# Patient Record
Sex: FEMALE | Race: WHITE | NOT HISPANIC OR LATINO | Employment: OTHER | URBAN - METROPOLITAN AREA
[De-identification: names, ages, dates, MRNs, and addresses within clinical notes are randomized per-mention and may not be internally consistent; named-entity substitution may affect disease eponyms.]

---

## 2017-01-03 ENCOUNTER — TELEPHONE (OUTPATIENT)
Dept: GASTROENTEROLOGY | Facility: CLINIC | Age: 72
End: 2017-01-03

## 2017-01-03 NOTE — TELEPHONE ENCOUNTER
Consider short term use of Pepcid Complete (famotidine/calcium carbonate). Please refer to the labeling instructions on the bottle.

## 2017-01-03 NOTE — TELEPHONE ENCOUNTER
Pt called, has been taking Protonix for over 1 year, dose was increased by Dr Moe at last visit to [facility].  She is having more heartburn/upper abd pain with some nausea.  She was on Nexium before it was OTC, and once it became OTC, insurance stopped covering.  Pt wants to know if she needs to change medicine or if there is anything else she should be doing?

## 2017-01-04 NOTE — TELEPHONE ENCOUNTER
Pt notified of Dr Moe's recommendation regarding Pepcid Complete.  She is to call back if this doesn't help

## 2017-01-10 ENCOUNTER — OFFICE VISIT (OUTPATIENT)
Dept: ORTHOPEDIC SURGERY | Facility: CLINIC | Age: 72
End: 2017-01-10

## 2017-01-10 VITALS — HEIGHT: 64 IN | BODY MASS INDEX: 34.83 KG/M2 | WEIGHT: 204 LBS | TEMPERATURE: 97.6 F

## 2017-01-10 DIAGNOSIS — M17.11 ARTHRITIS OF RIGHT KNEE: ICD-10-CM

## 2017-01-10 DIAGNOSIS — G89.29 CHRONIC PAIN OF RIGHT KNEE: Primary | ICD-10-CM

## 2017-01-10 DIAGNOSIS — M25.561 CHRONIC PAIN OF RIGHT KNEE: Primary | ICD-10-CM

## 2017-01-10 PROCEDURE — 99212 OFFICE O/P EST SF 10 MIN: CPT | Performed by: NURSE PRACTITIONER

## 2017-01-10 PROCEDURE — 73562 X-RAY EXAM OF KNEE 3: CPT | Performed by: NURSE PRACTITIONER

## 2017-01-10 RX ORDER — METHYLPREDNISOLONE 4 MG/1
TABLET ORAL
Qty: 21 TABLET | Refills: 0 | Status: SHIPPED | OUTPATIENT
Start: 2017-01-10 | End: 2017-05-15

## 2017-01-10 NOTE — MR AVS SNAPSHOT
Mahnaz Becerra   1/10/2017 3:45 PM   Office Visit    Dept Phone:  [phone]   Encounter #:  [id number]    Provider:  ROBERTA Howard   Department:  [facility]                Your Full Care Plan              Today's Medication Changes          These changes are accurate as of: 1/10/17  4:55 PM.  If you have any questions, ask your nurse or doctor.               New Medication(s)Ordered:     MethylPREDNISolone 4 MG tablet   Commonly known as:  MEDROL (MARGI)   Use as directed by package instructions   Started by:  ROBERTA Howard            Where to Get Your Medications      These medications were sent to [facility] [address] [place] [address] [address] AT [facility] - [phone] [facility] [phone]   [address] [facility] [address]     Phone:  [phone]     MethylPREDNISolone 4 MG tablet                  Your Updated Medication List          This list is accurate as of: 1/10/17  4:55 PM.  Always use your most recent med list.                albuterol 108 (90 BASE) MCG/ACT inhaler   Commonly known as:  VENTOLIN HFA   Inhale 2 puffs Every 6 (Six) Hours As Needed for wheezing.       CENTRUM SILVER ADULT 50+ PO       cyclobenzaprine 10 MG tablet   Commonly known as:  FLEXERIL   Take 1 tablet by mouth 2 (Two) Times a Day As Needed for muscle spasms.       dicyclomine 20 MG tablet   Commonly known as:  BENTYL   Take 1 tablet by mouth As Needed (abd cramps).       hydrochlorothiazide 25 MG tablet   Commonly known as:  HYDRODIURIL   TAKE 1 TABLET BY MOUTH DAILY       meloxicam 7.5 MG tablet   Commonly known as:  MOBIC       MethylPREDNISolone 4 MG tablet   Commonly known as:  MEDROL (MARGI)   Use as directed by package instructions       oxyCODONE-acetaminophen 7.5-325 MG per tablet   Commonly known as:  PERCOCET   1-2 Oral Q4H PRN severe pain       pantoprazole 40 MG EC tablet      Commonly known as:  PROTONIX   Take 1 tablet by mouth 2 (Two) Times a Day.       vitamin E 400 UNIT capsule               We Performed the Following     XR knee 1 or 2 vw bilateral     XR knee 1 or 2 vw right       You Were Diagnosed With        Codes Comments    Chronic pain of right knee    -  Primary ICD-10-CM: M25.561, G89.29  ICD-9-CM: 719.46, 338.29     Arthritis of right knee     ICD-10-CM: M19.90  ICD-9-CM: 716.96       Medications to be Given to You by a Medical Professional     Due       Frequency    2016 ipratropium-albuterol (DUO-NEB) nebulizer solution 3 mL  4 Times Daily - RT      Instructions     None    Patient Instructions History      Upcoming Appointments     Visit Type Date Time Department    FOLLOW UP 1/10/2017  3:45 PM [facility]    FOLLOW UP 2017 10:10 AM [facility]    FOLLOW UP 2017 10:30 AM [facility]      maniaTV Signup     [facility] [employer] allows you to send messages to your doctor, view your test results, renew your prescriptions, schedule appointments, and more. To sign up, go to [employer] and click on the Sign Up Now link in the New User? box. Enter your maniaTV Activation Code exactly as it appears below along with the last four digits of your Social Security Number and your Date of Birth () to complete the sign-up process. If you do not sign up before the expiration date, you must request a new code.    maniaTV Activation Code: [email or web address]  Expires: 2017  5:40 AM    If you have questions, you can email [email or web address] or call [phone] to talk to our maniaTV staff. Remember, maniaTV is NOT to be used for urgent needs. For medical emergencies, dial 911.               Other Info from Your Visit           Your Appointments     2017 10:10 AM EST   Follow Up with Artur Pat MD   [facility] [place] [facility] (--)    [address] [place] [address]  "[id number]   [phone]           Arrive 15 minutes prior to appointment.            May 04, 2017 10:30 AM EDT   Follow Up with Bernardo Botello DO   [facility] (--)    [address] [place] [id number] [phone]           Arrive 15 minutes prior to appointment.              Allergies     Lansoprazole  Itching    AND TURN RED    Levofloxacin  Swelling    RED RASH    Cefdinir Intolerance Other (See Comments)    Abdominal pain, caused upset stomach    Trazodone And Nefazodone Intolerance Other (See Comments)    Severe muscle cramps      Reason for Visit     Right Knee - Follow-up           Vital Signs     Temperature Height Weight Body Mass Index Smoking Status       97.6 °F (36.4 °C) (Temporal Artery ) 64\" (162.6 cm) 204 lb (92.5 kg) 35.02 kg/m2 Former Smoker       Problems and Diagnoses Noted     Arthritis of right knee    Knee pain, right      Results     XR knee 1 or 2 vw right           XR knee 1 or 2 vw bilateral               "

## 2017-02-02 ENCOUNTER — OFFICE VISIT (OUTPATIENT)
Dept: ORTHOPEDIC SURGERY | Facility: CLINIC | Age: 72
End: 2017-02-02

## 2017-02-02 VITALS — TEMPERATURE: 97.6 F | HEIGHT: 64 IN | BODY MASS INDEX: 34.66 KG/M2 | WEIGHT: 203 LBS

## 2017-02-02 DIAGNOSIS — M25.552 BILATERAL HIP PAIN: Primary | ICD-10-CM

## 2017-02-02 DIAGNOSIS — M25.551 BILATERAL HIP PAIN: Primary | ICD-10-CM

## 2017-02-02 DIAGNOSIS — M17.0 ARTHRITIS OF BOTH KNEES: ICD-10-CM

## 2017-02-02 PROCEDURE — 20610 DRAIN/INJ JOINT/BURSA W/O US: CPT | Performed by: NURSE PRACTITIONER

## 2017-02-02 RX ORDER — LIDOCAINE HYDROCHLORIDE 10 MG/ML
4 INJECTION, SOLUTION INFILTRATION; PERINEURAL
Status: COMPLETED | OUTPATIENT
Start: 2017-02-02 | End: 2017-02-02

## 2017-02-02 RX ORDER — LIDOCAINE HYDROCHLORIDE 10 MG/ML
2 INJECTION, SOLUTION INFILTRATION; PERINEURAL
Status: COMPLETED | OUTPATIENT
Start: 2017-02-02 | End: 2017-02-02

## 2017-02-02 RX ORDER — METHYLPREDNISOLONE ACETATE 80 MG/ML
80 INJECTION, SUSPENSION INTRA-ARTICULAR; INTRALESIONAL; INTRAMUSCULAR; SOFT TISSUE
Status: COMPLETED | OUTPATIENT
Start: 2017-02-02 | End: 2017-02-02

## 2017-02-02 RX ORDER — BUPIVACAINE HYDROCHLORIDE 5 MG/ML
2 INJECTION, SOLUTION PERINEURAL
Status: COMPLETED | OUTPATIENT
Start: 2017-02-02 | End: 2017-02-02

## 2017-02-02 RX ADMIN — LIDOCAINE HYDROCHLORIDE 2 ML: 10 INJECTION, SOLUTION INFILTRATION; PERINEURAL at 11:27

## 2017-02-02 RX ADMIN — BUPIVACAINE HYDROCHLORIDE 2 ML: 5 INJECTION, SOLUTION PERINEURAL at 11:27

## 2017-02-02 RX ADMIN — METHYLPREDNISOLONE ACETATE 80 MG: 80 INJECTION, SUSPENSION INTRA-ARTICULAR; INTRALESIONAL; INTRAMUSCULAR; SOFT TISSUE at 11:27

## 2017-02-02 RX ADMIN — LIDOCAINE HYDROCHLORIDE 4 ML: 10 INJECTION, SOLUTION INFILTRATION; PERINEURAL at 11:27

## 2017-02-02 NOTE — PROGRESS NOTES
"Knee Joint Injection      Patient: Mahnaz Becerra  YOB: 1945    Chief Complaints: Knee pain    Subjective:  This problem is not new to this examiner.     History of Present Illness: Pt gets intermittent  injections with good relief. Is here for repeat injection. Understands options. The pain is a generalized joint line tenderness.  It has been progressive in nature but remains intermittent.  Worsened by prolonged standing or walking and squatting activities. Has had improvement in the past with ice/heat, rest, and injections. TIMING:  The pain is described as ACUTE on CHRONIC.  LOCATION: medial joint line tenderness. AGGRAVATING FACTORS:  Is worsened by prolonged standing, sitting, walking and squatting activities.  ASSOCIATED SYMPTOMS:  swelling, tenderness, and aching. OTHER SYMPTOMS denies popping, locking or catching. RELIEVING FACTORS:  Previous treatment has included cortisone injections  viscosupplementation  OTC Tylenol  OTC meds/NSAIDS  prescription NSAIDS  controlled substances  activtiy modification  physical therapy  ice/heat/rest  pat rec'd L Knee Synvisc (doing well) & Rt knee Eddie inj 11/02/16. Pat has MS x17yrs, not on MS meds, does PT. Rt leg Sciatic nerve inflamed. R hip to ankle severely painful. Not sleeping, unable to WB very long. \"Absolutely miserable\" IS NOW IMPROVED MODERATELY after medrol dose pack , but is unable to flex right knee >100 with PT still. .      Allergies:   Allergies   Allergen Reactions   • Lansoprazole Itching     AND TURN RED   • Levofloxacin Swelling     RED RASH   • Cefdinir Other (See Comments)     Abdominal pain, caused upset stomach   • Trazodone And Nefazodone Other (See Comments)     Severe muscle cramps       Medications:   Home Medications:  Current Outpatient Prescriptions on File Prior to Visit   Medication Sig   • albuterol (VENTOLIN HFA) 108 (90 BASE) MCG/ACT inhaler Inhale 2 puffs Every 6 (Six) Hours As Needed for wheezing.   • cyclobenzaprine " (FLEXERIL) 10 MG tablet Take 1 tablet by mouth 2 (Two) Times a Day As Needed for muscle spasms.   • dicyclomine (BENTYL) 20 MG tablet Take 1 tablet by mouth As Needed (abd cramps).   • hydrochlorothiazide (HYDRODIURIL) 25 MG tablet TAKE 1 TABLET BY MOUTH DAILY   • meloxicam (MOBIC) 7.5 MG tablet Take 7.5 mg by mouth Daily.   • MethylPREDNISolone (MEDROL, MARGI,) 4 MG tablet Use as directed by package instructions   • Multiple Vitamins-Minerals (CENTRUM SILVER ADULT 50+ PO) Take  by mouth daily.   • oxyCODONE-acetaminophen (PERCOCET) 7.5-325 MG per tablet  1-2 Oral Q4H PRN severe pain   • pantoprazole (PROTONIX) 40 MG EC tablet Take 1 tablet by mouth 2 (Two) Times a Day.   • vitamin E 400 UNIT capsule Take 400 Units by mouth daily.     Current Facility-Administered Medications on File Prior to Visit   Medication   • ipratropium-albuterol (DUO-NEB) nebulizer solution 3 mL     Current Medications:  Scheduled Meds:  ipratropium-albuterol 3 mL Nebulization 4x Daily - RT     Continuous Infusions:   PRN Meds:.    I have reviewed the patient's medical history in detail and updated the computerized patient record.  Review and summarization of old records include:    Past Medical History   Diagnosis Date   • Acid reflux    • Asthma    • Bronchitis    • Edema    • IBS (irritable bowel syndrome)    • Knee pain, right    • Neuromuscular disorder      MS   • PONV (postoperative nausea and vomiting)      Patient states she had post-op nausea and vomiting after hysterectomy in .        Past Surgical History   Procedure Laterality Date   •  section     • Dilatation and curettage     • Breast surgery       REDUCTION   • Oophorectomy     • Knee surgery Left 2014   • Cholecystectomy     • Eye surgery Bilateral      BLEPHAROPLASTY   • Knee arthroscopy Right 2016     Procedure: KNEE ARTHROSCOPY, PARTIAL MEDIAL AND LATERAL MENISECTOMY, CHONDROPLASTY OF THE PATELLA, REMOVAL OF LOOSE BODIES;  Surgeon: Artur GOLD  MD Adilia;  Location:  [facility];  Service:    • Endoscopy  09/20/2012     Dr Moe   • Colonoscopy  09/20/2012     Dr Moe   • Breast lumpectomy Bilateral    • Hysterectomy  1987   • Tonsillectomy  1952   • Appendectomy  1997   • Endoscopy N/A 12/22/2016     Procedure: ESOPHAGOGASTRODUODENOSCOPY WITH BX;  Surgeon: Nasim Moe MD;  Location: [facility] ENDOSCOPY;  Service:         Social History     Occupational History   • Not on file.     Social History Main Topics   • Smoking status: Former Smoker     Packs/day: 0.25     Types: Cigarettes     Quit date: 1983   • Smokeless tobacco: Never Used      Comment: Patient states she was a social smoker.   • Alcohol use Yes      Comment: Occasional   • Drug use: No   • Sexual activity: Defer    Social History     Social History Narrative        Family History   Problem Relation Age of Onset   • Hypertension Mother    • Stroke Mother    • Alzheimer's disease Mother    • Heart disease Father    • Emphysema Father    • Stroke Maternal Grandmother    • Cancer Maternal Grandfather    • No Known Problems Paternal Grandmother    • Cerebral aneurysm Paternal Grandfather        ROS: 14 point review of systems was performed and was negative except for documented findings in HPI and today's encounter.     Allergies:   Allergies   Allergen Reactions   • Lansoprazole Itching     AND TURN RED   • Levofloxacin Swelling     RED RASH   • Cefdinir Other (See Comments)     Abdominal pain, caused upset stomach   • Trazodone And Nefazodone Other (See Comments)     Severe muscle cramps     Constitutional:  Denies fever, shaking or chills   Eyes:  Denies change in visual acuity   HENT:  Denies nasal congestion or sore throat   Respiratory:  Denies cough or shortness of breath   Cardiovascular:  Denies chest pain or severe LE edema   GI:  Denies abdominal pain, nausea, vomiting, bloody stools or diarrhea   Musculoskeletal:  Numbness, tingling, or loss of motor function only as noted above  in history of present illness.  : Denies painful urination or hematuria  Integument:  Denies rash, lesion or ulceration   Neurologic:  Denies headache or focal weakness  Endocrine:  Denies lymphadenopathy  Psych:  Denies confusion or change in mental status   Hem:  Denies active bleeding    Physical Exam:  Body mass index is 34.84 kg/(m^2).  Vitals:    02/02/17 1115   Temp: 97.6 °F (36.4 °C)     Vital Signs:  reviewed  Constitutional: Awake alert and oriented x3, well developed, no acute distress, non-toxic appearance.  EYES: symmetric, sclera clear  ENT:  Normocephalic, Atraumatic.   Respiratory:  No respiratory distress, No wheezing  CV: pulse regular, no palpitations or pallor.  GI:  Abdomen soft, non-tender.   Vascular:  Intact distal pulses, No cyanosis, no signs or symptoms of DVT.  Neurologic: Sensation grossly intact to the involved extremity, No focal deficits noted.   Neck: No tenderness, Supple.  Integument: warm, dry, no ulcerations.   Psychiatric:  Oriented, no pathological affect.  Musculoskeletal:    Affected knee(s):  Painful gait with a subtle limp, positive for synovitis, swelling, joint effusion with crepitation.  Lachman negative  Posterior drawer negative  Cherri's negative  Patellofemoral grind +  Sensation grossly intact to light touch throughout the lower extremity  Skin is intact  Distal pulses are palpable  No signs or symptoms of DVT        Diagnostic Data:     Imaging was done previously in the office and discussed at length with the patient:    Indication: pain related symptoms,  Views: 3V AP, LAT & 40 degree PA bilateral knee(s)   Findings: severe end-stage arthritis  Comparison views: viewed last xray done in the office.     Procedure:  Large Joint Arthrocentesis  Date/Time: 2/2/2017 11:27 AM  Consent given by: patient  Site marked: site marked  Timeout: Immediately prior to procedure a time out was called to verify the correct patient, procedure, equipment, support staff and  site/side marked as required   Supporting Documentation  Indications: pain and joint swelling   Procedure Details  Location: knee - R knee  Preparation: Patient was prepped and draped in the usual sterile fashion  Needle size: 22 G  Approach: anterolateral  Medications administered: 48 mg hylan 48 MG/6ML; 4 mL lidocaine 1 %  Patient tolerance: patient tolerated the procedure well with no immediate complications    Large Joint Arthrocentesis  Date/Time: 2/2/2017 11:27 AM  Consent given by: patient  Site marked: site marked  Timeout: Immediately prior to procedure a time out was called to verify the correct patient, procedure, equipment, support staff and site/side marked as required   Supporting Documentation  Indications: pain and joint swelling   Procedure Details  Location: knee - L knee  Preparation: Patient was prepped and draped in the usual sterile fashion  Needle size: 22 G  Approach: anterolateral  Medications administered: 2 mL bupivacaine; 2 mL lidocaine 1 %; 80 mg methylPREDNISolone acetate 80 MG/ML  Patient tolerance: patient tolerated the procedure well with no immediate complications          Assessment. Severe osteoarthritis bilateral knee(s).      Plan: Is to proceed with injection  Follow up as indicated.  Ice, elevate, and rest as needed.  Additional interventions include: Biomechanics of pertinent body area discussed.  Risks, benefits, alternatives, comparisons, and complications of accepted medicines, injections, recommendations, surgical procedures, and therapies explained and education provided in laymen's terms. The patient was given the opportunity to ask questions and they were answerved to their satisfaction.   Natural history and expected course discussed. Questions answered.  Advice on benefits of, and types of regular/moderate exercise.  Lifestyle measures for weight loss with biomechanical explanations for weight loss and how this affects orthopedic condition.  Cortisone Injection. See  procedure note.  Medications per orders; safety, precautions, and warnings given.  Cryotherapy/brachy therapy as indicated with instructions.   Viscosupplementation.  See procedure note.  Rest, ice, compression, and elevation (RICE) therapy.  3 mo f/u L knee synvisc if desires and right knee cortisone next visit.   2/2/2017  TRINH

## 2017-02-02 NOTE — PATIENT INSTRUCTIONS
Knee Injection, Care After  Refer to this sheet in the next few weeks. These instructions provide you with information about caring for yourself after your procedure. Your health care provider may also give you more specific instructions. Your treatment has been planned according to current medical practices, but problems sometimes occur. Call your health care provider if you have any problems or questions after your procedure.  WHAT TO EXPECT AFTER THE PROCEDURE  After your procedure, it is common to have:  · Soreness.  · Warmth.  · Swelling.  You may have more pain, swelling, and warmth than you did before the injection. This reaction may last for about one day.   HOME CARE INSTRUCTIONS  Bathing  · If you were given a bandage (dressing), keep it dry until your health care provider says it can be removed. Ask your health care provider when you can start showering or taking a bath.   Managing Pain, Stiffness, and Swelling  · If directed, apply ice to the injection area:    Put ice in a plastic bag.    Place a towel between your skin and the bag.    Leave the ice on for 20 minutes, 2-3 times per day.  · Do not apply heat to your knee.  · Raise the injection area above the level of your heart while you are sitting or lying down.  Activity  · Avoid strenuous activities for as long as directed by your health care provider. Ask your health care provider when you can return to your normal activities.   General Instructions  · Take medicines only as directed by your health care provider.  · Do not take aspirin or other over-the-counter medicines unless your health care provider says you can.  · Check your injection site every day for signs of infection. Watch for:    Redness, swelling, or pain.    Fluid, blood, or pus.  · Follow your health care provider's instructions about dressing changes and removal.  SEEK MEDICAL CARE IF:  · You have symptoms at your injection site that last longer than two days after your  procedure.  · You have redness, swelling, or pain in your injection area.  · You have fluid, blood, or pus coming from your injection site.  · You have warmth in your injection area.  · You have a fever.  · Your pain is not controlled with medicine.  SEEK IMMEDIATE MEDICAL CARE IF:  · Your knee turns very red.  · Your knee becomes very swollen.  · Your knee pain is severe.     This information is not intended to replace advice given to you by your health care provider. Make sure you discuss any questions you have with your health care provider.     Document Released: 01/08/2016 Document Reviewed: 01/08/2016  [employer] Patient Education ©2016 [employer]

## 2017-02-28 ENCOUNTER — RESULTS ENCOUNTER (OUTPATIENT)
Dept: FAMILY MEDICINE CLINIC | Facility: CLINIC | Age: 72
End: 2017-02-28

## 2017-02-28 DIAGNOSIS — E78.5 HYPERLIPIDEMIA, UNSPECIFIED HYPERLIPIDEMIA TYPE: ICD-10-CM

## 2017-03-17 ENCOUNTER — TELEPHONE (OUTPATIENT)
Dept: FAMILY MEDICINE CLINIC | Facility: CLINIC | Age: 72
End: 2017-03-17

## 2017-03-17 RX ORDER — AZITHROMYCIN 250 MG/1
TABLET, FILM COATED ORAL
Qty: 6 TABLET | Refills: 0 | Status: SHIPPED | OUTPATIENT
Start: 2017-03-17 | End: 2017-05-15

## 2017-03-22 ENCOUNTER — TELEPHONE (OUTPATIENT)
Dept: FAMILY MEDICINE CLINIC | Facility: CLINIC | Age: 72
End: 2017-03-22

## 2017-05-01 RX ORDER — HYDROCHLOROTHIAZIDE 25 MG/1
TABLET ORAL
Qty: 30 TABLET | Refills: 4 | Status: SHIPPED | OUTPATIENT
Start: 2017-05-01 | End: 2017-10-15 | Stop reason: SDUPTHER

## 2017-05-03 ENCOUNTER — CLINICAL SUPPORT (OUTPATIENT)
Dept: ORTHOPEDIC SURGERY | Facility: CLINIC | Age: 72
End: 2017-05-03

## 2017-05-03 VITALS — HEIGHT: 63 IN | BODY MASS INDEX: 34.91 KG/M2 | TEMPERATURE: 97.2 F | WEIGHT: 197 LBS

## 2017-05-03 DIAGNOSIS — M17.0 ARTHRITIS OF BOTH KNEES: Primary | ICD-10-CM

## 2017-05-03 PROCEDURE — 20610 DRAIN/INJ JOINT/BURSA W/O US: CPT | Performed by: NURSE PRACTITIONER

## 2017-05-03 RX ORDER — METHYLPREDNISOLONE ACETATE 80 MG/ML
80 INJECTION, SUSPENSION INTRA-ARTICULAR; INTRALESIONAL; INTRAMUSCULAR; SOFT TISSUE
Status: COMPLETED | OUTPATIENT
Start: 2017-05-03 | End: 2017-05-03

## 2017-05-03 RX ORDER — LIDOCAINE HYDROCHLORIDE 10 MG/ML
2 INJECTION, SOLUTION INFILTRATION; PERINEURAL
Status: COMPLETED | OUTPATIENT
Start: 2017-05-03 | End: 2017-05-03

## 2017-05-03 RX ORDER — BUPIVACAINE HYDROCHLORIDE 2.5 MG/ML
2 INJECTION, SOLUTION INFILTRATION; PERINEURAL
Status: COMPLETED | OUTPATIENT
Start: 2017-05-03 | End: 2017-05-03

## 2017-05-03 RX ORDER — LIDOCAINE HYDROCHLORIDE 10 MG/ML
4 INJECTION, SOLUTION INFILTRATION; PERINEURAL
Status: COMPLETED | OUTPATIENT
Start: 2017-05-03 | End: 2017-05-03

## 2017-05-03 RX ADMIN — LIDOCAINE HYDROCHLORIDE 2 ML: 10 INJECTION, SOLUTION INFILTRATION; PERINEURAL at 14:01

## 2017-05-03 RX ADMIN — BUPIVACAINE HYDROCHLORIDE 2 ML: 2.5 INJECTION, SOLUTION INFILTRATION; PERINEURAL at 14:01

## 2017-05-03 RX ADMIN — LIDOCAINE HYDROCHLORIDE 4 ML: 10 INJECTION, SOLUTION INFILTRATION; PERINEURAL at 14:01

## 2017-05-03 RX ADMIN — METHYLPREDNISOLONE ACETATE 80 MG: 80 INJECTION, SUSPENSION INTRA-ARTICULAR; INTRALESIONAL; INTRAMUSCULAR; SOFT TISSUE at 14:01

## 2017-05-04 ENCOUNTER — TELEPHONE (OUTPATIENT)
Dept: ORTHOPEDIC SURGERY | Facility: CLINIC | Age: 72
End: 2017-05-04

## 2017-05-15 ENCOUNTER — OFFICE VISIT (OUTPATIENT)
Dept: FAMILY MEDICINE CLINIC | Facility: CLINIC | Age: 72
End: 2017-05-15

## 2017-05-15 VITALS
WEIGHT: 198 LBS | OXYGEN SATURATION: 98 % | BODY MASS INDEX: 35.08 KG/M2 | HEART RATE: 58 BPM | SYSTOLIC BLOOD PRESSURE: 128 MMHG | TEMPERATURE: 97.7 F | HEIGHT: 63 IN | DIASTOLIC BLOOD PRESSURE: 84 MMHG

## 2017-05-15 DIAGNOSIS — D72.829 LEUKOCYTOSIS, UNSPECIFIED TYPE: ICD-10-CM

## 2017-05-15 DIAGNOSIS — I10 BENIGN ESSENTIAL HYPERTENSION: Primary | ICD-10-CM

## 2017-05-15 DIAGNOSIS — E78.5 DYSLIPIDEMIA: ICD-10-CM

## 2017-05-15 DIAGNOSIS — G47.33 OSA (OBSTRUCTIVE SLEEP APNEA): ICD-10-CM

## 2017-05-15 DIAGNOSIS — E03.8 OTHER SPECIFIED HYPOTHYROIDISM: ICD-10-CM

## 2017-05-15 PROCEDURE — 99214 OFFICE O/P EST MOD 30 MIN: CPT | Performed by: FAMILY MEDICINE

## 2017-05-25 DIAGNOSIS — R52 PAIN: ICD-10-CM

## 2017-05-25 RX ORDER — MELOXICAM 7.5 MG/1
TABLET ORAL
Qty: 90 TABLET | Refills: 3 | Status: SHIPPED | OUTPATIENT
Start: 2017-05-25 | End: 2017-08-08 | Stop reason: SDUPTHER

## 2017-06-14 ENCOUNTER — TRANSCRIBE ORDERS (OUTPATIENT)
Dept: ADMINISTRATIVE | Facility: HOSPITAL | Age: 72
End: 2017-06-14

## 2017-06-14 DIAGNOSIS — R42 VERTIGO: ICD-10-CM

## 2017-06-14 DIAGNOSIS — H53.2 DIPLOPIA: Primary | ICD-10-CM

## 2017-06-21 ENCOUNTER — APPOINTMENT (OUTPATIENT)
Dept: CARDIOLOGY | Facility: HOSPITAL | Age: 72
End: 2017-06-21

## 2017-06-21 ENCOUNTER — APPOINTMENT (OUTPATIENT)
Dept: MRI IMAGING | Facility: HOSPITAL | Age: 72
End: 2017-06-21

## 2017-06-29 ENCOUNTER — HOSPITAL ENCOUNTER (OUTPATIENT)
Dept: CARDIOLOGY | Facility: HOSPITAL | Age: 72
Discharge: HOME | End: 2017-06-29

## 2017-06-29 ENCOUNTER — HOSPITAL ENCOUNTER (OUTPATIENT)
Dept: MRI IMAGING | Facility: HOSPITAL | Age: 72
Discharge: HOME | End: 2017-06-29
Admitting: PSYCHIATRY & NEUROLOGY

## 2017-06-29 DIAGNOSIS — H53.2 DIPLOPIA: ICD-10-CM

## 2017-06-29 DIAGNOSIS — R42 VERTIGO: ICD-10-CM

## 2017-06-29 LAB — CREAT BLDA-MCNC: 0.7 MG/DL (ref 0.6–1.3)

## 2017-06-29 PROCEDURE — 82565 ASSAY OF CREATININE: CPT

## 2017-06-29 PROCEDURE — A9577 INJ MULTIHANCE: HCPCS | Performed by: PSYCHIATRY & NEUROLOGY

## 2017-06-29 PROCEDURE — 0 GADOBENATE DIMEGLUMINE 529 MG/ML SOLUTION: Performed by: PSYCHIATRY & NEUROLOGY

## 2017-06-29 PROCEDURE — 70553 MRI BRAIN STEM W/O & W/DYE: CPT

## 2017-06-29 PROCEDURE — 93880 EXTRACRANIAL BILAT STUDY: CPT

## 2017-06-29 RX ADMIN — GADOBENATE DIMEGLUMINE 18 ML: 529 INJECTION, SOLUTION INTRAVENOUS at 15:23

## 2017-06-30 LAB
BH CV XLRA MEAS LEFT CCA RATIO VEL: 68.6 CM/SEC
BH CV XLRA MEAS LEFT DIST CCA EDV: 20.5 CM/SEC
BH CV XLRA MEAS LEFT DIST CCA PSV: 68.6 CM/SEC
BH CV XLRA MEAS LEFT DIST ICA EDV: -33 CM/SEC
BH CV XLRA MEAS LEFT DIST ICA PSV: -69.1 CM/SEC
BH CV XLRA MEAS LEFT ICA RATIO VEL: 89.1 CM/SEC
BH CV XLRA MEAS LEFT ICA/CCA RATIO: 1.3
BH CV XLRA MEAS LEFT MID ICA EDV: 33.4 CM/SEC
BH CV XLRA MEAS LEFT MID ICA PSV: 89.1 CM/SEC
BH CV XLRA MEAS LEFT PROX CCA EDV: 31.9 CM/SEC
BH CV XLRA MEAS LEFT PROX CCA PSV: 140 CM/SEC
BH CV XLRA MEAS LEFT PROX ECA EDV: -10 CM/SEC
BH CV XLRA MEAS LEFT PROX ECA PSV: -87.9 CM/SEC
BH CV XLRA MEAS LEFT PROX ICA EDV: -28.1 CM/SEC
BH CV XLRA MEAS LEFT PROX ICA PSV: -75.6 CM/SEC
BH CV XLRA MEAS LEFT PROX SCLA EDV: 11 CM/SEC
BH CV XLRA MEAS LEFT PROX SCLA PSV: 134 CM/SEC
BH CV XLRA MEAS LEFT VERTEBRAL A EDV: 16.4 CM/SEC
BH CV XLRA MEAS LEFT VERTEBRAL A PSV: 66.8 CM/SEC
BH CV XLRA MEAS RIGHT CCA RATIO VEL: 78.6 CM/SEC
BH CV XLRA MEAS RIGHT DIST CCA EDV: 21.1 CM/SEC
BH CV XLRA MEAS RIGHT DIST CCA PSV: 78.6 CM/SEC
BH CV XLRA MEAS RIGHT DIST ICA EDV: -29.9 CM/SEC
BH CV XLRA MEAS RIGHT DIST ICA PSV: -72.3 CM/SEC
BH CV XLRA MEAS RIGHT ICA RATIO VEL: -90.3 CM/SEC
BH CV XLRA MEAS RIGHT ICA/CCA RATIO: -1.1
BH CV XLRA MEAS RIGHT MID ICA EDV: -36.4 CM/SEC
BH CV XLRA MEAS RIGHT MID ICA PSV: -90.3 CM/SEC
BH CV XLRA MEAS RIGHT PROX CCA EDV: 28.3 CM/SEC
BH CV XLRA MEAS RIGHT PROX CCA PSV: 136 CM/SEC
BH CV XLRA MEAS RIGHT PROX ECA EDV: -9.6 CM/SEC
BH CV XLRA MEAS RIGHT PROX ECA PSV: -92.5 CM/SEC
BH CV XLRA MEAS RIGHT PROX ICA EDV: -21.1 CM/SEC
BH CV XLRA MEAS RIGHT PROX ICA PSV: -85 CM/SEC
BH CV XLRA MEAS RIGHT PROX SCLA PSV: 135 CM/SEC
BH CV XLRA MEAS RIGHT VERTEBRAL A EDV: 9 CM/SEC
BH CV XLRA MEAS RIGHT VERTEBRAL A PSV: 49.1 CM/SEC
LEFT ARM BP: NORMAL MMHG
RIGHT ARM BP: NORMAL MMHG

## 2017-08-08 ENCOUNTER — CLINICAL SUPPORT (OUTPATIENT)
Dept: ORTHOPEDIC SURGERY | Facility: CLINIC | Age: 72
End: 2017-08-08

## 2017-08-08 VITALS — BODY MASS INDEX: 35.08 KG/M2 | TEMPERATURE: 97.7 F | HEIGHT: 63 IN | WEIGHT: 198 LBS

## 2017-08-08 DIAGNOSIS — M17.11 ARTHRITIS OF RIGHT KNEE: ICD-10-CM

## 2017-08-08 DIAGNOSIS — Z98.890 S/P RIGHT KNEE ARTHROSCOPY: Primary | ICD-10-CM

## 2017-08-08 DIAGNOSIS — M25.562 CHRONIC PAIN OF LEFT KNEE: ICD-10-CM

## 2017-08-08 DIAGNOSIS — G89.29 CHRONIC PAIN OF LEFT KNEE: ICD-10-CM

## 2017-08-08 DIAGNOSIS — M17.12 ARTHRITIS OF LEFT KNEE: ICD-10-CM

## 2017-08-08 PROCEDURE — 73562 X-RAY EXAM OF KNEE 3: CPT | Performed by: ORTHOPAEDIC SURGERY

## 2017-08-08 PROCEDURE — 20610 DRAIN/INJ JOINT/BURSA W/O US: CPT | Performed by: ORTHOPAEDIC SURGERY

## 2017-08-08 PROCEDURE — 99213 OFFICE O/P EST LOW 20 MIN: CPT | Performed by: ORTHOPAEDIC SURGERY

## 2017-08-08 RX ORDER — METHYLPREDNISOLONE ACETATE 80 MG/ML
80 INJECTION, SUSPENSION INTRA-ARTICULAR; INTRALESIONAL; INTRAMUSCULAR; SOFT TISSUE
Status: COMPLETED | OUTPATIENT
Start: 2017-08-08 | End: 2017-08-08

## 2017-08-08 RX ORDER — BUPIVACAINE HYDROCHLORIDE 5 MG/ML
2 INJECTION, SOLUTION PERINEURAL
Status: COMPLETED | OUTPATIENT
Start: 2017-08-08 | End: 2017-08-08

## 2017-08-08 RX ORDER — NABUMETONE 500 MG/1
500 TABLET, FILM COATED ORAL 2 TIMES DAILY PRN
Qty: 60 TABLET | Refills: 5 | Status: SHIPPED | OUTPATIENT
Start: 2017-08-08 | End: 2017-11-09

## 2017-08-08 RX ORDER — LIDOCAINE HYDROCHLORIDE 10 MG/ML
4 INJECTION, SOLUTION INFILTRATION; PERINEURAL
Status: COMPLETED | OUTPATIENT
Start: 2017-08-08 | End: 2017-08-08

## 2017-08-08 RX ORDER — LIDOCAINE HYDROCHLORIDE 10 MG/ML
2 INJECTION, SOLUTION INFILTRATION; PERINEURAL
Status: COMPLETED | OUTPATIENT
Start: 2017-08-08 | End: 2017-08-08

## 2017-08-08 RX ADMIN — BUPIVACAINE HYDROCHLORIDE 2 ML: 5 INJECTION, SOLUTION PERINEURAL at 10:53

## 2017-08-08 RX ADMIN — METHYLPREDNISOLONE ACETATE 80 MG: 80 INJECTION, SUSPENSION INTRA-ARTICULAR; INTRALESIONAL; INTRAMUSCULAR; SOFT TISSUE at 10:53

## 2017-08-08 RX ADMIN — LIDOCAINE HYDROCHLORIDE 4 ML: 10 INJECTION, SOLUTION INFILTRATION; PERINEURAL at 10:54

## 2017-08-08 RX ADMIN — LIDOCAINE HYDROCHLORIDE 2 ML: 10 INJECTION, SOLUTION INFILTRATION; PERINEURAL at 10:53

## 2017-08-08 NOTE — PROGRESS NOTES
Knee Follow Up Visit      Patient: Mahnaz Becerra  YOB: 1945    Chief Complaints:  bilateral knee pain    Subjective:    History of Present Illness: Here today for knee pain. The pain is a generalized joint tenderness.  It has been progressive in nature but remains intermittent.  Worsened by prolonged standing or walking and squatting activities. Has had improvement in the past with ice/heat, rest, and injections. Had synvisc on the left.  Helps  Achy.  Atraumatic.  contant with intermittent severe.  Has MS>  mobic not helping as much.  Went over precuations    This problem is not new to this examiner.     Allergies:   Allergies   Allergen Reactions   • Lansoprazole Itching     AND TURN RED   • Levofloxacin Swelling     RED RASH   • Cefdinir Other (See Comments)     Abdominal pain, caused upset stomach   • Trazodone And Nefazodone Other (See Comments)     Severe muscle cramps       Medications:   Home Medications:  Current Outpatient Prescriptions on File Prior to Visit   Medication Sig   • albuterol (VENTOLIN HFA) 108 (90 BASE) MCG/ACT inhaler Inhale 2 puffs Every 6 (Six) Hours As Needed for wheezing.   • cyclobenzaprine (FLEXERIL) 10 MG tablet Take 1 tablet by mouth 2 (Two) Times a Day As Needed for muscle spasms.   • dicyclomine (BENTYL) 20 MG tablet Take 1 tablet by mouth As Needed (abd cramps).   • hydrochlorothiazide (HYDRODIURIL) 25 MG tablet TAKE 1 TABLET BY MOUTH DAILY   • Multiple Vitamins-Minerals (CENTRUM SILVER ADULT 50+ PO) Take  by mouth daily.   • oxyCODONE-acetaminophen (PERCOCET) 7.5-325 MG per tablet  1-2 Oral Q4H PRN severe pain   • pantoprazole (PROTONIX) 40 MG EC tablet Take 1 tablet by mouth 2 (Two) Times a Day.   • vitamin E 400 UNIT capsule Take 400 Units by mouth daily.   • [DISCONTINUED] meloxicam (MOBIC) 7.5 MG tablet Take 7.5 mg by mouth Daily.   • [DISCONTINUED] meloxicam (MOBIC) 7.5 MG tablet TAKE 1 TABLET BY MOUTH EVERY DAY     Current Facility-Administered Medications  on File Prior to Visit   Medication   • ipratropium-albuterol (DUO-NEB) nebulizer solution 3 mL     Current Medications:  Scheduled Meds:    ipratropium-albuterol 3 mL Nebulization 4x Daily - RT     Continuous Infusions:   PRN Meds:.    I have reviewed the patient's medical history in detail and updated the computerized patient record.  Review and summarization of old records include:    Past Medical History:   Diagnosis Date   • Acid reflux    • Asthma    • Bronchitis    • Edema    • IBS (irritable bowel syndrome)    • Knee pain, right    • Neuromuscular disorder     MS   • PONV (postoperative nausea and vomiting)     Patient states she had post-op nausea and vomiting after hysterectomy in .        Past Surgical History:   Procedure Laterality Date   • APPENDECTOMY     • BREAST LUMPECTOMY Bilateral    • BREAST SURGERY      REDUCTION   •  SECTION     • CHOLECYSTECTOMY     • COLONOSCOPY  2012    Dr Moe   • DILATATION AND CURETTAGE     • ENDOSCOPY  2012    Dr Moe   • ENDOSCOPY N/A 2016    Procedure: ESOPHAGOGASTRODUODENOSCOPY WITH BX;  Surgeon: Nasim Moe MD;  Location: [facility] ENDOSCOPY;  Service:    • EYE SURGERY Bilateral     BLEPHAROPLASTY   • HYSTERECTOMY     • KNEE ARTHROSCOPY Right 2016    Procedure: KNEE ARTHROSCOPY, PARTIAL MEDIAL AND LATERAL MENISECTOMY, CHONDROPLASTY OF THE PATELLA, REMOVAL OF LOOSE BODIES;  Surgeon: Artur Pat MD;  Location: [facility] OR [facility] [place];  Service:    • KNEE SURGERY Left 2014   • OOPHORECTOMY     • TONSILLECTOMY          Social History     Occupational History   • Not on file.     Social History Main Topics   • Smoking status: Former Smoker     Packs/day: 0.25     Types: Cigarettes     Quit date:    • Smokeless tobacco: Never Used      Comment: Patient states she was a social smoker.   • Alcohol use Yes      Comment: Occasional   • Drug use: No   • Sexual activity: Defer      Social History     Social  History Narrative        Family History   Problem Relation Age of Onset   • Hypertension Mother    • Stroke Mother    • Alzheimer's disease Mother    • Heart disease Father    • Emphysema Father    • Stroke Maternal Grandmother    • Cancer Maternal Grandfather    • No Known Problems Paternal Grandmother    • Cerebral aneurysm Paternal Grandfather        ROS: 14 point review of systems was performed and was negative except for documented findings in HPI and today's encounter.     Allergies:   Allergies   Allergen Reactions   • Lansoprazole Itching     AND TURN RED   • Levofloxacin Swelling     RED RASH   • Cefdinir Other (See Comments)     Abdominal pain, caused upset stomach   • Trazodone And Nefazodone Other (See Comments)     Severe muscle cramps     Constitutional:  Denies fever, shaking or chills   Eyes:  Denies change in visual acuity   HENT:  Denies nasal congestion or sore throat   Respiratory:  Denies cough or shortness of breath   Cardiovascular:  Denies chest pain or severe LE edema   GI:  Denies abdominal pain, nausea, vomiting, bloody stools or diarrhea   Musculoskeletal:  Numbness, tingling, or loss of motor function only as noted above in history of present illness.  : Denies painful urination or hematuria  Integument:  Denies rash, lesion or ulceration   Neurologic:  Denies headache or focal weakness  Endocrine:  Denies lymphadenopathy  Psych:  Denies confusion or change in mental status   Hem:  Denies active bleeding    Physical Exam:  Body mass index is 35.07 kg/(m^2).  Vitals:    08/08/17 1052   Temp: 97.7 °F (36.5 °C)     Vital Signs:  reviewed  Constitutional: Awake alert and oriented x3, well developed, no acute distress, non-toxic appearance.  EYES: symmetric, sclera clear  ENT:  Normocephalic, Atraumatic.   Respiratory:  No respiratory distress, No wheezing  CV: pulse regular, no palpitations or pallor.  GI:  Abdomen soft, non-tender.   Vascular:  Intact distal pulses, No cyanosis, no signs  or symptoms of DVT.  Neurologic: Sensation grossly intact to the involved extremity, No focal deficits noted.   Neck: No tenderness, Supple.  Integument: warm, dry, no ulcerations.   Psychiatric:  Oriented, no pathological affect.  Musculoskeletal:    Affected knee(s):  Painful gait with a subtle limp, positive for synovitis, swelling, joint effusion with crepitation.  Lachman negative  Posterior drawer negative  Cherri's negative  Patellofemoral grind +  Sensation grossly intact to light touch throughout the lower extremity  Skin is intact  Distal pulses are palpable  No signs or symptoms of DVT        Diagnostic Data:     Imaging was done today and discussed at length with the patient:    Indication: pain related symptoms,  Views: 3V AP, LAT & 40 degree PA bilateral knee(s)   Findings: severe end-stage arthritis (bone on bone, subchondral sclerosis/cysts, osteophytes), advanced arthritis  Comparison views: viewed last xray done in the office.     Procedure:  Large Joint Arthrocentesis  Date/Time: 8/8/2017 10:53 AM  Consent given by: patient  Site marked: site marked  Timeout: Immediately prior to procedure a time out was called to verify the correct patient, procedure, equipment, support staff and site/side marked as required   Supporting Documentation  Indications: pain   Procedure Details  Location: knee - L knee  Needle size: 25 G  Approach: anteromedial  Medications administered: 80 mg methylPREDNISolone acetate 80 MG/ML; 2 mL lidocaine 1 %; 2 mL bupivacaine  Patient tolerance: patient tolerated the procedure well with no immediate complications    Large Joint Arthrocentesis  Date/Time: 8/8/2017 10:54 AM  Consent given by: patient  Site marked: site marked  Timeout: Immediately prior to procedure a time out was called to verify the correct patient, procedure, equipment, support staff and site/side marked as required   Supporting Documentation  Indications: pain   Procedure Details  Location: knee - R  knee  Needle size: 25 G  Approach: anteromedial  Medications administered: 4 mL lidocaine 1 %; 48 mg hylan 48 MG/6ML  Patient tolerance: patient tolerated the procedure well with no immediate complications          Assessment. Severe arthritis bilateral knee(s).      Plan: Is to proceed with injection  Follow up as indicated.  Ice, elevate, and rest as needed.  Additional interventions include: Biomechanics of pertinent body area discussed.  Risks, benefits, alternatives, comparisons, and complications of accepted medicines, injections, recommendations, surgical procedures, and therapies explained and education provided in laymen's terms. The patient was given the opportunity to ask questions and they were answerved to their satisfaction.   Natural history and expected course discussed. Questions answered.  Lifestyle measures for weight loss with biomechanical explanations for weight loss and how this affects orthopedic condition.  Cortisone Injection for DIAGNOSTIC and THERAPUTIC purposes.  Medications per orders; safety, precautions, and warnings given.  PT referral offered and declined, advised on stretching/strengthening exercises.  15 min spent face to face with patient >10 min spent counseling about natural history and expected course of assessed complaint. Questions answered.  Advised on knee strengthening exercises, stressed importance of these with progression of arthritis, demonstrated exercises to patient with repeat demonstration and verb of understanding.   Viscosupplementation.  See procedure note.  Compression/elastic brace/support sleeve.   Biomechanics and proper use of cane/ambulatory aids.    8/8/2017

## 2017-08-14 ENCOUNTER — TELEPHONE (OUTPATIENT)
Dept: ORTHOPEDIC SURGERY | Facility: CLINIC | Age: 72
End: 2017-08-14

## 2017-08-14 DIAGNOSIS — M17.11 ARTHRITIS OF RIGHT KNEE: Primary | ICD-10-CM

## 2017-08-14 RX ORDER — TRAMADOL HYDROCHLORIDE 50 MG/1
50 TABLET ORAL EVERY 4 HOURS PRN
Qty: 30 TABLET | Refills: 0 | Status: SHIPPED | OUTPATIENT
Start: 2017-08-14 | End: 2017-11-09 | Stop reason: SDUPTHER

## 2017-08-14 NOTE — TELEPHONE ENCOUNTER
Rx was called in to the [facility] pharmacy on file and the patient has been notified as well.

## 2017-08-14 NOTE — TELEPHONE ENCOUNTER
Discussed reaction and encouraged to ice, rest and elevate as needed until reaction goes down.  Cannot sleep and is not getting any relief. Will go ahead and give a limited dose with precautions.   Thanks TRINH

## 2017-09-22 ENCOUNTER — OFFICE VISIT (OUTPATIENT)
Dept: FAMILY MEDICINE CLINIC | Facility: CLINIC | Age: 72
End: 2017-09-22

## 2017-09-22 VITALS
HEIGHT: 63 IN | TEMPERATURE: 98.2 F | OXYGEN SATURATION: 97 % | BODY MASS INDEX: 35.79 KG/M2 | HEART RATE: 81 BPM | DIASTOLIC BLOOD PRESSURE: 72 MMHG | SYSTOLIC BLOOD PRESSURE: 136 MMHG | WEIGHT: 202 LBS
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
## 2017-09-22 PROCEDURE — 99213 OFFICE O/P EST LOW 20 MIN: CPT | Performed by: NURSE PRACTITIONER

## 2017-09-22 RX ORDER — DIAPER,BRIEF,INFANT-TODD,DISP
EACH MISCELLANEOUS 2 TIMES DAILY
Qty: 56 G | Refills: 0 | Status: SHIPPED | OUTPATIENT
Start: 2017-09-22 | End: 2018-01-25

## 2017-09-22 RX ORDER — CEPHALEXIN 500 MG/1
500 CAPSULE ORAL 2 TIMES DAILY
Qty: 20 CAPSULE | Refills: 0 | Status: SHIPPED | OUTPATIENT
Start: 2017-09-22 | End: 2017-11-09

## 2017-09-22 NOTE — PROGRESS NOTES
Lopez Becerra is a 72 y.o. female. She is here for a possible spider bite under her L armpit, on the R side of her chest and right side of her abdomen. She was trimming shrubs last night and thinks she was bit by a spider. She did notice there were several spider webs on the shrubs which she knocked to the side. She went to therapy this morning and while there noticed some stinging to the area on the right upper chest. When she was checking it out, she noticed there was also areas in the left axilla and on the left side of the abdomen. The last time this happened she developed cellulitis.     Insect Bite   This is a new problem. The current episode started yesterday. The problem occurs constantly. The problem has been gradually worsening. Pertinent negatives include no chills or fever. Associated symptoms comments: Stinging of the skin  . Nothing aggravates the symptoms. Treatments tried: Benadryl cream.        The following portions of the patient's history were reviewed and updated as appropriate: allergies, current medications, past medical history, past social history, past surgical history and problem list.    Review of Systems   Constitutional: Negative.  Negative for chills and fever.   Respiratory: Negative.    Cardiovascular: Negative.    Skin:        3 areas of redness.        Objective   Physical Exam   Constitutional: She appears well-developed and well-nourished.   Cardiovascular: Normal rate, regular rhythm and normal heart sounds.    Pulmonary/Chest: Effort normal and breath sounds normal.   Skin: Skin is warm and dry. There is erythema.        Area on the right upper chest with erythema, vesicles and warmth, area on the right upper abdomen with erythema, warmth, area under the left axilla with erythema, warmth, No drainage.    Nursing note and vitals reviewed.    Vitals:    09/22/17 1558   BP: 136/72   Pulse: 81   Temp: 98.2 °F (36.8 °C)   TempSrc: Oral   SpO2: 97%   Weight: 202 lb  "(91.6 kg)   Height: 63\" (160 cm)       Assessment/Plan   Diagnoses and all orders for this visit:    Insect bite, initial encounter  -     hydrocortisone 1 % ointment; Apply  topically 2 (Two) Times a Day.  -     cephalexin (KEFLEX) 500 MG capsule; Take 1 capsule by mouth 2 (Two) Times a Day.      States she has taken Keflex before and tolerated well. She does not have true allergy to cefdinir, just upsets her stomach. Keflex ok.  Keep clean and dry.  Monitor for worsening infection.  RTC prn         "

## 2017-10-16 ENCOUNTER — TELEPHONE (OUTPATIENT)
Dept: FAMILY MEDICINE CLINIC | Facility: CLINIC | Age: 72
End: 2017-10-16

## 2017-10-16 RX ORDER — HYDROCHLOROTHIAZIDE 25 MG/1
TABLET ORAL
Qty: 30 TABLET | Refills: 6 | Status: SHIPPED | OUTPATIENT
Start: 2017-10-16 | End: 2017-10-16 | Stop reason: SDUPTHER

## 2017-10-16 RX ORDER — HYDROCHLOROTHIAZIDE 25 MG/1
25 TABLET ORAL DAILY
Qty: 90 TABLET | Refills: 1 | Status: SHIPPED | OUTPATIENT
Start: 2017-10-16 | End: 2018-05-13 | Stop reason: SDUPTHER

## 2017-11-09 ENCOUNTER — CLINICAL SUPPORT (OUTPATIENT)
Dept: ORTHOPEDIC SURGERY | Facility: CLINIC | Age: 72
End: 2017-11-09

## 2017-11-09 VITALS — WEIGHT: 202 LBS | HEIGHT: 63 IN | TEMPERATURE: 98.2 F | BODY MASS INDEX: 35.79 KG/M2

## 2017-11-09 DIAGNOSIS — M17.12 ARTHRITIS OF LEFT KNEE: ICD-10-CM

## 2017-11-09 PROCEDURE — 20610 DRAIN/INJ JOINT/BURSA W/O US: CPT | Performed by: NURSE PRACTITIONER

## 2017-11-09 RX ORDER — CEFAZOLIN SODIUM 2 G/100ML
2 INJECTION, SOLUTION INTRAVENOUS ONCE
Status: CANCELLED | OUTPATIENT
Start: 2018-02-07 | End: 2018-02-07

## 2017-11-09 RX ORDER — TRAMADOL HYDROCHLORIDE 50 MG/1
50 TABLET ORAL EVERY 4 HOURS PRN
Qty: 45 TABLET | Refills: 0 | Status: SHIPPED | OUTPATIENT
Start: 2017-11-09 | End: 2018-02-09 | Stop reason: HOSPADM

## 2017-11-09 RX ORDER — BUPIVACAINE HYDROCHLORIDE 5 MG/ML
2 INJECTION, SOLUTION EPIDURAL; INTRACAUDAL
Status: COMPLETED | OUTPATIENT
Start: 2017-11-09 | End: 2017-11-09

## 2017-11-09 RX ORDER — METHYLPREDNISOLONE ACETATE 80 MG/ML
80 INJECTION, SUSPENSION INTRA-ARTICULAR; INTRALESIONAL; INTRAMUSCULAR; SOFT TISSUE
Status: COMPLETED | OUTPATIENT
Start: 2017-11-09 | End: 2017-11-09

## 2017-11-09 RX ORDER — LIDOCAINE HYDROCHLORIDE 10 MG/ML
2 INJECTION, SOLUTION EPIDURAL; INFILTRATION; INTRACAUDAL; PERINEURAL
Status: COMPLETED | OUTPATIENT
Start: 2017-11-09 | End: 2017-11-09

## 2017-11-09 RX ADMIN — METHYLPREDNISOLONE ACETATE 80 MG: 80 INJECTION, SUSPENSION INTRA-ARTICULAR; INTRALESIONAL; INTRAMUSCULAR; SOFT TISSUE at 11:08

## 2017-11-09 RX ADMIN — LIDOCAINE HYDROCHLORIDE 2 ML: 10 INJECTION, SOLUTION EPIDURAL; INFILTRATION; INTRACAUDAL; PERINEURAL at 11:08

## 2017-11-09 RX ADMIN — BUPIVACAINE HYDROCHLORIDE 2 ML: 5 INJECTION, SOLUTION EPIDURAL; INTRACAUDAL at 11:08

## 2018-01-24 ENCOUNTER — LAB (OUTPATIENT)
Dept: LAB | Facility: HOSPITAL | Age: 73
End: 2018-01-24

## 2018-01-24 ENCOUNTER — TRANSCRIBE ORDERS (OUTPATIENT)
Dept: ADMINISTRATIVE | Facility: HOSPITAL | Age: 73
End: 2018-01-24

## 2018-01-24 DIAGNOSIS — Z00.00 ROUTINE GENERAL MEDICAL EXAMINATION AT A HEALTH CARE FACILITY: ICD-10-CM

## 2018-01-24 DIAGNOSIS — Z00.00 ROUTINE GENERAL MEDICAL EXAMINATION AT A HEALTH CARE FACILITY: Primary | ICD-10-CM

## 2018-01-24 LAB
ALBUMIN SERPL-MCNC: 4.2 G/DL (ref 3.5–5.2)
ALBUMIN/GLOB SERPL: 1.6 G/DL
ALP SERPL-CCNC: 71 U/L (ref 39–117)
ALT SERPL W P-5'-P-CCNC: 14 U/L (ref 1–33)
ANION GAP SERPL CALCULATED.3IONS-SCNC: 11.1 MMOL/L
AST SERPL-CCNC: 18 U/L (ref 1–32)
BASOPHILS # BLD AUTO: 0.04 10*3/MM3 (ref 0–0.2)
BASOPHILS NFR BLD AUTO: 0.6 % (ref 0–1.5)
BILIRUB SERPL-MCNC: 0.5 MG/DL (ref 0.1–1.2)
BILIRUB UR QL STRIP: NEGATIVE
BUN BLD-MCNC: 15 MG/DL (ref 8–23)
BUN/CREAT SERPL: 18.5 (ref 7–25)
CALCIUM SPEC-SCNC: 9.3 MG/DL (ref 8.6–10.5)
CHLORIDE SERPL-SCNC: 100 MMOL/L (ref 98–107)
CHOLEST SERPL-MCNC: 205 MG/DL (ref 0–200)
CLARITY UR: CLEAR
CO2 SERPL-SCNC: 29.9 MMOL/L (ref 22–29)
COLOR UR: YELLOW
CREAT BLD-MCNC: 0.81 MG/DL (ref 0.57–1)
DEPRECATED RDW RBC AUTO: 46.3 FL (ref 37–54)
EOSINOPHIL # BLD AUTO: 0.22 10*3/MM3 (ref 0–0.7)
EOSINOPHIL NFR BLD AUTO: 3.2 % (ref 0.3–6.2)
ERYTHROCYTE [DISTWIDTH] IN BLOOD BY AUTOMATED COUNT: 13.4 % (ref 11.7–13)
GFR SERPL CREATININE-BSD FRML MDRD: 70 ML/MIN/1.73
GLOBULIN UR ELPH-MCNC: 2.6 GM/DL
GLUCOSE BLD-MCNC: 103 MG/DL (ref 65–99)
GLUCOSE UR STRIP-MCNC: NEGATIVE MG/DL
HCT VFR BLD AUTO: 41.3 % (ref 35.6–45.5)
HDLC SERPL-MCNC: 54 MG/DL (ref 40–60)
HGB BLD-MCNC: 13.3 G/DL (ref 11.9–15.5)
HGB UR QL STRIP.AUTO: NEGATIVE
IMM GRANULOCYTES # BLD: 0 10*3/MM3 (ref 0–0.03)
IMM GRANULOCYTES NFR BLD: 0 % (ref 0–0.5)
KETONES UR QL STRIP: NEGATIVE
LDLC SERPL CALC-MCNC: 133 MG/DL (ref 0–100)
LDLC/HDLC SERPL: 2.47 {RATIO}
LEUKOCYTE ESTERASE UR QL STRIP.AUTO: NEGATIVE
LYMPHOCYTES # BLD AUTO: 2.2 10*3/MM3 (ref 0.9–4.8)
LYMPHOCYTES NFR BLD AUTO: 32.1 % (ref 19.6–45.3)
MCH RBC QN AUTO: 30.7 PG (ref 26.9–32)
MCHC RBC AUTO-ENTMCNC: 32.2 G/DL (ref 32.4–36.3)
MCV RBC AUTO: 95.4 FL (ref 80.5–98.2)
MONOCYTES # BLD AUTO: 0.58 10*3/MM3 (ref 0.2–1.2)
MONOCYTES NFR BLD AUTO: 8.5 % (ref 5–12)
NEUTROPHILS # BLD AUTO: 3.81 10*3/MM3 (ref 1.9–8.1)
NEUTROPHILS NFR BLD AUTO: 55.6 % (ref 42.7–76)
NITRITE UR QL STRIP: NEGATIVE
PH UR STRIP.AUTO: 6 [PH] (ref 5–8)
PLATELET # BLD AUTO: 265 10*3/MM3 (ref 140–500)
PMV BLD AUTO: 10.3 FL (ref 6–12)
POTASSIUM BLD-SCNC: 3.7 MMOL/L (ref 3.5–5.2)
PROT SERPL-MCNC: 6.8 G/DL (ref 6–8.5)
PROT UR QL STRIP: NEGATIVE
RBC # BLD AUTO: 4.33 10*6/MM3 (ref 3.9–5.2)
SODIUM BLD-SCNC: 141 MMOL/L (ref 136–145)
SP GR UR STRIP: 1.02 (ref 1–1.03)
TRIGL SERPL-MCNC: 89 MG/DL (ref 0–150)
UROBILINOGEN UR QL STRIP: NORMAL
VLDLC SERPL-MCNC: 17.8 MG/DL (ref 5–40)
WBC NRBC COR # BLD: 6.85 10*3/MM3 (ref 4.5–10.7)

## 2018-01-24 PROCEDURE — 85025 COMPLETE CBC W/AUTO DIFF WBC: CPT | Performed by: INTERNAL MEDICINE

## 2018-01-24 PROCEDURE — 81003 URINALYSIS AUTO W/O SCOPE: CPT | Performed by: INTERNAL MEDICINE

## 2018-01-24 PROCEDURE — 80061 LIPID PANEL: CPT | Performed by: INTERNAL MEDICINE

## 2018-01-24 PROCEDURE — 80053 COMPREHEN METABOLIC PANEL: CPT | Performed by: INTERNAL MEDICINE

## 2018-01-24 PROCEDURE — 36415 COLL VENOUS BLD VENIPUNCTURE: CPT

## 2018-01-25 ENCOUNTER — APPOINTMENT (OUTPATIENT)
Dept: PREADMISSION TESTING | Facility: HOSPITAL | Age: 73
End: 2018-01-25

## 2018-01-25 VITALS
TEMPERATURE: 97.7 F | HEIGHT: 64 IN | BODY MASS INDEX: 34.59 KG/M2 | DIASTOLIC BLOOD PRESSURE: 72 MMHG | SYSTOLIC BLOOD PRESSURE: 128 MMHG | OXYGEN SATURATION: 98 % | HEART RATE: 67 BPM | RESPIRATION RATE: 16 BRPM | WEIGHT: 202.6 LBS

## 2018-01-25 DIAGNOSIS — M17.12 ARTHRITIS OF LEFT KNEE: ICD-10-CM

## 2018-01-25 ASSESSMENT — KOOS JR
KOOS JR SCORE: 15
KOOS JR SCORE: 50.012

## 2018-01-30 ENCOUNTER — OFFICE VISIT (OUTPATIENT)
Dept: ORTHOPEDIC SURGERY | Facility: CLINIC | Age: 73
End: 2018-01-30

## 2018-01-30 VITALS — BODY MASS INDEX: 34.49 KG/M2 | HEIGHT: 64 IN | TEMPERATURE: 98.4 F | WEIGHT: 202 LBS

## 2018-01-30 DIAGNOSIS — M17.12 ARTHRITIS OF LEFT KNEE: Primary | ICD-10-CM

## 2018-01-30 PROCEDURE — S0260 H&P FOR SURGERY: HCPCS | Performed by: NURSE PRACTITIONER

## 2018-01-30 NOTE — PROGRESS NOTES
History & Physical       Patient: Mahnaz Becerra  YOB: 1945  Medical Record Number: [id number]  Body mass index is 34.67 kg/(m^2).    Surgeon:  Dr. Artur Pat    Chief Complaints:   Chief Complaint   Patient presents with   • Left Knee - Pre-op Exam, Pain       Subjective:    History of Present Illness: 72 y.o. female presents with   Chief Complaint   Patient presents with   • Left Knee - Pre-op Exam, Pain   . Onset of symptoms was years ago and has been progressively worsening despite more conservative treatment measures.  Symptoms are associated with ability to move, exercise, and perform activities of daily living.  Symptoms are aggravated by weight bearing and ROM necessary for activities of daily living.   Symptoms improve with rest, ice and elevation only minimally.      Allergies:   Allergies   Allergen Reactions   • Lansoprazole Itching and Other (See Comments)     AND TURN RED   • Levofloxacin Swelling and Other (See Comments)     RED RASH   • Cefdinir Other (See Comments)     Abdominal pain, caused upset stomach   • Trazodone And Nefazodone Other (See Comments)     Severe muscle cramps       Medications:   Home Medications:  Current Outpatient Prescriptions on File Prior to Visit   Medication Sig   • albuterol (VENTOLIN HFA) 108 (90 BASE) MCG/ACT inhaler Inhale 2 puffs Every 6 (Six) Hours As Needed for wheezing.   • cyclobenzaprine (FLEXERIL) 10 MG tablet Take 1 tablet by mouth 2 (Two) Times a Day As Needed for muscle spasms.   • dicyclomine (BENTYL) 20 MG tablet Take 1 tablet by mouth As Needed (abd cramps).   • hydrochlorothiazide (HYDRODIURIL) 25 MG tablet Take 1 tablet by mouth Daily.   • Multiple Vitamins-Minerals (CENTRUM SILVER PO) Take 1 tablet by mouth Daily. STOP 1 WEEK PRIOR TO SURGERY   • pantoprazole (PROTONIX) 40 MG EC tablet Take 1 tablet by mouth 2 (Two) Times a Day.   • traMADol (ULTRAM) 50 MG tablet Take 1 tablet by mouth Every 4 (Four) Hours As Needed for Moderate  Pain .   • vitamin E 400 UNIT capsule Take 400 Units by mouth Daily. STOP 1 WEEK PRIOR TO SURGERY     Current Facility-Administered Medications on File Prior to Visit   Medication   • ipratropium-albuterol (DUO-NEB) nebulizer solution 3 mL     Current Medications:  Scheduled Meds:  ipratropium-albuterol 3 mL Nebulization 4x Daily - RT     Continuous Infusions:   PRN Meds:.    I have reviewed the patient's medical history in detail and updated the computerized patient record.  Review and summarization of old records include:    Past Medical History:   Diagnosis Date   • Acid reflux    • Anesthesia complication     ANESTHESIA HAS BROUGHT ON ASTHMA ATTACKS    • Asthma    • Bronchitis    • [place]     FREQUENT   • Edema     LOWER EXT   • History of skin cancer     BACK   • IBS (irritable bowel syndrome)    • Knee pain, right    • MS (multiple sclerosis)    • Osteoarthritis    • PONV (postoperative nausea and vomiting)     Patient states she had post-op nausea and vomiting after hysterectomy in .   • Sleep apnea     CANT TOLERATE CPAP        Past Surgical History:   Procedure Laterality Date   • APPENDECTOMY     • BREAST LUMPECTOMY Bilateral    • BREAST SURGERY      REDUCTION   •  SECTION     • CHOLECYSTECTOMY     • COLONOSCOPY  2012    Dr Moe   • DILATATION AND CURETTAGE     • ENDOSCOPY  2012    Dr Moe   • ENDOSCOPY N/A 2016    Procedure: ESOPHAGOGASTRODUODENOSCOPY WITH BX;  Surgeon: Nasim Moe MD;  Location: [facility] ENDOSCOPY;  Service:    • EYE SURGERY Bilateral     BLEPHAROPLASTY   • HYSTERECTOMY     • KNEE ARTHROSCOPY Right 2016    Procedure: KNEE ARTHROSCOPY, PARTIAL MEDIAL AND LATERAL MENISECTOMY, CHONDROPLASTY OF THE PATELLA, REMOVAL OF LOOSE BODIES;  Surgeon: Artur Pat MD;  Location: [facility] OR [facility] [place];  Service:    • KNEE ARTHROSCOPY W/ MENISCAL REPAIR Left    • OOPHORECTOMY Bilateral    • TONSILLECTOMY          Social History      Occupational History   • Not on file.     Social History Main Topics   • Smoking status: Former Smoker     Packs/day: 0.25     Types: Cigarettes     Quit date: 1983   • Smokeless tobacco: Never Used      Comment: Patient states she was a social smoker.   • Alcohol use Yes      Comment: Occasional   • Drug use: No   • Sexual activity: Defer    Social History     Social History Narrative        Family History   Problem Relation Age of Onset   • Hypertension Mother    • Stroke Mother    • Alzheimer's disease Mother    • Heart disease Father    • Emphysema Father    • Stroke Maternal Grandmother    • Cancer Maternal Grandfather    • No Known Problems Paternal Grandmother    • Cerebral aneurysm Paternal Grandfather    • Malig Hyperthermia Neg Hx        ROS: 14 point review of systems was performed and was negative except for documented findings in HPI and today's encounter.     Allergies:   Allergies   Allergen Reactions   • Lansoprazole Itching and Other (See Comments)     AND TURN RED   • Levofloxacin Swelling and Other (See Comments)     RED RASH   • Cefdinir Other (See Comments)     Abdominal pain, caused upset stomach   • Trazodone And Nefazodone Other (See Comments)     Severe muscle cramps     Constitutional:  Denies fever, shaking or chills   Eyes:  Denies change in visual acuity   HENT:  Denies nasal congestion or sore throat   Respiratory:  Denies cough or shortness of breath   Cardiovascular:  Denies chest pain or severe LE edema   GI:  Denies abdominal pain, nausea, vomiting, bloody stools or diarrhea   Musculoskeletal:  Denies numbness tingling or loss of motor function except as outlined above in history of present illness.  : Denies painful urination or hematuria  Integument:  Denies rash, lesion or ulceration   Neurologic:  Denies headache or focal weakness  Endocrine:  Denies lymphadenopathy  Psych:  Denies confusion or change in mental status   Hem:  Denies active bleeding    Physical Exam: 72  y.o. female  Body mass index is 34.67 kg/(m^2)., @HT@, @WT@  Vitals:    01/30/18 1356   Temp: 98.4 °F (36.9 °C)     Vital signs reviewed.   General Appearance:    Alert, cooperative, in no acute distress                  Eyes: conjunctiva clear  ENT: external ears and nose atraumatic  CV: no peripheral edema  Resp: normal respiratory effort  Skin: no rashes or wounds; normal turgor  Psych: mood and affect appropriate  Lymph: no nodes appreciated  Neuro: gross sensation intact  Vascular:  Palpable peripheral pulse in noted extremity  Musculoskeletal Extremities: DETAILED KNEE Exam: Left knee: Painful gait w/wo limp, muscle atrophy, erythema, ecchymosis, or gross deformity noted, Large knee effusion, + medial joint line tenderness, Active range of motion normal, 5/5 strength flexion and extension, The knee is stable to varus and valgus stress testing, VARUS VALGUS NEUTRAL: varus alignment of the limb, Lachman negative, Posterior drawer negative, Cherri's negative, Patellofemoral grind +, Sensation grossly intact to light tough throughout the lower extremity, Skin is intact, Distal pulses are palpable, No signs or symptoms of DVT          Diagnostic Tests:  Lab on 01/24/2018   Component Date Value Ref Range Status   • Glucose 01/24/2018 103* 65 - 99 mg/dL Final   • BUN 01/24/2018 15  8 - 23 mg/dL Final   • Creatinine 01/24/2018 0.81  0.57 - 1.00 mg/dL Final   • Sodium 01/24/2018 141  136 - 145 mmol/L Final   • Potassium 01/24/2018 3.7  3.5 - 5.2 mmol/L Final   • Chloride 01/24/2018 100  98 - 107 mmol/L Final   • CO2 01/24/2018 29.9* 22.0 - 29.0 mmol/L Final   • Calcium 01/24/2018 9.3  8.6 - 10.5 mg/dL Final   • Total Protein 01/24/2018 6.8  6.0 - 8.5 g/dL Final   • Albumin 01/24/2018 4.20  3.50 - 5.20 g/dL Final   • ALT (SGPT) 01/24/2018 14  1 - 33 U/L Final   • AST (SGOT) 01/24/2018 18  1 - 32 U/L Final   • Alkaline Phosphatase 01/24/2018 71  39 - 117 U/L Final   • Total Bilirubin 01/24/2018 0.5  0.1 - 1.2 mg/dL  Final   • eGFR Non  Amer 01/24/2018 70  >60 mL/min/1.73 Final   • Globulin 01/24/2018 2.6  gm/dL Final   • A/G Ratio 01/24/2018 1.6  g/dL Final   • BUN/Creatinine Ratio 01/24/2018 18.5  7.0 - 25.0 Final   • Anion Gap 01/24/2018 11.1  mmol/L Final   • Total Cholesterol 01/24/2018 205* 0 - 200 mg/dL Final   • Triglycerides 01/24/2018 89  0 - 150 mg/dL Final   • HDL Cholesterol 01/24/2018 54  40 - 60 mg/dL Final   • LDL Cholesterol  01/24/2018 133* 0 - 100 mg/dL Final   • VLDL Cholesterol 01/24/2018 17.8  5 - 40 mg/dL Final   • LDL/HDL Ratio 01/24/2018 2.47   Final   • Color, UA 01/24/2018 Yellow  Yellow, Straw Final   • Appearance, UA 01/24/2018 Clear  Clear Final   • pH, UA 01/24/2018 6.0  5.0 - 8.0 Final   • Specific Gravity, UA 01/24/2018 1.022  1.005 - 1.030 Final   • Glucose, UA 01/24/2018 Negative  Negative Final   • Ketones, UA 01/24/2018 Negative  Negative Final   • Bilirubin, UA 01/24/2018 Negative  Negative Final   • Blood, UA 01/24/2018 Negative  Negative Final   • Protein, UA 01/24/2018 Negative  Negative Final   • Leuk Esterase, UA 01/24/2018 Negative  Negative Final   • Nitrite, UA 01/24/2018 Negative  Negative Final   • Urobilinogen, UA 01/24/2018 0.2 E.U./dL  0.2 - 1.0 E.U./dL Final   • WBC 01/24/2018 6.85  4.50 - 10.70 10*3/mm3 Final   • RBC 01/24/2018 4.33  3.90 - 5.20 10*6/mm3 Final   • Hemoglobin 01/24/2018 13.3  11.9 - 15.5 g/dL Final   • Hematocrit 01/24/2018 41.3  35.6 - 45.5 % Final   • MCV 01/24/2018 95.4  80.5 - 98.2 fL Final   • MCH 01/24/2018 30.7  26.9 - 32.0 pg Final   • MCHC 01/24/2018 32.2* 32.4 - 36.3 g/dL Final   • RDW 01/24/2018 13.4* 11.7 - 13.0 % Final   • RDW-SD 01/24/2018 46.3  37.0 - 54.0 fl Final   • MPV 01/24/2018 10.3  6.0 - 12.0 fL Final   • Platelets 01/24/2018 265  140 - 500 10*3/mm3 Final   • Neutrophil % 01/24/2018 55.6  42.7 - 76.0 % Final   • Lymphocyte % 01/24/2018 32.1  19.6 - 45.3 % Final   • Monocyte % 01/24/2018 8.5  5.0 - 12.0 % Final   • Eosinophil %  01/24/2018 3.2  0.3 - 6.2 % Final   • Basophil % 01/24/2018 0.6  0.0 - 1.5 % Final   • Immature Grans % 01/24/2018 0.0  0.0 - 0.5 % Final   • Neutrophils, Absolute 01/24/2018 3.81  1.90 - 8.10 10*3/mm3 Final   • Lymphocytes, Absolute 01/24/2018 2.20  0.90 - 4.80 10*3/mm3 Final   • Monocytes, Absolute 01/24/2018 0.58  0.20 - 1.20 10*3/mm3 Final   • Eosinophils, Absolute 01/24/2018 0.22  0.00 - 0.70 10*3/mm3 Final   • Basophils, Absolute 01/24/2018 0.04  0.00 - 0.20 10*3/mm3 Final   • Immature Grans, Absolute 01/24/2018 0.00  0.00 - 0.03 10*3/mm3 Final     No results found.    Imaging was done previously in the office, images were personally viewed and discussed at length with the patient:    Indication: pain related symptoms,  Views: 3V AP, LAT & 40 degree PA left knee(s)   Findings: severe end-stage arthritis (bone on bone, subchondral sclerosis/cysts, osteophytes)  Comparison views: viewed last xray done in the office.     Assessment:  Patient Active Problem List   Diagnosis   • Anxiety   • Benign essential hypertension   • Contact dermatitis   • Dyslipidemia   • Gastroesophageal reflux disease   • Hypothyroidism   • Insomnia   • Pain of lower extremity   • Multiple sclerosis   • Venous stasis   • Arthritis of right knee   • Arthritis of both knees   • Knee pain, right   • S/P right knee arthroscopy   • Arthritis of left knee   • Asthma   • Irritable bowel syndrome   • Periumbilical abdominal pain   • Epigastric pain   • PEARL (obstructive sleep apnea)   • Chronic pain of left knee       Plan:  Dr. Artur Pat reviewed anatomy of a total joint arthroplasty in laymen's terms, as well as typical postoperative recovery and possibly 6-12 months for maximal recovery, and possible need for rehabilitation stay after hospitalization. We also discussed risks, benefits, alternatives, and limitations of procedure with risks including but not limited to neurovascular damage, bleeding, infection, malalignment, chronic pian,  failure of implants, osteolysis, loosening of implants, loss of motion, weakness, stiffness, instability, DVT, pulmonary embolus, death, stroke, complex regional pain syndrome, myocardial infarction, and need for additional procedures. Concept of substitution vs. replacement discussed.  No guarantees were given regarding results of surgery.      Mahnaz Becerra was given the opportunity to ask and have all questions answered today.  The patient voiced understanding of the risks, benefits, and alternative forms of treatment that were discussed and the patient consents to proceed with surgery.     Patient's blood clot history is negative.  Planned DVT prophylaxis for surgery:  Aspirin     Has been doing her preop physical therapy and may benefit from breathing treatment preop.    Discharge Plan: POD 2-3 to home and home health, cleared by Dr Dae Maher cardiology, and Dr. Wilkes PCP, may need higher dose of pain medicine due to MS but start with perc 7.5    Patient was seen by ROBERTA Ruffin in the office today.    Date: 1/30/2018  ROBERTA Browne

## 2018-02-05 RX ORDER — PANTOPRAZOLE SODIUM 40 MG/1
TABLET, DELAYED RELEASE ORAL
Qty: 60 TABLET | Refills: 0 | Status: SHIPPED | OUTPATIENT
Start: 2018-02-05 | End: 2018-04-07 | Stop reason: SDUPTHER

## 2018-02-07 ENCOUNTER — HOSPITAL ENCOUNTER (INPATIENT)
Facility: HOSPITAL | Age: 73
LOS: 2 days | Discharge: HOME HEALTH | End: 2018-02-09
Attending: ORTHOPAEDIC SURGERY | Admitting: ORTHOPAEDIC SURGERY

## 2018-02-07 ENCOUNTER — APPOINTMENT (OUTPATIENT)
Dept: GENERAL RADIOLOGY | Facility: HOSPITAL | Age: 73
End: 2018-02-07
Attending: ORTHOPAEDIC SURGERY

## 2018-02-07 ENCOUNTER — ANESTHESIA EVENT (OUTPATIENT)
Dept: PERIOP | Facility: HOSPITAL | Age: 73
End: 2018-02-07

## 2018-02-07 ENCOUNTER — ANESTHESIA (OUTPATIENT)
Dept: PERIOP | Facility: HOSPITAL | Age: 73
End: 2018-02-07

## 2018-02-07 DIAGNOSIS — M17.12 ARTHRITIS OF LEFT KNEE: ICD-10-CM

## 2018-02-07 DIAGNOSIS — R68.83 CHILLS: ICD-10-CM

## 2018-02-07 PROCEDURE — 25010000002 MORPHINE (PF) 10 MG/ML SOLUTION 1 ML CARTRIDGE: Performed by: ORTHOPAEDIC SURGERY

## 2018-02-07 PROCEDURE — 25010000002 NEOSTIGMINE PER 0.5 MG: Performed by: NURSE ANESTHETIST, CERTIFIED REGISTERED

## 2018-02-07 PROCEDURE — 20985 CPTR-ASST DIR MS PX: CPT | Performed by: ORTHOPAEDIC SURGERY

## 2018-02-07 PROCEDURE — 25010000002 ONDANSETRON PER 1 MG: Performed by: NURSE ANESTHETIST, CERTIFIED REGISTERED

## 2018-02-07 PROCEDURE — 25010000002 MIDAZOLAM PER 1 MG: Performed by: ANESTHESIOLOGY

## 2018-02-07 PROCEDURE — C1776 JOINT DEVICE (IMPLANTABLE): HCPCS | Performed by: ORTHOPAEDIC SURGERY

## 2018-02-07 PROCEDURE — 25010000002 FENTANYL CITRATE (PF) 100 MCG/2ML SOLUTION: Performed by: NURSE ANESTHETIST, CERTIFIED REGISTERED

## 2018-02-07 PROCEDURE — 25010000002 KETOROLAC TROMETHAMINE PER 15 MG: Performed by: ORTHOPAEDIC SURGERY

## 2018-02-07 PROCEDURE — 25010000003 CEFAZOLIN IN DEXTROSE 2-4 GM/100ML-% SOLUTION: Performed by: ORTHOPAEDIC SURGERY

## 2018-02-07 PROCEDURE — 0SRD0J9 REPLACEMENT OF LEFT KNEE JOINT WITH SYNTHETIC SUBSTITUTE, CEMENTED, OPEN APPROACH: ICD-10-PCS | Performed by: ORTHOPAEDIC SURGERY

## 2018-02-07 PROCEDURE — 73560 X-RAY EXAM OF KNEE 1 OR 2: CPT

## 2018-02-07 PROCEDURE — 25010000002 EPINEPHRINE PER 0.1 MG: Performed by: ORTHOPAEDIC SURGERY

## 2018-02-07 PROCEDURE — C1713 ANCHOR/SCREW BN/BN,TIS/BN: HCPCS | Performed by: ORTHOPAEDIC SURGERY

## 2018-02-07 PROCEDURE — 25010000002 DEXAMETHASONE PER 1 MG: Performed by: NURSE ANESTHETIST, CERTIFIED REGISTERED

## 2018-02-07 PROCEDURE — 25010000002 HYDROMORPHONE PER 4 MG: Performed by: ORTHOPAEDIC SURGERY

## 2018-02-07 PROCEDURE — 25010000002 HYDROMORPHONE PER 4 MG: Performed by: NURSE ANESTHETIST, CERTIFIED REGISTERED

## 2018-02-07 PROCEDURE — 94799 UNLISTED PULMONARY SVC/PX: CPT

## 2018-02-07 PROCEDURE — 94640 AIRWAY INHALATION TREATMENT: CPT

## 2018-02-07 PROCEDURE — 27447 TOTAL KNEE ARTHROPLASTY: CPT | Performed by: ORTHOPAEDIC SURGERY

## 2018-02-07 PROCEDURE — 25010000002 ONDANSETRON PER 1 MG: Performed by: ORTHOPAEDIC SURGERY

## 2018-02-07 PROCEDURE — 25010000002 ROPIVACAINE PER 1 MG: Performed by: ORTHOPAEDIC SURGERY

## 2018-02-07 PROCEDURE — 8E0YXBZ COMPUTER ASSISTED PROCEDURE OF LOWER EXTREMITY: ICD-10-PCS | Performed by: ORTHOPAEDIC SURGERY

## 2018-02-07 PROCEDURE — 25010000002 PROPOFOL 10 MG/ML EMULSION: Performed by: NURSE ANESTHETIST, CERTIFIED REGISTERED

## 2018-02-07 DEVICE — SIGMA TIBIAL INSERT FIXED BEARING CURVED PLUS 2.5 12.5MM XLK
Type: IMPLANTABLE DEVICE | Site: KNEE | Status: FUNCTIONAL
Brand: SIGMA

## 2018-02-07 DEVICE — P.F.C. SIGMA TIBIAL TRAY FIXED BEARING MODULAR COCR 2.5 CEMENTED
Type: IMPLANTABLE DEVICE | Site: KNEE | Status: FUNCTIONAL
Brand: P.F.C. SIGMA

## 2018-02-07 DEVICE — P.F.C. SIGMA OVAL DOME PATELLA 3-PEG 32MM CEMENTED
Type: IMPLANTABLE DEVICE | Site: KNEE | Status: FUNCTIONAL
Brand: P.F.C. SIGMA

## 2018-02-07 DEVICE — SIGMA FEMORAL CRUCIATE RETAINING CEMENTED 2.5 LEFT
Type: IMPLANTABLE DEVICE | Site: KNEE | Status: FUNCTIONAL
Brand: SIGMA

## 2018-02-07 DEVICE — IMPLANTABLE DEVICE: Type: IMPLANTABLE DEVICE | Site: KNEE | Status: FUNCTIONAL

## 2018-02-07 DEVICE — SMARTSET GMV HIGH PERFORMANCE GENTAMICIN MEDIUM VISCOSITY BONE CEMENT 40G
Type: IMPLANTABLE DEVICE | Site: KNEE | Status: FUNCTIONAL
Brand: SMARTSET

## 2018-02-07 RX ORDER — CYCLOBENZAPRINE HCL 10 MG
10 TABLET ORAL 2 TIMES DAILY PRN
Status: DISCONTINUED | OUTPATIENT
Start: 2018-02-07 | End: 2018-02-09 | Stop reason: HOSPADM

## 2018-02-07 RX ORDER — NALOXONE HCL 0.4 MG/ML
0.1 VIAL (ML) INJECTION
Status: DISCONTINUED | OUTPATIENT
Start: 2018-02-07 | End: 2018-02-09 | Stop reason: HOSPADM

## 2018-02-07 RX ORDER — OXYCODONE AND ACETAMINOPHEN 7.5; 325 MG/1; MG/1
1 TABLET ORAL
Status: DISCONTINUED | OUTPATIENT
Start: 2018-02-07 | End: 2018-02-09 | Stop reason: HOSPADM

## 2018-02-07 RX ORDER — DEXAMETHASONE SODIUM PHOSPHATE 10 MG/ML
INJECTION INTRAMUSCULAR; INTRAVENOUS AS NEEDED
Status: DISCONTINUED | OUTPATIENT
Start: 2018-02-07 | End: 2018-02-07 | Stop reason: SURG

## 2018-02-07 RX ORDER — PROPOFOL 10 MG/ML
VIAL (ML) INTRAVENOUS AS NEEDED
Status: DISCONTINUED | OUTPATIENT
Start: 2018-02-07 | End: 2018-02-07 | Stop reason: SURG

## 2018-02-07 RX ORDER — ONDANSETRON 2 MG/ML
INJECTION INTRAMUSCULAR; INTRAVENOUS AS NEEDED
Status: DISCONTINUED | OUTPATIENT
Start: 2018-02-07 | End: 2018-02-07 | Stop reason: SURG

## 2018-02-07 RX ORDER — TRANEXAMIC ACID 100 MG/ML
INJECTION, SOLUTION INTRAVENOUS AS NEEDED
Status: DISCONTINUED | OUTPATIENT
Start: 2018-02-07 | End: 2018-02-07 | Stop reason: HOSPADM

## 2018-02-07 RX ORDER — FENTANYL CITRATE 50 UG/ML
INJECTION, SOLUTION INTRAMUSCULAR; INTRAVENOUS AS NEEDED
Status: DISCONTINUED | OUTPATIENT
Start: 2018-02-07 | End: 2018-02-07 | Stop reason: SURG

## 2018-02-07 RX ORDER — FLUMAZENIL 0.1 MG/ML
0.2 INJECTION INTRAVENOUS AS NEEDED
Status: DISCONTINUED | OUTPATIENT
Start: 2018-02-07 | End: 2018-02-07 | Stop reason: HOSPADM

## 2018-02-07 RX ORDER — GLYCOPYRROLATE 0.2 MG/ML
INJECTION INTRAMUSCULAR; INTRAVENOUS AS NEEDED
Status: DISCONTINUED | OUTPATIENT
Start: 2018-02-07 | End: 2018-02-07 | Stop reason: SURG

## 2018-02-07 RX ORDER — HYDROMORPHONE HCL IN 0.9% NACL 10 MG/50ML
PATIENT CONTROLLED ANALGESIA SYRINGE INTRAVENOUS CONTINUOUS
Status: DISCONTINUED | OUTPATIENT
Start: 2018-02-07 | End: 2018-02-09 | Stop reason: HOSPADM

## 2018-02-07 RX ORDER — IPRATROPIUM BROMIDE AND ALBUTEROL SULFATE 2.5; .5 MG/3ML; MG/3ML
3 SOLUTION RESPIRATORY (INHALATION) ONCE
Status: COMPLETED | OUTPATIENT
Start: 2018-02-07 | End: 2018-02-07

## 2018-02-07 RX ORDER — IPRATROPIUM BROMIDE AND ALBUTEROL SULFATE 2.5; .5 MG/3ML; MG/3ML
3 SOLUTION RESPIRATORY (INHALATION)
Status: DISCONTINUED | OUTPATIENT
Start: 2018-02-07 | End: 2018-02-09 | Stop reason: HOSPADM

## 2018-02-07 RX ORDER — FAMOTIDINE 10 MG/ML
20 INJECTION, SOLUTION INTRAVENOUS ONCE
Status: COMPLETED | OUTPATIENT
Start: 2018-02-07 | End: 2018-02-07

## 2018-02-07 RX ORDER — DIPHENHYDRAMINE HYDROCHLORIDE 50 MG/ML
12.5 INJECTION INTRAMUSCULAR; INTRAVENOUS
Status: DISCONTINUED | OUTPATIENT
Start: 2018-02-07 | End: 2018-02-07 | Stop reason: HOSPADM

## 2018-02-07 RX ORDER — ALBUTEROL SULFATE 90 UG/1
2 AEROSOL, METERED RESPIRATORY (INHALATION) EVERY 6 HOURS PRN
Status: DISCONTINUED | OUTPATIENT
Start: 2018-02-07 | End: 2018-02-07 | Stop reason: CLARIF

## 2018-02-07 RX ORDER — HYDROMORPHONE HCL 110MG/55ML
PATIENT CONTROLLED ANALGESIA SYRINGE INTRAVENOUS AS NEEDED
Status: DISCONTINUED | OUTPATIENT
Start: 2018-02-07 | End: 2018-02-07 | Stop reason: SURG

## 2018-02-07 RX ORDER — ONDANSETRON 2 MG/ML
4 INJECTION INTRAMUSCULAR; INTRAVENOUS ONCE AS NEEDED
Status: DISCONTINUED | OUTPATIENT
Start: 2018-02-07 | End: 2018-02-07 | Stop reason: HOSPADM

## 2018-02-07 RX ORDER — EPHEDRINE SULFATE 50 MG/ML
5 INJECTION, SOLUTION INTRAVENOUS ONCE AS NEEDED
Status: DISCONTINUED | OUTPATIENT
Start: 2018-02-07 | End: 2018-02-07 | Stop reason: HOSPADM

## 2018-02-07 RX ORDER — ACETAMINOPHEN 325 MG/1
325 TABLET ORAL EVERY 4 HOURS PRN
Status: DISCONTINUED | OUTPATIENT
Start: 2018-02-07 | End: 2018-02-09 | Stop reason: HOSPADM

## 2018-02-07 RX ORDER — DIPHENHYDRAMINE HYDROCHLORIDE 50 MG/ML
25 INJECTION INTRAMUSCULAR; INTRAVENOUS EVERY 6 HOURS PRN
Status: DISCONTINUED | OUTPATIENT
Start: 2018-02-07 | End: 2018-02-09 | Stop reason: HOSPADM

## 2018-02-07 RX ORDER — ONDANSETRON 4 MG/1
4 TABLET, FILM COATED ORAL EVERY 6 HOURS PRN
Status: DISCONTINUED | OUTPATIENT
Start: 2018-02-07 | End: 2018-02-09 | Stop reason: HOSPADM

## 2018-02-07 RX ORDER — DIPHENHYDRAMINE HCL 25 MG
25 CAPSULE ORAL EVERY 6 HOURS PRN
Status: DISCONTINUED | OUTPATIENT
Start: 2018-02-07 | End: 2018-02-09 | Stop reason: HOSPADM

## 2018-02-07 RX ORDER — DICYCLOMINE HYDROCHLORIDE 10 MG/1
20 CAPSULE ORAL 3 TIMES DAILY PRN
Status: DISCONTINUED | OUTPATIENT
Start: 2018-02-07 | End: 2018-02-09 | Stop reason: HOSPADM

## 2018-02-07 RX ORDER — LABETALOL HYDROCHLORIDE 5 MG/ML
5 INJECTION, SOLUTION INTRAVENOUS
Status: DISCONTINUED | OUTPATIENT
Start: 2018-02-07 | End: 2018-02-07 | Stop reason: HOSPADM

## 2018-02-07 RX ORDER — PANTOPRAZOLE SODIUM 40 MG/1
40 TABLET, DELAYED RELEASE ORAL
Status: DISCONTINUED | OUTPATIENT
Start: 2018-02-07 | End: 2018-02-09 | Stop reason: HOSPADM

## 2018-02-07 RX ORDER — SODIUM CHLORIDE, SODIUM LACTATE, POTASSIUM CHLORIDE, CALCIUM CHLORIDE 600; 310; 30; 20 MG/100ML; MG/100ML; MG/100ML; MG/100ML
100 INJECTION, SOLUTION INTRAVENOUS CONTINUOUS
Status: ACTIVE | OUTPATIENT
Start: 2018-02-07 | End: 2018-02-08

## 2018-02-07 RX ORDER — HYDRALAZINE HYDROCHLORIDE 20 MG/ML
5 INJECTION INTRAMUSCULAR; INTRAVENOUS
Status: DISCONTINUED | OUTPATIENT
Start: 2018-02-07 | End: 2018-02-07 | Stop reason: HOSPADM

## 2018-02-07 RX ORDER — MIDAZOLAM HYDROCHLORIDE 1 MG/ML
1 INJECTION INTRAMUSCULAR; INTRAVENOUS
Status: DISCONTINUED | OUTPATIENT
Start: 2018-02-07 | End: 2018-02-07 | Stop reason: HOSPADM

## 2018-02-07 RX ORDER — BISACODYL 10 MG
10 SUPPOSITORY, RECTAL RECTAL DAILY PRN
Status: DISCONTINUED | OUTPATIENT
Start: 2018-02-07 | End: 2018-02-09 | Stop reason: HOSPADM

## 2018-02-07 RX ORDER — NALOXONE HCL 0.4 MG/ML
0.2 VIAL (ML) INJECTION AS NEEDED
Status: DISCONTINUED | OUTPATIENT
Start: 2018-02-07 | End: 2018-02-07 | Stop reason: HOSPADM

## 2018-02-07 RX ORDER — ONDANSETRON 4 MG/1
4 TABLET, ORALLY DISINTEGRATING ORAL EVERY 6 HOURS PRN
Status: DISCONTINUED | OUTPATIENT
Start: 2018-02-07 | End: 2018-02-09 | Stop reason: HOSPADM

## 2018-02-07 RX ORDER — OXYCODONE AND ACETAMINOPHEN 7.5; 325 MG/1; MG/1
1 TABLET ORAL ONCE AS NEEDED
Status: DISCONTINUED | OUTPATIENT
Start: 2018-02-07 | End: 2018-02-07 | Stop reason: HOSPADM

## 2018-02-07 RX ORDER — ROCURONIUM BROMIDE 10 MG/ML
INJECTION, SOLUTION INTRAVENOUS AS NEEDED
Status: DISCONTINUED | OUTPATIENT
Start: 2018-02-07 | End: 2018-02-07 | Stop reason: SURG

## 2018-02-07 RX ORDER — CEFAZOLIN SODIUM 2 G/100ML
2 INJECTION, SOLUTION INTRAVENOUS ONCE
Status: COMPLETED | OUTPATIENT
Start: 2018-02-07 | End: 2018-02-07

## 2018-02-07 RX ORDER — SODIUM CHLORIDE 0.9 % (FLUSH) 0.9 %
1-10 SYRINGE (ML) INJECTION AS NEEDED
Status: DISCONTINUED | OUTPATIENT
Start: 2018-02-07 | End: 2018-02-07 | Stop reason: HOSPADM

## 2018-02-07 RX ORDER — CHLORHEXIDINE GLUCONATE 4 G/100ML
SOLUTION TOPICAL DAILY PRN
COMMUNITY
End: 2018-02-09 | Stop reason: HOSPADM

## 2018-02-07 RX ORDER — MIDAZOLAM HYDROCHLORIDE 1 MG/ML
2 INJECTION INTRAMUSCULAR; INTRAVENOUS
Status: DISCONTINUED | OUTPATIENT
Start: 2018-02-07 | End: 2018-02-07 | Stop reason: HOSPADM

## 2018-02-07 RX ORDER — FENTANYL CITRATE 50 UG/ML
50 INJECTION, SOLUTION INTRAMUSCULAR; INTRAVENOUS
Status: DISCONTINUED | OUTPATIENT
Start: 2018-02-07 | End: 2018-02-07 | Stop reason: HOSPADM

## 2018-02-07 RX ORDER — DOCUSATE SODIUM 100 MG/1
100 CAPSULE, LIQUID FILLED ORAL 2 TIMES DAILY
Status: DISCONTINUED | OUTPATIENT
Start: 2018-02-07 | End: 2018-02-09 | Stop reason: HOSPADM

## 2018-02-07 RX ORDER — LIDOCAINE HYDROCHLORIDE 20 MG/ML
INJECTION, SOLUTION INFILTRATION; PERINEURAL AS NEEDED
Status: DISCONTINUED | OUTPATIENT
Start: 2018-02-07 | End: 2018-02-07 | Stop reason: SURG

## 2018-02-07 RX ORDER — PROMETHAZINE HYDROCHLORIDE 25 MG/1
12.5 TABLET ORAL ONCE AS NEEDED
Status: DISCONTINUED | OUTPATIENT
Start: 2018-02-07 | End: 2018-02-07 | Stop reason: HOSPADM

## 2018-02-07 RX ORDER — PROMETHAZINE HYDROCHLORIDE 12.5 MG/1
12.5 TABLET ORAL EVERY 6 HOURS PRN
Status: DISCONTINUED | OUTPATIENT
Start: 2018-02-07 | End: 2018-02-09 | Stop reason: HOSPADM

## 2018-02-07 RX ORDER — ONDANSETRON 2 MG/ML
4 INJECTION INTRAMUSCULAR; INTRAVENOUS EVERY 6 HOURS PRN
Status: DISCONTINUED | OUTPATIENT
Start: 2018-02-07 | End: 2018-02-09 | Stop reason: HOSPADM

## 2018-02-07 RX ORDER — ASPIRIN 325 MG
325 TABLET, DELAYED RELEASE (ENTERIC COATED) ORAL EVERY 12 HOURS SCHEDULED
Status: DISCONTINUED | OUTPATIENT
Start: 2018-02-07 | End: 2018-02-09 | Stop reason: HOSPADM

## 2018-02-07 RX ORDER — OXYCODONE AND ACETAMINOPHEN 7.5; 325 MG/1; MG/1
2 TABLET ORAL
Status: DISCONTINUED | OUTPATIENT
Start: 2018-02-07 | End: 2018-02-09 | Stop reason: HOSPADM

## 2018-02-07 RX ORDER — HYDROCODONE BITARTRATE AND ACETAMINOPHEN 7.5; 325 MG/1; MG/1
1 TABLET ORAL ONCE AS NEEDED
Status: DISCONTINUED | OUTPATIENT
Start: 2018-02-07 | End: 2018-02-07 | Stop reason: HOSPADM

## 2018-02-07 RX ORDER — MAGNESIUM HYDROXIDE 1200 MG/15ML
LIQUID ORAL AS NEEDED
Status: DISCONTINUED | OUTPATIENT
Start: 2018-02-07 | End: 2018-02-07 | Stop reason: HOSPADM

## 2018-02-07 RX ORDER — ALBUTEROL SULFATE 2.5 MG/3ML
2.5 SOLUTION RESPIRATORY (INHALATION) EVERY 6 HOURS PRN
Status: DISCONTINUED | OUTPATIENT
Start: 2018-02-07 | End: 2018-02-09 | Stop reason: HOSPADM

## 2018-02-07 RX ORDER — HYDROMORPHONE HCL 110MG/55ML
0.5 PATIENT CONTROLLED ANALGESIA SYRINGE INTRAVENOUS
Status: DISCONTINUED | OUTPATIENT
Start: 2018-02-07 | End: 2018-02-07 | Stop reason: HOSPADM

## 2018-02-07 RX ORDER — SODIUM CHLORIDE, SODIUM LACTATE, POTASSIUM CHLORIDE, CALCIUM CHLORIDE 600; 310; 30; 20 MG/100ML; MG/100ML; MG/100ML; MG/100ML
9 INJECTION, SOLUTION INTRAVENOUS CONTINUOUS
Status: DISCONTINUED | OUTPATIENT
Start: 2018-02-07 | End: 2018-02-09 | Stop reason: HOSPADM

## 2018-02-07 RX ORDER — CEFAZOLIN SODIUM 2 G/100ML
2 INJECTION, SOLUTION INTRAVENOUS EVERY 8 HOURS
Status: COMPLETED | OUTPATIENT
Start: 2018-02-07 | End: 2018-02-08

## 2018-02-07 RX ORDER — HYDROCHLOROTHIAZIDE 25 MG/1
25 TABLET ORAL DAILY
Status: DISCONTINUED | OUTPATIENT
Start: 2018-02-07 | End: 2018-02-09 | Stop reason: HOSPADM

## 2018-02-07 RX ORDER — BISACODYL 5 MG/1
10 TABLET, DELAYED RELEASE ORAL DAILY PRN
Status: DISCONTINUED | OUTPATIENT
Start: 2018-02-07 | End: 2018-02-09 | Stop reason: HOSPADM

## 2018-02-07 RX ADMIN — SODIUM CHLORIDE, POTASSIUM CHLORIDE, SODIUM LACTATE AND CALCIUM CHLORIDE: 600; 310; 30; 20 INJECTION, SOLUTION INTRAVENOUS at 12:19

## 2018-02-07 RX ADMIN — SODIUM CHLORIDE, POTASSIUM CHLORIDE, SODIUM LACTATE AND CALCIUM CHLORIDE: 600; 310; 30; 20 INJECTION, SOLUTION INTRAVENOUS at 13:55

## 2018-02-07 RX ADMIN — CEFAZOLIN SODIUM 2 G: 2 INJECTION, SOLUTION INTRAVENOUS at 21:12

## 2018-02-07 RX ADMIN — GLYCOPYRROLATE 0.4 MG: 0.2 INJECTION INTRAMUSCULAR; INTRAVENOUS at 14:13

## 2018-02-07 RX ADMIN — MUPIROCIN: 20 OINTMENT TOPICAL at 21:12

## 2018-02-07 RX ADMIN — IPRATROPIUM BROMIDE AND ALBUTEROL SULFATE 3 ML: .5; 3 SOLUTION RESPIRATORY (INHALATION) at 10:25

## 2018-02-07 RX ADMIN — HYDROMORPHONE HYDROCHLORIDE: 10 INJECTION INTRAMUSCULAR; INTRAVENOUS; SUBCUTANEOUS at 14:52

## 2018-02-07 RX ADMIN — NEOSTIGMINE METHYLSULFATE 3 MG: 1 INJECTION INTRAMUSCULAR; INTRAVENOUS; SUBCUTANEOUS at 14:09

## 2018-02-07 RX ADMIN — LIDOCAINE HYDROCHLORIDE 60 MG: 20 INJECTION, SOLUTION INFILTRATION; PERINEURAL at 12:26

## 2018-02-07 RX ADMIN — ASPIRIN 325 MG: 325 TABLET, COATED ORAL at 21:12

## 2018-02-07 RX ADMIN — ROCURONIUM BROMIDE 50 MG: 10 INJECTION INTRAVENOUS at 12:26

## 2018-02-07 RX ADMIN — FAMOTIDINE 20 MG: 10 INJECTION, SOLUTION INTRAVENOUS at 10:50

## 2018-02-07 RX ADMIN — ONDANSETRON 4 MG: 2 INJECTION INTRAMUSCULAR; INTRAVENOUS at 18:38

## 2018-02-07 RX ADMIN — SODIUM CHLORIDE, POTASSIUM CHLORIDE, SODIUM LACTATE AND CALCIUM CHLORIDE 9 ML/HR: 600; 310; 30; 20 INJECTION, SOLUTION INTRAVENOUS at 09:50

## 2018-02-07 RX ADMIN — HYDROMORPHONE HYDROCHLORIDE 0.5 MG: 2 INJECTION, SOLUTION INTRAMUSCULAR; INTRAVENOUS; SUBCUTANEOUS at 14:50

## 2018-02-07 RX ADMIN — FENTANYL CITRATE 50 MCG: 50 INJECTION INTRAMUSCULAR; INTRAVENOUS at 14:50

## 2018-02-07 RX ADMIN — FENTANYL CITRATE 50 MCG: 50 INJECTION INTRAMUSCULAR; INTRAVENOUS at 12:47

## 2018-02-07 RX ADMIN — FENTANYL CITRATE 50 MCG: 50 INJECTION INTRAMUSCULAR; INTRAVENOUS at 12:54

## 2018-02-07 RX ADMIN — MIDAZOLAM 2 MG: 1 INJECTION INTRAMUSCULAR; INTRAVENOUS at 10:50

## 2018-02-07 RX ADMIN — ONDANSETRON 4 MG: 2 INJECTION INTRAMUSCULAR; INTRAVENOUS at 13:58

## 2018-02-07 RX ADMIN — FENTANYL CITRATE 50 MCG: 50 INJECTION INTRAMUSCULAR; INTRAVENOUS at 14:35

## 2018-02-07 RX ADMIN — GLYCOPYRROLATE 0.4 MG: 0.2 INJECTION INTRAMUSCULAR; INTRAVENOUS at 14:09

## 2018-02-07 RX ADMIN — DEXAMETHASONE SODIUM PHOSPHATE 8 MG: 10 INJECTION INTRAMUSCULAR; INTRAVENOUS at 12:33

## 2018-02-07 RX ADMIN — HYDROMORPHONE HYDROCHLORIDE 0.5 MG: 2 INJECTION INTRAMUSCULAR; INTRAVENOUS; SUBCUTANEOUS at 14:24

## 2018-02-07 RX ADMIN — SODIUM CHLORIDE, POTASSIUM CHLORIDE, SODIUM LACTATE AND CALCIUM CHLORIDE 100 ML/HR: 600; 310; 30; 20 INJECTION, SOLUTION INTRAVENOUS at 21:17

## 2018-02-07 RX ADMIN — FENTANYL CITRATE 50 MCG: 50 INJECTION INTRAMUSCULAR; INTRAVENOUS at 13:08

## 2018-02-07 RX ADMIN — NEOSTIGMINE METHYLSULFATE 2 MG: 1 INJECTION INTRAMUSCULAR; INTRAVENOUS; SUBCUTANEOUS at 14:13

## 2018-02-07 RX ADMIN — HYDROMORPHONE HYDROCHLORIDE 0.5 MG: 2 INJECTION INTRAMUSCULAR; INTRAVENOUS; SUBCUTANEOUS at 13:06

## 2018-02-07 RX ADMIN — HYDROMORPHONE HYDROCHLORIDE 0.5 MG: 2 INJECTION, SOLUTION INTRAMUSCULAR; INTRAVENOUS; SUBCUTANEOUS at 14:35

## 2018-02-07 RX ADMIN — CEFAZOLIN SODIUM 2 G: 2 INJECTION, SOLUTION INTRAVENOUS at 12:21

## 2018-02-07 RX ADMIN — PROPOFOL 200 MG: 10 INJECTION, EMULSION INTRAVENOUS at 12:26

## 2018-02-07 RX ADMIN — IPRATROPIUM BROMIDE AND ALBUTEROL SULFATE 3 ML: .5; 3 SOLUTION RESPIRATORY (INHALATION) at 20:17

## 2018-02-07 NOTE — H&P (VIEW-ONLY)
History & Physical       Patient: Mahnaz Becerra  YOB: 1945  Medical Record Number: [id number]  Body mass index is 34.67 kg/(m^2).    Surgeon:  Dr. Artur Pat    Chief Complaints:   Chief Complaint   Patient presents with   • Left Knee - Pre-op Exam, Pain       Subjective:    History of Present Illness: 72 y.o. female presents with   Chief Complaint   Patient presents with   • Left Knee - Pre-op Exam, Pain   . Onset of symptoms was years ago and has been progressively worsening despite more conservative treatment measures.  Symptoms are associated with ability to move, exercise, and perform activities of daily living.  Symptoms are aggravated by weight bearing and ROM necessary for activities of daily living.   Symptoms improve with rest, ice and elevation only minimally.      Allergies:   Allergies   Allergen Reactions   • Lansoprazole Itching and Other (See Comments)     AND TURN RED   • Levofloxacin Swelling and Other (See Comments)     RED RASH   • Cefdinir Other (See Comments)     Abdominal pain, caused upset stomach   • Trazodone And Nefazodone Other (See Comments)     Severe muscle cramps       Medications:   Home Medications:  Current Outpatient Prescriptions on File Prior to Visit   Medication Sig   • albuterol (VENTOLIN HFA) 108 (90 BASE) MCG/ACT inhaler Inhale 2 puffs Every 6 (Six) Hours As Needed for wheezing.   • cyclobenzaprine (FLEXERIL) 10 MG tablet Take 1 tablet by mouth 2 (Two) Times a Day As Needed for muscle spasms.   • dicyclomine (BENTYL) 20 MG tablet Take 1 tablet by mouth As Needed (abd cramps).   • hydrochlorothiazide (HYDRODIURIL) 25 MG tablet Take 1 tablet by mouth Daily.   • Multiple Vitamins-Minerals (CENTRUM SILVER PO) Take 1 tablet by mouth Daily. STOP 1 WEEK PRIOR TO SURGERY   • pantoprazole (PROTONIX) 40 MG EC tablet Take 1 tablet by mouth 2 (Two) Times a Day.   • traMADol (ULTRAM) 50 MG tablet Take 1 tablet by mouth Every 4 (Four) Hours As Needed for Moderate  Pain .   • vitamin E 400 UNIT capsule Take 400 Units by mouth Daily. STOP 1 WEEK PRIOR TO SURGERY     Current Facility-Administered Medications on File Prior to Visit   Medication   • ipratropium-albuterol (DUO-NEB) nebulizer solution 3 mL     Current Medications:  Scheduled Meds:  ipratropium-albuterol 3 mL Nebulization 4x Daily - RT     Continuous Infusions:   PRN Meds:.    I have reviewed the patient's medical history in detail and updated the computerized patient record.  Review and summarization of old records include:    Past Medical History:   Diagnosis Date   • Acid reflux    • Anesthesia complication     ANESTHESIA HAS BROUGHT ON ASTHMA ATTACKS    • Asthma    • Bronchitis    • [place]     FREQUENT   • Edema     LOWER EXT   • History of skin cancer     BACK   • IBS (irritable bowel syndrome)    • Knee pain, right    • MS (multiple sclerosis)    • Osteoarthritis    • PONV (postoperative nausea and vomiting)     Patient states she had post-op nausea and vomiting after hysterectomy in .   • Sleep apnea     CANT TOLERATE CPAP        Past Surgical History:   Procedure Laterality Date   • APPENDECTOMY     • BREAST LUMPECTOMY Bilateral    • BREAST SURGERY      REDUCTION   •  SECTION     • CHOLECYSTECTOMY     • COLONOSCOPY  2012    Dr Moe   • DILATATION AND CURETTAGE     • ENDOSCOPY  2012    Dr Moe   • ENDOSCOPY N/A 2016    Procedure: ESOPHAGOGASTRODUODENOSCOPY WITH BX;  Surgeon: Nasim Moe MD;  Location: [facility] ENDOSCOPY;  Service:    • EYE SURGERY Bilateral     BLEPHAROPLASTY   • HYSTERECTOMY     • KNEE ARTHROSCOPY Right 2016    Procedure: KNEE ARTHROSCOPY, PARTIAL MEDIAL AND LATERAL MENISECTOMY, CHONDROPLASTY OF THE PATELLA, REMOVAL OF LOOSE BODIES;  Surgeon: Artur Pat MD;  Location: [facility] OR [facility];  Service:    • KNEE ARTHROSCOPY W/ MENISCAL REPAIR Left    • OOPHORECTOMY Bilateral    • TONSILLECTOMY          Social History      Occupational History   • Not on file.     Social History Main Topics   • Smoking status: Former Smoker     Packs/day: 0.25     Types: Cigarettes     Quit date: 1983   • Smokeless tobacco: Never Used      Comment: Patient states she was a social smoker.   • Alcohol use Yes      Comment: Occasional   • Drug use: No   • Sexual activity: Defer    Social History     Social History Narrative        Family History   Problem Relation Age of Onset   • Hypertension Mother    • Stroke Mother    • Alzheimer's disease Mother    • Heart disease Father    • Emphysema Father    • Stroke Maternal Grandmother    • Cancer Maternal Grandfather    • No Known Problems Paternal Grandmother    • Cerebral aneurysm Paternal Grandfather    • Malig Hyperthermia Neg Hx        ROS: 14 point review of systems was performed and was negative except for documented findings in HPI and today's encounter.     Allergies:   Allergies   Allergen Reactions   • Lansoprazole Itching and Other (See Comments)     AND TURN RED   • Levofloxacin Swelling and Other (See Comments)     RED RASH   • Cefdinir Other (See Comments)     Abdominal pain, caused upset stomach   • Trazodone And Nefazodone Other (See Comments)     Severe muscle cramps     Constitutional:  Denies fever, shaking or chills   Eyes:  Denies change in visual acuity   HENT:  Denies nasal congestion or sore throat   Respiratory:  Denies cough or shortness of breath   Cardiovascular:  Denies chest pain or severe LE edema   GI:  Denies abdominal pain, nausea, vomiting, bloody stools or diarrhea   Musculoskeletal:  Denies numbness tingling or loss of motor function except as outlined above in history of present illness.  : Denies painful urination or hematuria  Integument:  Denies rash, lesion or ulceration   Neurologic:  Denies headache or focal weakness  Endocrine:  Denies lymphadenopathy  Psych:  Denies confusion or change in mental status   Hem:  Denies active bleeding    Physical Exam: 72  y.o. female  Body mass index is 34.67 kg/(m^2)., @HT@, @WT@  Vitals:    01/30/18 1356   Temp: 98.4 °F (36.9 °C)     Vital signs reviewed.   General Appearance:    Alert, cooperative, in no acute distress                  Eyes: conjunctiva clear  ENT: external ears and nose atraumatic  CV: no peripheral edema  Resp: normal respiratory effort  Skin: no rashes or wounds; normal turgor  Psych: mood and affect appropriate  Lymph: no nodes appreciated  Neuro: gross sensation intact  Vascular:  Palpable peripheral pulse in noted extremity  Musculoskeletal Extremities: DETAILED KNEE Exam: Left knee: Painful gait w/wo limp, muscle atrophy, erythema, ecchymosis, or gross deformity noted, Large knee effusion, + medial joint line tenderness, Active range of motion normal, 5/5 strength flexion and extension, The knee is stable to varus and valgus stress testing, VARUS VALGUS NEUTRAL: varus alignment of the limb, Lachman negative, Posterior drawer negative, Cherri's negative, Patellofemoral grind +, Sensation grossly intact to light tough throughout the lower extremity, Skin is intact, Distal pulses are palpable, No signs or symptoms of DVT          Diagnostic Tests:  Lab on 01/24/2018   Component Date Value Ref Range Status   • Glucose 01/24/2018 103* 65 - 99 mg/dL Final   • BUN 01/24/2018 15  8 - 23 mg/dL Final   • Creatinine 01/24/2018 0.81  0.57 - 1.00 mg/dL Final   • Sodium 01/24/2018 141  136 - 145 mmol/L Final   • Potassium 01/24/2018 3.7  3.5 - 5.2 mmol/L Final   • Chloride 01/24/2018 100  98 - 107 mmol/L Final   • CO2 01/24/2018 29.9* 22.0 - 29.0 mmol/L Final   • Calcium 01/24/2018 9.3  8.6 - 10.5 mg/dL Final   • Total Protein 01/24/2018 6.8  6.0 - 8.5 g/dL Final   • Albumin 01/24/2018 4.20  3.50 - 5.20 g/dL Final   • ALT (SGPT) 01/24/2018 14  1 - 33 U/L Final   • AST (SGOT) 01/24/2018 18  1 - 32 U/L Final   • Alkaline Phosphatase 01/24/2018 71  39 - 117 U/L Final   • Total Bilirubin 01/24/2018 0.5  0.1 - 1.2 mg/dL  Final   • eGFR Non  Amer 01/24/2018 70  >60 mL/min/1.73 Final   • Globulin 01/24/2018 2.6  gm/dL Final   • A/G Ratio 01/24/2018 1.6  g/dL Final   • BUN/Creatinine Ratio 01/24/2018 18.5  7.0 - 25.0 Final   • Anion Gap 01/24/2018 11.1  mmol/L Final   • Total Cholesterol 01/24/2018 205* 0 - 200 mg/dL Final   • Triglycerides 01/24/2018 89  0 - 150 mg/dL Final   • HDL Cholesterol 01/24/2018 54  40 - 60 mg/dL Final   • LDL Cholesterol  01/24/2018 133* 0 - 100 mg/dL Final   • VLDL Cholesterol 01/24/2018 17.8  5 - 40 mg/dL Final   • LDL/HDL Ratio 01/24/2018 2.47   Final   • Color, UA 01/24/2018 Yellow  Yellow, Straw Final   • Appearance, UA 01/24/2018 Clear  Clear Final   • pH, UA 01/24/2018 6.0  5.0 - 8.0 Final   • Specific Gravity, UA 01/24/2018 1.022  1.005 - 1.030 Final   • Glucose, UA 01/24/2018 Negative  Negative Final   • Ketones, UA 01/24/2018 Negative  Negative Final   • Bilirubin, UA 01/24/2018 Negative  Negative Final   • Blood, UA 01/24/2018 Negative  Negative Final   • Protein, UA 01/24/2018 Negative  Negative Final   • Leuk Esterase, UA 01/24/2018 Negative  Negative Final   • Nitrite, UA 01/24/2018 Negative  Negative Final   • Urobilinogen, UA 01/24/2018 0.2 E.U./dL  0.2 - 1.0 E.U./dL Final   • WBC 01/24/2018 6.85  4.50 - 10.70 10*3/mm3 Final   • RBC 01/24/2018 4.33  3.90 - 5.20 10*6/mm3 Final   • Hemoglobin 01/24/2018 13.3  11.9 - 15.5 g/dL Final   • Hematocrit 01/24/2018 41.3  35.6 - 45.5 % Final   • MCV 01/24/2018 95.4  80.5 - 98.2 fL Final   • MCH 01/24/2018 30.7  26.9 - 32.0 pg Final   • MCHC 01/24/2018 32.2* 32.4 - 36.3 g/dL Final   • RDW 01/24/2018 13.4* 11.7 - 13.0 % Final   • RDW-SD 01/24/2018 46.3  37.0 - 54.0 fl Final   • MPV 01/24/2018 10.3  6.0 - 12.0 fL Final   • Platelets 01/24/2018 265  140 - 500 10*3/mm3 Final   • Neutrophil % 01/24/2018 55.6  42.7 - 76.0 % Final   • Lymphocyte % 01/24/2018 32.1  19.6 - 45.3 % Final   • Monocyte % 01/24/2018 8.5  5.0 - 12.0 % Final   • Eosinophil %  01/24/2018 3.2  0.3 - 6.2 % Final   • Basophil % 01/24/2018 0.6  0.0 - 1.5 % Final   • Immature Grans % 01/24/2018 0.0  0.0 - 0.5 % Final   • Neutrophils, Absolute 01/24/2018 3.81  1.90 - 8.10 10*3/mm3 Final   • Lymphocytes, Absolute 01/24/2018 2.20  0.90 - 4.80 10*3/mm3 Final   • Monocytes, Absolute 01/24/2018 0.58  0.20 - 1.20 10*3/mm3 Final   • Eosinophils, Absolute 01/24/2018 0.22  0.00 - 0.70 10*3/mm3 Final   • Basophils, Absolute 01/24/2018 0.04  0.00 - 0.20 10*3/mm3 Final   • Immature Grans, Absolute 01/24/2018 0.00  0.00 - 0.03 10*3/mm3 Final     No results found.    Imaging was done previously in the office, images were personally viewed and discussed at length with the patient:    Indication: pain related symptoms,  Views: 3V AP, LAT & 40 degree PA left knee(s)   Findings: severe end-stage arthritis (bone on bone, subchondral sclerosis/cysts, osteophytes)  Comparison views: viewed last xray done in the office.     Assessment:  Patient Active Problem List   Diagnosis   • Anxiety   • Benign essential hypertension   • Contact dermatitis   • Dyslipidemia   • Gastroesophageal reflux disease   • Hypothyroidism   • Insomnia   • Pain of lower extremity   • Multiple sclerosis   • Venous stasis   • Arthritis of right knee   • Arthritis of both knees   • Knee pain, right   • S/P right knee arthroscopy   • Arthritis of left knee   • Asthma   • Irritable bowel syndrome   • Periumbilical abdominal pain   • Epigastric pain   • PEARL (obstructive sleep apnea)   • Chronic pain of left knee       Plan:  Dr. Artur Pat reviewed anatomy of a total joint arthroplasty in laymen's terms, as well as typical postoperative recovery and possibly 6-12 months for maximal recovery, and possible need for rehabilitation stay after hospitalization. We also discussed risks, benefits, alternatives, and limitations of procedure with risks including but not limited to neurovascular damage, bleeding, infection, malalignment, chronic pian,  failure of implants, osteolysis, loosening of implants, loss of motion, weakness, stiffness, instability, DVT, pulmonary embolus, death, stroke, complex regional pain syndrome, myocardial infarction, and need for additional procedures. Concept of substitution vs. replacement discussed.  No guarantees were given regarding results of surgery.      Mahnaz Becerra was given the opportunity to ask and have all questions answered today.  The patient voiced understanding of the risks, benefits, and alternative forms of treatment that were discussed and the patient consents to proceed with surgery.     Patient's blood clot history is negative.  Planned DVT prophylaxis for surgery:  Aspirin     Has been doing her preop physical therapy and may benefit from breathing treatment preop.    Discharge Plan: POD 2-3 to home and home health, cleared by Dr Dae Maher cardiology, and Dr. Wilkes PCP, may need higher dose of pain medicine due to MS but start with perc 7.5    Patient was seen by ROBERTA Ruffin in the office today.    Date: 1/30/2018  ROBERTA Browne

## 2018-02-07 NOTE — PLAN OF CARE
Problem: Patient Care Overview (Adult)  Goal: Plan of Care Review  Outcome: Ongoing (interventions implemented as appropriate)   02/07/18 1003   Coping/Psychosocial Response Interventions   Plan Of Care Reviewed With patient   Patient Care Overview   Progress improving     Goal: Adult Individualization and Mutuality  Outcome: Ongoing (interventions implemented as appropriate)   02/07/18 1003   Individualization   Patient Specific Preferences PT PREFERS TO BE CALLED DOT[facility], PT HAS MS, IBS, PT EXTREMELY NERVOUS AM OF SURGERY, STATES HAS HAD KEFLEX IN PAST WITHOUT REACTION     Goal: Discharge Needs Assessment  Outcome: Ongoing (interventions implemented as appropriate)      Problem: Perioperative Period (Adult)  Goal: Signs and Symptoms of Listed Potential Problems Will be Absent or Manageable (Perioperative Period)  Outcome: Ongoing (interventions implemented as appropriate)   02/07/18 1003   Perioperative Period   Problems Assessed (Perioperative Period) pain   Problems Present (Perioperative Period) none

## 2018-02-07 NOTE — ANESTHESIA PREPROCEDURE EVALUATION
Anesthesia Evaluation     history of anesthetic complications (difficulty breathing post op, required albuterral): PONV               Airway   Mallampati: II  TM distance: >3 FB  Neck ROM: full  Dental - normal exam     Pulmonary    (+) asthma, sleep apnea,   (-) shortness of breath, recent URI  Cardiovascular     (+) valvular problems/murmurs TI,   (-) hypertension, dysrhythmias, angina, hyperlipidemia      Neuro/Psych    ROS Comment: Multiple sclerosis  GI/Hepatic/Renal/Endo    (+) obesity,  GERD, hypothyroidism,   (-) no renal disease, diabetes    Musculoskeletal     Abdominal    Substance History      OB/GYN          Other                        Anesthesia Plan    ASA 3     general     intravenous induction   Anesthetic plan and risks discussed with patient.

## 2018-02-07 NOTE — ANESTHESIA POSTPROCEDURE EVALUATION
"Patient: Mahnaz Becerra    Procedure Summary     Date Anesthesia Start Anesthesia Stop Room / Location    02/07/18 1219 1425  [facility] /  [facility]       Procedure Diagnosis Surgeon Provider    LEFT TOTAL KNEE ARTHROPLASTY WITH MARGRET NAVIGATION (Left Knee) Arthritis of left knee  (Arthritis of left knee [M17.12]) MD Alexsandra Garcia MD          Anesthesia Type: general  Last vitals  BP   126/65 (02/07/18 1700)   Temp   36.7 °C (98 °F) (02/07/18 1421)   Pulse   59 (02/07/18 1700)   Resp   16 (02/07/18 1700)     SpO2   95 % (02/07/18 1700)     Post Anesthesia Care and Evaluation    Patient location during evaluation: PACU  Patient participation: complete - patient participated  Level of consciousness: awake and alert  Pain management: adequate  Airway patency: patent  Anesthetic complications: No anesthetic complications    Cardiovascular status: acceptable  Respiratory status: acceptable  Hydration status: acceptable    Comments: /65  Pulse 59  Temp 36.7 °C (98 °F) (Oral)   Resp 16  Ht 162.6 cm (64.02\")  Wt 90.7 kg (200 lb)  SpO2 95%  BMI 34.31 kg/m2        "

## 2018-02-07 NOTE — OP NOTE
Operative Note    Name: Mahnaz Becerra  YOB: 1945  MRN: [id number]  BMI: Body mass index is 34.31 kg/(m^2).    DATE OF SURGERY: 2/7/2018    PREOPERATIVE DIAGNOSIS: left knee end-stage osteoarthritis    POSTOPERATIVE DIAGNOSIS: left knee end-stage osteoarthritis    PROCEDURE PERFORMED: left total knee replacement with Arianne navigation    SURGEON: Dr. Artur Pat    ASSISTANT: TATIANNA Douglass    IMPLANTS: Depuy PFC size 2.5L femoral component, size 2.5 tibial baseplate, 12.5mm polyethylene insert, 32 mm patellar button    Estimated Blood Loss: 100cc  Specimens : none  Complications: none  22 Modifier:  stiffness and scarring and previously operated joint    DESCRIPTION OF PROCEDURE: The patient was taken to the operating room and placed in the supine position. Preoperative antibiotics were administered. Surgical time out was performed. After adequate induction of anesthesia, the leg was prepped and draped in the usual sterile fashion.  Surgical time out was performed. The leg was exsanguinated with an Esmarch bandage and the tourniquet inflated to 250 mmHg. A midline incision was performed followed by a medial parapatellar arthrotomy. The patella was subluxed laterally.  A portion of the fat pad, ACL, and anterior horns of the meniscus were excised.  The Snapkin navigation device was affixed and navigated. The distal cut was made. The femur was then sized with a sizing guide. The femoral cutting block was placed and all femoral cuts were performed. The proximal tibia was exposed. [place] navigation protocol was accomplished.  9 millimeters were removed.  We used the extramedullary tibial cutting guide set for removal of 3mm of bone off the low side. The tibial cut was performed. The posterior horns of the menisci were excised. The posterior osteophytes were removed. Flexion extension blocks were then used to balance the knee. The tibial cut surface was then sized with the sizing templates  and the tibial and femoral trial were then placed. The tray was aligned with the middle third of the tubercle.    Attention was then placed to the patella. The patella was balanced to track centrally through range of motion.  It measured 24 and patella resurfacing was performed.   At this point all trial components were removed, the knee was copiously irrigated with pulsed lavage, and the knee was injected with anesthetic cocktail solution. The cut surfaces were then dried with clean lap sponges, and the components were cemented tibia, followed by femura. The knee was held in full extension and all excess cement was removed. The knee was held still until the cement had completely hardened. We then placed the trial polyethylene spacer which resulted in full extension and excellent flexion-extension balance. We placed the final polyethylene spacer.   The knee was then copiously irrigated with the full 3 liters. The tourniquet was then released. There was excellent hemostasis. We closed the knee in multiple layers in standard fashion. Sterile dressing were applied. At the end of the case, the sponge and needle counts were reported as being correct. There were no known complications. The patient was then transported to the recovery room.    Surgical assistant performed retraction, suction, hemostasis, and specific limb positioning and manipulation required for accuracy of joint placement, and assistance in wound closure and dressing application.       Artur Pat M.D.

## 2018-02-07 NOTE — ANESTHESIA POSTPROCEDURE EVALUATION
Patient: Mahnaz Becerra    Procedure Summary     Date Anesthesia Start Anesthesia Stop Room / Location    02/07/18 1415 [id number]  [facility] /  [facility]       Procedure Diagnosis Surgeon Provider    LEFT TOTAL KNEE ARTHROPLASTY WITH MARGRET NAVIGATION (Left Knee) Arthritis of left knee  (Arthritis of left knee [M17.12]) MD Alexsandra Garcia MD          Anesthesia Type: general  Last vitals  BP   139/76 (02/07/18 1458)   Temp   36.7 °C (98 °F) (02/07/18 1421)   Pulse   61 (02/07/18 1458)   Resp   16 (02/07/18 1458)     SpO2   93 % (02/07/18 1458)     Post Anesthesia Care and Evaluation    Patient location during evaluation: PACU  Patient participation: complete - patient participated  Level of consciousness: awake and alert  Pain management: adequate  Airway patency: patent  Anesthetic complications: No anesthetic complications    Cardiovascular status: acceptable  Respiratory status: acceptable  Hydration status: acceptable    Comments: --------------------            02/07/18 1458     --------------------   BP:       139/76     Pulse:      61       Resp:       16       Temp:                SpO2:      93%      --------------------

## 2018-02-07 NOTE — ANESTHESIA PROCEDURE NOTES
Airway  Urgency: elective    Airway not difficult    General Information and Staff    Patient location during procedure: OR  Anesthesiologist: BRETT RIVERA  CRNA: EVITA GARCIA    Indications and Patient Condition  Indications for airway management: airway protection    Preoxygenated: yes  MILS maintained throughout  Mask difficulty assessment: 1 - vent by mask    Final Airway Details  Final airway type: endotracheal airway      Successful airway: ETT  Cuffed: yes   Successful intubation technique: direct laryngoscopy  Facilitating devices/methods: intubating stylet  Endotracheal tube insertion site: oral  Blade: Miner  Blade size: #2  ETT size: 7.0 mm  Cormack-Lehane Classification: grade I - full view of glottis  Placement verified by: chest auscultation and capnometry   Cuff volume (mL): 8  Measured from: lips  ETT to lips (cm): 21  Number of attempts at approach: 1    Additional Comments  Pt preoxygenated, SIVI, bag mask vent, ATETI, dentition as before

## 2018-02-08 LAB
ANION GAP SERPL CALCULATED.3IONS-SCNC: 13.4 MMOL/L
BUN BLD-MCNC: 10 MG/DL (ref 8–23)
BUN/CREAT SERPL: 13.2 (ref 7–25)
CALCIUM SPEC-SCNC: 8.3 MG/DL (ref 8.6–10.5)
CHLORIDE SERPL-SCNC: 97 MMOL/L (ref 98–107)
CO2 SERPL-SCNC: 27.6 MMOL/L (ref 22–29)
CREAT BLD-MCNC: 0.76 MG/DL (ref 0.57–1)
GFR SERPL CREATININE-BSD FRML MDRD: 75 ML/MIN/1.73
GLUCOSE BLD-MCNC: 163 MG/DL (ref 65–99)
HBA1C MFR BLD: 5.76 % (ref 4.8–5.6)
HCT VFR BLD AUTO: 35 % (ref 35.6–45.5)
HGB BLD-MCNC: 11.6 G/DL (ref 11.9–15.5)
POTASSIUM BLD-SCNC: 3.4 MMOL/L (ref 3.5–5.2)
SODIUM BLD-SCNC: 138 MMOL/L (ref 136–145)

## 2018-02-08 PROCEDURE — 25010000003 CEFAZOLIN IN DEXTROSE 2-4 GM/100ML-% SOLUTION: Performed by: ORTHOPAEDIC SURGERY

## 2018-02-08 PROCEDURE — 85018 HEMOGLOBIN: CPT | Performed by: ORTHOPAEDIC SURGERY

## 2018-02-08 PROCEDURE — 85014 HEMATOCRIT: CPT | Performed by: ORTHOPAEDIC SURGERY

## 2018-02-08 PROCEDURE — 83036 HEMOGLOBIN GLYCOSYLATED A1C: CPT | Performed by: INTERNAL MEDICINE

## 2018-02-08 PROCEDURE — 97110 THERAPEUTIC EXERCISES: CPT

## 2018-02-08 PROCEDURE — 99024 POSTOP FOLLOW-UP VISIT: CPT | Performed by: NURSE PRACTITIONER

## 2018-02-08 PROCEDURE — 97162 PT EVAL MOD COMPLEX 30 MIN: CPT

## 2018-02-08 PROCEDURE — 80048 BASIC METABOLIC PNL TOTAL CA: CPT | Performed by: ORTHOPAEDIC SURGERY

## 2018-02-08 PROCEDURE — 94799 UNLISTED PULMONARY SVC/PX: CPT

## 2018-02-08 RX ORDER — OXYCODONE AND ACETAMINOPHEN 7.5; 325 MG/1; MG/1
TABLET ORAL
Qty: 100 TABLET | Refills: 0
Start: 2018-02-08 | End: 2018-02-21 | Stop reason: SDUPTHER

## 2018-02-08 RX ORDER — POTASSIUM CHLORIDE 750 MG/1
40 CAPSULE, EXTENDED RELEASE ORAL 2 TIMES DAILY WITH MEALS
Status: COMPLETED | OUTPATIENT
Start: 2018-02-08 | End: 2018-02-08

## 2018-02-08 RX ORDER — BISACODYL 5 MG/1
10 TABLET, DELAYED RELEASE ORAL DAILY PRN
Qty: 30 TABLET | Refills: 3 | Status: SHIPPED | OUTPATIENT
Start: 2018-02-08 | End: 2018-03-19

## 2018-02-08 RX ORDER — PSEUDOEPHEDRINE HCL 30 MG
100 TABLET ORAL 2 TIMES DAILY
Qty: 60 CAPSULE | Refills: 2 | Status: SHIPPED | OUTPATIENT
Start: 2018-02-08 | End: 2018-03-19

## 2018-02-08 RX ORDER — ONDANSETRON 4 MG/1
4 TABLET, FILM COATED ORAL EVERY 6 HOURS PRN
Qty: 20 TABLET | Refills: 3 | Status: SHIPPED | OUTPATIENT
Start: 2018-02-08 | End: 2018-03-19

## 2018-02-08 RX ORDER — KETOROLAC TROMETHAMINE 30 MG/ML
15 INJECTION, SOLUTION INTRAMUSCULAR; INTRAVENOUS ONCE AS NEEDED
Status: DISCONTINUED | OUTPATIENT
Start: 2018-02-08 | End: 2018-02-09 | Stop reason: HOSPADM

## 2018-02-08 RX ADMIN — ASPIRIN 325 MG: 325 TABLET, COATED ORAL at 20:24

## 2018-02-08 RX ADMIN — POTASSIUM CHLORIDE 40 MEQ: 750 CAPSULE, EXTENDED RELEASE ORAL at 16:26

## 2018-02-08 RX ADMIN — OXYCODONE HYDROCHLORIDE AND ACETAMINOPHEN 2 TABLET: 7.5; 325 TABLET ORAL at 07:54

## 2018-02-08 RX ADMIN — PANTOPRAZOLE SODIUM 40 MG: 40 TABLET, DELAYED RELEASE ORAL at 07:55

## 2018-02-08 RX ADMIN — POTASSIUM CHLORIDE 40 MEQ: 750 CAPSULE, EXTENDED RELEASE ORAL at 12:40

## 2018-02-08 RX ADMIN — CEFAZOLIN SODIUM 2 G: 2 INJECTION, SOLUTION INTRAVENOUS at 03:42

## 2018-02-08 RX ADMIN — IPRATROPIUM BROMIDE AND ALBUTEROL SULFATE 3 ML: .5; 3 SOLUTION RESPIRATORY (INHALATION) at 08:34

## 2018-02-08 RX ADMIN — MUPIROCIN: 20 OINTMENT TOPICAL at 20:24

## 2018-02-08 RX ADMIN — OXYCODONE HYDROCHLORIDE AND ACETAMINOPHEN 2 TABLET: 7.5; 325 TABLET ORAL at 12:40

## 2018-02-08 RX ADMIN — ASPIRIN 325 MG: 325 TABLET, COATED ORAL at 09:42

## 2018-02-08 RX ADMIN — OXYCODONE HYDROCHLORIDE AND ACETAMINOPHEN 2 TABLET: 7.5; 325 TABLET ORAL at 16:24

## 2018-02-08 RX ADMIN — IPRATROPIUM BROMIDE AND ALBUTEROL SULFATE 3 ML: .5; 3 SOLUTION RESPIRATORY (INHALATION) at 20:51

## 2018-02-08 RX ADMIN — PANTOPRAZOLE SODIUM 40 MG: 40 TABLET, DELAYED RELEASE ORAL at 16:33

## 2018-02-08 RX ADMIN — MUPIROCIN: 20 OINTMENT TOPICAL at 09:42

## 2018-02-08 RX ADMIN — IPRATROPIUM BROMIDE AND ALBUTEROL SULFATE 3 ML: .5; 3 SOLUTION RESPIRATORY (INHALATION) at 11:30

## 2018-02-08 RX ADMIN — OXYCODONE HYDROCHLORIDE AND ACETAMINOPHEN 2 TABLET: 7.5; 325 TABLET ORAL at 20:24

## 2018-02-08 RX ADMIN — IPRATROPIUM BROMIDE AND ALBUTEROL SULFATE 3 ML: .5; 3 SOLUTION RESPIRATORY (INHALATION) at 15:27

## 2018-02-08 RX ADMIN — CEFAZOLIN SODIUM 2 G: 2 INJECTION, SOLUTION INTRAVENOUS at 12:38

## 2018-02-08 NOTE — THERAPY TREATMENT NOTE
Acute Care - Physical Therapy Treatment Note  [facility]     Patient Name: Mahnaz Becerra  : 1945  MRN: [id number]  Today's Date: 2018  Onset of Illness/Injury or Date of Surgery Date: 18     Referring Physician: Adilia    Admit Date: 2018    Visit Dx:    ICD-10-CM ICD-9-CM   1. Arthritis of left knee M17.12 716.96   2. Chills R68.83 780.64     Patient Active Problem List   Diagnosis   • Anxiety   • Benign essential hypertension   • Contact dermatitis   • Dyslipidemia   • Gastroesophageal reflux disease   • Hypothyroidism   • Insomnia   • Pain of lower extremity   • Multiple sclerosis   • Venous stasis   • Arthritis of right knee   • Arthritis of both knees   • Knee pain, right   • S/P right knee arthroscopy   • Arthritis of left knee   • Asthma   • Irritable bowel syndrome   • Periumbilical abdominal pain   • Epigastric pain   • PEARL (obstructive sleep apnea)   • Chronic pain of left knee               Adult Rehabilitation Note       18 1300          Rehab Assessment/Intervention    Discipline physical therapy assistant  -CW      Document Type therapy note (daily note)  -CW      Subjective Information agree to therapy;complains of;pain;swelling  -CW      Patient Effort, Rehab Treatment good  -CW      Precautions/Limitations fall precautions  -CW      Recorded by [CW] Aakash Christine PTA      Pain Assessment    Pain Assessment 0-10  -CW      Pain Score 5  -CW      Post Pain Score 5  -CW      Pain Type Surgical pain  -CW      Pain Location Knee  -CW      Pain Orientation Left  -CW      Pain Intervention(s) Repositioned;Ambulation/increased activity  -CW      Recorded by [CW] Aakash Christine PTA      Cognitive Assessment/Intervention    Current Cognitive/Communication Assessment functional  -CW      Orientation Status oriented x 4  -CW      Follows Commands/Answers Questions 100% of the time  -CW      Personal Safety WNL/WFL  -CW      Personal Safety Interventions fall prevention  program maintained;gait belt;muscle strengthening facilitated;nonskid shoes/slippers when out of bed  -CW      Recorded by [CW] Aakash Christine PTA      Bed Mobility, Assessment/Treatment    Bed Mob, Supine to Sit, [place] supervision required  -CW      Bed Mob, Sit to Supine, [place] supervision required  -CW      Recorded by [CW] Aakash Christine PTA      Transfer Assessment/Treatment    Transfers, Sit-Stand [place] supervision required  -CW      Transfers, Stand-Sit [place] supervision required  -CW      Transfers, Sit-Stand-Sit, Assist Device rolling walker  -CW      Recorded by [CW] Aakash Christine PTA      Gait Assessment/Treatment    Gait, [place] Level supervision required  -CW      Gait, Assistive Device rolling walker  -CW      Gait, Distance (Feet) 200  -CW      Gait, Gait Deviations linda decreased;step length decreased;stride length decreased  -CW      Recorded by [CW] Aakash Christine PTA      Therapy Exercises    Exercise Protocols total knee  -CW      Total Knee Exercises left:;15 reps;completed protocol  -CW      Recorded by [LING] Aakash Christine PTA      Positioning and Restraints    Pre-Treatment Position in bed  -CW      Post Treatment Position bed  -CW      In Bed notified nsg;supine;call light within reach;encouraged to call for assist;with family/caregiver  -CW      Recorded by [CW] Aakash Christine PTA        User Key  (r) = Recorded By, (t) = Taken By, (c) = Cosigned By    Initials Name Effective Dates     Aakash Christine PTA 12/13/16 -                 IP PT Goals       02/08/18 0917          Transfer Training PT LTG    Transfer Training PT LTG, Date Established 02/08/18  -AR      Transfer Training PT LTG, Time to Achieve 1 wk  -AR      Transfer Training PT LTG, Activity Type sit to stand/stand to sit  -AR      Transfer Training PT LTG, [place] Level supervision required  -AR      Transfer Training PT LTG, Assist Device walker,  rolling  -AR      Gait Training PT LTG    Gait Training Goal PT LTG, Date Established 02/08/18  -AR      Gait Training Goal PT LTG, Time to Achieve 1 wk  -AR      Gait Training Goal PT LTG, [place] Level supervision required  -AR      Gait Training Goal PT LTG, Assist Device walker, rolling  -AR      Gait Training Goal PT LTG, Distance to Achieve 150  -AR      Stair Training PT LTG    Stair Training Goal PT LTG, Date Established 02/08/18  -AR      Stair Training Goal PT LTG, Time to Achieve 1 wk  -AR      Stair Training Goal PT LTG, Number of Steps 2  -AR      Stair Training Goal PT LTG, [place] Level contact guard assist  -AR      Range of Motion PT LTG    Range of Motion Goal PT LTG, Date Established 02/08/18  -AR      Range of Motion Goal PT LTG, Time to Achieve 1 wk  -AR      Range fo Motion Goal PT LTG, Joint L knee  -AR      Range of Motion Goal PT LTG, AROM Measure -5-90  -AR        User Key  (r) = Recorded By, (t) = Taken By, (c) = Cosigned By    Initials Name Provider Type    DINESH Muller PT Physical Therapist          Physical Therapy Education     Title: PT OT SLP Therapies (Active)     Topic: Physical Therapy (Active)     Point: Mobility training (Active)    Learning Progress Summary    Learner Readiness Method Response Comment Documented by Status   Patient Acceptance E NR  AR 02/08/18 0916 Active               Point: Home exercise program (Active)    Learning Progress Summary    Learner Readiness Method Response Comment Documented by Status   Patient Acceptance E NR  AR 02/08/18 0916 Active               Point: Body mechanics (Active)    Learning Progress Summary    Learner Readiness Method Response Comment Documented by Status   Patient Acceptance E NR  AR 02/08/18 0916 Active               Point: Precautions (Active)    Learning Progress Summary    Learner Readiness Method Response Comment Documented by Status   Patient Acceptance E NR  AR 02/08/18 0916 Active                       User Key     Initials Effective Dates Name Provider Type Discipline    AR 06/27/16 -  Deb Muller PT Physical Therapist PT                    PT Recommendation and Plan  Anticipated Equipment Needs At Discharge:  (RW and BSC ordered 2/8)  Anticipated Discharge Disposition: home with assist, home with home health  Planned Therapy Interventions: balance training, bed mobility training, gait training, home exercise program, patient/family education, ROM (Range of Motion), stair training, strengthening  PT Frequency: 2 times/day             Outcome Measures       02/08/18 0900          How much help from another person do you currently need...    Turning from your back to your side while in flat bed without using bedrails? 4  -AR      Moving from lying on back to sitting on the side of a flat bed without bedrails? 4  -AR      Moving to and from a bed to a chair (including a wheelchair)? 4  -AR      Standing up from a chair using your arms (e.g., wheelchair, bedside chair)? 3  -AR      Climbing 3-5 steps with a railing? 3  -AR      To walk in hospital room? 3  -AR      AM-PAC 6 Clicks Score 21  -AR      Functional Assessment    Outcome Measure Options AM-PAC 6 Clicks Basic Mobility (PT)  -AR        User Key  (r) = Recorded By, (t) = Taken By, (c) = Cosigned By    Initials Name Provider Type    AR Deb Muller PT Physical Therapist           Time Calculation:         PT Charges       02/08/18 1333 02/08/18 0919       Time Calculation    Start Time 1308  -CW      Stop Time 1333  -CW 0919  -AR     Time Calculation (min) 25 min  -CW      PT Received On 02/08/18  -CW 02/08/18  -AR     PT - Next Appointment 02/09/18  -CW 02/08/18  -AR     PT Goal Re-Cert Due Date  02/15/18  -AR       User Key  (r) = Recorded By, (t) = Taken By, (c) = Cosigned By    Initials Name Provider Type    AR Deb Muller, PT Physical Therapist    LING Christine, PTA Physical Therapy Assistant          Therapy Charges for Today      Code Description Service Date Service Provider Modifiers Qty    61638777364 HC PT THER PROC EA 15 MIN 2/8/2018 Aakash Christine, PTA GP 2          PT G-Codes  Outcome Measure Options: AM-PAC 6 Clicks Basic Mobility (PT)    Aakash Christine, ALVARO  2/8/2018

## 2018-02-08 NOTE — PROGRESS NOTES
Discharge Planning Assessment  [facility]     Patient Name: Mahnaz Becerra  MRN: [id number]  Today's Date: 2/8/2018    Admit Date: 2/7/2018          Discharge Needs Assessment       02/08/18 1534    Living Environment    Lives With spouse    Living Arrangements house    Transportation Available car;family or friend will provide    Discharge Needs Assessment    Concerns To Be Addressed basic needs concerns    Readmission Within The Last 30 Days no previous admission in last 30 days    Anticipated Changes Related to Illness none    Equipment Currently Used at Home none    Discharge Facility/Level Of Care Needs home with home health            Discharge Plan       02/08/18 1534    Case Management/Social Work Plan    Plan [facility]    Patient/Family In Agreement With Plan yes    Additional Comments Spoke with pt, verified correct information on facesheet and explained the role of CCP. Pt would like to d/c home with [facility], referral given to Inez with [facility] who states they are able to accept. Plan will be to d/c home with HH and family support.        Discharge Placement     Facility/Agency Request Status Selected? Address Phone Number Fax Number    [facility] Accepted    Yes [address] [address] [phone]                Demographic Summary     None            Functional Status       02/08/18 1533    Functional Status Current    Ambulation 3-->assistive equipment and person    Transferring 3-->assistive equipment and person    Toileting 3-->assistive equipment and person    Bathing 2-->assistive person    Dressing 2-->assistive person    Eating 0-->independent    Communication 0-->understands/communicates without difficulty    Swallowing (if score 2 or more for any item, consult Rehab Services) 0-->swallows foods/liquids without difficulty    Functional Status Prior    Ambulation 0-->independent    Transferring 0-->independent    Toileting  0-->independent    Bathing 0-->independent    Dressing 0-->independent    Eating 0-->independent    Communication 0-->understands/communicates without difficulty    Swallowing 0-->swallows foods/liquids without difficulty            Psychosocial     None            Abuse/Neglect     None            Legal     None            Substance Abuse     None            Patient Forms     None          Rupali Chan RN

## 2018-02-08 NOTE — THERAPY EVALUATION
Acute Care - Physical Therapy Initial Evaluation  [facility]     Patient Name: Mahnaz Becerra  : 1945  MRN: [id number]  Today's Date: 2018   Onset of Illness/Injury or Date of Surgery Date: 18     Referring Physician: Adilia      Admit Date: 2018     Visit Dx:    ICD-10-CM ICD-9-CM   1. Arthritis of left knee M17.12 716.96   2. Chills R68.83 780.64     Patient Active Problem List   Diagnosis   • Anxiety   • Benign essential hypertension   • Contact dermatitis   • Dyslipidemia   • Gastroesophageal reflux disease   • Hypothyroidism   • Insomnia   • Pain of lower extremity   • Multiple sclerosis   • Venous stasis   • Arthritis of right knee   • Arthritis of both knees   • Knee pain, right   • S/P right knee arthroscopy   • Arthritis of left knee   • Asthma   • Irritable bowel syndrome   • Periumbilical abdominal pain   • Epigastric pain   • PEARL (obstructive sleep apnea)   • Chronic pain of left knee     Past Medical History:   Diagnosis Date   • Acid reflux    • Anesthesia complication     ANESTHESIA HAS BROUGHT ON ASTHMA ATTACKS    • Asthma    • Bronchitis    • [place]     FREQUENT   • Edema     LOWER EXT   • Heart murmur    • History of skin cancer     BACK   • IBS (irritable bowel syndrome)    • Knee pain, right    • MS (multiple sclerosis)    • Osteoarthritis    • PONV (postoperative nausea and vomiting)     Patient states she had post-op nausea and vomiting after hysterectomy in .   • Sleep apnea     CANT TOLERATE CPAP     Past Surgical History:   Procedure Laterality Date   • APPENDECTOMY     • BREAST LUMPECTOMY Bilateral    • BREAST SURGERY      REDUCTION   •  SECTION     • CHOLECYSTECTOMY     • COLONOSCOPY  2012    Dr Moe   • DILATATION AND CURETTAGE     • ENDOSCOPY  2012    Dr Moe   • ENDOSCOPY N/A 2016    Procedure: ESOPHAGOGASTRODUODENOSCOPY WITH BX;  Surgeon: Nasim Moe MD;  Location: [facility] ENDOSCOPY;  Service:    • EYE  SURGERY Bilateral     BLEPHAROPLASTY   • HYSTERECTOMY  1987   • KNEE ARTHROSCOPY Right 8/12/2016    Procedure: KNEE ARTHROSCOPY, PARTIAL MEDIAL AND LATERAL MENISECTOMY, CHONDROPLASTY OF THE PATELLA, REMOVAL OF LOOSE BODIES;  Surgeon: Artur Pat MD;  Location: [facility];  Service:    • KNEE ARTHROSCOPY W/ MENISCAL REPAIR Left    • OOPHORECTOMY Bilateral 1997   • TONSILLECTOMY  1952   • TOTAL KNEE ARTHROPLASTY Left 2/7/2018    Procedure: LEFT TOTAL KNEE ARTHROPLASTY WITH MARGRET NAVIGATION;  Surgeon: Artur Pat MD;  Location: [facility] OR;  Service:           PT ASSESSMENT (last 72 hours)      PT Evaluation       02/08/18 0913 02/08/18 0421    Rehab Evaluation    Document Type evaluation  -AR     Subjective Information agree to therapy  -AR     Patient Effort, Rehab Treatment good  -AR     Symptoms Noted During/After Treatment none  -AR     General Information    Patient Profile Review yes  -AR     Onset of Illness/Injury or Date of Surgery Date 02/07/18  -AR     Referring Physician Adilia  -AR     Pertinent History Of Current Problem multiple sclerosis, L TKA 2/7  -AR     Precautions/Limitations fall precautions  -AR     Prior Level of Function independent:  -AR     Equipment Currently Used at Home none  -AR none  -BM    Barriers to Rehab none identified  -AR     Living Environment    Lives With spouse  -AR     Living Arrangements house  -AR     Home Accessibility no concerns  -AR     Transportation Available  car  -BM    Living Environment Comment 2 steps to get into bed  -AR     Clinical Impression    Patient/Family Goals Statement DC home 2/9  -AR     Criteria for Skilled Therapeutic Interventions Met yes  -AR     Pathology/Pathophysiology Noted (Describe Specifically for Each System) musculoskeletal  -AR     Impairments Found (describe specific impairments) aerobic capacity/endurance;gait, locomotion, and balance;ROM  -AR     Rehab Potential good, to achieve stated therapy goals  -AR     Pain  Assessment    Pain Assessment No/denies pain  -AR     Cognitive Assessment/Intervention    Current Cognitive/Communication Assessment functional  -AR     Orientation Status oriented x 4  -AR     ROM (Range of Motion)    General ROM --   WFL except L knee  -AR     MMT (Manual Muscle Testing)    General MMT Assessment --   WFL except LLE  -AR     Bed Mobility, Assessment/Treatment    Bed Mobility, Assistive Device bed rails;head of bed elevated  -AR     Bed Mob, Supine to Sit, [place] supervision required  -AR     Bed Mob, Sit to Supine, [place] not tested  -AR     Transfer Assessment/Treatment    Transfers, Sit-Stand [place] contact guard assist  -AR     Transfers, Stand-Sit [place] contact guard assist  -AR     Transfers, Sit-Stand-Sit, Assist Device standard walker  -AR     Gait Assessment/Treatment    Gait, [place] Level contact guard assist;stand by assist   improved to stand by assist  -AR     Gait, Assistive Device standard walker  -AR     Gait, Distance (Feet) 120  -AR     Gait, Gait Pattern Analysis swing-to gait  -AR     Gait, Gait Deviations linda decreased;decreased heel strike  -AR     Gait, Safety Issues step length decreased  -AR     Gait, Impairments ROM decreased  -AR     Therapy Exercises    Exercise Protocols total knee  -AR     Total Knee Exercises left:;10 reps;completed protocol  -AR     Positioning and Restraints    Pre-Treatment Position in bed  -AR     Post Treatment Position chair  -AR     In Chair reclined;sitting;call light within reach;encouraged to call for assist;with family/caregiver  -AR       02/08/18 0400 02/08/18 0000    Muscle Tone Assessment    Muscle Tone Assessment Bilateral Upper Extremities  -BM Bilateral Upper Extremities  -BM    Bilateral Upper Extremities Muscle Tone Assessment other (see comments)   no arm drift  -BM other (see comments)   no arm drift  -BM      02/07/18 2057 02/07/18 1951    Living Environment    Lives With  spouse  -     Living Arrangements  house  -WM    Home Accessibility  no concerns  -WM    Stair Railings at Home  none  -WM    Type of Financial/Environmental Concern  none  -WM    Transportation Available  car  -WM    Muscle Tone Assessment    Muscle Tone Assessment Bilateral Upper Extremities  -BM     Bilateral Upper Extremities Muscle Tone Assessment other (see comments)   no arm drift  -BM       02/07/18 0953       General Information    Equipment Currently Used at Home none  -PA     Living Environment    Lives With spouse  -PA     Living Arrangements house  -PA     Home Accessibility no concerns  -PA     Stair Railings at Home none  -PA     Type of Financial/Environmental Concern none  -PA     Transportation Available car  -PA       User Key  (r) = Recorded By, (t) = Taken By, (c) = Cosigned By    Initials Name Provider Type    PA Savana Malik, RN Registered Nurse    DARREN Sung, RN Registered Nurse    WM Ham Elizondo, RN Registered Nurse    DINESH Muller, PT Physical Therapist          Physical Therapy Education     Title: PT OT SLP Therapies (Active)     Topic: Physical Therapy (Active)     Point: Mobility training (Active)    Learning Progress Summary    Learner Readiness Method Response Comment Documented by Status   Patient Acceptance E NR  AR 02/08/18 0916 Active               Point: Home exercise program (Active)    Learning Progress Summary    Learner Readiness Method Response Comment Documented by Status   Patient Acceptance E NR  AR 02/08/18 0916 Active               Point: Body mechanics (Active)    Learning Progress Summary    Learner Readiness Method Response Comment Documented by Status   Patient Acceptance E NR  AR 02/08/18 0916 Active               Point: Precautions (Active)    Learning Progress Summary    Learner Readiness Method Response Comment Documented by Status   Patient Acceptance E NR  AR 02/08/18 0916 Active                      User Key     Initials Effective Dates Name  Provider Type Discipline    AR 06/27/16 -  Deb Muller PT Physical Therapist PT                PT Recommendation and Plan  Anticipated Equipment Needs At Discharge:  (RW and BSC ordered 2/8)  Anticipated Discharge Disposition: home with assist, home with home health  Planned Therapy Interventions: balance training, bed mobility training, gait training, home exercise program, patient/family education, ROM (Range of Motion), stair training, strengthening  PT Frequency: 2 times/day  Plan of Care Review  Plan Of Care Reviewed With: patient  Outcome Summary/Follow up Plan: Pt admitted 2/7 s/p L TKA; pt w/ MS.  Pt reports living w/ spouse, no use of AD, no recent falls, was receiving OP PT.  Pt demonstrates impaired L knee ROM, strength, gait pattern but able to ambulate 120' w/ walker and contact improving to stand by assist.  Performed exercises w/ education for HEP.  Educated on activity/positioning/icing recommendations.  RW and 3-1 commode ordered 2/8.  Pt reports plan for DC to home 2/9.            IP PT Goals       02/08/18 0917          Transfer Training PT LTG    Transfer Training PT LTG, Date Established 02/08/18  -AR      Transfer Training PT LTG, Time to Achieve 1 wk  -AR      Transfer Training PT LTG, Activity Type sit to stand/stand to sit  -AR      Transfer Training PT LTG, [place] Level supervision required  -AR      Transfer Training PT LTG, Assist Device walker, rolling  -AR      Gait Training PT LTG    Gait Training Goal PT LTG, Date Established 02/08/18  -AR      Gait Training Goal PT LTG, Time to Achieve 1 wk  -AR      Gait Training Goal PT LTG, [place] Level supervision required  -AR      Gait Training Goal PT LTG, Assist Device walker, rolling  -AR      Gait Training Goal PT LTG, Distance to Achieve 150  -AR      Stair Training PT LTG    Stair Training Goal PT LTG, Date Established 02/08/18  -AR      Stair Training Goal PT LTG, Time to Achieve 1 wk  -AR      Stair Training Goal PT  LTG, Number of Steps 2  -AR      Stair Training Goal PT LTG, [place] Level contact guard assist  -AR      Range of Motion PT LTG    Range of Motion Goal PT LTG, Date Established 02/08/18  -AR      Range of Motion Goal PT LTG, Time to Achieve 1 wk  -AR      Range fo Motion Goal PT LTG, Joint L knee  -AR      Range of Motion Goal PT LTG, AROM Measure -5-90  -AR        User Key  (r) = Recorded By, (t) = Taken By, (c) = Cosigned By    Initials Name Provider Type    DINESH Muller PT Physical Therapist                Outcome Measures       02/08/18 0900          How much help from another person do you currently need...    Turning from your back to your side while in flat bed without using bedrails? 4  -AR      Moving from lying on back to sitting on the side of a flat bed without bedrails? 4  -AR      Moving to and from a bed to a chair (including a wheelchair)? 4  -AR      Standing up from a chair using your arms (e.g., wheelchair, bedside chair)? 3  -AR      Climbing 3-5 steps with a railing? 3  -AR      To walk in hospital room? 3  -AR      AM-PAC 6 Clicks Score 21  -AR      Functional Assessment    Outcome Measure Options AM-PAC 6 Clicks Basic Mobility (PT)  -AR        User Key  (r) = Recorded By, (t) = Taken By, (c) = Cosigned By    Initials Name Provider Type    DINESH Muller PT Physical Therapist           Time Calculation:         PT Charges       02/08/18 0919          Time Calculation    Stop Time 0919  -AR      PT Received On 02/08/18  -AR      PT - Next Appointment 02/08/18  -AR      PT Goal Re-Cert Due Date 02/15/18  -AR        User Key  (r) = Recorded By, (t) = Taken By, (c) = Cosigned By    Initials Name Provider Type    DINESH Muller PT Physical Therapist          Therapy Charges for Today     Code Description Service Date Service Provider Modifiers Qty    11635148673 HC PT EVAL MOD COMPLEXITY 2 2/8/2018 Deb Muller PT GP 1    01023397606 HC PT THER PROC EA 15 MIN 2/8/2018  Deb Muller, PT GP 1          PT G-Codes  Outcome Measure Options: AM-PAC 6 Clicks Basic Mobility (PT)      Deb Muller, PT  2/8/2018

## 2018-02-08 NOTE — PROGRESS NOTES
Orthopedic Total Joint Progress Note        Patient: Mahnaz Becerra    Date of Admission: 2/7/2018  9:27 AM    YOB: 1945    Medical Record Number: [id number]    Attending Physician: Artur Pat MD      POD # 1 Day Post-Op Procedure(s) (LRB):  LEFT TOTAL KNEE ARTHROPLASTY WITH MARGRET NAVIGATION (Left)       Systemic or Specific Complaints: No Complaints      Allergies:   Allergies   Allergen Reactions   • Lansoprazole Itching and Other (See Comments)     AND TURN RED   • Levofloxacin Swelling and Other (See Comments)     RED RASH   • Cefdinir Other (See Comments)     Abdominal pain, caused upset stomach   • Trazodone And Nefazodone Other (See Comments)     Severe muscle cramps       Medications:   Current Medications:  Scheduled Meds:  aspirin 325 mg Oral Q12H   ceFAZolin 2 g Intravenous Q8H   docusate sodium 100 mg Oral BID   hydrochlorothiazide 25 mg Oral Daily   ipratropium-albuterol 3 mL Nebulization 4x Daily - RT   mupirocin  Each Nare BID   pantoprazole 40 mg Oral BID AC   polyethylene glycol 17 g Oral Daily     Continuous Infusions:  HYDROmorphone HCl-NaCl  Last Rate: Stopped (02/08/18 0711)   lactated ringers 9 mL/hr Last Rate: 9 mL/hr (02/07/18 0950)   lactated ringers 100 mL/hr Last Rate: 100 mL/hr (02/07/18 2117)     PRN Meds:.•  acetaminophen  •  albuterol  •  bisacodyl  •  bisacodyl  •  cyclobenzaprine  •  dicyclomine  •  diphenhydrAMINE **OR** diphenhydrAMINE  •  ketorolac  •  magnesium hydroxide  •  naloxone  •  ondansetron **OR** ondansetron ODT **OR** ondansetron  •  oxyCODONE-acetaminophen  •  oxyCODONE-acetaminophen  •  promethazine      Physical Exam: 72 y.o. female Body mass index is 34.31 kg/(m^2).  General Appearance:    alert and oriented            Pain Relief: Patient reports good relief Vitals:    02/07/18 2017 02/07/18 2300 02/08/18 0300 02/08/18 0647   BP:  100/62 141/67 110/57   BP Location:  Left arm Left arm Left arm   Patient Position:  Lying Lying Lying    Pulse: 53 67 71 65   Resp: 16 16 16 16   Temp:  97.5 °F (36.4 °C) 100 °F (37.8 °C) 97.1 °F (36.2 °C)   TempSrc:  Oral Oral Oral   SpO2: 98% 96% 99% 99%   Weight:       Height:              HEENT:    Normocephalic, without obvious abnormality, atraumatic.          PERRLA; EOMI; Neck supple with trachea midline. No JVD.       Lungs:     Respirations regular, even and unlabored      Heart:    Regular rhythm and normal rate.   Abdomen:     Normal bowel sounds, soft non-tender, non-distended       Extremities:   Operative extremity neurovascular status intact. ROM intact.    Incision intact w/out signs or symptoms of infection.  No cyanosis, no calf tenderness     Pulses:     Pulses palpable and equal bilaterally     Skin:     Skin Warm/Dry w/out cyanosis     Activity: Mobilizing Per P.T.   Weight Bearing: As Tolerated    Diagnostic Tests:   Admission on 02/07/2018   Component Date Value Ref Range Status   • Glucose 02/08/2018 163* 65 - 99 mg/dL Final   • BUN 02/08/2018 10  8 - 23 mg/dL Final   • Creatinine 02/08/2018 0.76  0.57 - 1.00 mg/dL Final   • Sodium 02/08/2018 138  136 - 145 mmol/L Final   • Potassium 02/08/2018 3.4* 3.5 - 5.2 mmol/L Final   • Chloride 02/08/2018 97* 98 - 107 mmol/L Final   • CO2 02/08/2018 27.6  22.0 - 29.0 mmol/L Final   • Calcium 02/08/2018 8.3* 8.6 - 10.5 mg/dL Final   • eGFR Non African Amer 02/08/2018 75  >60 mL/min/1.73 Final   • BUN/Creatinine Ratio 02/08/2018 13.2  7.0 - 25.0 Final   • Anion Gap 02/08/2018 13.4  mmol/L Final   • Hemoglobin 02/08/2018 11.6* 11.9 - 15.5 g/dL Final   • Hematocrit 02/08/2018 35.0* 35.6 - 45.5 % Final       Xr Knee 1 Or 2 View Left    Result Date: 2/7/2018  Narrative: LEFT KNEE 2 VIEWS 02/07/2018  HISTORY: Postop left knee arthroplasty.  The distal femoral and proximal tibia components of the left knee prosthesis are well seated with no abnormal surrounding bony lucencies. Soft tissue air and skin staples are seen.  No fracture or subluxation is seen.   IMPRESSION;  Satisfactory postoperative appearance of the left knee.        Personally viewed ortho images and report     Assessment: post op elevated blood sugar and low potassium, will consult [facility] for medical post op management.  Doing well POD  # 1 Day Post-Op following total joint replacement  Acute Blood Loss Anemia, stable  Post-operative Pain  Limited mobility, requires use of walker and assistance when OOB.    Patient Active Problem List   Diagnosis   • Anxiety   • Benign essential hypertension   • Contact dermatitis   • Dyslipidemia   • Gastroesophageal reflux disease   • Hypothyroidism   • Insomnia   • Pain of lower extremity   • Multiple sclerosis   • Venous stasis   • Arthritis of right knee   • Arthritis of both knees   • Knee pain, right   • S/P right knee arthroscopy   • Arthritis of left knee   • Asthma   • Irritable bowel syndrome   • Periumbilical abdominal pain   • Epigastric pain   • PEARL (obstructive sleep apnea)   • Chronic pain of left knee        Plan:    consult [facility] for medical post op management.  Continue to monitor labs and/or v/s, for tolerance to post op blood loss.  Continue efforts to increase mobilization.  Continue Pain Control Measures.  Continue incisional Care.  DVT prophylaxis.  Follow up in office with Artur Pat M.D. In 2 weeks.    Discharge Plan:tomorrow to home and home health    Date: 2/8/2018  ROBERTA Browne  Seen today by Artur Pat M.D. and ROBERTA Ruffin

## 2018-02-08 NOTE — PLAN OF CARE
Problem: Patient Care Overview (Adult)  Goal: Plan of Care Review  Outcome: Ongoing (interventions implemented as appropriate)   02/07/18 1900   Coping/Psychosocial Response Interventions   Plan Of Care Reviewed With patient   Patient Care Overview   Progress progress toward functional goals as expected   Outcome Evaluation   Outcome Summary/Follow up Plan Admit to floor for LTKA, pain well controlled, VSS, NV wnl, voiding per BSC - DC plan's unknown at this time. Educated patient on her Hx of MS and how fatigue and other S/E can play a large part in her recovery. Patient acknowledged understanding.      Goal: Adult Individualization and Mutuality  Outcome: Ongoing (interventions implemented as appropriate)    Goal: Discharge Needs Assessment  Outcome: Ongoing (interventions implemented as appropriate)      Problem: Perioperative Period (Adult)  Goal: Signs and Symptoms of Listed Potential Problems Will be Absent or Manageable (Perioperative Period)  Outcome: Ongoing (interventions implemented as appropriate)      Problem: Knee Replacement, Total (Adult)  Goal: Signs and Symptoms of Listed Potential Problems Will be Absent or Manageable (Knee Replacement, Total)  Outcome: Ongoing (interventions implemented as appropriate)      Problem: Fall Risk (Adult)  Goal: Identify Related Risk Factors and Signs and Symptoms  Outcome: Ongoing (interventions implemented as appropriate)    Goal: Absence of Falls  Outcome: Ongoing (interventions implemented as appropriate)

## 2018-02-08 NOTE — CONSULTS
Patient Identification:  Name: Mahnaz Becerra  Age/Sex: 72 y.o. female  :  1945  MRN: [id number]         Primary Care Physician: Stephen Wilkes MD  Room:  P879/1              Inpatient Consult to Internal Medicine  Consult performed by: KAMALA SARGENT  Consult ordered by: CARMELO SAUNDERS  Reason for consult: elevated blood sugar and low potassium      Date of Admit: 2018  Date of Consult: 18  Subjective   History of Present Illness  71 y/o WF with a h/o asthma, LE swelling, GERD, MS who comes in for elective L TKA. As fars as her medical diagnoses are concerned, she was diagnosed with MS about 18 years ago. It has been fairly mild as she has not required any meds for this since being diagnosed. She also states she has chronic LE swelling and takes HCTZ for this. Currently pain is controlled and no issues breathing.     Past Medical History:   Diagnosis Date   • Acid reflux    • Anesthesia complication     ANESTHESIA HAS BROUGHT ON ASTHMA ATTACKS    • Asthma    • Bronchitis    • [place]     FREQUENT   • Edema     LOWER EXT   • Heart murmur    • History of skin cancer     BACK   • IBS (irritable bowel syndrome)    • Knee pain, right    • MS (multiple sclerosis)    • Osteoarthritis    • PONV (postoperative nausea and vomiting)     Patient states she had post-op nausea and vomiting after hysterectomy in .   • Sleep apnea     CANT TOLERATE CPAP     Past Surgical History:   Procedure Laterality Date   • APPENDECTOMY     • BREAST LUMPECTOMY Bilateral    • BREAST SURGERY      REDUCTION   •  SECTION     • CHOLECYSTECTOMY     • COLONOSCOPY  2012    Dr Moe   • DILATATION AND CURETTAGE     • ENDOSCOPY  2012    Dr Moe   • ENDOSCOPY N/A 2016    Procedure: ESOPHAGOGASTRODUODENOSCOPY WITH BX;  Surgeon: Nasim Moe MD;  [facility] ENDOSCOPY;  Service:    • EYE SURGERY Bilateral     BLEPHAROPLASTY   • HYSTERECTOMY     • KNEE ARTHROSCOPY  Right 8/12/2016    Procedure: KNEE ARTHROSCOPY, PARTIAL MEDIAL AND LATERAL MENISECTOMY, CHONDROPLASTY OF THE PATELLA, REMOVAL OF LOOSE BODIES;  Surgeon: Artur Pat MD;  Location: [facility];  Service:    • KNEE ARTHROSCOPY W/ MENISCAL REPAIR Left    • OOPHORECTOMY Bilateral 1997   • TONSILLECTOMY  1952   • TOTAL KNEE ARTHROPLASTY Left 2/7/2018    Procedure: LEFT TOTAL KNEE ARTHROPLASTY WITH MARGRET NAVIGATION;  Surgeon: Artur Pat MD;  Location: [facility] OR;  Service:      Family History   Problem Relation Age of Onset   • Hypertension Mother    • Stroke Mother    • Alzheimer's disease Mother    • Heart disease Father    • Emphysema Father    • Stroke Maternal Grandmother    • Cancer Maternal Grandfather    • No Known Problems Paternal Grandmother    • Cerebral aneurysm Paternal Grandfather    • Malig Hyperthermia Neg Hx      Social History   Substance Use Topics   • Smoking status: Former Smoker     Packs/day: 0.25     Types: Cigarettes     Quit date: 1983   • Smokeless tobacco: Never Used      Comment: Patient states she was a social smoker.   • Alcohol use Yes      Comment: Occasional     Facility-Administered Medications Prior to Admission   Medication Dose Route Frequency Provider Last Rate Last Dose   • ipratropium-albuterol (DUO-NEB) nebulizer solution 3 mL  3 mL Nebulization 4x Daily - RT ROBERTA Scott   3 mL at 04/25/16 1427     Prescriptions Prior to Admission   Medication Sig Dispense Refill Last Dose   • albuterol (VENTOLIN HFA) 108 (90 BASE) MCG/ACT inhaler Inhale 2 puffs Every 6 (Six) Hours As Needed for wheezing. 3 inhaler 3 1/1/2018   • chlorhexidine (HIBICLENS) 4 % external liquid Apply  topically Daily As Needed for Wound Care.   2/7/2018 at 0800   • cyclobenzaprine (FLEXERIL) 10 MG tablet Take 1 tablet by mouth 2 (Two) Times a Day As Needed for muscle spasms. 60 tablet 2 12/7/2017   • dicyclomine (BENTYL) 20 MG tablet Take 1 tablet by mouth As Needed (abd cramps). 120  tablet 3 2/6/2018 at 2359   • hydrochlorothiazide (HYDRODIURIL) 25 MG tablet Take 1 tablet by mouth Daily. 90 tablet 1 2/6/2018 at 2000   • Multiple Vitamins-Minerals (CENTRUM SILVER PO) Take 1 tablet by mouth Daily. STOP 1 WEEK PRIOR TO SURGERY   1/31/2018   • mupirocin (BACTROBAN) 2 % ointment Apply  topically 3 (Three) Times a Day.   2/7/2018 at 0830   • pantoprazole (PROTONIX) 40 MG EC tablet TAKE 1 TABLET BY MOUTH TWICE DAILY 60 tablet 0 2/7/2018 at 0700   • traMADol (ULTRAM) 50 MG tablet Take 1 tablet by mouth Every 4 (Four) Hours As Needed for Moderate Pain . 45 tablet 0 2/5/2018   • vitamin E 400 UNIT capsule Take 400 Units by mouth Daily. STOP 1 WEEK PRIOR TO SURGERY   1/31/2018     Allergies:  Lansoprazole; Levofloxacin; Cefdinir; and Trazodone and nefazodone    Review of Systems   Constitutional: Negative.    HENT: Negative.    Eyes: Negative.    Respiratory: Negative.    Cardiovascular: Negative.    Gastrointestinal: Negative.    Endocrine: Negative.    Genitourinary: Negative.    Musculoskeletal: Negative.    Skin: Negative.    Neurological: Negative.    Hematological: Negative.    Psychiatric/Behavioral: Negative.        Objective      Vital Signs  Temp:  [97.1 °F (36.2 °C)-100 °F (37.8 °C)] 98.3 °F (36.8 °C)  Heart Rate:  [52-82] 79  Resp:  [15-18] 18  BP: ()/(57-87) 92/57  Body mass index is 34.31 kg/(m^2).    Physical Exam   Constitutional: She is oriented to person, place, and time. She appears well-developed and well-nourished.   HENT:   Head: Normocephalic and atraumatic.   Mouth/Throat: No oropharyngeal exudate.   Eyes: Conjunctivae are normal. No scleral icterus.   Neck: Normal range of motion. No JVD present. No tracheal deviation present.   Cardiovascular: Normal rate and regular rhythm.    Murmur heard.  Pulmonary/Chest: Effort normal and breath sounds normal.   Abdominal: Soft. Bowel sounds are normal. She exhibits no distension. There is no tenderness.   Musculoskeletal: She exhibits  no edema.   L leg is in immobilizer   Neurological: She is alert and oriented to person, place, and time.   Skin: Skin is warm and dry.   Psychiatric: She has a normal mood and affect. Her behavior is normal. Judgment and thought content normal.       Results Review:    I reviewed the patient's new clinical results.    1. Hypokalemia  - replace orally. Check mag in am with K    2. Hyperglycemia  - likely reactive from surgery   - check A1c    3. LE swelling  - HCTZ restarted but with hold parameters as BP low right now    4. Asthma  - duonebs and albuterol ordered  - no s/s of acute exacerbation currently    5. Post-op anemia  - expected drop  - cont to monitor    6. MS  - stable, monitor

## 2018-02-08 NOTE — PLAN OF CARE
Problem: Patient Care Overview (Adult)  Goal: Plan of Care Review  Outcome: Ongoing (interventions implemented as appropriate)   02/08/18 0421   Coping/Psychosocial Response Interventions   Plan Of Care Reviewed With patient   Patient Care Overview   Progress improving   Outcome Evaluation   Outcome Summary/Follow up Plan patient ambualting with assistance x1 to bathroom and in hallway, states minimal pain with ambulating and no pain when resting, patient educated on b/p monitoring and oxygen use r/t no CPAP and PEARL     Goal: Adult Individualization and Mutuality  Outcome: Ongoing (interventions implemented as appropriate)    Goal: Discharge Needs Assessment  Outcome: Ongoing (interventions implemented as appropriate)      Problem: Perioperative Period (Adult)  Goal: Signs and Symptoms of Listed Potential Problems Will be Absent or Manageable (Perioperative Period)  Outcome: Ongoing (interventions implemented as appropriate)      Problem: Knee Replacement, Total (Adult)  Goal: Signs and Symptoms of Listed Potential Problems Will be Absent or Manageable (Knee Replacement, Total)  Outcome: Ongoing (interventions implemented as appropriate)      Problem: Fall Risk (Adult)  Goal: Identify Related Risk Factors and Signs and Symptoms  Outcome: Outcome(s) achieved Date Met: 02/08/18    Goal: Absence of Falls  Outcome: Ongoing (interventions implemented as appropriate)

## 2018-02-08 NOTE — PLAN OF CARE
Problem: Patient Care Overview (Adult)  Goal: Plan of Care Review   02/08/18 0917   Coping/Psychosocial Response Interventions   Plan Of Care Reviewed With patient   Outcome Evaluation   Outcome Summary/Follow up Plan Pt admitted 2/7 s/p L TKA; pt w/ MS. Pt reports living w/ spouse, no use of AD, no recent falls, was receiving OP PT. Pt demonstrates impaired L knee ROM, strength, gait pattern but able to ambulate 120' w/ walker and contact improving to stand by assist. Performed exercises w/ education for HEP. Educated on activity/positioning/icing recommendations. RW and 3-1 commode ordered 2/8. Pt reports plan for DC to home 2/9.        Problem: Inpatient Physical Therapy  Goal: Transfer Training Goal 1 LTG- PT   02/08/18 0917   Transfer Training PT LTG   Transfer Training PT LTG, Date Established 02/08/18   Transfer Training PT LTG, Time to Achieve 1 wk   Transfer Training PT LTG, Activity Type sit to stand/stand to sit   Transfer Training PT LTG, [place] Level supervision required   Transfer Training PT LTG, Assist Device walker, rolling     Goal: Gait Training Goal LTG- PT   02/08/18 0917   Gait Training PT LTG   Gait Training Goal PT LTG, Date Established 02/08/18   Gait Training Goal PT LTG, Time to Achieve 1 wk   Gait Training Goal PT LTG, [place] Level supervision required   Gait Training Goal PT LTG, Assist Device walker, rolling   Gait Training Goal PT LTG, Distance to Achieve 150     Goal: Stair Training Goal LTG- PT   02/08/18 0917   Stair Training PT LTG   Stair Training Goal PT LTG, Date Established 02/08/18   Stair Training Goal PT LTG, Time to Achieve 1 wk   Stair Training Goal PT LTG, Number of Steps 2   Stair Training Goal PT LTG, [place] Level contact guard assist     Goal: Range of Motion Goal LTG- PT   02/08/18 0917   Range of Motion PT LTG   Range of Motion Goal PT LTG, Date Established 02/08/18   Range of Motion Goal PT LTG, Time to Achieve 1 wk   Range fo Motion Goal PT LTG,  Joint L knee   Range of Motion Goal PT LTG, AROM Measure -5-90

## 2018-02-09 VITALS
BODY MASS INDEX: 34.15 KG/M2 | OXYGEN SATURATION: 92 % | HEART RATE: 88 BPM | RESPIRATION RATE: 16 BRPM | WEIGHT: 200 LBS | SYSTOLIC BLOOD PRESSURE: 117 MMHG | DIASTOLIC BLOOD PRESSURE: 61 MMHG | HEIGHT: 64 IN | TEMPERATURE: 99.8 F

## 2018-02-09 LAB
HCT VFR BLD AUTO: 34.9 % (ref 35.6–45.5)
HGB BLD-MCNC: 11.4 G/DL (ref 11.9–15.5)
MAGNESIUM SERPL-MCNC: 1.8 MG/DL (ref 1.6–2.4)
POTASSIUM BLD-SCNC: 3.5 MMOL/L (ref 3.5–5.2)

## 2018-02-09 PROCEDURE — 85014 HEMATOCRIT: CPT | Performed by: ORTHOPAEDIC SURGERY

## 2018-02-09 PROCEDURE — 85018 HEMOGLOBIN: CPT | Performed by: ORTHOPAEDIC SURGERY

## 2018-02-09 PROCEDURE — 97110 THERAPEUTIC EXERCISES: CPT

## 2018-02-09 PROCEDURE — 94799 UNLISTED PULMONARY SVC/PX: CPT

## 2018-02-09 PROCEDURE — 99024 POSTOP FOLLOW-UP VISIT: CPT | Performed by: NURSE PRACTITIONER

## 2018-02-09 PROCEDURE — 84132 ASSAY OF SERUM POTASSIUM: CPT | Performed by: INTERNAL MEDICINE

## 2018-02-09 PROCEDURE — 83735 ASSAY OF MAGNESIUM: CPT | Performed by: INTERNAL MEDICINE

## 2018-02-09 PROCEDURE — 97150 GROUP THERAPEUTIC PROCEDURES: CPT

## 2018-02-09 RX ORDER — POTASSIUM CHLORIDE 750 MG/1
40 CAPSULE, EXTENDED RELEASE ORAL ONCE
Status: COMPLETED | OUTPATIENT
Start: 2018-02-09 | End: 2018-02-09

## 2018-02-09 RX ORDER — POTASSIUM CHLORIDE 750 MG/1
20 CAPSULE, EXTENDED RELEASE ORAL DAILY
Status: DISCONTINUED | OUTPATIENT
Start: 2018-02-09 | End: 2018-02-09 | Stop reason: HOSPADM

## 2018-02-09 RX ORDER — POTASSIUM CHLORIDE 750 MG/1
20 CAPSULE, EXTENDED RELEASE ORAL DAILY
Qty: 30 CAPSULE | Refills: 1 | Status: SHIPPED | OUTPATIENT
Start: 2018-02-09 | End: 2018-04-02

## 2018-02-09 RX ADMIN — OXYCODONE HYDROCHLORIDE AND ACETAMINOPHEN 2 TABLET: 7.5; 325 TABLET ORAL at 09:41

## 2018-02-09 RX ADMIN — ASPIRIN 325 MG: 325 TABLET, COATED ORAL at 09:41

## 2018-02-09 RX ADMIN — OXYCODONE HYDROCHLORIDE AND ACETAMINOPHEN 2 TABLET: 7.5; 325 TABLET ORAL at 13:31

## 2018-02-09 RX ADMIN — POTASSIUM CHLORIDE 40 MEQ: 750 CAPSULE, EXTENDED RELEASE ORAL at 13:24

## 2018-02-09 RX ADMIN — POTASSIUM CHLORIDE 20 MEQ: 750 CAPSULE, EXTENDED RELEASE ORAL at 13:24

## 2018-02-09 RX ADMIN — OXYCODONE HYDROCHLORIDE AND ACETAMINOPHEN 2 TABLET: 7.5; 325 TABLET ORAL at 03:03

## 2018-02-09 RX ADMIN — IPRATROPIUM BROMIDE AND ALBUTEROL SULFATE 3 ML: .5; 3 SOLUTION RESPIRATORY (INHALATION) at 08:22

## 2018-02-09 RX ADMIN — PANTOPRAZOLE SODIUM 40 MG: 40 TABLET, DELAYED RELEASE ORAL at 09:41

## 2018-02-09 NOTE — PLAN OF CARE
Problem: Patient Care Overview (Adult)  Goal: Plan of Care Review  Outcome: Ongoing (interventions implemented as appropriate)   02/09/18 1140   Coping/Psychosocial Response Interventions   Plan Of Care Reviewed With patient   Patient Care Overview   Progress progress toward functional goals as expected   Outcome Evaluation   Outcome Summary/Follow up Plan Pt increasing with transfer safety and stair navigation to improve with I upon d/c

## 2018-02-09 NOTE — DISCHARGE SUMMARY
Orthopedic Discharge Summary      Patient: Mahnaz Becerra  YOB: 1945  Medical Record Number: [id number]    Attending Physician: Artur Pat MD  Consulting Physician(s): [facility]  Agree with additional diagnoses per Consultant notes.    Date of Admission: 2/7/2018  9:27 AM  Date of Discharge:2/9/18    Discharge Diagnosis: LEFT TOTAL KNEE ARTHROPLASTY WITH MARGRET NAVIGATION,   Acute Blood Loss Anemia, stable  Post-operative Pain  Limited mobility, requires use of walker and assistance when OOB.    Presenting Problem/History of Present Illness: Arthritis of left knee [M17.12]  Arthritis of left knee [M17.12]        Allergies:   Allergies   Allergen Reactions   • Lansoprazole Itching and Other (See Comments)     AND TURN RED   • Levofloxacin Swelling and Other (See Comments)     RED RASH   • Cefdinir Other (See Comments)     Abdominal pain, caused upset stomach   • Trazodone And Nefazodone Other (See Comments)     Severe muscle cramps       Discharge Medications     Mahnaz Becerra   Home Medication Instructions IVANIA:[id number]    Printed on:02/09/18 0916   Medication Information                      albuterol (VENTOLIN HFA) 108 (90 BASE) MCG/ACT inhaler  Inhale 2 puffs Every 6 (Six) Hours As Needed for wheezing.             aspirin  MG EC tablet  Take 1 tablet by mouth Every 12 (Twelve) Hours for 83 doses.             bisacodyl (DULCOLAX) 5 MG EC tablet  Take 2 tablets by mouth Daily As Needed for Constipation.             cyclobenzaprine (FLEXERIL) 10 MG tablet  Take 1 tablet by mouth 2 (Two) Times a Day As Needed for muscle spasms.             dicyclomine (BENTYL) 20 MG tablet  Take 1 tablet by mouth As Needed (abd cramps).             docusate sodium 100 MG capsule  Take 100 mg by mouth 2 (Two) Times a Day.             hydrochlorothiazide (HYDRODIURIL) 25 MG tablet  Take 1 tablet by mouth Daily.             ondansetron (ZOFRAN) 4 MG tablet  Take 1 tablet by mouth Every 6 (Six) Hours As  Needed for Nausea or Vomiting.             oxyCODONE-acetaminophen (PERCOCET) 7.5-325 MG per tablet  1-2 tabs po q 3-4 hr prn severe pain, wean as tolerated             pantoprazole (PROTONIX) 40 MG EC tablet  TAKE 1 TABLET BY MOUTH TWICE DAILY             polyethylene glycol (MIRALAX) pack packet  Take 17 g by mouth Daily.                   Past Medical History:   Diagnosis Date   • Acid reflux    • Anesthesia complication     ANESTHESIA HAS BROUGHT ON ASTHMA ATTACKS    • Asthma    • Bronchitis    • [place]horse     FREQUENT   • Edema     LOWER EXT   • Heart murmur    • History of skin cancer     BACK   • IBS (irritable bowel syndrome)    • Knee pain, right    • MS (multiple sclerosis)    • Osteoarthritis    • PONV (postoperative nausea and vomiting)     Patient states she had post-op nausea and vomiting after hysterectomy in .   • Sleep apnea     CANT TOLERATE CPAP        Past Surgical History:   Procedure Laterality Date   • APPENDECTOMY     • BREAST LUMPECTOMY Bilateral    • BREAST SURGERY      REDUCTION   •  SECTION     • CHOLECYSTECTOMY     • COLONOSCOPY  2012    Dr Moe   • DILATATION AND CURETTAGE     • ENDOSCOPY  2012    Dr Moe   • ENDOSCOPY N/A 2016    Procedure: ESOPHAGOGASTRODUODENOSCOPY WITH BX;  Surgeon: Nasim Moe MD;  Location: [facility] ENDOSCOPY;  Service:    • EYE SURGERY Bilateral     BLEPHAROPLASTY   • HYSTERECTOMY     • KNEE ARTHROSCOPY Right 2016    Procedure: KNEE ARTHROSCOPY, PARTIAL MEDIAL AND LATERAL MENISECTOMY, CHONDROPLASTY OF THE PATELLA, REMOVAL OF LOOSE BODIES;  Surgeon: Artur Pat MD;  Location: [facility] OR OSC;  Service:    • KNEE ARTHROSCOPY W/ MENISCAL REPAIR Left    • OOPHORECTOMY Bilateral    • TONSILLECTOMY     • TOTAL KNEE ARTHROPLASTY Left 2018    Procedure: LEFT TOTAL KNEE ARTHROPLASTY WITH MARGRET NAVIGATION;  Surgeon: Artur Pat MD;  Location: [facility] MAIN OR;  Service:         Social History      Occupational History   • Not on file.     Social History Main Topics   • Smoking status: Former Smoker     Packs/day: 0.25     Types: Cigarettes     Quit date: 1983   • Smokeless tobacco: Never Used      Comment: Patient states she was a social smoker.   • Alcohol use Yes      Comment: Occasional   • Drug use: No   • Sexual activity: Defer    Social History     Social History Narrative        Family History   Problem Relation Age of Onset   • Hypertension Mother    • Stroke Mother    • Alzheimer's disease Mother    • Heart disease Father    • Emphysema Father    • Stroke Maternal Grandmother    • Cancer Maternal Grandfather    • No Known Problems Paternal Grandmother    • Cerebral aneurysm Paternal Grandfather    • Malig Hyperthermia Neg Hx          Physical Exam: 72 y.o. female Body mass index is 34.31 kg/(m^2).    General Appearance:    Alert, cooperative, in no acute distress                    Vitals:    02/09/18 0027 02/09/18 0253 02/09/18 0715 02/09/18 0822   BP:  124/59 104/58    BP Location:  Left arm Left arm    Patient Position:  Lying Lying    Pulse:  91 76 78   Resp: 14 16 16 16   Temp:  99.4 °F (37.4 °C) 97.2 °F (36.2 °C)    TempSrc:  Oral Oral    SpO2:  93% 92% 93%   Weight:       Height:            DIAGNOSTIC TESTS:   Admission on 02/07/2018   Component Date Value Ref Range Status   • Glucose 02/08/2018 163* 65 - 99 mg/dL Final   • BUN 02/08/2018 10  8 - 23 mg/dL Final   • Creatinine 02/08/2018 0.76  0.57 - 1.00 mg/dL Final   • Sodium 02/08/2018 138  136 - 145 mmol/L Final   • Potassium 02/08/2018 3.4* 3.5 - 5.2 mmol/L Final   • Chloride 02/08/2018 97* 98 - 107 mmol/L Final   • CO2 02/08/2018 27.6  22.0 - 29.0 mmol/L Final   • Calcium 02/08/2018 8.3* 8.6 - 10.5 mg/dL Final   • eGFR Non African Amer 02/08/2018 75  >60 mL/min/1.73 Final   • BUN/Creatinine Ratio 02/08/2018 13.2  7.0 - 25.0 Final   • Anion Gap 02/08/2018 13.4  mmol/L Final   • Hemoglobin 02/08/2018 11.6* 11.9 - 15.5 g/dL Final   •  Hematocrit 02/08/2018 35.0* 35.6 - 45.5 % Final   • Hemoglobin A1C 02/08/2018 5.76* 4.80 - 5.60 % Final   • Hemoglobin 02/09/2018 11.4* 11.9 - 15.5 g/dL Final   • Hematocrit 02/09/2018 34.9* 35.6 - 45.5 % Final   • Potassium 02/09/2018 3.5  3.5 - 5.2 mmol/L Final   • Magnesium 02/09/2018 1.8  1.6 - 2.4 mg/dL Final       Xr Knee 1 Or 2 View Left    Result Date: 2/7/2018  Narrative: LEFT KNEE 2 VIEWS 02/07/2018  HISTORY: Postop left knee arthroplasty.  The distal femoral and proximal tibia components of the left knee prosthesis are well seated with no abnormal surrounding bony lucencies. Soft tissue air and skin staples are seen.  No fracture or subluxation is seen.  IMPRESSION;  Satisfactory postoperative appearance of the left knee.        Hospital Course:  72 y.o. female admitted to [facility], [place], operated by [facility] to services of Artur Pat MD with Arthritis of left knee [M17.12]  Arthritis of left knee [M17.12] on 2/7/2018 and underwent LEFT TOTAL KNEE ARTHROPLASTY WITH MARGRET NAVIGATION  Per Artur Pat MD. Antibiotic and VTE prophylaxis were per [facility]IP protocols. Post-operatively the patient transferred to the post-operative floor where the patient underwent mobilization therapy that included active as well as passive ROM exercises. Opioids were titrated to achieve appropriate pain management to allow for participation in mobilization exercises. [facility] was consulted for post operative hypokalemia. Vital signs are now stable. On the day of discharge the wound was clean, dry and intact and calf was soft and non tender and Homans sign was negative. Operative extremity neurovascular status remains intact.   Appropriate education re: incision care, activity levels, medications, and follow up visits was completed and all questions were answered. The patient is now deemed stable for discharge.    Condition on Discharge:  Stable    Discharge Instructions: Patient is to continue with physical therapy exercises twice daily and  continue working with the physical therapist as ordered. Patient may weight bear as tolerated unless otherwise specified. Continue ROSELYN hose daily (for six weeks) and ice regularly. Patient instructed on frequent calf pumping exercises.  Patient also instructed on incentive spirometer during hospitalization and encouraged to continue to use at home regularly.  See wound care discharge instructions.    Follow up Instructions:  Follow up in the office with Dr. Artur Monzon in 2 weeks - patient to call the office at [phone] to schedule. Prescriptions were given for pain medication and blood thinner therapy.    Follow-up Appointments  Future Appointments  Date Time Provider Department Center   2/21/2018 1:50 PM MD MERCEDES GarciaK [facility] None     Additional Instructions for the Follow-ups that You Need to Schedule     Ambulatory Referral to Home Health    As directed    Face to Face Visit Date:  2/7/2018    Follow-up Provider for Plan of Care?:  I will be treating the patient on an ongoing basis.  Please send me the Plan of Care for signature.    Follow-up Provider:  ARTUR MONZON [[id number]]    Reason/Clinical Findings:  post operative pain with ambulation, requires use of walker for ambulation    Describe mobility limitations that make leaving home difficult:  requires assist of another to leave home    Nursing/Therapeutic Services Requested:  Skilled Nursing Physical Therapy    Skilled nursing orders:  Wound care dressing/changes    PT orders:  Total joint pathway    Frequency:  1 Week 1           Discharge Follow-up with Specified Provider: Artur Monzon M.D. at [facility] [phone]; 2 Weeks    As directed    To:  Artur Monzon M.D. at [facility] [phone]    Follow Up:  2 Weeks           Dressing Change Instructions    As directed    IV. INCISION CARE: May shower and let water run over the incision on post-operative day #5. Do not scrub or  rub the incision. Simply let the water run over the incision and pat dry. Keep covered with clean dry dressing as long as there is drainage.    Wash your hands prior to dressing changes  Change the dressing daily as needed to keep incision clean and dry. Utilize dry gauze and paper tape. Avoid touching the side of the gauze that goes against the incision with your hands.  No creams or ointments to the incision  May remove dressing once the incision is free of drainage usually around 5 days post op.  Do not touch or pick at the incision  Check incision every day and notify surgeon immediately if any of the following signs or symptoms are noted:  Increase in baseline redness (bruising is normal)  Increase in baseline swelling around the incision and of the entire extremity  Sudden increase in pain or inability to bend the knee  Drainage oozing from the incision more than 5 days out from surgery.  Pulling apart of the edges of the incision  Increase in overall body temperature (greater than 100.5 degrees) Make sure you are doing your incentive spirometer and deep breathing exercises every day while awake as this is the most frequent cause of low grade fever post operatively.  Your staple removal will be done in the office at your follow-up visit.   Order Comments:  IV. INCISION CARE: May shower and let water run over the incision on post-operative day #5. Do not scrub or rub the incision. Simply let the water run over the incision and pat dry. Keep covered with clean dry dressing as long as there is drainage. Wash your hands prior to dressing changes Change the dressing daily as needed to keep incision clean and dry. Utilize dry gauze and paper tape. Avoid touching the side of the gauze that goes against the incision with your hands. No creams or ointments to the incision May remove dressing once the incision is free of drainage usually around 5 days post op. Do not touch or pick at the incision Check incision every  day and notify surgeon immediately if any of the following signs or symptoms are noted: Increase in baseline redness (bruising is normal) Increase in baseline swelling around the incision and of the entire extremity Sudden increase in pain or inability to bend the knee Drainage oozing from the incision more than 5 days out from surgery. Pulling apart of the edges of the incision Increase in overall body temperature (greater than 100.5 degrees) Make sure you are doing your incentive spirometer and deep breathing exercises every day while awake as this is the most frequent cause of low grade fever post operatively. Your staple removal will be done in the office at your follow-up visit.                      Discharge Disposition Plan:today to home and home health    Date: 2/9/2018    Artur Pat MD

## 2018-02-09 NOTE — PLAN OF CARE
Problem: Patient Care Overview (Adult)  Goal: Plan of Care Review  Outcome: Ongoing (interventions implemented as appropriate)   02/09/18 0407   Coping/Psychosocial Response Interventions   Plan Of Care Reviewed With patient   Patient Care Overview   Progress improving   Outcome Evaluation   Outcome Summary/Follow up Plan POD #1 LTKA, ace wrap CDI, pt voices adequate pain control, voiding per BRP, ambulating at increased distances, likely DC home later this afternoon with home health, discussed asthma S/S related to history      Goal: Adult Individualization and Mutuality  Outcome: Ongoing (interventions implemented as appropriate)   02/09/18 0407   Individualization   Patient Specific Preferences none stated   Patient Specific Goals pain control and improved mobility   Patient Specific Interventions offer PRN pain meds in a timely manner     Goal: Discharge Needs Assessment  Outcome: Ongoing (interventions implemented as appropriate)   02/09/18 0407   Discharge Needs Assessment   Concerns To Be Addressed basic needs concerns   Readmission Within The Last 30 Days no previous admission in last 30 days   Equipment Needed After Discharge wound care supplies;walker, rolling   Discharge Facility/Level Of Care Needs home with home health   Discharge Disposition still a patient   Current Health   Anticipated Changes Related to Illness inability to care for self   Self-Care   Equipment Currently Used at Home none   Living Environment   Transportation Available car;family or friend will provide       Problem: Knee Replacement, Total (Adult)  Goal: Signs and Symptoms of Listed Potential Problems Will be Absent or Manageable (Knee Replacement, Total)  Outcome: Ongoing (interventions implemented as appropriate)   02/09/18 0407   Knee Replacement, Total   Problems Assessed (Total Knee Replacement) all   Problems Present (Total Knee Replacement) pain;decreased range of motion;functional decline/self care deficit;situational  response       Problem: Fall Risk (Adult)  Goal: Absence of Falls  Outcome: Ongoing (interventions implemented as appropriate)   02/09/18 0407   Fall Risk (Adult)   Absence of Falls achieves outcome

## 2018-02-09 NOTE — PLAN OF CARE
"Problem: Patient Care Overview (Adult)  Goal: Plan of Care Review  Outcome: Ongoing (interventions implemented as appropriate)   02/08/18 1800   Coping/Psychosocial Response Interventions   Plan Of Care Reviewed With patient   Patient Care Overview   Progress progress toward functional goals as expected   Outcome Evaluation   Outcome Summary/Follow up Plan Pt. NVS, VSS, voiding per bathroom x 1 assist - walker. Pain well controlled, doing IS independently. DC likely in am. Educated patient on her MS Hx and patient acknowledged understanding. Pt. states that she elected to do \"physical therapy over the MS drugs for S/E\".      Goal: Adult Individualization and Mutuality  Outcome: Ongoing (interventions implemented as appropriate)    Goal: Discharge Needs Assessment  Outcome: Ongoing (interventions implemented as appropriate)      Problem: Perioperative Period (Adult)  Goal: Signs and Symptoms of Listed Potential Problems Will be Absent or Manageable (Perioperative Period)  Outcome: Outcome(s) achieved Date Met: 02/08/18      Problem: Knee Replacement, Total (Adult)  Goal: Signs and Symptoms of Listed Potential Problems Will be Absent or Manageable (Knee Replacement, Total)  Outcome: Ongoing (interventions implemented as appropriate)      Problem: Fall Risk (Adult)  Goal: Absence of Falls  Outcome: Ongoing (interventions implemented as appropriate)        "

## 2018-02-09 NOTE — PROGRESS NOTES
"  Name: Mahnaz Becerra  ADMIT: 2018   Age/Sex: 72 y.o.female LOS:  LOS: 2 days    :    1945     ROOM: [facility]   MRN:    [id number]    PCP:    Stephen Wilkes MD     Subjective   Doing well. Pain controlled. No light-headedness or dizziness    Objective   Vital Signs  Temp:  [96.8 °F (36 °C)-99.8 °F (37.7 °C)] 99.8 °F (37.7 °C)  Heart Rate:  [66-91] 88  Resp:  [14-18] 16  BP: ()/(56-61) 117/61  Body mass index is 34.31 kg/(m^2).    Objective:  General Appearance:  Comfortable and well-appearing.    Vital signs: (most recent): Blood pressure 117/61, pulse 88, temperature 99.8 °F (37.7 °C), temperature source Oral, resp. rate 16, height 162.6 cm (64.02\"), weight 90.7 kg (200 lb), SpO2 92 %.  Vital signs are normal.    HEENT: Normal HEENT exam.    Lungs:  Normal effort.  Breath sounds clear to auscultation.    Heart: Normal rate.  Regular rhythm.    Abdomen: Abdomen is soft and non-distended.  Bowel sounds are normal.   There is no abdominal tenderness.     Extremities: There is no deformity or dependent edema.    Neurological: Patient is alert and oriented to person, place and time.    Skin:  Warm and dry.  No rash.             Results Review:       I reviewed the patient's new clinical results.    Results from last 7 days  Lab Units 18  0420 18  0339   HEMOGLOBIN g/dL 11.4* 11.6*     Results from last 7 days  Lab Units 18  0420 18  0339   SODIUM mmol/L  --  138   POTASSIUM mmol/L 3.5 3.4*   CHLORIDE mmol/L  --  97*   CO2 mmol/L  --  27.6   BUN mg/dL  --  10   CREATININE mg/dL  --  0.76   GLUCOSE mg/dL  --  163*   Estimated Creatinine Clearance: 71.4 mL/min (by C-G formula based on Cr of 0.76).  Results from last 7 days  Lab Units 18  0420 18  0339   CALCIUM mg/dL  --  8.3*   MAGNESIUM mg/dL 1.8  --        RADIOLOGY  2018  Pending      aspirin 325 mg Oral Q12H   docusate sodium 100 mg Oral BID   hydrochlorothiazide 25 mg Oral Daily   ipratropium-albuterol 3 mL " Nebulization 4x Daily - RT   mupirocin  Each Nare BID   pantoprazole 40 mg Oral BID AC   polyethylene glycol 17 g Oral Daily       HYDROmorphone HCl-NaCl  Last Rate: Stopped (02/08/18 0711)   lactated ringers 9 mL/hr Last Rate: 9 mL/hr (02/07/18 0950)   Diet Regular      Assessment/Plan   Assessment:   Principal Problem:    Arthritis of left knee        Plan:   1. Hypokalemia  - replace orally.   - will place on scheduled daily dose since she is on HCTZ   - advised her to have K checked in 1-2 weeks     2. Hyperglycemia  - reactive from surgery   - A1c w/ prediabetes     3. LE swelling  - HCTZ restarted but with hold parameters as BP low right now  - advised her not to restart at home until BP consistently >120     4. Asthma  - duonebs and albuterol ordered  - no s/s of acute exacerbation currently     5. Post-op anemia  - expected drop  - cont to monitor     6. MS  - stable, monitor       Disposition  Stable for discharge from medical perspective      Andres Bethea MD  [facility]  02/09/18  11:59 AM

## 2018-02-09 NOTE — THERAPY TREATMENT NOTE
Acute Care - Physical Therapy Treatment Note  [facility]     Patient Name: Mahnaz Becerra  : 1945  MRN: [id number]  Today's Date: 2018  Onset of Illness/Injury or Date of Surgery Date: 18     Referring Physician: Adilia    Admit Date: 2018    Visit Dx:    ICD-10-CM ICD-9-CM   1. Arthritis of left knee M17.12 716.96   2. Chills R68.83 780.64     Patient Active Problem List   Diagnosis   • Anxiety   • Benign essential hypertension   • Contact dermatitis   • Dyslipidemia   • Gastroesophageal reflux disease   • Hypothyroidism   • Insomnia   • Pain of lower extremity   • Multiple sclerosis   • Venous stasis   • Arthritis of right knee   • Arthritis of both knees   • Knee pain, right   • S/P right knee arthroscopy   • Arthritis of left knee   • Asthma   • Irritable bowel syndrome   • Periumbilical abdominal pain   • Epigastric pain   • PEARL (obstructive sleep apnea)   • Chronic pain of left knee               Adult Rehabilitation Note       18 1000 18 1300       Rehab Assessment/Intervention    Discipline physical therapy assistant  -CW physical therapy assistant  -CW     Document Type therapy note (daily note)  -CW therapy note (daily note)  -CW     Subjective Information agree to therapy;complains of;pain  -CW agree to therapy;complains of;pain;swelling  -CW     Patient Effort, Rehab Treatment good  -CW good  -CW     Precautions/Limitations fall precautions  -CW fall precautions  -CW     Recorded by [CW] Aakash Christine PTA [CW] Aakash Christine PTA     Pain Assessment    Pain Assessment No/denies pain  -CW 0-10  -CW     Pain Score  5  -CW     Post Pain Score  5  -CW     Pain Type  Surgical pain  -CW     Pain Location  Knee  -CW     Pain Orientation  Left  -CW     Pain Intervention(s)  Repositioned;Ambulation/increased activity  -CW     Recorded by [CW] Aakash Christine PTA [CW] Aakash Christine PTA     Cognitive Assessment/Intervention    Current Cognitive/Communication  Assessment functional  -CW functional  -CW     Orientation Status oriented x 4  -CW oriented x 4  -CW     Follows Commands/Answers Questions 100% of the time  -% of the time  -CW     Personal Safety WNL/WFL  -CW WNL/WFL  -CW     Personal Safety Interventions fall prevention program maintained;gait belt;muscle strengthening facilitated;nonskid shoes/slippers when out of bed  -CW fall prevention program maintained;gait belt;muscle strengthening facilitated;nonskid shoes/slippers when out of bed  -CW     Recorded by [CW] Aakash Christine PTA [CW] Aakash Christine PTA     ROM (Range of Motion)    General ROM Detail knee wrapped 15-70  -CW      Recorded by [CW] Aakash Christine PTA      Bed Mobility, Assessment/Treatment    Bed Mob, Supine to Sit, Chase supervision required  -CW supervision required  -CW     Bed Mob, Sit to Supine, Chase supervision required  -CW supervision required  -CW     Recorded by [CW] Aakash Christine PTA [CW] Aakash Christine PTA     Transfer Assessment/Treatment    Transfers, Sit-Stand [place] supervision required  -CW supervision required  -CW     Transfers, Stand-Sit [place] supervision required  -CW supervision required  -CW     Transfers, Sit-Stand-Sit, Assist Device rolling walker  -CW rolling walker  -CW     Recorded by [CW] Aakash Christine PTA [CW] Aakash Christine PTA     Gait Assessment/Treatment    Gait, [place] Level supervision required  -CW supervision required  -CW     Gait, Assistive Device rolling walker  -CW rolling walker  -CW     Gait, Distance (Feet) 200  -  -CW     Gait, Gait Deviations linda decreased;step length decreased;stride length decreased  -CW linda decreased;step length decreased;stride length decreased  -CW     Recorded by [CW] Aakash Christine PTA [CW] Aakash Christine PTA     Stairs Assessment/Treatment    Number of Stairs 2  -CW      Stairs, Handrail Location none  -CW      Stairs,  [place] Level supervision required  -CW      Stairs, Technique Used step to step (ascending);step to step (descending)  -CW      Recorded by [CW] Aakash Christine PTA      Therapy Exercises    Exercise Protocols total knee  -CW total knee  -CW     Total Knee Exercises left:;20 reps;completed protocol  -CW left:;15 reps;completed protocol  -CW     Recorded by [CW] Aakash Christine PTA [CW] Aakash Christine PTA     Positioning and Restraints    Pre-Treatment Position sitting in chair/recliner  -CW in bed  -CW     Post Treatment Position chair  -CW bed  -CW     In Bed  notified nsg;supine;call light within reach;encouraged to call for assist;with family/caregiver  -CW     In Chair notified nsg;reclined;call light within reach;encouraged to call for assist;with family/caregiver  -CW      Recorded by [CW] Aakash Christine PTA [CW] Aakash Christine PTA       User Key  (r) = Recorded By, (t) = Taken By, (c) = Cosigned By    Initials Name Effective Dates     Aakash Christine PTA 12/13/16 -                 IP PT Goals       02/08/18 0917          Transfer Training PT LTG    Transfer Training PT LTG, Date Established 02/08/18  -AR      Transfer Training PT LTG, Time to Achieve 1 wk  -AR      Transfer Training PT LTG, Activity Type sit to stand/stand to sit  -AR      Transfer Training PT LTG, [place] Level supervision required  -AR      Transfer Training PT LTG, Assist Device walker, rolling  -AR      Gait Training PT LTG    Gait Training Goal PT LTG, Date Established 02/08/18  -AR      Gait Training Goal PT LTG, Time to Achieve 1 wk  -AR      Gait Training Goal PT LTG, [place] Level supervision required  -AR      Gait Training Goal PT LTG, Assist Device walker, rolling  -AR      Gait Training Goal PT LTG, Distance to Achieve 150  -AR      Stair Training PT LTG    Stair Training Goal PT LTG, Date Established 02/08/18  -AR      Stair Training Goal PT LTG, Time to Achieve 1 wk  -AR      Stair  Training Goal PT LTG, Number of Steps 2  -AR      Stair Training Goal PT LTG, [place] Level contact guard assist  -AR      Range of Motion PT LTG    Range of Motion Goal PT LTG, Date Established 02/08/18  -AR      Range of Motion Goal PT LTG, Time to Achieve 1 wk  -AR      Range fo Motion Goal PT LTG, Joint L knee  -AR      Range of Motion Goal PT LTG, AROM Measure -5-90  -AR        User Key  (r) = Recorded By, (t) = Taken By, (c) = Cosigned By    Initials Name Provider Type    AR Deb Muller, PT Physical Therapist          Physical Therapy Education     Title: PT OT SLP Therapies (Resolved)     Topic: Physical Therapy (Resolved)     Point: Mobility training (Resolved)    Learning Progress Summary    Learner Readiness Method Response Comment Documented by Status   Patient Acceptance E,TB NINFA,JOSE   02/09/18 1139 Done    Acceptance E NR  AR 02/08/18 0916 Active               Point: Home exercise program (Resolved)    Learning Progress Summary    Learner Readiness Method Response Comment Documented by Status   Patient Acceptance E,TB DU,VU  CW 02/09/18 1139 Done    Acceptance E NR  AR 02/08/18 0916 Active               Point: Body mechanics (Resolved)    Learning Progress Summary    Learner Readiness Method Response Comment Documented by Status   Patient Acceptance E,TB DU,VU  CW 02/09/18 1139 Done    Acceptance E NR  AR 02/08/18 0916 Active               Point: Precautions (Resolved)    Learning Progress Summary    Learner Readiness Method Response Comment Documented by Status   Patient Acceptance E,TB DU,VU  CW 02/09/18 1139 Done    Acceptance E NR  AR 02/08/18 0916 Active                      User Key     Initials Effective Dates Name Provider Type Discipline    AR 06/27/16 -  Deb Muller PT Physical Therapist PT    CW 12/13/16 -  Aakash Christine PTA Physical Therapy Assistant PT                    PT Recommendation and Plan  Anticipated Equipment Needs At Discharge:  (RW and BSC ordered  2/8)  Anticipated Discharge Disposition: home with assist, home with home health  Planned Therapy Interventions: balance training, bed mobility training, gait training, home exercise program, patient/family education, ROM (Range of Motion), stair training, strengthening  PT Frequency: 2 times/day  Plan of Care Review  Plan Of Care Reviewed With: patient  Progress: progress toward functional goals as expected  Outcome Summary/Follow up Plan: Pt increasing with transfer safety and stair navigation to improve with I upon d/c          Outcome Measures       02/09/18 1100 02/08/18 0900       How much help from another person do you currently need...    Turning from your back to your side while in flat bed without using bedrails? 4  -CW 4  -AR     Moving from lying on back to sitting on the side of a flat bed without bedrails? 4  -CW 4  -AR     Moving to and from a bed to a chair (including a wheelchair)? 4  -CW 4  -AR     Standing up from a chair using your arms (e.g., wheelchair, bedside chair)? 3  -CW 3  -AR     Climbing 3-5 steps with a railing? 3  -CW 3  -AR     To walk in hospital room? 3  -CW 3  -AR     AM-PAC 6 Clicks Score 21  -CW 21  -AR     Functional Assessment    Outcome Measure Options AM-PAC 6 Clicks Basic Mobility (PT)  -CW AM-PAC 6 Clicks Basic Mobility (PT)  -AR       User Key  (r) = Recorded By, (t) = Taken By, (c) = Cosigned By    Initials Name Provider Type    AR Deb Muller, PT Physical Therapist    CW Aakash Christine PTA Physical Therapy Assistant           Time Calculation:         PT Charges       02/09/18 1140          Time Calculation    Start Time 1040  -CW      Stop Time 1135  -CW      Time Calculation (min) 55 min  -CW      PT Received On 02/09/18  -CW        User Key  (r) = Recorded By, (t) = Taken By, (c) = Cosigned By    Initials Name Provider Type    LING Christine PTA Physical Therapy Assistant          Therapy Charges for Today     Code Description Service Date Service  Provider Modifiers Qty    95574036305 HC PT THER PROC EA 15 MIN 2/8/2018 Aakash Christine, PTA GP 2    66472574412 HC PT THER PROC GROUP 2/9/2018 Aakash Christine PTA GP 1    25946875455 HC PT THER PROC EA 15 MIN 2/9/2018 Aakash Christine, ALVARO GP 1          PT G-Codes  Outcome Measure Options: AM-PAC 6 Clicks Basic Mobility (PT)    Aakash Christine PTA  2/9/2018

## 2018-02-09 NOTE — PROGRESS NOTES
Orthopedic Total Joint Progress Note        Patient: Mahnaz Becerra    Date of Admission: 2/7/2018  9:27 AM    YOB: 1945    Medical Record Number: [id number]    Attending Physician: Artur Pat MD      POD # 2 Days Post-Op Procedure(s) (LRB):  LEFT TOTAL KNEE ARTHROPLASTY WITH MARGRET NAVIGATION (Left)       Systemic or Specific Complaints: No Complaints      Allergies:   Allergies   Allergen Reactions   • Lansoprazole Itching and Other (See Comments)     AND TURN RED   • Levofloxacin Swelling and Other (See Comments)     RED RASH   • Cefdinir Other (See Comments)     Abdominal pain, caused upset stomach   • Trazodone And Nefazodone Other (See Comments)     Severe muscle cramps       Medications:   Current Medications:  Scheduled Meds:  aspirin 325 mg Oral Q12H   docusate sodium 100 mg Oral BID   hydrochlorothiazide 25 mg Oral Daily   ipratropium-albuterol 3 mL Nebulization 4x Daily - RT   mupirocin  Each Nare BID   pantoprazole 40 mg Oral BID AC   polyethylene glycol 17 g Oral Daily     Continuous Infusions:  HYDROmorphone HCl-NaCl  Last Rate: Stopped (02/08/18 0711)   lactated ringers 9 mL/hr Last Rate: 9 mL/hr (02/07/18 0950)     PRN Meds:.•  acetaminophen  •  albuterol  •  bisacodyl  •  bisacodyl  •  cyclobenzaprine  •  dicyclomine  •  diphenhydrAMINE **OR** diphenhydrAMINE  •  ketorolac  •  magnesium hydroxide  •  naloxone  •  ondansetron **OR** ondansetron ODT **OR** ondansetron  •  oxyCODONE-acetaminophen  •  oxyCODONE-acetaminophen  •  promethazine      Physical Exam: 72 y.o. female Body mass index is 34.31 kg/(m^2).  General Appearance:    alert and oriented            Pain Relief: Patient reports good relief Vitals:    02/09/18 0027 02/09/18 0253 02/09/18 0715 02/09/18 0822   BP:  124/59 104/58    BP Location:  Left arm Left arm    Patient Position:  Lying Lying    Pulse:  91 76 78   Resp: 14 16 16 16   Temp:  99.4 °F (37.4 °C) 97.2 °F (36.2 °C)    TempSrc:  Oral Oral    SpO2:  93%  92% 93%   Weight:       Height:              HEENT:    Normocephalic, without obvious abnormality, atraumatic.          PERRLA; EOMI; Neck supple with trachea midline. No JVD.       Lungs:     Respirations regular, even and unlabored      Heart:    Regular rhythm and normal rate.   Abdomen:     Normal bowel sounds, soft non-tender, non-distended       Extremities:   Operative extremity neurovascular status intact. ROM intact.    Incision intact w/out signs or symptoms of infection.  No cyanosis, no calf tenderness     Pulses:     Pulses palpable and equal bilaterally     Skin:     Skin Warm/Dry w/out cyanosis     Activity: Mobilizing Per P.T.   Weight Bearing: As Tolerated    Diagnostic Tests:   Admission on 02/07/2018   Component Date Value Ref Range Status   • Glucose 02/08/2018 163* 65 - 99 mg/dL Final   • BUN 02/08/2018 10  8 - 23 mg/dL Final   • Creatinine 02/08/2018 0.76  0.57 - 1.00 mg/dL Final   • Sodium 02/08/2018 138  136 - 145 mmol/L Final   • Potassium 02/08/2018 3.4* 3.5 - 5.2 mmol/L Final   • Chloride 02/08/2018 97* 98 - 107 mmol/L Final   • CO2 02/08/2018 27.6  22.0 - 29.0 mmol/L Final   • Calcium 02/08/2018 8.3* 8.6 - 10.5 mg/dL Final   • eGFR Non African Amer 02/08/2018 75  >60 mL/min/1.73 Final   • BUN/Creatinine Ratio 02/08/2018 13.2  7.0 - 25.0 Final   • Anion Gap 02/08/2018 13.4  mmol/L Final   • Hemoglobin 02/08/2018 11.6* 11.9 - 15.5 g/dL Final   • Hematocrit 02/08/2018 35.0* 35.6 - 45.5 % Final   • Hemoglobin A1C 02/08/2018 5.76* 4.80 - 5.60 % Final   • Hemoglobin 02/09/2018 11.4* 11.9 - 15.5 g/dL Final   • Hematocrit 02/09/2018 34.9* 35.6 - 45.5 % Final   • Potassium 02/09/2018 3.5  3.5 - 5.2 mmol/L Final   • Magnesium 02/09/2018 1.8  1.6 - 2.4 mg/dL Final       Xr Knee 1 Or 2 View Left    Result Date: 2/7/2018  Narrative: LEFT KNEE 2 VIEWS 02/07/2018  HISTORY: Postop left knee arthroplasty.  The distal femoral and proximal tibia components of the left knee prosthesis are well seated with no  abnormal surrounding bony lucencies. Soft tissue air and skin staples are seen.  No fracture or subluxation is seen.  IMPRESSION;  Satisfactory postoperative appearance of the left knee.        Personally viewed ortho images and report and previously     Assessment: Post operative hypokalemia resolved.  Doing well POD  # 2 Days Post-Op following total joint replacement  Acute Blood Loss Anemia, stable  Post-operative Pain  Limited mobility, requires use of walker and assistance when OOB.    Patient Active Problem List   Diagnosis   • Anxiety   • Benign essential hypertension   • Contact dermatitis   • Dyslipidemia   • Gastroesophageal reflux disease   • Hypothyroidism   • Insomnia   • Pain of lower extremity   • Multiple sclerosis   • Venous stasis   • Arthritis of right knee   • Arthritis of both knees   • Knee pain, right   • S/P right knee arthroscopy   • Arthritis of left knee   • Asthma   • Irritable bowel syndrome   • Periumbilical abdominal pain   • Epigastric pain   • PEARL (obstructive sleep apnea)   • Chronic pain of left knee        Plan:   [facility] managing post op hypokalemia.  Continue to monitor labs and/or v/s, for tolerance to post op blood loss.  Continue efforts to increase mobilization.  Continue Pain Control Measures.  Continue incisional Care.  DVT prophylaxis.  Follow up in office with Artur Pat M.D. In 2 weeks.    Discharge Plan:today to home and home health when ok with [facility].    Date: 2/9/2018  Alicia Sandoval, ROBERTA

## 2018-02-21 ENCOUNTER — OFFICE VISIT (OUTPATIENT)
Dept: ORTHOPEDIC SURGERY | Facility: CLINIC | Age: 73
End: 2018-02-21

## 2018-02-21 VITALS — HEIGHT: 64 IN | BODY MASS INDEX: 34.31 KG/M2 | TEMPERATURE: 98.2 F | WEIGHT: 201 LBS

## 2018-02-21 DIAGNOSIS — Z96.652 STATUS POST TOTAL LEFT KNEE REPLACEMENT: Primary | ICD-10-CM

## 2018-02-21 PROCEDURE — 99024 POSTOP FOLLOW-UP VISIT: CPT | Performed by: NURSE PRACTITIONER

## 2018-02-21 PROCEDURE — 73560 X-RAY EXAM OF KNEE 1 OR 2: CPT | Performed by: NURSE PRACTITIONER

## 2018-02-21 RX ORDER — OXYCODONE AND ACETAMINOPHEN 7.5; 325 MG/1; MG/1
TABLET ORAL
Qty: 60 TABLET | Refills: 0 | Status: SHIPPED | OUTPATIENT
Start: 2018-02-21 | End: 2018-02-21 | Stop reason: SDUPTHER

## 2018-02-21 RX ORDER — OXYCODONE AND ACETAMINOPHEN 7.5; 325 MG/1; MG/1
TABLET ORAL
Qty: 60 TABLET | Refills: 0 | Status: SHIPPED | OUTPATIENT
Start: 2018-02-21 | End: 2018-03-19 | Stop reason: SDUPTHER

## 2018-02-21 RX ORDER — OXYCODONE AND ACETAMINOPHEN 7.5; 325 MG/1; MG/1
TABLET ORAL
Qty: 60 TABLET | Refills: 0
Start: 2018-02-21 | End: 2018-02-21 | Stop reason: SDUPTHER

## 2018-02-21 NOTE — PROGRESS NOTES
Mahnaz Becerra : 1945 MRN: [id number] DATE: 2018  Body mass index is 34.5 kg/(m^2).  Vitals:    18 1421   Temp: 98.2 °F (36.8 °C)       DIAGNOSIS: 2 week follow up left total knee arthroplasty    SUBJECTIVE:Patient returns today for 2 week follow up of left total knee replacement. Patient reports doing well with no unusual complaints. Appears to be progressing appropriately.    OBJECTIVE:   Exam:. The incision is healing appropriately. No sign of infection. Range of motion is progressing as expected - . The calf is soft and nontender with a negative Homans sign.    DIAGNOSTIC STUDIES  Xrays: 2 views of the left knee (AP full length and lateral) were ordered and images were reviewed for evaluation of recent knee replacement. They demonstrate a well positioned, well aligned knee replacement without complicating factors noted. In comparison with previous films there has been interval implant placement.    ASSESSMENT: 2 week status post left knee replacement expected healing.    PLAN: 1) Staples removed and steri strips applied   2) Order given for PT and pain medicine (as needed)   3) Continue ROSELYN hose for four weeks   4) Continue ice PRN   5) Continue EC Aspirin 325mg by mouth twice a day until 6 weeks post op.   6) Follow up in 4 weeks with repeat Xrays of left knee (2 views)   7) Continue with antibiotic prophylaxis with dental procedures for 2 yrs post total joint replacement.    Alicia Sandoval, APRN  2018     Pain Medications utilized after surgery are narcotics and the law requires that the following information be given to all patients that are prescribed narcotics:    CLASSIFICATION: Pain medications are called Opioids and are narcotics  LEGALITIES: It is illegal to share narcotics with others and to drive within 4 hours of taking narcotics  POTENTIAL SIDE EFFECTS: Potential side effects of opioids include: nausea, vomiting, itching, dizziness, drowsiness, dry mouth, constipation,  and difficulty urinating.  POTENTIAL ADVERSE EFFECTS:   Opioid tolerance can develop with use of pain medications and this simply means that it requires more and more of the medication to control pain; however, this is seen more in patients that use opioids for longer periods of time.  Opioid dependence can develop with use of Opioids and this simply means that to stop the medication can cause withdrawal symptoms so wean gradually (do not stop all at once); however, this is seen more with patients that use opioids for longer periods of time.  Opioid addiction can develop with use of Opioids and the incidence of this is very unlikely in patients who take the medications as ordered and stop the medications as instructed.  Opioid overdose can be dangerous, but is unlikely when the medication is taken as ordered and stopped when ordered. It is important not to mix opioids with alcohol or with and type of sedative such as Benadryl as this can lead to over sedation and respiratory difficulty.    DOSAGE:   Pain medications will need to be taken consistently for the first week to decrease pain and promote adequate pain relief and participation in physical therapy.    After the initial surgical pain begins to resolve, you may begin to decrease the pain medication. By the end of 8 weeks, you should be off of pain medications.    Refills will not be given by the office during evening hours, on weekends.  To seek refills on pain medications during the initial 6 week post-operative period, you must call the office 48 hours in advance to request the refill. The office will then notify you when to  the prescription. DO NOT wait until you are out of the medication to request a refill.

## 2018-02-21 NOTE — PATIENT INSTRUCTIONS
DIAGNOSIS: 2 week follow up left total knee arthroplasty    SUBJECTIVE:Patient returns today for 2 week follow up of left total knee replacement. Patient reports doing well with no unusual complaints. Appears to be progressing appropriately.    OBJECTIVE:   Exam:. The incision is healing appropriately. No sign of infection. Range of motion is progressing as expected -5/112 . The calf is soft and nontender with a negative Homans sign.    DIAGNOSTIC STUDIES  Xrays: 2 views of the left knee (AP full length and lateral) were ordered and images were reviewed for evaluation of recent knee replacement. They demonstrate a well positioned, well aligned knee replacement without complicating factors noted. In comparison with previous films there has been interval implant placement.    ASSESSMENT: 2 week status post left knee replacement expected healing.    PLAN: 1) Staples removed and steri strips applied   2) Order given for PT and pain medicine (as needed)   3) Continue ROSELYN hose for four weeks   4) Continue ice PRN   5) Continue EC Aspirin 325mg by mouth twice a day until 6 weeks post op.   6) Follow up in 4 weeks with repeat Xrays of left knee (2 views)   7) Continue with antibiotic prophylaxis with dental procedures for 2 yrs post total joint replacement.    Alicia Sandoval, APRN  2/21/2018     Pain Medications utilized after surgery are narcotics and the law requires that the following information be given to all patients that are prescribed narcotics:    CLASSIFICATION: Pain medications are called Opioids and are narcotics  LEGALITIES: It is illegal to share narcotics with others and to drive within 4 hours of taking narcotics  POTENTIAL SIDE EFFECTS: Potential side effects of opioids include: nausea, vomiting, itching, dizziness, drowsiness, dry mouth, constipation, and difficulty urinating.  POTENTIAL ADVERSE EFFECTS:   Opioid tolerance can develop with use of pain medications and this simply means that it requires  more and more of the medication to control pain; however, this is seen more in patients that use opioids for longer periods of time.  Opioid dependence can develop with use of Opioids and this simply means that to stop the medication can cause withdrawal symptoms so wean gradually (do not stop all at once); however, this is seen more with patients that use opioids for longer periods of time.  Opioid addiction can develop with use of Opioids and the incidence of this is very unlikely in patients who take the medications as ordered and stop the medications as instructed.  Opioid overdose can be dangerous, but is unlikely when the medication is taken as ordered and stopped when ordered. It is important not to mix opioids with alcohol or with and type of sedative such as Benadryl as this can lead to over sedation and respiratory difficulty.    DOSAGE:   Pain medications will need to be taken consistently for the first week to decrease pain and promote adequate pain relief and participation in physical therapy.    After the initial surgical pain begins to resolve, you may begin to decrease the pain medication. By the end of 8 weeks, you should be off of pain medications.    Refills will not be given by the office during evening hours, on weekends.  To seek refills on pain medications during the initial 6 week post-operative period, you must call the office 48 hours in advance to request the refill. The office will then notify you when to  the prescription. DO NOT wait until you are out of the medication to request a refill.

## 2018-03-05 ENCOUNTER — TELEPHONE (OUTPATIENT)
Dept: ORTHOPEDIC SURGERY | Facility: CLINIC | Age: 73
End: 2018-03-05

## 2018-03-09 ENCOUNTER — TRANSCRIBE ORDERS (OUTPATIENT)
Dept: ADMINISTRATIVE | Facility: HOSPITAL | Age: 73
End: 2018-03-09

## 2018-03-09 ENCOUNTER — LAB (OUTPATIENT)
Dept: LAB | Facility: HOSPITAL | Age: 73
End: 2018-03-09

## 2018-03-09 DIAGNOSIS — E87.6 HYPOKALEMIA: Primary | ICD-10-CM

## 2018-03-09 DIAGNOSIS — E87.6 HYPOKALEMIA: ICD-10-CM

## 2018-03-09 LAB — POTASSIUM BLD-SCNC: 3.9 MMOL/L (ref 3.5–5.2)

## 2018-03-09 PROCEDURE — 84132 ASSAY OF SERUM POTASSIUM: CPT | Performed by: INTERNAL MEDICINE

## 2018-03-19 ENCOUNTER — OFFICE VISIT (OUTPATIENT)
Dept: ORTHOPEDIC SURGERY | Facility: CLINIC | Age: 73
End: 2018-03-19

## 2018-03-19 VITALS — HEIGHT: 64 IN | TEMPERATURE: 98.2 F | BODY MASS INDEX: 34.15 KG/M2 | WEIGHT: 200 LBS

## 2018-03-19 DIAGNOSIS — Z96.652 STATUS POST TOTAL LEFT KNEE REPLACEMENT: Primary | ICD-10-CM

## 2018-03-19 PROCEDURE — 73560 X-RAY EXAM OF KNEE 1 OR 2: CPT | Performed by: ORTHOPAEDIC SURGERY

## 2018-03-19 PROCEDURE — 99024 POSTOP FOLLOW-UP VISIT: CPT | Performed by: ORTHOPAEDIC SURGERY

## 2018-03-19 RX ORDER — OXYCODONE AND ACETAMINOPHEN 7.5; 325 MG/1; MG/1
TABLET ORAL
Qty: 40 TABLET | Refills: 0 | Status: SHIPPED | OUTPATIENT
Start: 2018-03-19 | End: 2022-11-01

## 2018-03-19 RX ORDER — CEPHALEXIN 500 MG/1
CAPSULE ORAL
Qty: 4 CAPSULE | Refills: 2 | Status: SHIPPED | OUTPATIENT
Start: 2018-03-19 | End: 2019-04-02 | Stop reason: SDUPTHER

## 2018-03-19 NOTE — PROGRESS NOTES
Mahnaz Becerra : 1945 MRN: [id number] DATE: 3/19/2018  Body mass index is 34.33 kg/m².  Vitals:    18 0832   Temp: 98.2 °F (36.8 °C)       Chief Complaint and HPI: 6 weeks follow up left total knee    SUBJECTIVE:Patient returns today for follow up of total joint replacement. Patient reports doing well with no unusual complaints. Appears to be progressing appropriately.    OBJECTIVE:   Exam:. The incision is healing appropriately. No sign of infection. Range of motion is progressing as expected 0/117. The calf is soft and nontender with a negative Homans sign.    DIAGNOSTIC STUDIES  Imaging Results (last 72 hours)     Procedure Component Value Units Date/Time    XR Knee 1 or 2 View Left [[id number]] Resulted:  18     Updated:  18    Impression:       Ordering physician's impression is located in the Encounter Note dated 18. X-ray performed in the DR room.          Indication, findings and comparison: images were reviewed for evaluation of recent joint replacement. They demonstrate a well positioned, well aligned total joint replacement without complicating factors noted. In comparison with previous films there has been no alignment change.    ASSESSMENT: status post left total knee replacement expected healing.    PLAN: 1) Continue with PT exercises as prescribed   2) Follow up 3 MONTHS  WITH XRAYS (2 views of same joint).   3) Continue with antibiotic prophylaxis with dental procedures for 2 yrs post total joint replacement.   4) May discontinue anticoagulant therapy (such as aspirin or xarelto) from surgery at this time and resume medications, vitamins, and supplements you were taking before surgery.     Alicia Sandoval, APRN  3/19/2018     Pain Medications utilized after surgery are narcotics and the law requires that the following information be given to all patients that are prescribed narcotics:    CLASSIFICATION: Pain medications are called Opioids and are  narcotics  LEGALITIES: It is illegal to share narcotics with others and to drive within 4 hours of taking narcotics  POTENTIAL SIDE EFFECTS: Potential side effects of opioids include: nausea, vomiting, itching, dizziness, drowsiness, dry mouth, constipation, and difficulty urinating.  POTENTIAL ADVERSE EFFECTS:   Opioid tolerance can develop with use of pain medications and this simply means that it requires more and more of the medication to control pain; however, this is seen more in patients that use opioids for longer periods of time.  Opioid dependence can develop with use of Opioids and this simply means that to stop the medication can cause withdrawal symptoms so wean gradually (do not stop all at once); however, this is seen more with patients that use opioids for longer periods of time.  Opioid addiction can develop with use of Opioids and the incidence of this is very unlikely in patients who take the medications as ordered and stop the medications as instructed.  Opioid overdose can be dangerous, but is unlikely when the medication is taken as ordered and stopped when ordered. It is important not to mix opioids with alcohol or with and type of sedative such as Benadryl as this can lead to over sedation and respiratory difficulty.    DOSAGE:   Pain medications will need to be taken consistently for the first week to decrease pain and promote adequate pain relief and participation in physical therapy.    After the initial surgical pain begins to resolve, you may begin to decrease the pain medication. By the end of 8 weeks, you should be off of pain medications.    Refills will not be given by the office during evening hours, on weekends.  To seek refills on pain medications during the initial 6 week post-operative period, you must call the office 48 hours in advance to request the refill. The office will then notify you when to  the prescription. DO NOT wait until you are out of the medication to  request a refill.    A MADELINE check will be made on-line, and will be repeated if prescription is renewed after a 90 day period. The patient agrees to adhering to the medication regimen as prescribed.

## 2018-03-19 NOTE — PATIENT INSTRUCTIONS
Chief Complaint and HPI: 6 weeks follow up left total knee    SUBJECTIVE:Patient returns today for follow up of total joint replacement. Patient reports doing well with no unusual complaints. Appears to be progressing appropriately.    OBJECTIVE:   Exam:. The incision is healing appropriately. No sign of infection. Range of motion is progressing as expected 0/117. The calf is soft and nontender with a negative Homans sign.    DIAGNOSTIC STUDIES  Imaging Results (last 72 hours)     Procedure Component Value Units Date/Time    XR Knee 1 or 2 View Left [[id number]] Resulted:  03/19/18 0820     Updated:  03/19/18 0821    Impression:       Ordering physician's impression is located in the Encounter Note dated 03/19/18. X-ray performed in the DR room.          Indication, findings and comparison: images were reviewed for evaluation of recent joint replacement. They demonstrate a well positioned, well aligned total joint replacement without complicating factors noted. In comparison with previous films there has been no alignment change.    ASSESSMENT: status post left total knee replacement expected healing.    PLAN: 1) Continue with PT exercises as prescribed   2) Follow up 3 MONTHS  WITH XRAYS (2 views of same joint).   3) Continue with antibiotic prophylaxis with dental procedures for 2 yrs post total joint replacement.   4) May discontinue anticoagulant therapy (such as aspirin or xarelto) from surgery at this time and resume medications, vitamins, and supplements you were taking before surgery.     Alicia Sandoval, APRN  3/19/2018     Pain Medications utilized after surgery are narcotics and the law requires that the following information be given to all patients that are prescribed narcotics:    CLASSIFICATION: Pain medications are called Opioids and are narcotics  LEGALITIES: It is illegal to share narcotics with others and to drive within 4 hours of taking narcotics  POTENTIAL SIDE EFFECTS: Potential side effects of  opioids include: nausea, vomiting, itching, dizziness, drowsiness, dry mouth, constipation, and difficulty urinating.  POTENTIAL ADVERSE EFFECTS:   Opioid tolerance can develop with use of pain medications and this simply means that it requires more and more of the medication to control pain; however, this is seen more in patients that use opioids for longer periods of time.  Opioid dependence can develop with use of Opioids and this simply means that to stop the medication can cause withdrawal symptoms so wean gradually (do not stop all at once); however, this is seen more with patients that use opioids for longer periods of time.  Opioid addiction can develop with use of Opioids and the incidence of this is very unlikely in patients who take the medications as ordered and stop the medications as instructed.  Opioid overdose can be dangerous, but is unlikely when the medication is taken as ordered and stopped when ordered. It is important not to mix opioids with alcohol or with and type of sedative such as Benadryl as this can lead to over sedation and respiratory difficulty.    DOSAGE:   Pain medications will need to be taken consistently for the first week to decrease pain and promote adequate pain relief and participation in physical therapy.    After the initial surgical pain begins to resolve, you may begin to decrease the pain medication. By the end of 8 weeks, you should be off of pain medications.    Refills will not be given by the office during evening hours, on weekends.  To seek refills on pain medications during the initial 6 week post-operative period, you must call the office 48 hours in advance to request the refill. The office will then notify you when to  the prescription. DO NOT wait until you are out of the medication to request a refill.    A MADELINE check will be made on-line, and will be repeated if prescription is renewed after a 90 day period. The patient agrees to adhering to the  medication regimen as prescribed.

## 2018-04-02 ENCOUNTER — OFFICE VISIT (OUTPATIENT)
Dept: GASTROENTEROLOGY | Facility: CLINIC | Age: 73
End: 2018-04-02

## 2018-04-02 ENCOUNTER — LAB (OUTPATIENT)
Dept: LAB | Facility: HOSPITAL | Age: 73
End: 2018-04-02
Attending: INTERNAL MEDICINE

## 2018-04-02 VITALS
BODY MASS INDEX: 33.12 KG/M2 | WEIGHT: 194 LBS | HEART RATE: 77 BPM | DIASTOLIC BLOOD PRESSURE: 88 MMHG | HEIGHT: 64 IN | SYSTOLIC BLOOD PRESSURE: 137 MMHG

## 2018-04-02 DIAGNOSIS — R11.0 NAUSEA: ICD-10-CM

## 2018-04-02 DIAGNOSIS — R19.7 DIARRHEA, UNSPECIFIED TYPE: Primary | ICD-10-CM

## 2018-04-02 DIAGNOSIS — R19.7 DIARRHEA, UNSPECIFIED TYPE: ICD-10-CM

## 2018-04-02 DIAGNOSIS — R10.13 EPIGASTRIC PAIN: ICD-10-CM

## 2018-04-02 LAB — C DIFF TOX GENS STL QL NAA+PROBE: POSITIVE

## 2018-04-02 PROCEDURE — 99213 OFFICE O/P EST LOW 20 MIN: CPT | Performed by: INTERNAL MEDICINE

## 2018-04-02 PROCEDURE — 87493 C DIFF AMPLIFIED PROBE: CPT

## 2018-04-02 RX ORDER — VITAMIN E 268 MG
400 CAPSULE ORAL DAILY
COMMUNITY
End: 2022-11-09 | Stop reason: HOSPADM

## 2018-04-02 RX ORDER — PROMETHAZINE HYDROCHLORIDE 12.5 MG/1
12.5 TABLET ORAL EVERY 6 HOURS PRN
Qty: 30 TABLET | Refills: 1 | Status: SHIPPED | OUTPATIENT
Start: 2018-04-02 | End: 2018-06-18

## 2018-04-02 RX ORDER — DIPHENOXYLATE HYDROCHLORIDE AND ATROPINE SULFATE 2.5; .025 MG/1; MG/1
1 TABLET ORAL 3 TIMES DAILY PRN
Qty: 30 TABLET | Refills: 0 | Status: SHIPPED | OUTPATIENT
Start: 2018-04-02 | End: 2018-06-18

## 2018-04-02 NOTE — PROGRESS NOTES
Subjective   Mahnaz Becerra is a 72 y.o. female is here today for follow-up.  Chief Complaint   Patient presents with   • Diarrhea   • Vomiting   • Abdominal Pain     History of Present Illness  Patient was in her usual state of health until 8 days ago when she developed abrupt onset of nausea vomiting and diarrhea associated with epigastric distress.  This would wax and wane over the next week.  This weekend has been fairly good for her, with the symptoms having ease down a bit.  She's had no rectal bleeding, no hematemesis or melena.  She is status post left knee replacement and received antibiotics for that.  She also had a small pustule on her face and that was treated with Keflex.  The following portions of the patient's history were reviewed and updated as appropriate: allergies, current medications, past family history, past medical history, past social history, past surgical history and problem list.    Review of Systems   Respiratory: Negative for shortness of breath.    Cardiovascular: Negative for chest pain.   All other systems reviewed and are negative.      Objective   Physical Exam   Constitutional: She appears well-developed and well-nourished.   Nursing note and vitals reviewed.    Her abdominal examination is benign.    Assessment/Plan   Problems Addressed this Visit        Digestive    Diarrhea - Primary    Relevant Orders    Clostridium Difficile Toxin, PCR - Stool, Per Rectum    Nausea    Relevant Orders    Clostridium Difficile Toxin, PCR - Stool, Per Rectum       Nervous and Auditory    Epigastric pain    Relevant Orders    Clostridium Difficile Toxin, PCR - Stool, Per Rectum      Other Visit Diagnoses    None.       This may well of been infectious, such as a viral infection, that may have run its course already.  She has antibiotic exposure so would like to check her for C. difficile.  I'll also prescribe Phenergan and Lomotil for symptom control.  If symptoms persist time to see her back  in 2 weeks.

## 2018-04-03 ENCOUNTER — TELEPHONE (OUTPATIENT)
Dept: GASTROENTEROLOGY | Facility: CLINIC | Age: 73
End: 2018-04-03

## 2018-04-03 RX ORDER — METRONIDAZOLE 500 MG/1
500 TABLET ORAL 3 TIMES DAILY
Qty: 30 TABLET | Refills: 0 | Status: SHIPPED | OUTPATIENT
Start: 2018-04-03 | End: 2018-04-16

## 2018-04-03 NOTE — TELEPHONE ENCOUNTER
Pt notified of positive C Diff test.  Dr Moe sent Flagyl rx to [facility]. Pt has f/u appt 4/16/18.

## 2018-04-03 NOTE — TELEPHONE ENCOUNTER
Pt was seen in office yesterday, Dr Moe prescribed Lomotil, but rx did not print.  Called [facility] [phone] and [facility] with rx info as written by Dr Moe:  Lomotil 1 tab po TID prn diarrhea, #30, no refills.

## 2018-04-03 NOTE — TELEPHONE ENCOUNTER
----- Message from Nasim Moe MD sent at 4/3/2018 10:18 AM EDT -----  I have sent Flagyl to her pharmacy of record.

## 2018-04-05 ENCOUNTER — TELEPHONE (OUTPATIENT)
Dept: ORTHOPEDIC SURGERY | Facility: CLINIC | Age: 73
End: 2018-04-05

## 2018-04-05 NOTE — TELEPHONE ENCOUNTER
Called and spoke with patient and discussed treatment. She is also eating daily yogurt for probiotics. Denies any sudden increase in pain, warmth or swelling or inability to bend or bear weight.  Continue with current treatment and let us know if any of those symptoms develop.  Verb. Understanding.  TRINH

## 2018-04-05 NOTE — TELEPHONE ENCOUNTER
SPM patient had TKA on 2/7/18. Monday she found out she has C-difficile infection and her gastroenterologist has put her on antibiotics. Her knee is fine at this point, no signs of infection. She wants some assurance.

## 2018-04-09 RX ORDER — PANTOPRAZOLE SODIUM 40 MG/1
TABLET, DELAYED RELEASE ORAL
Qty: 60 TABLET | Refills: 0 | Status: SHIPPED | OUTPATIENT
Start: 2018-04-09 | End: 2018-06-02 | Stop reason: SDUPTHER

## 2018-04-16 ENCOUNTER — OFFICE VISIT (OUTPATIENT)
Dept: GASTROENTEROLOGY | Facility: CLINIC | Age: 73
End: 2018-04-16

## 2018-04-16 VITALS
BODY MASS INDEX: 33.97 KG/M2 | HEIGHT: 64 IN | WEIGHT: 199 LBS | DIASTOLIC BLOOD PRESSURE: 74 MMHG | SYSTOLIC BLOOD PRESSURE: 120 MMHG | HEART RATE: 82 BPM

## 2018-04-16 DIAGNOSIS — A04.72 COLITIS, CLOSTRIDIUM DIFFICILE: Primary | ICD-10-CM

## 2018-04-16 PROCEDURE — 99213 OFFICE O/P EST LOW 20 MIN: CPT | Performed by: INTERNAL MEDICINE

## 2018-04-16 NOTE — PROGRESS NOTES
Subjective   Mahnaz Becerra is a 72 y.o.. female is here today for follow-up.    Chief Complaint   Patient presents with   • Diarrhea     C Diff     History of Present Illness  Patient developed severe diarrhea and nausea and reported last month for this.  A stool was obtained and was positive for C. difficile.  She was treated with metronidazole and had a good response.  She has no more diarrhea although she does have some intermittent postprandial bloating.  And she feels tired.  The following portions of the patient's history were reviewed and updated as appropriate: allergies, current medications, past family history, past medical history, past social history, past surgical history and problem list.    Review of Systems   Respiratory: Negative for shortness of breath.    Cardiovascular: Negative for chest pain.   All other systems reviewed and are negative.      Objective   Physical Exam   Constitutional: She appears well-developed and well-nourished.   Nursing note and vitals reviewed.    Patient's abdominal examination is benign today.    Assessment/Plan   Problems Addressed this Visit        Digestive    Colitis, Clostridium difficile - Primary      Other Visit Diagnoses    None.       Patient is doing well now.  We discussed possibility of a relapse and the need for her to call us promptly if she thinks she is experiencing one.  I recommend Florastor for 3 months.  FODMAPS diet might help.  Return as needed.

## 2018-04-16 NOTE — PATIENT INSTRUCTIONS
Use probiotic of choice (examples include Florastor, Align, Metagenics Digestive Health, Activia, VSL#3, or any of the common generics).     FODMAPS diet. Please refer to our handout, or cross-reference with an Internet search engine.

## 2018-05-14 RX ORDER — HYDROCHLOROTHIAZIDE 25 MG/1
25 TABLET ORAL DAILY
Qty: 30 TABLET | Refills: 0 | Status: SHIPPED | OUTPATIENT
Start: 2018-05-14

## 2018-05-14 RX ORDER — HYDROCHLOROTHIAZIDE 25 MG/1
25 TABLET ORAL DAILY
Qty: 90 TABLET | Refills: 0 | OUTPATIENT
Start: 2018-05-14

## 2018-06-05 RX ORDER — PANTOPRAZOLE SODIUM 40 MG/1
TABLET, DELAYED RELEASE ORAL
Qty: 60 TABLET | Refills: 5 | Status: SHIPPED | OUTPATIENT
Start: 2018-06-05 | End: 2019-03-13 | Stop reason: SDUPTHER

## 2018-06-18 ENCOUNTER — OFFICE VISIT (OUTPATIENT)
Dept: ORTHOPEDIC SURGERY | Facility: CLINIC | Age: 73
End: 2018-06-18

## 2018-06-18 VITALS — TEMPERATURE: 98.7 F | BODY MASS INDEX: 34.11 KG/M2 | WEIGHT: 199.8 LBS | HEIGHT: 64 IN

## 2018-06-18 DIAGNOSIS — Z96.652 STATUS POST TOTAL LEFT KNEE REPLACEMENT: ICD-10-CM

## 2018-06-18 DIAGNOSIS — Z96.652 HISTORY OF TOTAL KNEE ARTHROPLASTY, LEFT: Primary | ICD-10-CM

## 2018-06-18 PROCEDURE — 73560 X-RAY EXAM OF KNEE 1 OR 2: CPT | Performed by: NURSE PRACTITIONER

## 2018-06-18 PROCEDURE — 99212 OFFICE O/P EST SF 10 MIN: CPT | Performed by: NURSE PRACTITIONER

## 2018-06-18 RX ORDER — SACCHAROMYCES BOULARDII 250 MG
250 CAPSULE ORAL 2 TIMES DAILY
COMMUNITY
End: 2021-06-11

## 2018-06-18 NOTE — PROGRESS NOTES
NAME: Mahnaz Becerra  : 1945   MRN: [id number]   DATE: 2018    CC: 4 months Follow up status post total joint replacement    SUBJECTIVE:    HPI: Patient returns today for a follow up of total joint replacement. She is able to garden again with a garden seat and is no longer painful and she is going up and down stairs normally one leg at a time without pause.  Patient reports doing well with no unusual complaints. Appears to be progressing appropriately.  HPI    This problem is not new to this examiner.     Allergies:   Allergies   Allergen Reactions   • Lansoprazole Itching and Other (See Comments)     AND TURN RED   • Levofloxacin Swelling and Other (See Comments)     RED RASH   • Cefdinir Other (See Comments)     Abdominal pain, caused upset stomach   • Trazodone And Nefazodone Other (See Comments)     Severe muscle cramps       Medications:   Home Medications:  Current Outpatient Prescriptions on File Prior to Visit   Medication Sig   • albuterol (VENTOLIN HFA) 108 (90 BASE) MCG/ACT inhaler Inhale 2 puffs Every 6 (Six) Hours As Needed for wheezing.   • cephalexin (KEFLEX) 500 MG capsule 2000 mg po x 1 dose, 1hr prior to dental procedure   • cyclobenzaprine (FLEXERIL) 10 MG tablet Take 1 tablet by mouth 2 (Two) Times a Day As Needed for muscle spasms.   • dicyclomine (BENTYL) 20 MG tablet Take 1 tablet by mouth As Needed (abd cramps).   • hydrochlorothiazide (HYDRODIURIL) 25 MG tablet TAKE 1 TABLET BY MOUTH DAILY   • oxyCODONE-acetaminophen (PERCOCET) 7.5-325 MG per tablet 1-2 tabs po q 3-4 hr prn severe pain, wean as tolerated   • pantoprazole (PROTONIX) 40 MG EC tablet TAKE 1 TABLET BY MOUTH TWICE DAILY   • vitamin E 400 UNIT capsule Take 400 Units by mouth Daily.   • [DISCONTINUED] diphenoxylate-atropine (LOMOTIL) 2.5-0.025 MG per tablet Take 1 tablet by mouth 3 (Three) Times a Day As Needed for Diarrhea.   • [DISCONTINUED] promethazine (PHENERGAN) 12.5 MG tablet Take 1 tablet by mouth Every 6  (Six) Hours As Needed for Nausea or Vomiting.     Current Facility-Administered Medications on File Prior to Visit   Medication   • ipratropium-albuterol (DUO-NEB) nebulizer solution 3 mL       Current Medications:  Scheduled Meds:  ipratropium-albuterol 3 mL Nebulization 4x Daily - RT     Continuous Infusions:   PRN Meds:.    I have reviewed the patient's medical history in detail and updated the computerized patient record.  Review and summarization of old records include:    Past Medical History:   Diagnosis Date   • Acid reflux    • Anesthesia complication     ANESTHESIA HAS BROUGHT ON ASTHMA ATTACKS    • Asthma    • Bronchitis    • [place]     FREQUENT   • Edema     LOWER EXT   • Heart murmur    • History of skin cancer     BACK   • IBS (irritable bowel syndrome)    • Knee pain, right    • MS (multiple sclerosis)    • Osteoarthritis    • PONV (postoperative nausea and vomiting)     Patient states she had post-op nausea and vomiting after hysterectomy in .   • Sleep apnea     CANT TOLERATE CPAP        Past Surgical History:   Procedure Laterality Date   • APPENDECTOMY     • BREAST LUMPECTOMY Bilateral    • BREAST SURGERY      REDUCTION   •  SECTION     • CHOLECYSTECTOMY     • COLONOSCOPY  2012    Dr Moe   • COSMETIC SURGERY     • DILATATION AND CURETTAGE     • ENDOSCOPY  2012    Dr Moe   • ENDOSCOPY N/A 2016    Procedure: ESOPHAGOGASTRODUODENOSCOPY WITH BX;  Surgeon: Nasim Moe MD;  Location: [facility] ENDOSCOPY;  Service:    • EYE SURGERY Bilateral     BLEPHAROPLASTY   • HYSTERECTOMY     • KNEE ARTHROSCOPY Right 2016    Procedure: KNEE ARTHROSCOPY, PARTIAL MEDIAL AND LATERAL MENISECTOMY, CHONDROPLASTY OF THE PATELLA, REMOVAL OF LOOSE BODIES;  Surgeon: Artur Pat MD;  [place] [facility] [place] [facility] [place]  Service:    • KNEE ARTHROSCOPY W/ MENISCAL REPAIR Left    • OOPHORECTOMY Bilateral    • TONSILLECTOMY     • TOTAL KNEE ARTHROPLASTY Left  2/7/2018    Procedure: LEFT TOTAL KNEE ARTHROPLASTY WITH MARGRET NAVIGATION;  Surgeon: Artur Pat MD;  Location: [facility] MAIN OR;  Service:         Social History     Occupational History   • Not on file.     Social History Main Topics   • Smoking status: Former Smoker     Packs/day: 0.25     Types: Cigarettes     Quit date: 1983   • Smokeless tobacco: Never Used      Comment: Patient states she was a social smoker.   • Alcohol use Yes      Comment: Occasional   • Drug use: No   • Sexual activity: Defer    Social History     Social History Narrative   • No narrative on file        Family History   Problem Relation Age of Onset   • Hypertension Mother    • Stroke Mother    • Alzheimer's disease Mother    • Heart disease Father    • Emphysema Father    • Stroke Maternal Grandmother    • Cancer Maternal Grandfather    • No Known Problems Paternal Grandmother    • Cerebral aneurysm Paternal Grandfather    • Malig Hyperthermia Neg Hx        ROS: 14 point review of systems was performed and was negative except for documented findings in HPI and today's encounter.     Allergies:   Allergies   Allergen Reactions   • Lansoprazole Itching and Other (See Comments)     AND TURN RED   • Levofloxacin Swelling and Other (See Comments)     RED RASH   • Cefdinir Other (See Comments)     Abdominal pain, caused upset stomach   • Trazodone And Nefazodone Other (See Comments)     Severe muscle cramps     Constitutional:  Denies fever, shaking or chills   Eyes:  Denies change in visual acuity   HENT:  Denies nasal congestion or sore throat   Respiratory:  Denies cough or shortness of breath   Cardiovascular:  Denies chest pain or severe LE edema   GI:  Denies abdominal pain, nausea, vomiting, bloody stools or diarrhea   Musculoskeletal:  Denies numbness tingling or loss of motor function except as outlined above in history of present illness.  : Denies painful urination or hematuria  Integument:  Denies rash, lesion or ulceration  "  Neurologic:  Denies headache or focal weakness  Endocrine:  Denies lymphadenopathy  Psych:  Denies confusion or change in mental status   Hem:  Denies active bleeding        OBJECTIVE:     Physical Exam:  Vital Signs:    Wt Readings from Last 3 Encounters:   06/18/18 90.6 kg (199 lb 12.8 oz)   04/16/18 90.3 kg (199 lb)   04/02/18 88 kg (194 lb)     Ht Readings from Last 3 Encounters:   06/18/18 162.6 cm (64\")   04/16/18 162.6 cm (64\")   04/02/18 162.6 cm (64\")     Body mass index is 34.3 kg/m².  Facility age limit for growth percentiles is 20 years.  Vitals:    06/18/18 1041   Temp: 98.7 °F (37.1 °C)       Constitutional: Awake alert and oriented x3, well developed, well nourished, no acute distress, non-toxic appearance.  HEENT:  Normocephalic, Atraumatic, Bilateral external ears normal, Oropharynx moist, No oral exudates, Nose normal.   Respiratory:  No respiratory distress, No wheezing  CV: No palpitations  Vascular:  Brisk cap refill, Intact distal pulses, No cyanosis, all compartments soft with no signs or symptoms of compartment syndrome or DVT.  Neurologic: Sensation grossly intact to light touch throughout the involved extremity and bilaterally symmetric, deep tendon reflexes are 2+ and bilaterally symmetric, No focal deficits noted.   Neck:  Normal range of motion, No tenderness, Supple, Negative Spurlings.  Integument: Well hydrated, no rash, warm, dry, no lesions or ulceration.   Musculoskeletal:  Affected extremity post op incision is healed appropriately. No sign of infection. Range of motion is progressing as expected. The calf is soft and nontender with a negative Homans sign. Distal pulses intact. Normal amount of swelling present for recovery time. She is able to garden again with a garden seat and is no longer painful and she is going up and down stairs normally one leg at a time without pause. Right knee is doing better with the therapy and relief from the L TKA.     DIAGNOSTIC STUDIES  Imaging " done today, images were personally viewed and discussed with the patient:  Indication, findings and comparison: 2V AP&Lat of the operative joint viewed for evaluation of past joint replacement and discussed with patient. They demonstrate a well positioned, well aligned total joint replacement without complicating factors noted. In comparison with the film from the last office visit, there has been no alignment change.    ASSESSMENT: Status post left total knee replacement with expected healing    PLAN: 1) Continue home PT exercises as needed.   2) Continue with antibiotic prophylaxis with dental procedures for 2 yrs post total joint replacement.   3) Follow up as ordered.    6/18/2018  Patient was seen by ROBERTA Ruffin in the office today.

## 2018-07-13 ENCOUNTER — TRANSCRIBE ORDERS (OUTPATIENT)
Dept: ADMINISTRATIVE | Facility: HOSPITAL | Age: 73
End: 2018-07-13

## 2018-07-13 ENCOUNTER — LAB (OUTPATIENT)
Dept: LAB | Facility: HOSPITAL | Age: 73
End: 2018-07-13

## 2018-07-13 DIAGNOSIS — R60.9 EDEMA, UNSPECIFIED TYPE: Primary | ICD-10-CM

## 2018-07-13 DIAGNOSIS — R60.9 EDEMA, UNSPECIFIED TYPE: ICD-10-CM

## 2018-07-13 LAB
ANION GAP SERPL CALCULATED.3IONS-SCNC: 15.8 MMOL/L
BUN BLD-MCNC: 15 MG/DL (ref 8–23)
BUN/CREAT SERPL: 19 (ref 7–25)
CALCIUM SPEC-SCNC: 9.8 MG/DL (ref 8.6–10.5)
CHLORIDE SERPL-SCNC: 100 MMOL/L (ref 98–107)
CO2 SERPL-SCNC: 27.2 MMOL/L (ref 22–29)
CREAT BLD-MCNC: 0.79 MG/DL (ref 0.57–1)
GFR SERPL CREATININE-BSD FRML MDRD: 72 ML/MIN/1.73
GLUCOSE BLD-MCNC: 102 MG/DL (ref 65–99)
POTASSIUM BLD-SCNC: 3.8 MMOL/L (ref 3.5–5.2)
SODIUM BLD-SCNC: 143 MMOL/L (ref 136–145)

## 2018-07-13 PROCEDURE — 80048 BASIC METABOLIC PNL TOTAL CA: CPT | Performed by: INTERNAL MEDICINE

## 2018-07-13 PROCEDURE — 36415 COLL VENOUS BLD VENIPUNCTURE: CPT

## 2018-07-16 ENCOUNTER — TELEPHONE (OUTPATIENT)
Dept: GASTROENTEROLOGY | Facility: CLINIC | Age: 73
End: 2018-07-16

## 2018-07-16 NOTE — TELEPHONE ENCOUNTER
Pt has hx C Diff in April 2018. She recently had knee surgery 2/2018, and they want her to take 2000 mg Keflex prior to dental procedure tomorrow.  Pt is asking if okay with Dr Moe to take Keflex with her history of C Diff.    All d/w Dr Moe, he is okay with pt taking Keflex as ordered above.  Pt is currently taking Florastor BID, and she will call if she develops diarrhea.

## 2018-12-20 ENCOUNTER — OFFICE VISIT (OUTPATIENT)
Dept: ORTHOPEDIC SURGERY | Facility: CLINIC | Age: 73
End: 2018-12-20

## 2018-12-20 VITALS — BODY MASS INDEX: 33.97 KG/M2 | WEIGHT: 199 LBS | HEIGHT: 64 IN | TEMPERATURE: 98 F

## 2018-12-20 DIAGNOSIS — Z96.652 HISTORY OF TOTAL KNEE ARTHROPLASTY, LEFT: ICD-10-CM

## 2018-12-20 DIAGNOSIS — Z96.652 STATUS POST TOTAL LEFT KNEE REPLACEMENT: Primary | ICD-10-CM

## 2018-12-20 DIAGNOSIS — M17.11 ARTHRITIS OF RIGHT KNEE: ICD-10-CM

## 2018-12-20 PROCEDURE — 73560 X-RAY EXAM OF KNEE 1 OR 2: CPT | Performed by: ORTHOPAEDIC SURGERY

## 2018-12-20 PROCEDURE — 99213 OFFICE O/P EST LOW 20 MIN: CPT | Performed by: ORTHOPAEDIC SURGERY

## 2018-12-20 PROCEDURE — 20610 DRAIN/INJ JOINT/BURSA W/O US: CPT | Performed by: ORTHOPAEDIC SURGERY

## 2018-12-20 RX ORDER — METHYLPREDNISOLONE ACETATE 80 MG/ML
80 INJECTION, SUSPENSION INTRA-ARTICULAR; INTRALESIONAL; INTRAMUSCULAR; SOFT TISSUE
Status: COMPLETED | OUTPATIENT
Start: 2018-12-20 | End: 2018-12-20

## 2018-12-20 RX ORDER — LIDOCAINE HYDROCHLORIDE 20 MG/ML
4 INJECTION, SOLUTION EPIDURAL; INFILTRATION; INTRACAUDAL; PERINEURAL
Status: COMPLETED | OUTPATIENT
Start: 2018-12-20 | End: 2018-12-20

## 2018-12-20 RX ADMIN — LIDOCAINE HYDROCHLORIDE 4 ML: 20 INJECTION, SOLUTION EPIDURAL; INFILTRATION; INTRACAUDAL; PERINEURAL at 11:32

## 2018-12-20 RX ADMIN — METHYLPREDNISOLONE ACETATE 80 MG: 80 INJECTION, SUSPENSION INTRA-ARTICULAR; INTRALESIONAL; INTRAMUSCULAR; SOFT TISSUE at 11:32

## 2018-12-20 NOTE — PROGRESS NOTES
Patient Name: Mahnaz Becerra   YOB: 1945  Referring Primary Care Physician: Stephen Wilkes MD  BMI: Body mass index is 34.16 kg/m².    Chief Complaint:    Chief Complaint   Patient presents with   • Left Knee - Follow-up, Pain        Subjective:    HPI:   Mahnaz Becerra is a pleasant 73 y.o. year old who presents today for evaluation of   Chief Complaint   Patient presents with   • Left Knee - Follow-up, Pain    10 mos sp left tka doing great.  Right knee hurts and is getting worse.  Intermittent.  Achy.This problem is not new to this examiner.     Medications:   Home Medications:  Current Outpatient Medications on File Prior to Visit   Medication Sig   • albuterol (VENTOLIN HFA) 108 (90 BASE) MCG/ACT inhaler Inhale 2 puffs Every 6 (Six) Hours As Needed for wheezing.   • cephalexin (KEFLEX) 500 MG capsule 2000 mg po x 1 dose, 1hr prior to dental procedure   • cyclobenzaprine (FLEXERIL) 10 MG tablet Take 1 tablet by mouth 2 (Two) Times a Day As Needed for muscle spasms.   • dicyclomine (BENTYL) 20 MG tablet Take 1 tablet by mouth As Needed (abd cramps).   • hydrochlorothiazide (HYDRODIURIL) 25 MG tablet TAKE 1 TABLET BY MOUTH DAILY   • Multiple Vitamins-Minerals (CENTRUM SILVER PO) Take  by mouth.   • oxyCODONE-acetaminophen (PERCOCET) 7.5-325 MG per tablet 1-2 tabs po q 3-4 hr prn severe pain, wean as tolerated   • pantoprazole (PROTONIX) 40 MG EC tablet TAKE 1 TABLET BY MOUTH TWICE DAILY   • saccharomyces boulardii (FLORASTOR) 250 MG capsule Take 250 mg by mouth 2 (Two) Times a Day.   • vitamin E 400 UNIT capsule Take 400 Units by mouth Daily.     Current Facility-Administered Medications on File Prior to Visit   Medication   • ipratropium-albuterol (DUO-NEB) nebulizer solution 3 mL     Current Medications:  Scheduled Meds:  ipratropium-albuterol 3 mL Nebulization 4x Daily - RT     Continuous Infusions:   PRN Meds:.    I have reviewed the patient's medical history in detail and updated the  computerized patient record.  Review and summarization of old records includes:    Past Medical History:   Diagnosis Date   • Acid reflux    • Anesthesia complication     ANESTHESIA HAS BROUGHT ON ASTHMA ATTACKS    • Asthma    • Bronchitis    • [place]     FREQUENT   • Edema     LOWER EXT   • Heart murmur    • History of skin cancer     BACK   • IBS (irritable bowel syndrome)    • Knee pain, right    • MS (multiple sclerosis) (CMS/[facility])    • Osteoarthritis    • PONV (postoperative nausea and vomiting)     Patient states she had post-op nausea and vomiting after hysterectomy in .   • Sleep apnea     CANT TOLERATE CPAP        Past Surgical History:   Procedure Laterality Date   • APPENDECTOMY     • BREAST LUMPECTOMY Bilateral    • BREAST SURGERY      REDUCTION   •  SECTION     • CHOLECYSTECTOMY     • COLONOSCOPY  2012    Dr Moe   • COSMETIC SURGERY     • DILATATION AND CURETTAGE     • ENDOSCOPY  2012    Dr Moe   • ENDOSCOPY N/A 2016    Procedure: ESOPHAGOGASTRODUODENOSCOPY WITH BX;  Surgeon: Nasim Moe MD;  Location: [facility] ENDOSCOPY;  Service:    • EYE SURGERY Bilateral     BLEPHAROPLASTY   • HYSTERECTOMY     • KNEE ARTHROSCOPY Right 2016    Procedure: KNEE ARTHROSCOPY, PARTIAL MEDIAL AND LATERAL MENISECTOMY, CHONDROPLASTY OF THE PATELLA, REMOVAL OF LOOSE BODIES;  Surgeon: Artur Pat MD;  [place] [facility] [place] [facility]  Service:    • KNEE ARTHROSCOPY W/ MENISCAL REPAIR Left    • OOPHORECTOMY Bilateral    • TONSILLECTOMY     • TOTAL KNEE ARTHROPLASTY Left 2018    Procedure: LEFT TOTAL KNEE ARTHROPLASTY WITH MARGRET NAVIGATION;  Surgeon: Artur Pat MD;  Location: [facility] MAIN OR;  Service:         Social History     Occupational History   • Not on file   Tobacco Use   • Smoking status: Former Smoker     Packs/day: 0.25     Types: Cigarettes     Last attempt to quit:      Years since quittin.9   • Smokeless tobacco: Never Used    • Tobacco comment: Patient states she was a social smoker.   Substance and Sexual Activity   • Alcohol use: Yes     Comment: Occasional   • Drug use: No   • Sexual activity: Defer    Social History     Social History Narrative   • Not on file        Family History   Problem Relation Age of Onset   • Hypertension Mother    • Stroke Mother    • Alzheimer's disease Mother    • Heart disease Father    • Emphysema Father    • Stroke Maternal Grandmother    • Cancer Maternal Grandfather    • No Known Problems Paternal Grandmother    • Cerebral aneurysm Paternal Grandfather    • Malig Hyperthermia Neg Hx        ROS: 14 point review of systems was performed and all other systems were reviewed and are negative except for documented findings in HPI and today's encounter.     Allergies:   Allergies   Allergen Reactions   • Lansoprazole Itching and Other (See Comments)     AND TURN RED   • Levofloxacin Swelling and Other (See Comments)     RED RASH   • Cefdinir Other (See Comments)     Abdominal pain, caused upset stomach   • Trazodone And Nefazodone Other (See Comments)     Severe muscle cramps     Constitutional:  Denies fever, shaking or chills   Eyes:  Denies change in visual acuity   HENT:  Denies nasal congestion or sore throat   Respiratory:  Denies cough or shortness of breath   Cardiovascular:  Denies chest pain or severe LE edema   GI:  Denies abdominal pain, nausea, vomiting, bloody stools or diarrhea   Musculoskeletal:  Numbness, tingling, pain, or loss of motor function only as noted above in history of present illness.  : Denies painful urination or hematuria  Integument:  Denies rash, lesion or ulceration   Neurologic:  Denies headache or focal weakness  Endocrine:  Denies lymphadenopathy  Psych:  Denies confusion or change in mental status   Hem:  Denies active bleeding    Subjective     Objective:    Physical Exam: 73 y.o. female  Wt Readings from Last 3 Encounters:   12/20/18 90.3 kg (199 lb)   06/18/18  "90.6 kg (199 lb 12.8 oz)   04/16/18 90.3 kg (199 lb)     Ht Readings from Last 3 Encounters:   12/20/18 162.6 cm (64\")   06/18/18 162.6 cm (64\")   04/16/18 162.6 cm (64\")     Body mass index is 34.16 kg/m².    Vitals:    12/20/18 1109   Temp: 98 °F (36.7 °C)       Vital signs reviewed.   General Appearance:    Alert, cooperative, in no acute distress                  Eyes: conjunctiva clear  ENT: external ears and nose atraumatic  CV: no peripheral edema  Resp: normal respiratory effort  Skin: no rashes or wounds; normal turgor  Psych: mood and affect appropriate  Lymph: no nodes appreciated  Neuro: gross sensation intact  Vascular:  Palpable peripheral pulse in noted extremity  Musculoskeletal Extremities: KNEE Exam: medial joint line tenderness with crepitation, synovitis, swelling, and joint effusion right knee.0-125 on the left with good stability      Radiology:   Imaging done today and discussed at length with the patient:    Indication: total joint follow up,  Views: 2V AP&LAT left knee(s)   Findings: advanced arthritis, S/P left  Total Knee Replacement in good position and alignment, arthritis on the right  Comparison views: viewed last xray done in the office.       Assessment:     ICD-10-CM ICD-9-CM   1. Status post total left knee replacement Z96.652 V43.65   2. History of total knee arthroplasty, left Z96.652 V43.65   3. Arthritis of right knee M17.11 716.96        Large Joint Arthrocentesis: L knee  Date/Time: 12/20/2018 11:32 AM  Consent given by: patient  Site marked: site marked  Timeout: Immediately prior to procedure a time out was called to verify the correct patient, procedure, equipment, support staff and site/side marked as required   Supporting Documentation  Indications: pain and joint swelling   Procedure Details  Location: knee - L knee  Preparation: Patient was prepped and draped in the usual sterile fashion  Needle size: 22 G  Approach: anterolateral  Medications administered: 80 mg " methylPREDNISolone acetate 80 MG/ML; 4 mL lidocaine PF 2% 2 %  Patient tolerance: patient tolerated the procedure well with no immediate complications             Plan: Biomechanics of pertinent body area discussed.  Risks, benefits, alternatives, comparisons, and complications of accepted medicines, injections, recommendations, surgical procedures, and therapies explained and education provided in laymen's terms. The patient was given the opportunity to ask questions and they were answerved to their satisfaction.   Natural history and expected course of this patient's diagnosis discussed along with evaluation of therapies. Questions answered.  Antibiotics.  Fu for right knee injections as needed    12/20/2018

## 2019-02-19 ENCOUNTER — APPOINTMENT (OUTPATIENT)
Dept: LAB | Facility: HOSPITAL | Age: 74
End: 2019-02-19

## 2019-02-19 ENCOUNTER — TELEPHONE (OUTPATIENT)
Dept: GASTROENTEROLOGY | Facility: CLINIC | Age: 74
End: 2019-02-19

## 2019-02-19 DIAGNOSIS — R19.7 DIARRHEA, UNSPECIFIED TYPE: Primary | ICD-10-CM

## 2019-02-19 NOTE — TELEPHONE ENCOUNTER
Pt has had diarrhea and loose stools since Saturday.  Pt has hx of C. Diff (April 2018).    All d/w Dr Moe, he would like pt to have C. Diff Stool test.  Order placed.      Pt notified, she will go to [facility].

## 2019-02-26 DIAGNOSIS — R19.7 DIARRHEA, UNSPECIFIED TYPE: ICD-10-CM

## 2019-02-26 LAB — C DIFF TOX GENS STL QL NAA+PROBE: NEGATIVE

## 2019-02-26 PROCEDURE — 87493 C DIFF AMPLIFIED PROBE: CPT

## 2019-02-27 ENCOUNTER — TELEPHONE (OUTPATIENT)
Dept: GASTROENTEROLOGY | Facility: CLINIC | Age: 74
End: 2019-02-27

## 2019-02-27 RX ORDER — METRONIDAZOLE 500 MG/1
500 TABLET ORAL 3 TIMES DAILY
Qty: 30 TABLET | Refills: 0 | Status: SHIPPED | OUTPATIENT
Start: 2019-02-27 | End: 2019-03-14

## 2019-02-27 RX ORDER — PROMETHAZINE HYDROCHLORIDE 12.5 MG/1
12.5 TABLET ORAL EVERY 6 HOURS PRN
Qty: 30 TABLET | Refills: 0 | Status: SHIPPED | OUTPATIENT
Start: 2019-02-27 | End: 2022-11-01

## 2019-02-27 NOTE — TELEPHONE ENCOUNTER
"Pt called on 2/19/19 because she had been having diarrhea since 2/16/19.  Dr Moe ordered C.Diff on stool due to history.    Pt had C.Diff test on 2/26/19 and would like results. C.Diff negative/normal.    She is still having diarrhea and some vomiting. Her stomach \"burns like fire\" and has been taking Pepcid.    All d/w Dr Moe, he would like to go ahead and start her on Flagyl and Phenergan.  Rx sent to pharmacy. Pt notified, and if this does not work, she needs to make f/u appt.  "

## 2019-03-13 RX ORDER — PANTOPRAZOLE SODIUM 40 MG/1
TABLET, DELAYED RELEASE ORAL
Qty: 60 TABLET | Refills: 11 | Status: SHIPPED | OUTPATIENT
Start: 2019-03-13 | End: 2020-04-08 | Stop reason: SDUPTHER

## 2019-03-14 ENCOUNTER — OFFICE VISIT (OUTPATIENT)
Dept: GASTROENTEROLOGY | Facility: CLINIC | Age: 74
End: 2019-03-14

## 2019-03-14 VITALS
DIASTOLIC BLOOD PRESSURE: 61 MMHG | SYSTOLIC BLOOD PRESSURE: 136 MMHG | HEART RATE: 81 BPM | WEIGHT: 208.5 LBS | HEIGHT: 64 IN | BODY MASS INDEX: 35.59 KG/M2

## 2019-03-14 DIAGNOSIS — R19.7 DIARRHEA OF PRESUMED INFECTIOUS ORIGIN: Primary | ICD-10-CM

## 2019-03-14 DIAGNOSIS — A04.72 COLITIS, CLOSTRIDIUM DIFFICILE: ICD-10-CM

## 2019-03-14 PROCEDURE — 99213 OFFICE O/P EST LOW 20 MIN: CPT | Performed by: INTERNAL MEDICINE

## 2019-03-14 RX ORDER — PREDNISONE 20 MG/1
40 TABLET ORAL DAILY
COMMUNITY
Start: 2019-03-13 | End: 2019-03-18

## 2019-03-14 NOTE — PROGRESS NOTES
Lopez Becerra is a 73 y.o.. female is here today for follow-up.    Chief Complaint   Patient presents with   • Diarrhea     Improved   • Abdominal Pain     Resolved       History of Present Illness  Patient developed profuse watery diarrhea last month.  She says it smelled like her previous episode of C. difficile and it did appeared to be related to recent antibiotic therapy.  The C. difficile assay was negative but on clinical suspicion she was treated with metronidazole and she improved dramatically to the point where the diarrhea and the abdominal pain have resolved.  However, 2 days after she finished metronidazole, she developed diffuse urticaria and even had swelling of her lips.  She did not have any stridor.  She was treated with Benadryl and prednisone and is doing much better.    The following portions of the patient's history were reviewed and updated as appropriate: allergies, current medications, past family history, past medical history, past social history, past surgical history and problem list.      Current Outpatient Medications:   •  albuterol (VENTOLIN HFA) 108 (90 BASE) MCG/ACT inhaler, Inhale 2 puffs Every 6 (Six) Hours As Needed for wheezing., Disp: 3 inhaler, Rfl: 3  •  cephalexin (KEFLEX) 500 MG capsule, 2000 mg po x 1 dose, 1hr prior to dental procedure, Disp: 4 capsule, Rfl: 2  •  cyclobenzaprine (FLEXERIL) 10 MG tablet, Take 1 tablet by mouth 2 (Two) Times a Day As Needed for muscle spasms., Disp: 60 tablet, Rfl: 2  •  dicyclomine (BENTYL) 20 MG tablet, Take 1 tablet by mouth As Needed (abd cramps)., Disp: 120 tablet, Rfl: 3  •  Famotidine-Ca Carb-Mag Hydrox (PEPCID COMPLETE PO), Take  by mouth., Disp: , Rfl:   •  hydrochlorothiazide (HYDRODIURIL) 25 MG tablet, TAKE 1 TABLET BY MOUTH DAILY, Disp: 30 tablet, Rfl: 0  •  Multiple Vitamins-Minerals (CENTRUM SILVER PO), Take  by mouth., Disp: , Rfl:   •  oxyCODONE-acetaminophen (PERCOCET) 7.5-325 MG per tablet, 1-2 tabs po q  3-4 hr prn severe pain, wean as tolerated, Disp: 40 tablet, Rfl: 0  •  pantoprazole (PROTONIX) 40 MG EC tablet, TAKE 1 TABLET BY MOUTH TWICE DAILY, Disp: 60 tablet, Rfl: 11  •  predniSONE (DELTASONE) 20 MG tablet, Take 40 mg by mouth Daily., Disp: , Rfl:   •  saccharomyces boulardii (FLORASTOR) 250 MG capsule, Take 250 mg by mouth 2 (Two) Times a Day., Disp: , Rfl:   •  vitamin E 400 UNIT capsule, Take 400 Units by mouth Daily., Disp: , Rfl:   •  promethazine (PHENERGAN) 12.5 MG tablet, Take 1 tablet by mouth Every 6 (Six) Hours As Needed for Nausea or Vomiting., Disp: 30 tablet, Rfl: 0    Current Facility-Administered Medications:   •  ipratropium-albuterol (DUO-NEB) nebulizer solution 3 mL, 3 mL, Nebulization, 4x Daily - RT, Little, Rosalinda Bertrand, APRN, 3 mL at 04/25/16 1427    Family History   Problem Relation Age of Onset   • Hypertension Mother    • Stroke Mother    • Alzheimer's disease Mother    • Heart disease Father    • Emphysema Father    • Stroke Maternal Grandmother    • Cancer Maternal Grandfather    • No Known Problems Paternal Grandmother    • Cerebral aneurysm Paternal Grandfather    • Malig Hyperthermia Neg Hx        Review of Systems   Respiratory: Negative for shortness of breath.    Cardiovascular: Negative for chest pain.   All other systems reviewed and are negative.      Objective   Physical Exam   Constitutional: She appears well-developed and well-nourished.   Abdominal: Soft. Bowel sounds are normal. She exhibits no distension and no mass. There is no tenderness. There is no guarding.   Skin:   No obvious urticaria and there is no facial swelling today.   Nursing note and vitals reviewed.      Pertinent laboratory results were reviewed.  and Pertinent old records were reviewed.     Assessment/Plan   Problems Addressed this Visit        Digestive    Diarrhea - Primary    Colitis, Clostridium difficile        This is very suspicious for C. difficile colitis.  We discussed her using bleach to  clean out her bathroom.  She does this anyway.  Are also recommending that she throw out all of her old toothbrushes and she uses kefir daily.  She was warned about the chance of relapse and advised to call us if she thinks she is having one.  I reviewed the side effect profile of metronidazole and they mention hypersensitivity reactions, Hsieh Ayan syndrome, and toxic epidermal necrolysis.  I am not sure metronidazole had anything to do with this, but to be on the safe side I have advised her to listed as an allergy.  Follow-up as needed.

## 2019-03-21 ENCOUNTER — TELEPHONE (OUTPATIENT)
Dept: GASTROENTEROLOGY | Facility: CLINIC | Age: 74
End: 2019-03-21

## 2019-03-21 DIAGNOSIS — R19.7 DIARRHEA OF PRESUMED INFECTIOUS ORIGIN: Primary | ICD-10-CM

## 2019-03-21 NOTE — TELEPHONE ENCOUNTER
Pt was seen in office 3/14/19.  She was doing better but now having diarrhea and abd pain again after eating.    All d/w Dr Moe.  If symptoms persist, she needs to have stool checked again for C.Diff (order placed).  She can take Pepto Bismol and she also has Dicyclomine rx that she can use per Dr Moe. Pt verbalized understanding.

## 2019-03-26 ENCOUNTER — LAB (OUTPATIENT)
Dept: LAB | Facility: HOSPITAL | Age: 74
End: 2019-03-26

## 2019-03-26 ENCOUNTER — TRANSCRIBE ORDERS (OUTPATIENT)
Dept: ADMINISTRATIVE | Facility: HOSPITAL | Age: 74
End: 2019-03-26

## 2019-03-26 DIAGNOSIS — E78.5 HYPERLIPIDEMIA, UNSPECIFIED HYPERLIPIDEMIA TYPE: ICD-10-CM

## 2019-03-26 DIAGNOSIS — Z00.00 ROUTINE GENERAL MEDICAL EXAMINATION AT A HEALTH CARE FACILITY: Primary | ICD-10-CM

## 2019-03-26 DIAGNOSIS — Z00.00 ROUTINE GENERAL MEDICAL EXAMINATION AT A HEALTH CARE FACILITY: ICD-10-CM

## 2019-03-26 LAB
ALBUMIN SERPL-MCNC: 3.9 G/DL (ref 3.5–5.2)
ALBUMIN/GLOB SERPL: 1.6 G/DL
ALP SERPL-CCNC: 57 U/L (ref 39–117)
ALT SERPL W P-5'-P-CCNC: 17 U/L (ref 1–33)
ANION GAP SERPL CALCULATED.3IONS-SCNC: 9.7 MMOL/L
AST SERPL-CCNC: 13 U/L (ref 1–32)
BACTERIA UR QL AUTO: ABNORMAL /HPF
BASOPHILS # BLD AUTO: 0.04 10*3/MM3 (ref 0–0.2)
BASOPHILS NFR BLD AUTO: 0.5 % (ref 0–1.5)
BILIRUB SERPL-MCNC: 0.4 MG/DL (ref 0.2–1.2)
BILIRUB UR QL STRIP: NEGATIVE
BUN BLD-MCNC: 16 MG/DL (ref 8–23)
BUN/CREAT SERPL: 20.5 (ref 7–25)
CALCIUM SPEC-SCNC: 9.1 MG/DL (ref 8.6–10.5)
CHLORIDE SERPL-SCNC: 99 MMOL/L (ref 98–107)
CHOLEST SERPL-MCNC: 188 MG/DL (ref 0–200)
CLARITY UR: CLEAR
CO2 SERPL-SCNC: 30.3 MMOL/L (ref 22–29)
COLOR UR: ABNORMAL
CREAT BLD-MCNC: 0.78 MG/DL (ref 0.57–1)
DEPRECATED RDW RBC AUTO: 47.9 FL (ref 37–54)
EOSINOPHIL # BLD AUTO: 0.29 10*3/MM3 (ref 0–0.4)
EOSINOPHIL NFR BLD AUTO: 3.7 % (ref 0.3–6.2)
ERYTHROCYTE [DISTWIDTH] IN BLOOD BY AUTOMATED COUNT: 13.4 % (ref 12.3–15.4)
GFR SERPL CREATININE-BSD FRML MDRD: 73 ML/MIN/1.73
GLOBULIN UR ELPH-MCNC: 2.5 GM/DL
GLUCOSE BLD-MCNC: 117 MG/DL (ref 65–99)
GLUCOSE UR STRIP-MCNC: NEGATIVE MG/DL
HCT VFR BLD AUTO: 43.9 % (ref 34–46.6)
HDLC SERPL-MCNC: 48 MG/DL (ref 40–60)
HGB BLD-MCNC: 14.1 G/DL (ref 12–15.9)
HGB UR QL STRIP.AUTO: NEGATIVE
HYALINE CASTS UR QL AUTO: ABNORMAL /LPF
IMM GRANULOCYTES # BLD AUTO: 0.03 10*3/MM3 (ref 0–0.05)
IMM GRANULOCYTES NFR BLD AUTO: 0.4 % (ref 0–0.5)
KETONES UR QL STRIP: NEGATIVE
LDLC SERPL CALC-MCNC: 112 MG/DL (ref 0–100)
LDLC/HDLC SERPL: 2.33 {RATIO}
LEUKOCYTE ESTERASE UR QL STRIP.AUTO: ABNORMAL
LYMPHOCYTES # BLD AUTO: 1.62 10*3/MM3 (ref 0.7–3.1)
LYMPHOCYTES NFR BLD AUTO: 20.9 % (ref 19.6–45.3)
MCH RBC QN AUTO: 30.9 PG (ref 26.6–33)
MCHC RBC AUTO-ENTMCNC: 32.1 G/DL (ref 31.5–35.7)
MCV RBC AUTO: 96.1 FL (ref 79–97)
MONOCYTES # BLD AUTO: 0.58 10*3/MM3 (ref 0.1–0.9)
MONOCYTES NFR BLD AUTO: 7.5 % (ref 5–12)
NEUTROPHILS # BLD AUTO: 5.19 10*3/MM3 (ref 1.4–7)
NEUTROPHILS NFR BLD AUTO: 67 % (ref 42.7–76)
NITRITE UR QL STRIP: NEGATIVE
NRBC BLD AUTO-RTO: 0 /100 WBC (ref 0–0)
PH UR STRIP.AUTO: 6 [PH] (ref 5–8)
PLATELET # BLD AUTO: 277 10*3/MM3 (ref 140–450)
PMV BLD AUTO: 10.5 FL (ref 6–12)
POTASSIUM BLD-SCNC: 4.1 MMOL/L (ref 3.5–5.2)
PROT SERPL-MCNC: 6.4 G/DL (ref 6–8.5)
PROT UR QL STRIP: NEGATIVE
RBC # BLD AUTO: 4.57 10*6/MM3 (ref 3.77–5.28)
RBC # UR: ABNORMAL /HPF
REF LAB TEST METHOD: ABNORMAL
SODIUM BLD-SCNC: 139 MMOL/L (ref 136–145)
SP GR UR STRIP: 1.03 (ref 1–1.03)
SQUAMOUS #/AREA URNS HPF: ABNORMAL /HPF
TRIGL SERPL-MCNC: 142 MG/DL (ref 0–150)
UROBILINOGEN UR QL STRIP: ABNORMAL
VLDLC SERPL-MCNC: 28.4 MG/DL (ref 5–40)
WBC NRBC COR # BLD: 7.75 10*3/MM3 (ref 3.4–10.8)
WBC UR QL AUTO: ABNORMAL /HPF

## 2019-03-26 PROCEDURE — 80061 LIPID PANEL: CPT | Performed by: INTERNAL MEDICINE

## 2019-03-26 PROCEDURE — 36415 COLL VENOUS BLD VENIPUNCTURE: CPT

## 2019-03-26 PROCEDURE — 80053 COMPREHEN METABOLIC PANEL: CPT | Performed by: INTERNAL MEDICINE

## 2019-03-26 PROCEDURE — 85025 COMPLETE CBC W/AUTO DIFF WBC: CPT | Performed by: INTERNAL MEDICINE

## 2019-03-26 PROCEDURE — 81001 URINALYSIS AUTO W/SCOPE: CPT | Performed by: INTERNAL MEDICINE

## 2019-04-02 ENCOUNTER — CLINICAL SUPPORT (OUTPATIENT)
Dept: ORTHOPEDIC SURGERY | Facility: CLINIC | Age: 74
End: 2019-04-02

## 2019-04-02 DIAGNOSIS — Z96.652 HISTORY OF TOTAL KNEE ARTHROPLASTY, LEFT: ICD-10-CM

## 2019-04-02 DIAGNOSIS — M17.11 ARTHRITIS OF RIGHT KNEE: Primary | ICD-10-CM

## 2019-04-02 PROCEDURE — 99212 OFFICE O/P EST SF 10 MIN: CPT | Performed by: NURSE PRACTITIONER

## 2019-04-02 PROCEDURE — 73562 X-RAY EXAM OF KNEE 3: CPT | Performed by: NURSE PRACTITIONER

## 2019-04-02 PROCEDURE — 20610 DRAIN/INJ JOINT/BURSA W/O US: CPT | Performed by: NURSE PRACTITIONER

## 2019-04-02 RX ORDER — CEPHALEXIN 500 MG/1
CAPSULE ORAL
Qty: 16 CAPSULE | Refills: 0 | Status: SHIPPED | OUTPATIENT
Start: 2019-04-02 | End: 2020-11-13

## 2019-04-02 RX ORDER — METHYLPREDNISOLONE ACETATE 80 MG/ML
80 INJECTION, SUSPENSION INTRA-ARTICULAR; INTRALESIONAL; INTRAMUSCULAR; SOFT TISSUE
Status: COMPLETED | OUTPATIENT
Start: 2019-04-02 | End: 2019-04-02

## 2019-04-02 RX ADMIN — METHYLPREDNISOLONE ACETATE 80 MG: 80 INJECTION, SUSPENSION INTRA-ARTICULAR; INTRALESIONAL; INTRAMUSCULAR; SOFT TISSUE at 12:57

## 2019-04-02 NOTE — PROGRESS NOTES
Patient: Mahnaz Becerra  YOB: 1945    Chief Complaints:  right knee pain, s/p left TKA doing well.   Subjective:    History of Present Illness: Here today for right knee pain, s/p left TKA doing well. The pain is a generalized joint tenderness.  It has been progressive in nature but remains intermittent.  Worsened by prolonged standing or walking and squatting activities. Has had improvement in the past with ice/heat, rest, and injections. Recently had allergic reaction to flagyl and was on prednisone and helped with this.     This problem is not new to this examiner.     Allergies:   Allergies   Allergen Reactions   • Flagyl [Metronidazole] Hives and Swelling     Lips and Tongue     • Lansoprazole Itching and Other (See Comments)     AND TURN RED   • Levofloxacin Swelling and Other (See Comments)     RED RASH   • Cefdinir Other (See Comments)     Abdominal pain, caused upset stomach   • Trazodone And Nefazodone Other (See Comments)     Severe muscle cramps       Medications:   Home Medications:  Current Outpatient Medications on File Prior to Visit   Medication Sig   • cyclobenzaprine (FLEXERIL) 10 MG tablet Take 1 tablet by mouth 2 (Two) Times a Day As Needed for muscle spasms.   • dicyclomine (BENTYL) 20 MG tablet Take 1 tablet by mouth As Needed (abd cramps).   • Famotidine-Ca Carb-Mag Hydrox (PEPCID COMPLETE PO) Take  by mouth.   • hydrochlorothiazide (HYDRODIURIL) 25 MG tablet TAKE 1 TABLET BY MOUTH DAILY   • Multiple Vitamins-Minerals (CENTRUM SILVER PO) Take  by mouth.   • oxyCODONE-acetaminophen (PERCOCET) 7.5-325 MG per tablet 1-2 tabs po q 3-4 hr prn severe pain, wean as tolerated   • pantoprazole (PROTONIX) 40 MG EC tablet TAKE 1 TABLET BY MOUTH TWICE DAILY   • vitamin E 400 UNIT capsule Take 400 Units by mouth Daily.   • albuterol (VENTOLIN HFA) 108 (90 BASE) MCG/ACT inhaler Inhale 2 puffs Every 6 (Six) Hours As Needed for wheezing.   • cephalexin (KEFLEX) 500 MG capsule 2000 mg po x 1  dose, 1hr prior to dental procedure   • promethazine (PHENERGAN) 12.5 MG tablet Take 1 tablet by mouth Every 6 (Six) Hours As Needed for Nausea or Vomiting.   • saccharomyces boulardii (FLORASTOR) 250 MG capsule Take 250 mg by mouth 2 (Two) Times a Day.     Current Facility-Administered Medications on File Prior to Visit   Medication   • ipratropium-albuterol (DUO-NEB) nebulizer solution 3 mL     Current Medications:  Scheduled Meds:    ipratropium-albuterol 3 mL Nebulization 4x Daily - RT     Continuous Infusions:   PRN Meds:.    I have reviewed the patient's medical history in detail and updated the computerized patient record.  Review and summarization of old records include:    Past Medical History:   Diagnosis Date   • Acid reflux    • Anesthesia complication     ANESTHESIA HAS BROUGHT ON ASTHMA ATTACKS    • Asthma    • Bronchitis    • Charleyhorse     FREQUENT   • Edema     LOWER EXT   • Heart murmur    • History of skin cancer     BACK   • IBS (irritable bowel syndrome)    • Knee pain, right    • MS (multiple sclerosis) (CMS/HCC)    • Osteoarthritis    • PONV (postoperative nausea and vomiting)     Patient states she had post-op nausea and vomiting after hysterectomy in .   • Sleep apnea     CANT TOLERATE CPAP        Past Surgical History:   Procedure Laterality Date   • APPENDECTOMY     • BREAST LUMPECTOMY Bilateral    • BREAST SURGERY      REDUCTION   •  SECTION     • CHOLECYSTECTOMY     • COLONOSCOPY  2012    Dr Moe   • COSMETIC SURGERY     • DILATATION AND CURETTAGE     • ENDOSCOPY  2012    Dr Moe   • ENDOSCOPY N/A 2016    Procedure: ESOPHAGOGASTRODUODENOSCOPY WITH BX;  Surgeon: Nasim Moe MD;  Location: [facility] ENDOSCOPY;  Service:    • EYE SURGERY Bilateral     BLEPHAROPLASTY   • HYSTERECTOMY     • KNEE ARTHROSCOPY Right 2016    Procedure: KNEE ARTHROSCOPY, PARTIAL MEDIAL AND LATERAL MENISECTOMY, CHONDROPLASTY OF THE PATELLA, REMOVAL OF  LOOSE BODIES;  Surgeon: Atrur Pat MD;  Location: [facility] [place] [facility] [place];  Service:    • KNEE ARTHROSCOPY W/ MENISCAL REPAIR Left    • OOPHORECTOMY Bilateral    • TONSILLECTOMY     • TOTAL KNEE ARTHROPLASTY Left 2018    Procedure: LEFT TOTAL KNEE ARTHROPLASTY WITH MARGRET NAVIGATION;  Surgeon: Artur Pat MD;  Location: [facility] MAIN OR;  Service:         Social History     Occupational History   • Not on file   Tobacco Use   • Smoking status: Former Smoker     Packs/day: 0.25     Types: Cigarettes     Last attempt to quit:      Years since quittin.2   • Smokeless tobacco: Never Used   • Tobacco comment: Patient states she was a social smoker.   Substance and Sexual Activity   • Alcohol use: Yes     Comment: Occasional   • Drug use: No   • Sexual activity: Defer      Social History     Social History Narrative   • Not on file        Family History   Problem Relation Age of Onset   • Hypertension Mother    • Stroke Mother    • Alzheimer's disease Mother    • Heart disease Father    • Emphysema Father    • Stroke Maternal Grandmother    • Cancer Maternal Grandfather    • No Known Problems Paternal Grandmother    • Cerebral aneurysm Paternal Grandfather    • Malig Hyperthermia Neg Hx        ROS: 14 point review of systems was performed and was negative except for documented findings in HPI and today's encounter.     Allergies:   Allergies   Allergen Reactions   • Flagyl [Metronidazole] Hives and Swelling     Lips and Tongue     • Lansoprazole Itching and Other (See Comments)     AND TURN RED   • Levofloxacin Swelling and Other (See Comments)     RED RASH   • Cefdinir Other (See Comments)     Abdominal pain, caused upset stomach   • Trazodone And Nefazodone Other (See Comments)     Severe muscle cramps     Constitutional:  Denies fever, shaking or chills   Eyes:  Denies change in visual acuity   HENT:  Denies nasal congestion or sore throat   Respiratory:  Denies cough or shortness of breath  "  Cardiovascular:  Denies chest pain or severe LE edema   GI:  Denies abdominal pain, nausea, vomiting, bloody stools or diarrhea   Musculoskeletal:  Numbness, tingling, or loss of motor function only as noted above in history of present illness.  : Denies painful urination or hematuria  Integument:  Denies rash, lesion or ulceration   Neurologic:  Denies headache or focal weakness  Endocrine:  Denies lymphadenopathy  Psych:  Denies confusion or change in mental status   Hem:  Denies active bleeding    Physical Exam:  Wt Readings from Last 3 Encounters:   03/14/19 94.6 kg (208 lb 8 oz)   12/20/18 90.3 kg (199 lb)   06/18/18 90.6 kg (199 lb 12.8 oz)     Ht Readings from Last 3 Encounters:   03/14/19 162.6 cm (64\")   12/20/18 162.6 cm (64\")   06/18/18 162.6 cm (64\")     There is no height or weight on file to calculate BMI.  No height and weight on file for this encounter.  There were no vitals filed for this visit.  Vital Signs:  reviewed  Constitutional: Awake alert and oriented x3, well developed, no acute distress, non-toxic appearance.  EYES: symmetric, sclera clear  ENT:  Normocephalic, Atraumatic.   Respiratory:  No respiratory distress, No wheezing  CV: pulse regular, no palpitations or pallor.  GI:  Abdomen soft, non-tender.   Vascular:  Intact distal pulses, No cyanosis, no signs or symptoms of DVT.  Neurologic: Sensation grossly intact to the involved extremity, No focal deficits noted.   Neck: No tenderness, Supple.  Integument: warm, dry, no ulcerations.   Psychiatric:  Oriented, no pathological affect.  Musculoskeletal:    Affected knee(s):  Painful gait with a subtle limp, positive for synovitis, swelling, joint effusion with crepitation.  Lachman negative  Posterior drawer negative  Cherri's negative  Patellofemoral grind +  Sensation grossly intact to light touch throughout the lower extremity  Skin is intact  Distal pulses are palpable  No signs or symptoms of DVT  Left knee with healed incision, " rom good no swelling and no complaints.       Diagnostic Data:     Imaging was done today, images were personally viewed and discussed with the patient:    Indication: pain related symptoms,  Views: 3V AP, LAT & 40 degree PA right knee(s)   Findings: severe end-stage arthritis (bone on bone, subchondral sclerosis/cysts, osteophytes), S/P left  Total Knee Replacement in good position and alignment  Comparison views: viewed last xray done in the office.     Procedure:  Large Joint Arthrocentesis: R knee  Date/Time: 4/2/2019 12:57 PM  Consent given by: patient  Site marked: site marked  Timeout: Immediately prior to procedure a time out was called to verify the correct patient, procedure, equipment, support staff and site/side marked as required   Supporting Documentation  Indications: pain and joint swelling   Procedure Details  Location: knee - R knee  Preparation: Patient was prepped and draped in the usual sterile fashion  Needle gauge: 21 G.  Approach: anterolateral  Medications administered: 4 mL lidocaine (cardiac) 20 MG/ML; 80 mg methylPREDNISolone acetate 80 MG/ML  Patient tolerance: patient tolerated the procedure well with no immediate complications          Assessment:     ICD-10-CM ICD-9-CM   1. Arthritis of right knee M17.11 716.96   2. History of total knee arthroplasty, left Z96.652 V43.65           Plan: Is to proceed with injection  Follow up as indicated.  Ice, elevate, and rest as needed.  Additional interventions include:  15 min spent face to face with patient 11 min spent counseling about:  Biomechanics of pertinent body area discussed.  Risks, benefits, alternatives, comparisons, and complications of accepted medicines, injections, recommendations, surgical procedures, and therapies explained and education provided in laymen's terms. Natural history and expected course of this patient's diagnosis discussed along with evaluation of therapies. Questions answered. When appropriate I also discussed  proper use of cane, walker, trekking poles.   RICE: Rest, ice, compression, and elevation therapy, Cryotherapy/brachy therapy, and or OTC linaments as indicated with instructions.   Cortisone Injection. See procedure note.  Does her physical therapy regularly because of her MS. Enc to continue.     Recently recovered from C-diff, rx for keflex for dental visits, precautions given, she is on probiotics as well.   4/2/2019  TRINH

## 2019-07-26 ENCOUNTER — CLINICAL SUPPORT (OUTPATIENT)
Dept: ORTHOPEDIC SURGERY | Facility: CLINIC | Age: 74
End: 2019-07-26

## 2019-07-26 VITALS — BODY MASS INDEX: 35.51 KG/M2 | HEIGHT: 64 IN | WEIGHT: 208 LBS | TEMPERATURE: 98 F

## 2019-07-26 DIAGNOSIS — Z96.652 STATUS POST TOTAL LEFT KNEE REPLACEMENT: Primary | ICD-10-CM

## 2019-07-26 DIAGNOSIS — M17.11 ARTHRITIS OF RIGHT KNEE: ICD-10-CM

## 2019-07-26 DIAGNOSIS — M70.61 TROCHANTERIC BURSITIS OF RIGHT HIP: ICD-10-CM

## 2019-07-26 DIAGNOSIS — M25.551 RIGHT HIP PAIN: ICD-10-CM

## 2019-07-26 PROCEDURE — 99213 OFFICE O/P EST LOW 20 MIN: CPT | Performed by: NURSE PRACTITIONER

## 2019-07-26 NOTE — PROGRESS NOTES
Patient Name: Mahnaz Becerra   YOB: 1945  Referring Primary Care Physician: Stephen Wilkes MD  BMI: Body mass index is 35.7 kg/m².    Chief Complaint:    Chief Complaint   Patient presents with   • Right Knee - Follow-up, Pain   • Right Hip - Pain      HPI:   Mahnaz Becerra is a 74 y.o. female who presents today for evaluation of   Chief Complaint   Patient presents with   • Right Knee - Follow-up, Pain   • Right Hip - Pain   Her knee is flared today. The pain is a generalized joint tenderness.  It has been progressive in nature but remains intermittent.  Worsened by prolonged standing or walking and squatting activities. Has had improvement in the past with ice/heat, rest, and injections.    The right hip  (Location). Duration: This has been going on for weeks. Timing: The pain is intermittent. Context or causative factors: none.  Relieving Factors:  In the past has tried OTC Tylenol  OTC meds/NSAIDS  assistive devices  physical therapy.     This problem is new to this examiner.     Subjective     Medications:   Home Medications:  Current Outpatient Medications on File Prior to Visit   Medication Sig   • albuterol (VENTOLIN HFA) 108 (90 BASE) MCG/ACT inhaler Inhale 2 puffs Every 6 (Six) Hours As Needed for wheezing.   • cephalexin (KEFLEX) 500 MG capsule 2000 mg po x 1 dose, 1hr prior to dental procedure   • cyclobenzaprine (FLEXERIL) 10 MG tablet Take 1 tablet by mouth 2 (Two) Times a Day As Needed for muscle spasms.   • dicyclomine (BENTYL) 20 MG tablet Take 1 tablet by mouth As Needed (abd cramps).   • Famotidine-Ca Carb-Mag Hydrox (PEPCID COMPLETE PO) Take  by mouth.   • hydrochlorothiazide (HYDRODIURIL) 25 MG tablet TAKE 1 TABLET BY MOUTH DAILY   • Multiple Vitamins-Minerals (CENTRUM SILVER PO) Take  by mouth.   • oxyCODONE-acetaminophen (PERCOCET) 7.5-325 MG per tablet 1-2 tabs po q 3-4 hr prn severe pain, wean as tolerated   • pantoprazole (PROTONIX) 40 MG EC tablet TAKE 1 TABLET BY MOUTH  TWICE DAILY   • promethazine (PHENERGAN) 12.5 MG tablet Take 1 tablet by mouth Every 6 (Six) Hours As Needed for Nausea or Vomiting.   • saccharomyces boulardii (FLORASTOR) 250 MG capsule Take 250 mg by mouth 2 (Two) Times a Day.   • vitamin E 400 UNIT capsule Take 400 Units by mouth Daily.     Current Facility-Administered Medications on File Prior to Visit   Medication   • ipratropium-albuterol (DUO-NEB) nebulizer solution 3 mL     Current Medications:  Scheduled Meds:    ipratropium-albuterol 3 mL Nebulization 4x Daily - RT     Continuous Infusions:   PRN Meds:.    I have reviewed the patient's medical history in detail and updated the computerized patient record.  Review and summarization of old records includes:    Past Medical History:   Diagnosis Date   • Acid reflux    • Anesthesia complication     ANESTHESIA HAS BROUGHT ON ASTHMA ATTACKS    • Asthma    • Bronchitis    • Charleyhorse     FREQUENT   • Edema     LOWER EXT   • Heart murmur    • History of skin cancer     BACK   • IBS (irritable bowel syndrome)    • Knee pain, right    • MS (multiple sclerosis) (CMS/[facility])    • Osteoarthritis    • PONV (postoperative nausea and vomiting)     Patient states she had post-op nausea and vomiting after hysterectomy in .   • Sleep apnea     CANT TOLERATE CPAP        Past Surgical History:   Procedure Laterality Date   • APPENDECTOMY     • BREAST LUMPECTOMY Bilateral    • BREAST SURGERY      REDUCTION   •  SECTION     • CHOLECYSTECTOMY     • COLONOSCOPY  2012    Dr Moe   • COSMETIC SURGERY     • DILATATION AND CURETTAGE     • ENDOSCOPY  2012    Dr Moe   • ENDOSCOPY N/A 2016    Procedure: ESOPHAGOGASTRODUODENOSCOPY WITH BX;  Surgeon: Nasim Moe MD;  Location: [facility] ENDOSCOPY;  Service:    • EYE SURGERY Bilateral     BLEPHAROPLASTY   • HYSTERECTOMY     • KNEE ARTHROSCOPY Right 2016    Procedure: KNEE ARTHROSCOPY, PARTIAL MEDIAL AND LATERAL MENISECTOMY,  CHONDROPLASTY OF THE PATELLA, REMOVAL OF LOOSE BODIES;  Surgeon: Artur Pat MD;  Location: [facility] OR [facility] [place];  Service:    • KNEE ARTHROSCOPY W/ MENISCAL REPAIR Left    • OOPHORECTOMY Bilateral    • TONSILLECTOMY     • TOTAL KNEE ARTHROPLASTY Left 2018    Procedure: LEFT TOTAL KNEE ARTHROPLASTY WITH MARGRET NAVIGATION;  Surgeon: Artur Pat MD;  Location: [facility] MAIN OR;  Service:         Social History     Occupational History   • Not on file   Tobacco Use   • Smoking status: Former Smoker     Packs/day: 0.25     Types: Cigarettes     Last attempt to quit:      Years since quittin.5   • Smokeless tobacco: Never Used   • Tobacco comment: Patient states she was a social smoker.   Substance and Sexual Activity   • Alcohol use: Yes     Comment: Occasional   • Drug use: No   • Sexual activity: Defer      Social History     Social History Narrative   • Not on file        Family History   Problem Relation Age of Onset   • Hypertension Mother    • Stroke Mother    • Alzheimer's disease Mother    • Heart disease Father    • Emphysema Father    • Stroke Maternal Grandmother    • Cancer Maternal Grandfather    • No Known Problems Paternal Grandmother    • Cerebral aneurysm Paternal Grandfather    • Malig Hyperthermia Neg Hx        ROS: 14 point review of systems was performed and all other systems were reviewed and are negative except for documented findings in HPI and today's encounter.     Allergies:   Allergies   Allergen Reactions   • Flagyl [Metronidazole] Hives and Swelling     Lips and Tongue     • Lansoprazole Itching and Other (See Comments)     AND TURN RED   • Levofloxacin Swelling and Other (See Comments)     RED RASH   • Cefdinir Other (See Comments)     Abdominal pain, caused upset stomach   • Trazodone And Nefazodone Other (See Comments)     Severe muscle cramps     Constitutional:  Denies fever, shaking or chills   Eyes:  Denies change in visual acuity   HENT:  Denies nasal congestion  "or sore throat   Respiratory:  Denies cough or shortness of breath   Cardiovascular:  Denies chest pain or severe LE edema   GI:  Denies abdominal pain, nausea, vomiting, bloody stools or diarrhea   Musculoskeletal:  Numbness, tingling, pain, or loss of motor function only as noted above in history of present illness.  : Denies painful urination or hematuria  Integument:  Denies rash, lesion or ulceration   Neurologic:  Denies headache or focal weakness  Endocrine:  Denies lymphadenopathy  Psych:  Denies confusion or change in mental status   Hem:  Denies active bleeding         Objective:    Physical Exam:   Temp 98 °F (36.7 °C) (Temporal)   Ht 162.6 cm (64\")   Wt 94.3 kg (208 lb)   BMI 35.70 kg/m²     General Appearance:    Alert, cooperative, in no acute distress                  Eyes: conjunctiva clear  ENT: external ears and nose atraumatic  CV: no peripheral edema  Resp: normal respiratory effort  Skin: no rashes or wounds; normal turgor  Psych: mood and affect appropriate  Lymph: no nodes appreciated  Neuro: gross sensation intact  Vascular:  Palpable peripheral pulse in noted extremity  Musculoskeletal Extremities: KNEE Exam: medial and lateral joint line tenderness with crepitation, synovitis, swelling, and joint effusion right knee. and HIP Exam: normal gait without assistive device right hip, Stinchfield negative, stiffness, FERNIE test negative and trochanteric bursa tenderness 2+ pedal pulses and brisk capillary refill Pedal edema none      Radiology:   Imaging done previously in the office, images were personally viewed and discussed at length with the patient:    Indication: pain related symptoms,  Views: 3V AP, LAT & 40 degree PA right knee(s)   Findings: severe end-stage arthritis (bone on bone, subchondral sclerosis/cysts, osteophytes), S/P left  Total Knee Replacement in good position and alignment  Comparison views: viewed last xray done in the office.       Assessment:     ICD-10-CM ICD-9-CM "   1. Status post total left knee replacement Z96.652 V43.65   2. Arthritis of right knee M17.11 716.96        Procedures       Plan:  15 min spent face to face with patient 11 min spent counseling about:  Biomechanics of pertinent body area discussed.  Risks, benefits, alternatives, comparisons, and complications of accepted medicines, injections, recommendations, surgical procedures, and therapies explained and education provided in laymen's terms. Natural history and expected course of this patient's diagnosis discussed along with evaluation of therapies. Questions answered. When appropriate I also discussed proper use of cane, walker, or trekking poles.  BMI:  The concept of BMI body mass index and its importance and implications discussed.  BMI suggested to be < 40 or as low as possible. Lifestyle measures for weight loss and how this affects orthopedic condition.  EXERCISES:  Advice on benefits of, and types of regular/moderate exercise including biomechanical forces involved as it pertains to this complaint.  RICE: Rest, ice, compression, and elevation therapy, Cryotherapy/brachy therapy, and or OTC linaments as indicated with instructions.   Cortisone Injection. See procedure note.  She does not tolerate more than one cortisone inj at a time so will make 2 weeks for a right hip injection if she still needs it at that point.   7/26/2019  JUDIR

## 2019-07-31 ENCOUNTER — TELEPHONE (OUTPATIENT)
Dept: GASTROENTEROLOGY | Facility: CLINIC | Age: 74
End: 2019-07-31

## 2019-09-03 ENCOUNTER — CLINICAL SUPPORT (OUTPATIENT)
Dept: ORTHOPEDIC SURGERY | Facility: CLINIC | Age: 74
End: 2019-09-03

## 2019-09-03 VITALS — BODY MASS INDEX: 35.51 KG/M2 | WEIGHT: 208 LBS | HEIGHT: 64 IN | TEMPERATURE: 98.2 F

## 2019-09-03 DIAGNOSIS — M25.551 HIP PAIN, BILATERAL: Primary | ICD-10-CM

## 2019-09-03 DIAGNOSIS — M70.61 TROCHANTERIC BURSITIS OF RIGHT HIP: ICD-10-CM

## 2019-09-03 DIAGNOSIS — M25.552 HIP PAIN, BILATERAL: Primary | ICD-10-CM

## 2019-09-03 DIAGNOSIS — M51.36 DEGENERATIVE DISC DISEASE, LUMBAR: ICD-10-CM

## 2019-09-03 PROCEDURE — 20610 DRAIN/INJ JOINT/BURSA W/O US: CPT | Performed by: ORTHOPAEDIC SURGERY

## 2019-09-03 PROCEDURE — 99212 OFFICE O/P EST SF 10 MIN: CPT | Performed by: ORTHOPAEDIC SURGERY

## 2019-09-03 PROCEDURE — 73521 X-RAY EXAM HIPS BI 2 VIEWS: CPT | Performed by: ORTHOPAEDIC SURGERY

## 2019-09-03 RX ORDER — METHYLPREDNISOLONE ACETATE 80 MG/ML
80 INJECTION, SUSPENSION INTRA-ARTICULAR; INTRALESIONAL; INTRAMUSCULAR; SOFT TISSUE
Status: COMPLETED | OUTPATIENT
Start: 2019-09-03 | End: 2019-09-03

## 2019-09-03 RX ADMIN — METHYLPREDNISOLONE ACETATE 80 MG: 80 INJECTION, SUSPENSION INTRA-ARTICULAR; INTRALESIONAL; INTRAMUSCULAR; SOFT TISSUE at 14:11

## 2019-09-03 NOTE — PROGRESS NOTES
New patient or new problem visit    Chief Complaint   Patient presents with   • Right Hip - Follow-up, Pain       HPI: [2]    PFSH: See chart- reviewed    Review of Systems    PE: [3]      MEDICAL DECISION MAKING    XRAY: [4]    Other: n/a    Impression: [5]    Plan: [6]

## 2019-09-03 NOTE — PROGRESS NOTES
Patient Name: Mahnaz Becerra   YOB: 1945  Referring Primary Care Physician: Stephen Wilkes MD  BMI: Body mass index is 35.7 kg/m².    Chief Complaint:    Chief Complaint   Patient presents with   • Right Hip - Follow-up, Pain        HPI:     Mahnaz Becerra is a 74 y.o. female who presents today for evaluation of   Chief Complaint   Patient presents with   • Right Hip - Follow-up, Pain   .  She is seen today complaining of acute on chronic achy right hip pain.  She denies any trauma that radiates down the side of her leg hurts worse at night help by rest sometimes ice and heat.  She is had injections in the past that helped.  She also has back pain with degenerative scoliosis reviewing her records and saw Dr. Oseguera in the past and has had epidural dural injections in the past    This problem is not new to this examiner.     Subjective   Medications:   Home Medications:  Current Outpatient Medications on File Prior to Visit   Medication Sig   • albuterol (VENTOLIN HFA) 108 (90 BASE) MCG/ACT inhaler Inhale 2 puffs Every 6 (Six) Hours As Needed for wheezing.   • cephalexin (KEFLEX) 500 MG capsule 2000 mg po x 1 dose, 1hr prior to dental procedure   • cyclobenzaprine (FLEXERIL) 10 MG tablet Take 1 tablet by mouth 2 (Two) Times a Day As Needed for muscle spasms.   • dicyclomine (BENTYL) 20 MG tablet Take 1 tablet by mouth As Needed (abd cramps).   • Famotidine-Ca Carb-Mag Hydrox (PEPCID COMPLETE PO) Take  by mouth.   • hydrochlorothiazide (HYDRODIURIL) 25 MG tablet TAKE 1 TABLET BY MOUTH DAILY   • Multiple Vitamins-Minerals (CENTRUM SILVER PO) Take  by mouth.   • oxyCODONE-acetaminophen (PERCOCET) 7.5-325 MG per tablet 1-2 tabs po q 3-4 hr prn severe pain, wean as tolerated   • pantoprazole (PROTONIX) 40 MG EC tablet TAKE 1 TABLET BY MOUTH TWICE DAILY   • promethazine (PHENERGAN) 12.5 MG tablet Take 1 tablet by mouth Every 6 (Six) Hours As Needed for Nausea or Vomiting.   • saccharomyces boulardii  (FLORASTOR) 250 MG capsule Take 250 mg by mouth 2 (Two) Times a Day.   • vitamin E 400 UNIT capsule Take 400 Units by mouth Daily.     Current Facility-Administered Medications on File Prior to Visit   Medication   • ipratropium-albuterol (DUO-NEB) nebulizer solution 3 mL     Current Medications:  Scheduled Meds:    ipratropium-albuterol 3 mL Nebulization 4x Daily - RT     Continuous Infusions:   PRN Meds:.    I have reviewed the patient's medical history in detail and updated the computerized patient record.  Review and summarization of old records includes:    Past Medical History:   Diagnosis Date   • Acid reflux    • Anesthesia complication     ANESTHESIA HAS BROUGHT ON ASTHMA ATTACKS    • Asthma    • Bronchitis    • Charleyhorse     FREQUENT   • Edema     LOWER EXT   • Heart murmur    • History of skin cancer     BACK   • IBS (irritable bowel syndrome)    • Knee pain, right    • MS (multiple sclerosis) (CMS/[facility])    • Osteoarthritis    • PONV (postoperative nausea and vomiting)     Patient states she had post-op nausea and vomiting after hysterectomy in .   • Sleep apnea     CANT TOLERATE CPAP        Past Surgical History:   Procedure Laterality Date   • APPENDECTOMY     • BREAST LUMPECTOMY Bilateral    • BREAST SURGERY      REDUCTION   •  SECTION     • CHOLECYSTECTOMY     • COLONOSCOPY  2012    Dr Moe   • COSMETIC SURGERY     • DILATATION AND CURETTAGE     • ENDOSCOPY  2012    Dr Moe   • ENDOSCOPY N/A 2016    Procedure: ESOPHAGOGASTRODUODENOSCOPY WITH BX;  Surgeon: Nasim Moe MD;  Location: [facility] ENDOSCOPY;  Service:    • EYE SURGERY Bilateral     BLEPHAROPLASTY   • HYSTERECTOMY     • KNEE ARTHROSCOPY Right 2016    Procedure: KNEE ARTHROSCOPY, PARTIAL MEDIAL AND LATERAL MENISECTOMY, CHONDROPLASTY OF THE PATELLA, REMOVAL OF LOOSE BODIES;  Surgeon: Artur Pat MD;  [facility]  [facility];  Service:    • KNEE ARTHROSCOPY W/ MENISCAL REPAIR Left     • OOPHORECTOMY Bilateral    • TONSILLECTOMY     • TOTAL KNEE ARTHROPLASTY Left 2018    Procedure: LEFT TOTAL KNEE ARTHROPLASTY WITH MARGRET NAVIGATION;  Surgeon: Artur Pat MD;  Location: [facility] OR;  Service:         Social History     Occupational History   • Not on file   Tobacco Use   • Smoking status: Former Smoker     Packs/day: 0.25     Types: Cigarettes     Last attempt to quit:      Years since quittin.6   • Smokeless tobacco: Never Used   • Tobacco comment: Patient states she was a social smoker.   Substance and Sexual Activity   • Alcohol use: Yes     Comment: Occasional   • Drug use: No   • Sexual activity: Defer      Social History     Social History Narrative   • Not on file        Family History   Problem Relation Age of Onset   • Hypertension Mother    • Stroke Mother    • Alzheimer's disease Mother    • Heart disease Father    • Emphysema Father    • Stroke Maternal Grandmother    • Cancer Maternal Grandfather    • No Known Problems Paternal Grandmother    • Cerebral aneurysm Paternal Grandfather    • Malig Hyperthermia Neg Hx        ROS: 14 point review of systems was performed and all other systems were reviewed and are negative except for documented findings in HPI and today's encounter.     Allergies:   Allergies   Allergen Reactions   • Flagyl [Metronidazole] Hives and Swelling     Lips and Tongue     • Lansoprazole Itching and Other (See Comments)     AND TURN RED   • Levofloxacin Swelling and Other (See Comments)     RED RASH   • Cefdinir Other (See Comments)     Abdominal pain, caused upset stomach   • Trazodone And Nefazodone Other (See Comments)     Severe muscle cramps     Constitutional:  Denies fever, shaking or chills   Eyes:  Denies change in visual acuity   HENT:  Denies nasal congestion or sore throat   Respiratory:  Denies cough or shortness of breath   Cardiovascular:  Denies chest pain or severe LE edema   GI:  Denies abdominal pain, nausea,  "vomiting, bloody stools or diarrhea   Musculoskeletal:  Numbness, tingling, pain, or loss of motor function only as noted above in history of present illness.  : Denies painful urination or hematuria  Integument:  Denies rash, lesion or ulceration   Neurologic:  Denies headache or focal weakness  Endocrine:  Denies lymphadenopathy  Psych:  Denies confusion or change in mental status   Hem:  Denies active bleeding    OBJECTIVE:  Physical Exam: 74 y.o. female  Wt Readings from Last 3 Encounters:   09/03/19 94.3 kg (208 lb)   07/26/19 94.3 kg (208 lb)   03/14/19 94.6 kg (208 lb 8 oz)     Ht Readings from Last 1 Encounters:   09/03/19 162.6 cm (64\")     Body mass index is 35.7 kg/m².  Vitals:    09/03/19 1409   Temp: 98.2 °F (36.8 °C)     Vital signs reviewed.     General Appearance:    Alert, cooperative, in no acute distress                  Eyes: conjunctiva clear  ENT: external ears and nose atraumatic  CV: no peripheral edema  Resp: normal respiratory effort  Skin: no rashes or wounds; normal turgor  Psych: mood and affect appropriate  Lymph: no nodes appreciated  Neuro: gross sensation intact  Vascular:  Palpable peripheral pulse in noted extremity  Musculoskeletal Extremities: She is tender over the right greater trochanter Stinchfield is negative she has slightly decreased internal rotation and she walks pretty well when she gets up    Radiology:   AP of the hips lateral bilateral hips taken the office today with comparison view show mild to moderate arthritic changes in the hips but severe scoliosis and degenerative disc disease of the lower lumbosacral spine    Assessment:     ICD-10-CM ICD-9-CM   1. Hip pain, bilateral M25.551 719.45    M25.552    2. Degenerative disc disease, lumbar M51.36 722.52   3. Trochanteric bursitis of right hip M70.61 726.5        Large Joint Arthrocentesis: R greater trochanteric bursa  Date/Time: 9/3/2019 2:11 PM  Consent given by: patient  Site marked: site marked  Timeout: " Immediately prior to procedure a time out was called to verify the correct patient, procedure, equipment, support staff and site/side marked as required   Supporting Documentation  Indications: pain and joint swelling   Procedure Details  Location: hip - R greater trochanteric bursa  Preparation: Patient was prepped and draped in the usual sterile fashion  Needle size: 22 G  Approach: anterolateral  Medications administered: 80 mg methylPREDNISolone acetate 80 MG/ML; 4 mL lidocaine (cardiac)  Patient tolerance: patient tolerated the procedure well with no immediate complications             Plan: Biomechanics of pertinent body area discussed.  Risks, benefits, alternatives, comparisons, and complications of accepted medicines, injections, recommendations, surgical procedures, and therapies explained and education provided in laymen's terms. Natural history and expected course of this patient's diagnosis discussed along with evaluation of therapies. Questions answered. When appropriate I also discussed proper use of cane, walker, trekking poles.   EXERCISES:  Advice on benefits of, and types of regular/moderate exercise including biomechanical forces involved as it pertains to this complaint.  RICE: Rest, ice, compression, and elevation therapy, Cryotherapy/brachy therapy, and or OTC linaments as indicated with instructions.   Cortisone Injection. See procedure note.      9/3/2019    Much of this encounter note is an electronic transcription/translation of spoken language to printed text. The electronic translation of spoken language may permit erroneous, or at times, nonsensical words or phrases to be inadvertently transcribed; Although I have reviewed the note for such errors, some may still exist

## 2019-12-04 ENCOUNTER — CLINICAL SUPPORT (OUTPATIENT)
Dept: ORTHOPEDIC SURGERY | Facility: CLINIC | Age: 74
End: 2019-12-04

## 2019-12-04 VITALS — WEIGHT: 208 LBS | HEIGHT: 64 IN | TEMPERATURE: 98 F | BODY MASS INDEX: 35.51 KG/M2

## 2019-12-04 DIAGNOSIS — G89.29 CHRONIC PAIN OF RIGHT KNEE: ICD-10-CM

## 2019-12-04 DIAGNOSIS — Z96.652 STATUS POST TOTAL LEFT KNEE REPLACEMENT: Primary | ICD-10-CM

## 2019-12-04 DIAGNOSIS — M17.11 ARTHRITIS OF RIGHT KNEE: ICD-10-CM

## 2019-12-04 DIAGNOSIS — M25.561 CHRONIC PAIN OF RIGHT KNEE: ICD-10-CM

## 2019-12-04 PROCEDURE — 99212 OFFICE O/P EST SF 10 MIN: CPT | Performed by: ORTHOPAEDIC SURGERY

## 2019-12-04 PROCEDURE — 73562 X-RAY EXAM OF KNEE 3: CPT | Performed by: ORTHOPAEDIC SURGERY

## 2019-12-04 PROCEDURE — 20610 DRAIN/INJ JOINT/BURSA W/O US: CPT | Performed by: ORTHOPAEDIC SURGERY

## 2019-12-04 RX ORDER — METHYLPREDNISOLONE ACETATE 80 MG/ML
80 INJECTION, SUSPENSION INTRA-ARTICULAR; INTRALESIONAL; INTRAMUSCULAR; SOFT TISSUE
Status: COMPLETED | OUTPATIENT
Start: 2019-12-04 | End: 2019-12-04

## 2019-12-04 RX ADMIN — METHYLPREDNISOLONE ACETATE 80 MG: 80 INJECTION, SUSPENSION INTRA-ARTICULAR; INTRALESIONAL; INTRAMUSCULAR; SOFT TISSUE at 11:07

## 2019-12-04 NOTE — PROGRESS NOTES
Patient Name: Mahnaz Becerra   YOB: 1945  Referring Primary Care Physician: Stephen Wilkes MD  BMI: Body mass index is 35.7 kg/m².    Chief Complaint:    Chief Complaint   Patient presents with   • Right Knee - Follow-up, Injections, Pain        HPI:     Mahnaz Becerra is a 74 y.o. female who presents today for evaluation of   Chief Complaint   Patient presents with   • Right Knee - Follow-up, Injections, Pain   .  Patient follows up today with recurrent acute right knee pain superimposed on chronic knee pain.  Hurts when she ambulates very far.  Is relieved by rest exercises which she is done since she had a left total knee replacement done and occasional injections.  She is getting ready to go to [place] for vacation with her family.    This problem is not new to this examiner.     Subjective   Medications:   Home Medications:  Current Outpatient Medications on File Prior to Visit   Medication Sig   • albuterol (VENTOLIN HFA) 108 (90 BASE) MCG/ACT inhaler Inhale 2 puffs Every 6 (Six) Hours As Needed for wheezing.   • cephalexin (KEFLEX) 500 MG capsule 2000 mg po x 1 dose, 1hr prior to dental procedure   • cyclobenzaprine (FLEXERIL) 10 MG tablet Take 1 tablet by mouth 2 (Two) Times a Day As Needed for muscle spasms.   • dicyclomine (BENTYL) 20 MG tablet Take 1 tablet by mouth As Needed (abd cramps).   • Famotidine-Ca Carb-Mag Hydrox (PEPCID COMPLETE PO) Take  by mouth.   • hydrochlorothiazide (HYDRODIURIL) 25 MG tablet TAKE 1 TABLET BY MOUTH DAILY   • Multiple Vitamins-Minerals (CENTRUM SILVER PO) Take  by mouth.   • oxyCODONE-acetaminophen (PERCOCET) 7.5-325 MG per tablet 1-2 tabs po q 3-4 hr prn severe pain, wean as tolerated   • pantoprazole (PROTONIX) 40 MG EC tablet TAKE 1 TABLET BY MOUTH TWICE DAILY   • promethazine (PHENERGAN) 12.5 MG tablet Take 1 tablet by mouth Every 6 (Six) Hours As Needed for Nausea or Vomiting.   • saccharomyces boulardii (FLORASTOR) 250 MG capsule Take 250 mg  by mouth 2 (Two) Times a Day.   • vitamin E 400 UNIT capsule Take 400 Units by mouth Daily.     Current Facility-Administered Medications on File Prior to Visit   Medication   • ipratropium-albuterol (DUO-NEB) nebulizer solution 3 mL     Current Medications:  Scheduled Meds:    ipratropium-albuterol 3 mL Nebulization 4x Daily - RT     Continuous Infusions:   PRN Meds:.    I have reviewed the patient's medical history in detail and updated the computerized patient record.  Review and summarization of old records includes:    Past Medical History:   Diagnosis Date   • Acid reflux    • Anesthesia complication     ANESTHESIA HAS BROUGHT ON ASTHMA ATTACKS    • Asthma    • Bronchitis    • Charleyhorse     FREQUENT   • Edema     LOWER EXT   • Heart murmur    • History of skin cancer     BACK   • IBS (irritable bowel syndrome)    • Knee pain, right    • MS (multiple sclerosis) (CMS/HCC)    • Osteoarthritis    • PONV (postoperative nausea and vomiting)     Patient states she had post-op nausea and vomiting after hysterectomy in .   • Sleep apnea     CANT TOLERATE CPAP        Past Surgical History:   Procedure Laterality Date   • APPENDECTOMY     • BREAST LUMPECTOMY Bilateral    • BREAST SURGERY      REDUCTION   •  SECTION     • CHOLECYSTECTOMY     • COLONOSCOPY  2012    Dr Moe   • COSMETIC SURGERY     • DILATATION AND CURETTAGE     • ENDOSCOPY  2012    Dr Moe   • ENDOSCOPY N/A 2016    Procedure: ESOPHAGOGASTRODUODENOSCOPY WITH BX;  Surgeon: Nasim Moe MD;  Location: [facility] ENDOSCOPY;  Service:    • EYE SURGERY Bilateral     BLEPHAROPLASTY   • HYSTERECTOMY     • KNEE ARTHROSCOPY Right 2016    Procedure: KNEE ARTHROSCOPY, PARTIAL MEDIAL AND LATERAL MENISECTOMY, CHONDROPLASTY OF THE PATELLA, REMOVAL OF LOOSE BODIES;  Surgeon: Artur Pat MD;  Location: [facility] OR [facility];  Service:    • KNEE ARTHROSCOPY W/ MENISCAL REPAIR Left    • OOPHORECTOMY Bilateral    •  TONSILLECTOMY     • TOTAL KNEE ARTHROPLASTY Left 2018    Procedure: LEFT TOTAL KNEE ARTHROPLASTY WITH MARGRET NAVIGATION;  Surgeon: Artur Pat MD;  Location: [facility] OR;  Service:         Social History     Occupational History   • Not on file   Tobacco Use   • Smoking status: Former Smoker     Packs/day: 0.25     Types: Cigarettes     Last attempt to quit:      Years since quittin.9   • Smokeless tobacco: Never Used   • Tobacco comment: Patient states she was a social smoker.   Substance and Sexual Activity   • Alcohol use: Yes     Comment: Occasional   • Drug use: No   • Sexual activity: Defer      Social History     Social History Narrative   • Not on file        Family History   Problem Relation Age of Onset   • Hypertension Mother    • Stroke Mother    • Alzheimer's disease Mother    • Heart disease Father    • Emphysema Father    • Stroke Maternal Grandmother    • Cancer Maternal Grandfather    • No Known Problems Paternal Grandmother    • Cerebral aneurysm Paternal Grandfather    • Malig Hyperthermia Neg Hx        ROS: 14 point review of systems was performed and all other systems were reviewed and are negative except for documented findings in HPI and today's encounter.     Allergies:   Allergies   Allergen Reactions   • Flagyl [Metronidazole] Hives and Swelling     Lips and Tongue     • Levofloxacin Swelling and Other (See Comments)     RED RASH   • Lansoprazole Itching and Other (See Comments)     AND TURN RED   • Cefdinir Other (See Comments)     Abdominal pain, caused upset stomach   • Trazodone And Nefazodone Other (See Comments)     Severe muscle cramps     Constitutional:  Denies fever, shaking or chills   Eyes:  Denies change in visual acuity   HENT:  Denies nasal congestion or sore throat   Respiratory:  Denies cough or shortness of breath   Cardiovascular:  Denies chest pain or severe LE edema   GI:  Denies abdominal pain, nausea, vomiting, bloody stools or diarrhea  "  Musculoskeletal:  Numbness, tingling, pain, or loss of motor function only as noted above in history of present illness.  : Denies painful urination or hematuria  Integument:  Denies rash, lesion or ulceration   Neurologic:  Denies headache or focal weakness  Endocrine:  Denies lymphadenopathy  Psych:  Denies confusion or change in mental status   Hem:  Denies active bleeding    OBJECTIVE:  Physical Exam: 74 y.o. female  Wt Readings from Last 3 Encounters:   12/04/19 94.3 kg (208 lb)   09/03/19 94.3 kg (208 lb)   07/26/19 94.3 kg (208 lb)     Ht Readings from Last 1 Encounters:   12/04/19 162.6 cm (64\")     Body mass index is 35.7 kg/m².  Vitals:    12/04/19 1103   Temp: 98 °F (36.7 °C)     Vital signs reviewed.     General Appearance:    Alert, cooperative, in no acute distress                  Eyes: conjunctiva clear  ENT: external ears and nose atraumatic  CV: no peripheral edema  Resp: normal respiratory effort  Skin: no rashes or wounds; normal turgor  Psych: mood and affect appropriate  Lymph: no nodes appreciated  Neuro: gross sensation intact  Vascular:  Palpable peripheral pulse in noted extremity  Musculoskeletal Extremities: Medial joint line tenderness crepitation synovitis swelling a little bit of stiffness and slight start up limp.  Her left knee shows 0-120 with excellent stability    Radiology:   AP lateral 40 degree PA x-rays right knee and AP lateral left knee taken the office today with comparison view show well aligned well fixed left total knee arthroplasty with advanced arthritis with varus changes on the right knee    Assessment:     ICD-10-CM ICD-9-CM   1. Status post total left knee replacement Z96.652 V43.65   2. Chronic pain of right knee M25.561 719.46    G89.29 338.29   3. Arthritis of right knee M17.11 716.96        Large Joint Arthrocentesis: R knee  Date/Time: 12/4/2019 11:07 AM  Consent given by: patient  Site marked: site marked  Timeout: Immediately prior to procedure a time " out was called to verify the correct patient, procedure, equipment, support staff and site/side marked as required   Supporting Documentation  Indications: pain   Procedure Details  Location: knee - R knee  Preparation: Patient was prepped and draped in the usual sterile fashion  Needle gauge: 21.  Approach: anterolateral  Medications administered: 4 mL lidocaine (cardiac); 80 mg methylPREDNISolone acetate 80 MG/ML  Patient tolerance: patient tolerated the procedure well with no immediate complications             Plan: Biomechanics of pertinent body area discussed.  Risks, benefits, alternatives, comparisons, and complications of accepted medicines, injections, recommendations, surgical procedures, and therapies explained and education provided in laymen's terms. Natural history and expected course of this patient's diagnosis discussed along with evaluation of therapies. Questions answered. When appropriate I also discussed proper use of cane, walker, trekking poles.   EXERCISES:  Advice on benefits of, and types of regular/moderate exercise including biomechanical forces involved as it pertains to this complaint.  RICE: Rest, ice, compression, and elevation therapy, Cryotherapy/brachy therapy, and or OTC linaments as indicated with instructions.   Cortisone Injection. See procedure note.      12/4/2019    Much of this encounter note is an electronic transcription/translation of spoken language to printed text. The electronic translation of spoken language may permit erroneous, or at times, nonsensical words or phrases to be inadvertently transcribed; Although I have reviewed the note for such errors, some may still exist

## 2019-12-04 NOTE — PROGRESS NOTES
Patient Name: Mahnaz Becerra   YOB: 1945  Referring Primary Care Physician: Stephen Wilkes MD  BMI: There is no height or weight on file to calculate BMI.    Chief Complaint:  No chief complaint on file.       HPI:     Mahnaz Becerra is a 74 y.o. female who presents today for evaluation of No chief complaint on file.  . ***    This problem *** new to this examiner.     Subjective   Medications:   Home Medications:  Current Outpatient Medications on File Prior to Visit   Medication Sig   • albuterol (VENTOLIN HFA) 108 (90 BASE) MCG/ACT inhaler Inhale 2 puffs Every 6 (Six) Hours As Needed for wheezing.   • cephalexin (KEFLEX) 500 MG capsule 2000 mg po x 1 dose, 1hr prior to dental procedure   • cyclobenzaprine (FLEXERIL) 10 MG tablet Take 1 tablet by mouth 2 (Two) Times a Day As Needed for muscle spasms.   • dicyclomine (BENTYL) 20 MG tablet Take 1 tablet by mouth As Needed (abd cramps).   • Famotidine-Ca Carb-Mag Hydrox (PEPCID COMPLETE PO) Take  by mouth.   • hydrochlorothiazide (HYDRODIURIL) 25 MG tablet TAKE 1 TABLET BY MOUTH DAILY   • Multiple Vitamins-Minerals (CENTRUM SILVER PO) Take  by mouth.   • oxyCODONE-acetaminophen (PERCOCET) 7.5-325 MG per tablet 1-2 tabs po q 3-4 hr prn severe pain, wean as tolerated   • pantoprazole (PROTONIX) 40 MG EC tablet TAKE 1 TABLET BY MOUTH TWICE DAILY   • promethazine (PHENERGAN) 12.5 MG tablet Take 1 tablet by mouth Every 6 (Six) Hours As Needed for Nausea or Vomiting.   • saccharomyces boulardii (FLORASTOR) 250 MG capsule Take 250 mg by mouth 2 (Two) Times a Day.   • vitamin E 400 UNIT capsule Take 400 Units by mouth Daily.     Current Facility-Administered Medications on File Prior to Visit   Medication   • ipratropium-albuterol (DUO-NEB) nebulizer solution 3 mL     Current Medications:  Scheduled Meds:  ipratropium-albuterol 3 mL Nebulization 4x Daily - RT     Continuous Infusions:   PRN Meds:.    I have reviewed the patient's medical history in detail  and updated the computerized patient record.  Review and summarization of old records includes:    Past Medical History:   Diagnosis Date   • Acid reflux    • Anesthesia complication     ANESTHESIA HAS BROUGHT ON ASTHMA ATTACKS    • Asthma    • Bronchitis    • Charleyhorse     FREQUENT   • Edema     LOWER EXT   • Heart murmur    • History of skin cancer     BACK   • IBS (irritable bowel syndrome)    • Knee pain, right    • MS (multiple sclerosis) (CMS/HCC)    • Osteoarthritis    • PONV (postoperative nausea and vomiting)     Patient states she had post-op nausea and vomiting after hysterectomy in .   • Sleep apnea     CANT TOLERATE CPAP        Past Surgical History:   Procedure Laterality Date   • APPENDECTOMY     • BREAST LUMPECTOMY Bilateral    • BREAST SURGERY      REDUCTION   •  SECTION     • CHOLECYSTECTOMY     • COLONOSCOPY  2012    Dr Moe   • COSMETIC SURGERY     • DILATATION AND CURETTAGE     • ENDOSCOPY  2012    Dr Moe   • ENDOSCOPY N/A 2016    Procedure: ESOPHAGOGASTRODUODENOSCOPY WITH BX;  Surgeon: Nasim Moe MD;  Location: [facility] ENDOSCOPY;  Service:    • EYE SURGERY Bilateral     BLEPHAROPLASTY   • HYSTERECTOMY     • KNEE ARTHROSCOPY Right 2016    Procedure: KNEE ARTHROSCOPY, PARTIAL MEDIAL AND LATERAL MENISECTOMY, CHONDROPLASTY OF THE PATELLA, REMOVAL OF LOOSE BODIES;  Surgeon: Artur Pat MD;  Location: [facility] OR [facility];  Service:    • KNEE ARTHROSCOPY W/ MENISCAL REPAIR Left    • OOPHORECTOMY Bilateral    • TONSILLECTOMY     • TOTAL KNEE ARTHROPLASTY Left 2018    Procedure: LEFT TOTAL KNEE ARTHROPLASTY WITH MARGRET NAVIGATION;  Surgeon: Artur Pat MD;  Location: [facility] MAIN OR;  Service:         Social History     Occupational History   • Not on file   Tobacco Use   • Smoking status: Former Smoker     Packs/day: 0.25     Types: Cigarettes     Last attempt to quit:      Years since quittin.9   • Smokeless  tobacco: Never Used   • Tobacco comment: Patient states she was a social smoker.   Substance and Sexual Activity   • Alcohol use: Yes     Comment: Occasional   • Drug use: No   • Sexual activity: Defer    Social History     Social History Narrative   • Not on file        Family History   Problem Relation Age of Onset   • Hypertension Mother    • Stroke Mother    • Alzheimer's disease Mother    • Heart disease Father    • Emphysema Father    • Stroke Maternal Grandmother    • Cancer Maternal Grandfather    • No Known Problems Paternal Grandmother    • Cerebral aneurysm Paternal Grandfather    • Malig Hyperthermia Neg Hx        ROS: 14 point review of systems was performed and all other systems were reviewed and are negative except for documented findings in HPI and today's encounter.     Allergies:   Allergies   Allergen Reactions   • Flagyl [Metronidazole] Hives and Swelling     Lips and Tongue     • Lansoprazole Itching and Other (See Comments)     AND TURN RED   • Levofloxacin Swelling and Other (See Comments)     RED RASH   • Cefdinir Other (See Comments)     Abdominal pain, caused upset stomach   • Trazodone And Nefazodone Other (See Comments)     Severe muscle cramps     Constitutional:  Denies fever, shaking or chills   Eyes:  Denies change in visual acuity   HENT:  Denies nasal congestion or sore throat   Respiratory:  Denies cough or shortness of breath   Cardiovascular:  Denies chest pain or severe LE edema   GI:  Denies abdominal pain, nausea, vomiting, bloody stools or diarrhea   Musculoskeletal:  Numbness, tingling, pain, or loss of motor function only as noted above in history of present illness.  : Denies painful urination or hematuria  Integument:  Denies rash, lesion or ulceration   Neurologic:  Denies headache or focal weakness  Endocrine:  Denies lymphadenopathy  Psych:  Denies confusion or change in mental status   Hem:  Denies active bleeding    OBJECTIVE:  Physical Exam: 74 y.o. female  Wt  "Readings from Last 3 Encounters:   09/03/19 94.3 kg (208 lb)   07/26/19 94.3 kg (208 lb)   03/14/19 94.6 kg (208 lb 8 oz)     Ht Readings from Last 1 Encounters:   09/03/19 162.6 cm (64\")     There is no height or weight on file to calculate BMI.  There were no vitals filed for this visit.  Vital signs reviewed.     General Appearance:    Alert, cooperative, in no acute distress                  Eyes: conjunctiva clear  ENT: external ears and nose atraumatic  CV: no peripheral edema  Resp: normal respiratory effort  Skin: no rashes or wounds; normal turgor  Psych: mood and affect appropriate  Lymph: no nodes appreciated  Neuro: gross sensation intact  Vascular:  Palpable peripheral pulse in noted extremity  Musculoskeletal Extremities: ***    Radiology:   ***    Assessment: No diagnosis found.     Large Joint Arthrocentesis: L knee  Date/Time: 12/4/2019 8:11 AM  Consent given by: patient  Site marked: site marked  Timeout: Immediately prior to procedure a time out was called to verify the correct patient, procedure, equipment, support staff and site/side marked as required   Supporting Documentation  Indications: pain   Procedure Details  Location: knee - L knee  Preparation: Patient was prepped and draped in the usual sterile fashion  Needle gauge: 21.  Approach: anterolateral  Medications administered: 4 mL lidocaine (cardiac); 80 mg methylPREDNISolone acetate 80 MG/ML  Patient tolerance: patient tolerated the procedure well with no immediate complications             Plan: {[facility] plan treatment:60291}      12/4/2019    Much of this encounter note is an electronic transcription/translation of spoken language to printed text. The electronic translation of spoken language may permit erroneous, or at times, nonsensical words or phrases to be inadvertently transcribed; Although I have reviewed the note for such errors, some may still exist      "

## 2019-12-23 DIAGNOSIS — R25.2 CRAMP OF BOTH LOWER EXTREMITIES: ICD-10-CM

## 2019-12-23 RX ORDER — CYCLOBENZAPRINE HCL 10 MG
10 TABLET ORAL 2 TIMES DAILY PRN
Qty: 60 TABLET | Refills: 2 | Status: SHIPPED | OUTPATIENT
Start: 2019-12-23

## 2020-03-10 ENCOUNTER — CLINICAL SUPPORT (OUTPATIENT)
Dept: ORTHOPEDIC SURGERY | Facility: CLINIC | Age: 75
End: 2020-03-10

## 2020-03-10 VITALS — WEIGHT: 216 LBS | BODY MASS INDEX: 36.88 KG/M2 | TEMPERATURE: 98 F | HEIGHT: 64 IN

## 2020-03-10 DIAGNOSIS — Z96.652 STATUS POST TOTAL LEFT KNEE REPLACEMENT: ICD-10-CM

## 2020-03-10 DIAGNOSIS — M17.11 ARTHRITIS OF RIGHT KNEE: Primary | ICD-10-CM

## 2020-03-10 PROCEDURE — 20610 DRAIN/INJ JOINT/BURSA W/O US: CPT | Performed by: NURSE PRACTITIONER

## 2020-03-10 PROCEDURE — 99213 OFFICE O/P EST LOW 20 MIN: CPT | Performed by: NURSE PRACTITIONER

## 2020-03-10 RX ORDER — LIDOCAINE HYDROCHLORIDE 20 MG/ML
4 INJECTION, SOLUTION EPIDURAL; INFILTRATION; INTRACAUDAL; PERINEURAL
Status: COMPLETED | OUTPATIENT
Start: 2020-03-10 | End: 2020-03-10

## 2020-03-10 RX ORDER — METHYLPREDNISOLONE ACETATE 80 MG/ML
80 INJECTION, SUSPENSION INTRA-ARTICULAR; INTRALESIONAL; INTRAMUSCULAR; SOFT TISSUE
Status: COMPLETED | OUTPATIENT
Start: 2020-03-10 | End: 2020-03-10

## 2020-03-10 RX ADMIN — LIDOCAINE HYDROCHLORIDE 4 ML: 20 INJECTION, SOLUTION EPIDURAL; INFILTRATION; INTRACAUDAL; PERINEURAL at 09:42

## 2020-03-10 RX ADMIN — METHYLPREDNISOLONE ACETATE 80 MG: 80 INJECTION, SUSPENSION INTRA-ARTICULAR; INTRALESIONAL; INTRAMUSCULAR; SOFT TISSUE at 09:42

## 2020-03-10 NOTE — PROGRESS NOTES
Patient: Mahnaz Becerra  YOB: 1945    Chief Complaints:  right knee pain, s/p left TKA  Subjective:    History of Present Illness: Here today for knee pain. The pain is a generalized joint tenderness.  It has been progressive in nature but remains intermittent.  Worsened by prolonged standing or walking and squatting activities. Has had improvement in the past with ice/heat, rest, and injections. Intermittent achiness of left TKA that is below her patella.     This problem is new to this examiner.     Allergies:   Allergies   Allergen Reactions   • Flagyl [Metronidazole] Hives and Swelling     Lips and Tongue     • Levofloxacin Swelling and Other (See Comments)     RED RASH   • Lansoprazole Itching and Other (See Comments)     AND TURN RED   • Cefdinir Other (See Comments)     Abdominal pain, caused upset stomach   • Trazodone And Nefazodone Other (See Comments)     Severe muscle cramps       Medications:   Home Medications:  Current Outpatient Medications on File Prior to Visit   Medication Sig   • albuterol (VENTOLIN HFA) 108 (90 BASE) MCG/ACT inhaler Inhale 2 puffs Every 6 (Six) Hours As Needed for wheezing.   • cephalexin (KEFLEX) 500 MG capsule 2000 mg po x 1 dose, 1hr prior to dental procedure   • cyclobenzaprine (FLEXERIL) 10 MG tablet Take 1 tablet by mouth 2 (Two) Times a Day As Needed for Muscle Spasms.   • dicyclomine (BENTYL) 20 MG tablet Take 1 tablet by mouth As Needed (abd cramps).   • Famotidine-Ca Carb-Mag Hydrox (PEPCID COMPLETE PO) Take  by mouth.   • hydrochlorothiazide (HYDRODIURIL) 25 MG tablet TAKE 1 TABLET BY MOUTH DAILY   • Multiple Vitamins-Minerals (CENTRUM SILVER PO) Take  by mouth.   • oxyCODONE-acetaminophen (PERCOCET) 7.5-325 MG per tablet 1-2 tabs po q 3-4 hr prn severe pain, wean as tolerated   • pantoprazole (PROTONIX) 40 MG EC tablet TAKE 1 TABLET BY MOUTH TWICE DAILY   • promethazine (PHENERGAN) 12.5 MG tablet Take 1 tablet by mouth Every 6 (Six) Hours As Needed  for Nausea or Vomiting.   • saccharomyces boulardii (FLORASTOR) 250 MG capsule Take 250 mg by mouth 2 (Two) Times a Day.   • vitamin E 400 UNIT capsule Take 400 Units by mouth Daily.     Current Facility-Administered Medications on File Prior to Visit   Medication   • ipratropium-albuterol (DUO-NEB) nebulizer solution 3 mL     Current Medications:  Scheduled Meds:    ipratropium-albuterol 3 mL Nebulization 4x Daily - RT     Continuous Infusions:   PRN Meds:.    I have reviewed the patient's medical history in detail and updated the computerized patient record.  Review and summarization of old records include:    Past Medical History:   Diagnosis Date   • Acid reflux    • Anesthesia complication     ANESTHESIA HAS BROUGHT ON ASTHMA ATTACKS    • Asthma    • Bronchitis    • Charleyhorse     FREQUENT   • Edema     LOWER EXT   • Heart murmur    • History of skin cancer     BACK   • IBS (irritable bowel syndrome)    • Knee pain, right    • MS (multiple sclerosis) (CMS/[facility])    • Osteoarthritis    • PONV (postoperative nausea and vomiting)     Patient states she had post-op nausea and vomiting after hysterectomy in .   • Sleep apnea     CANT TOLERATE CPAP        Past Surgical History:   Procedure Laterality Date   • APPENDECTOMY     • BREAST LUMPECTOMY Bilateral    • BREAST SURGERY      REDUCTION   •  SECTION     • CHOLECYSTECTOMY     • COLONOSCOPY  2012    Dr Moe   • COSMETIC SURGERY     • DILATATION AND CURETTAGE     • ENDOSCOPY  2012    Dr Moe   • ENDOSCOPY N/A 2016    Procedure: ESOPHAGOGASTRODUODENOSCOPY WITH BX;  Surgeon: Nasim Moe MD;  Location: [facility] ENDOSCOPY;  Service:    • EYE SURGERY Bilateral     BLEPHAROPLASTY   • HYSTERECTOMY     • KNEE ARTHROSCOPY Right 2016    Procedure: KNEE ARTHROSCOPY, PARTIAL MEDIAL AND LATERAL MENISECTOMY, CHONDROPLASTY OF THE PATELLA, REMOVAL OF LOOSE BODIES;  Surgeon: Artur Pat MD;  Location: [facility] OR [facility];   Service:    • KNEE ARTHROSCOPY W/ MENISCAL REPAIR Left    • OOPHORECTOMY Bilateral    • TONSILLECTOMY     • TOTAL KNEE ARTHROPLASTY Left 2018    Procedure: LEFT TOTAL KNEE ARTHROPLASTY WITH MARGRET NAVIGATION;  Surgeon: Artur Pat MD;  Location: [facility] OR;  Service:         Social History     Occupational History   • Not on file   Tobacco Use   • Smoking status: Former Smoker     Packs/day: 0.25     Types: Cigarettes     Last attempt to quit:      Years since quittin.2   • Smokeless tobacco: Never Used   • Tobacco comment: Patient states she was a social smoker.   Substance and Sexual Activity   • Alcohol use: Yes     Comment: Occasional   • Drug use: No   • Sexual activity: Defer      Social History     Social History Narrative   • Not on file        Family History   Problem Relation Age of Onset   • Hypertension Mother    • Stroke Mother    • Alzheimer's disease Mother    • Heart disease Father    • Emphysema Father    • Stroke Maternal Grandmother    • Cancer Maternal Grandfather    • No Known Problems Paternal Grandmother    • Cerebral aneurysm Paternal Grandfather    • Malig Hyperthermia Neg Hx        ROS: 14 point review of systems was performed and was negative except for documented findings in HPI and today's encounter.     Allergies:   Allergies   Allergen Reactions   • Flagyl [Metronidazole] Hives and Swelling     Lips and Tongue     • Levofloxacin Swelling and Other (See Comments)     RED RASH   • Lansoprazole Itching and Other (See Comments)     AND TURN RED   • Cefdinir Other (See Comments)     Abdominal pain, caused upset stomach   • Trazodone And Nefazodone Other (See Comments)     Severe muscle cramps     Constitutional:  Denies fever, shaking or chills   Eyes:  Denies change in visual acuity   HENT:  Denies nasal congestion or sore throat   Respiratory:  Denies cough or shortness of breath   Cardiovascular:  Denies chest pain or severe LE edema   GI:  Denies abdominal  "pain, nausea, vomiting, bloody stools or diarrhea   Musculoskeletal:  Numbness, tingling, or loss of motor function only as noted above in history of present illness.  : Denies painful urination or hematuria  Integument:  Denies rash, lesion or ulceration   Neurologic:  Denies headache or focal weakness  Endocrine:  Denies lymphadenopathy  Psych:  Denies confusion or change in mental status   Hem:  Denies active bleeding    Physical Exam: 74 y.o. female  Wt Readings from Last 3 Encounters:   03/10/20 98 kg (216 lb)   12/04/19 94.3 kg (208 lb)   09/03/19 94.3 kg (208 lb)     Ht Readings from Last 1 Encounters:   03/10/20 162.6 cm (64\")     Body mass index is 37.08 kg/m².  Vitals:    03/10/20 1312   Temp: 98 °F (36.7 °C)     Vital signs reviewed.   Constitutional: Awake alert and oriented x3, well developed, no acute distress, non-toxic appearance.  EYES: symmetric, sclera clear  ENT:  Normocephalic, Atraumatic.   Respiratory:  No respiratory distress, No wheezing  CV: pulse regular, no palpitations or pallor.  GI:  Abdomen soft, non-tender.   Vascular:  Intact distal pulses, No cyanosis, no signs or symptoms of DVT.  Neurologic: Sensation grossly intact to the involved extremity, No focal deficits noted.   Neck: No tenderness, Supple.  Integument: warm, dry, no ulcerations.   Psychiatric:  Oriented, no pathological affect.  Musculoskeletal:    Affected knee(s):  Painful gait with a subtle limp, positive for synovitis, swelling, joint effusion with crepitation.  Lachman negative  Posterior drawer negative  Cherri's negative  Patellofemoral grind +  Sensation grossly intact to light touch throughout the lower extremity  Skin is intact  Distal pulses are palpable  No signs or symptoms of DVT  Left knee with good rom and muscle strength, tenderness over patellar tendon distal to patella with palpation.       Diagnostic Data:     Imaging was done previously in the office, images were personally viewed and discussed " with the patient:    Indication: pain related symptoms,  Views: 3V AP, LAT & 40 degree PA bilateral knee(s)   Findings: severe end-stage arthritis (bone on bone, subchondral sclerosis/cysts, osteophytes), S/P left  Total Knee Replacement in good position and alignment  Comparison views: viewed last xray done in the office.     Procedure:  Large Joint Arthrocentesis: R knee  Date/Time: 3/10/2020 9:42 AM  Consent given by: patient  Site marked: site marked  Timeout: Immediately prior to procedure a time out was called to verify the correct patient, procedure, equipment, support staff and site/side marked as required   Supporting Documentation  Indications: pain   Procedure Details  Location: knee - R knee  Preparation: Patient was prepped and draped in the usual sterile fashion  Needle gauge: 21.  Approach: anterolateral  Medications administered: 4 mL lidocaine PF 2% 2 %; 80 mg methylPREDNISolone acetate 80 MG/ML  Patient tolerance: patient tolerated the procedure well with no immediate complications          Assessment:     ICD-10-CM ICD-9-CM   1. Arthritis of right knee M17.11 716.96   2. Status post total left knee replacement Z96.652 V43.65           Plan: Is to proceed with injection  Follow up as indicated.  Ice, elevate, and rest as needed.  Additional interventions include:  15 min spent face to face with patient 11 min spent counseling about:  Biomechanics of pertinent body area discussed.  Risks, benefits, alternatives, comparisons, and complications of accepted medicines, injections, recommendations, surgical procedures, and therapies explained and education provided in laymen's terms. Natural history and expected course of this patient's diagnosis discussed along with evaluation of therapies. Questions answered. When appropriate I also discussed proper use of cane, walker, trekking poles.   BMI:  The concept of BMI body mass index and its importance and implications discussed.  BMI suggested to be < 40 or  as low as possible. Lifestyle measures for weight loss and how this affects orthopedic condition.  EXERCISES:  Advice on benefits of, and types of regular/moderate exercise including biomechanical forces involved as it pertains to this complaint.  RICE: Rest, ice, compression, and elevation therapy, Cryotherapy/brachy therapy, and or OTC linaments as indicated with instructions.   Cortisone Injection. See procedure note.  With her MS she gets quad muscle cramps at night and has developed some patellar tendonitis in the left replaced knee, will give some voltaren gel to help with this.  Prior xr in Dec showed good placement and no complications with left TKA at that point. Will get new xrays with next visit of both knees.   3/10/2020  Alicia Sandoval, APRN

## 2020-03-11 ENCOUNTER — TELEPHONE (OUTPATIENT)
Dept: ORTHOPEDIC SURGERY | Facility: CLINIC | Age: 75
End: 2020-03-11

## 2020-03-11 DIAGNOSIS — M17.11 ARTHRITIS OF RIGHT KNEE: Primary | ICD-10-CM

## 2020-03-11 RX ORDER — TRAMADOL HYDROCHLORIDE 50 MG/1
TABLET ORAL
Qty: 36 TABLET | Refills: 0 | Status: SHIPPED | OUTPATIENT
Start: 2020-03-11 | End: 2022-07-07 | Stop reason: SDUPTHER

## 2020-03-11 NOTE — TELEPHONE ENCOUNTER
-- Message is from the [facility]--    Reason for Call: Patient is returning missed milton.    Caller Information       Type Contact Phone    04/07/2019 10:51 AM Phone (Outgoing) Zhanna Plunkett (Self) [phone] (H)    Left Message -  Prescription sent to [facility]    04/07/2019 10:53 AM Phone (Outgoing) Zhanna Plunkett (Self) [phone] (H)    Left Message -  Culture positive. Not sensitive to Cephalexin. Will call in new prescription. Call clinic with questions.     04/07/2019 11:00 AM Phone (Outgoing) Zhanna Plunkett (Self)     Left Message -  LM that Prescription sent to [facility] pharmacy.          Alternative phone number: N/A    Turnaround time given to caller:   \"This message will be sent to [state Provider's name]. The clinical team will fulfill your request as soon as they review your message when the office opens on Monday (or after the holiday).\"     I saw Mahnaz in the office yesterday and she is having an acute pain flare in the right knee.  I will send in a one time prescription of Tramadol for her.  I have previously discussed narcotic precautions with patient in the office.

## 2020-03-11 NOTE — TELEPHONE ENCOUNTER
Patient would like to know if R would send a script to [facility] for Tramadol medication.

## 2020-04-08 RX ORDER — PANTOPRAZOLE SODIUM 40 MG/1
TABLET, DELAYED RELEASE ORAL
Qty: 60 TABLET | Refills: 11 | Status: SHIPPED | OUTPATIENT
Start: 2020-04-08

## 2020-06-24 ENCOUNTER — CLINICAL SUPPORT (OUTPATIENT)
Dept: ORTHOPEDIC SURGERY | Facility: CLINIC | Age: 75
End: 2020-06-24

## 2020-06-24 VITALS — TEMPERATURE: 98.1 F | HEIGHT: 64 IN | BODY MASS INDEX: 33.97 KG/M2 | WEIGHT: 199 LBS

## 2020-06-24 DIAGNOSIS — G35 MULTIPLE SCLEROSIS (HCC): ICD-10-CM

## 2020-06-24 DIAGNOSIS — M17.11 ARTHRITIS OF RIGHT KNEE: Primary | ICD-10-CM

## 2020-06-24 PROCEDURE — 20610 DRAIN/INJ JOINT/BURSA W/O US: CPT | Performed by: NURSE PRACTITIONER

## 2020-06-24 RX ORDER — LIDOCAINE HYDROCHLORIDE 20 MG/ML
4 INJECTION, SOLUTION EPIDURAL; INFILTRATION; INTRACAUDAL; PERINEURAL
Status: COMPLETED | OUTPATIENT
Start: 2020-06-24 | End: 2020-06-24

## 2020-06-24 RX ORDER — METHYLPREDNISOLONE ACETATE 80 MG/ML
80 INJECTION, SUSPENSION INTRA-ARTICULAR; INTRALESIONAL; INTRAMUSCULAR; SOFT TISSUE
Status: COMPLETED | OUTPATIENT
Start: 2020-06-24 | End: 2020-06-24

## 2020-06-24 RX ADMIN — METHYLPREDNISOLONE ACETATE 80 MG: 80 INJECTION, SUSPENSION INTRA-ARTICULAR; INTRALESIONAL; INTRAMUSCULAR; SOFT TISSUE at 08:40

## 2020-06-24 RX ADMIN — LIDOCAINE HYDROCHLORIDE 4 ML: 20 INJECTION, SOLUTION EPIDURAL; INFILTRATION; INTRACAUDAL; PERINEURAL at 08:40

## 2020-06-24 NOTE — PROGRESS NOTES
Patient Name: Mahnaz Becerra   YOB: 1945  Referring Primary Care Physician: Stephen Wilkes MD  BMI: Body mass index is 34.16 kg/m².    Chief Complaint:    Chief Complaint   Patient presents with   • Right Knee - Follow-up        HPI: Here for right knee cortisone injection. Pt denies cough or fever. Injections are lasting 3 months. Pt has MS that is stable and she is doing physical; therapy. No fever or cough.    Mahnaz Becerra is a 74 y.o. female who presents today for evaluation of   Chief Complaint   Patient presents with   • Right Knee - Follow-up       This problem is  new to this examiner.     Subjective   Medications:   Home Medications:  Current Outpatient Medications on File Prior to Visit   Medication Sig   • albuterol (VENTOLIN HFA) 108 (90 BASE) MCG/ACT inhaler Inhale 2 puffs Every 6 (Six) Hours As Needed for wheezing.   • cephalexin (KEFLEX) 500 MG capsule 2000 mg po x 1 dose, 1hr prior to dental procedure   • cyclobenzaprine (FLEXERIL) 10 MG tablet Take 1 tablet by mouth 2 (Two) Times a Day As Needed for Muscle Spasms.   • diclofenac (VOLTAREN) 1 % gel gel Apply pea size amount to area of pain up to four times a day   • dicyclomine (BENTYL) 20 MG tablet Take 1 tablet by mouth As Needed (abd cramps).   • Famotidine-Ca Carb-Mag Hydrox (PEPCID COMPLETE PO) Take  by mouth.   • hydrochlorothiazide (HYDRODIURIL) 25 MG tablet TAKE 1 TABLET BY MOUTH DAILY   • Multiple Vitamins-Minerals (CENTRUM SILVER PO) Take  by mouth.   • oxyCODONE-acetaminophen (PERCOCET) 7.5-325 MG per tablet 1-2 tabs po q 3-4 hr prn severe pain, wean as tolerated   • pantoprazole (PROTONIX) 40 MG EC tablet TAKE 1 TABLET BY MOUTH TWICE DAILY   • promethazine (PHENERGAN) 12.5 MG tablet Take 1 tablet by mouth Every 6 (Six) Hours As Needed for Nausea or Vomiting.   • saccharomyces boulardii (FLORASTOR) 250 MG capsule Take 250 mg by mouth 2 (Two) Times a Day.   • traMADol (ULTRAM) 50 MG tablet 1-2 Oral Q4H PRN severe pain    • vitamin E 400 UNIT capsule Take 400 Units by mouth Daily.     Current Facility-Administered Medications on File Prior to Visit   Medication   • ipratropium-albuterol (DUO-NEB) nebulizer solution 3 mL     Current Medications:  Scheduled Meds:    ipratropium-albuterol 3 mL Nebulization 4x Daily - RT     Continuous Infusions:   PRN Meds:.    I have reviewed the patient's medical history in detail and updated the computerized patient record.  Review and summarization of old records includes:    Past Medical History:   Diagnosis Date   • Acid reflux    • Anesthesia complication     ANESTHESIA HAS BROUGHT ON ASTHMA ATTACKS    • Asthma    • Bronchitis    • [place]     FREQUENT   • Edema     LOWER EXT   • Heart murmur    • History of skin cancer     BACK   • IBS (irritable bowel syndrome)    • Knee pain, right    • MS (multiple sclerosis) (CMS/[facility])    • Osteoarthritis    • PONV (postoperative nausea and vomiting)     Patient states she had post-op nausea and vomiting after hysterectomy in .   • Sleep apnea     CANT TOLERATE CPAP        Past Surgical History:   Procedure Laterality Date   • APPENDECTOMY     • BREAST LUMPECTOMY Bilateral    • BREAST SURGERY      REDUCTION   •  SECTION     • CHOLECYSTECTOMY     • COLONOSCOPY  2012    Dr Moe   • COSMETIC SURGERY     • DILATATION AND CURETTAGE     • ENDOSCOPY  2012    Dr Moe   • ENDOSCOPY N/A 2016    Procedure: ESOPHAGOGASTRODUODENOSCOPY WITH BX;  Surgeon: Nasim Moe MD;  Location: [facility] ENDOSCOPY;  Service:    • EYE SURGERY Bilateral     BLEPHAROPLASTY   • HYSTERECTOMY     • KNEE ARTHROSCOPY Right 2016    Procedure: KNEE ARTHROSCOPY, PARTIAL MEDIAL AND LATERAL MENISECTOMY, CHONDROPLASTY OF THE PATELLA, REMOVAL OF LOOSE BODIES;  Surgeon: Artur Pat MD;  Location: [facility] OR [facility] [place];  Service:    • KNEE ARTHROSCOPY W/ MENISCAL REPAIR Left    • OOPHORECTOMY Bilateral    • TONSILLECTOMY     • TOTAL  KNEE ARTHROPLASTY Left 2018    Procedure: LEFT TOTAL KNEE ARTHROPLASTY WITH MARGRET NAVIGATION;  Surgeon: Artur Pat MD;  Location: [facility] OR;  Service:         Social History     Occupational History   • Not on file   Tobacco Use   • Smoking status: Former Smoker     Packs/day: 0.25     Types: Cigarettes     Last attempt to quit:      Years since quittin.5   • Smokeless tobacco: Never Used   • Tobacco comment: Patient states she was a social smoker.   Substance and Sexual Activity   • Alcohol use: Yes     Comment: Occasional   • Drug use: No   • Sexual activity: Defer      Social History     Social History Narrative   • Not on file        Family History   Problem Relation Age of Onset   • Hypertension Mother    • Stroke Mother    • Alzheimer's disease Mother    • Heart disease Father    • Emphysema Father    • Stroke Maternal Grandmother    • Cancer Maternal Grandfather    • No Known Problems Paternal Grandmother    • Cerebral aneurysm Paternal Grandfather    • Malig Hyperthermia Neg Hx        ROS: 14 point review of systems was performed and all other systems were reviewed and are negative except for documented findings in HPI and today's encounter.     Allergies:   Allergies   Allergen Reactions   • Flagyl [Metronidazole] Hives and Swelling     Lips and Tongue     • Levofloxacin Swelling and Other (See Comments)     RED RASH   • Lansoprazole Itching and Other (See Comments)     AND TURN RED   • Cefdinir Other (See Comments)     Abdominal pain, caused upset stomach   • Trazodone And Nefazodone Other (See Comments)     Severe muscle cramps     Constitutional:  Denies fever, shaking or chills   Eyes:  Denies change in visual acuity   HENT:  Denies nasal congestion or sore throat   Respiratory:  Denies cough or shortness of breath   Cardiovascular:  Denies chest pain or severe LE edema   GI:  Denies abdominal pain, nausea, vomiting, bloody stools or diarrhea   Musculoskeletal:  Numbness,  "tingling, pain, or loss of motor function only as noted above in history of present illness.  : Denies painful urination or hematuria  Integument:  Denies rash, lesion or ulceration   Neurologic:  Denies headache or focal weakness  Endocrine:  Denies lymphadenopathy  Psych:  Denies confusion or change in mental status   Hem:  Denies active bleeding    OBJECTIVE:  Physical Exam: 74 y.o. female  Wt Readings from Last 3 Encounters:   06/24/20 90.3 kg (199 lb)   03/10/20 98 kg (216 lb)   12/04/19 94.3 kg (208 lb)     Ht Readings from Last 1 Encounters:   06/24/20 162.6 cm (64\")     Body mass index is 34.16 kg/m².  Vitals:    06/24/20 1328   Temp: 98.1 °F (36.7 °C)     Vital signs reviewed.     General Appearance:    Alert, cooperative, in no acute distress                  Eyes: conjunctiva clear  ENT: external ears and nose atraumatic  CV: no peripheral edema  Resp: normal respiratory effort  Skin: no rashes or wounds; normal turgor  Psych: mood and affect appropriate  Lymph: no nodes appreciated  Neuro: gross sensation intact  Vascular:  Palpable peripheral pulse in noted extremity  Musculoskeletal Extremities: right knee with skin warm, dry and intact, NVI, +PMS, calves soft to both legs, right knee with medial joint line tenderness and effusion.   No ambulatory aids - balance is good  Radiology:   Not done today -reviewed    Assessment:     ICD-10-CM ICD-9-CM   1. Arthritis of right knee M17.11 716.96   2. Multiple sclerosis (CMS/[facility]) G35 340        Large Joint Arthrocentesis: R knee  Date/Time: 6/24/2020 8:40 AM  Consent given by: patient  Site marked: site marked  Timeout: Immediately prior to procedure a time out was called to verify the correct patient, procedure, equipment, support staff and site/side marked as required   Supporting Documentation  Indications: pain   Procedure Details  Location: knee - R knee  Needle gauge: 21.  Approach: anterolateral  Medications administered: 4 mL lidocaine PF 2% 2 %; 80 " mg methylPREDNISolone acetate 80 MG/ML  Patient tolerance: patient tolerated the procedure well with no immediate complications             Plan: Biomechanics of pertinent body area discussed.  Risks, benefits, alternatives, comparisons, and complications of accepted medicines, injections, recommendations, surgical procedures, and therapies explained and education provided in laymen's terms. Natural history and expected course of this patient's diagnosis discussed along with evaluation of therapies. Questions answered. When appropriate I also discussed proper use of cane, walker, trekking poles.   BMI:  The concept of BMI body mass index and its importance and implications discussed.  BMI suggested to be < 40 or as low as possible. Lifestyle measures for weight loss and how this affects orthopedic condition.  EXERCISES:  Advice on benefits of, and types of regular/moderate exercise including biomechanical forces involved as it pertains to this complaint.  MEDICATIONS:  Prescription, OTC and Monitoring of Medications per orders to address ortho complaints; Evaluation and discussion of safety, precautions, side effects, and warnings given especially of long term NSAID or steroid therapy.    RICE: Rest, ice, compression, and elevation therapy, Cryotherapy/brachy therapy, and or OTC linaments as indicated with instructions.   Cortisone Injection. See procedure note.      6/24/2020    Much of this encounter note is an electronic transcription/translation of spoken language to printed text. The electronic translation of spoken language may permit erroneous, or at times, nonsensical words or phrases to be inadvertently transcribed; Although I have reviewed the note for such errors, some may still exist

## 2020-07-07 ENCOUNTER — LAB (OUTPATIENT)
Dept: LAB | Facility: HOSPITAL | Age: 75
End: 2020-07-07

## 2020-07-07 ENCOUNTER — TRANSCRIBE ORDERS (OUTPATIENT)
Dept: ADMINISTRATIVE | Facility: HOSPITAL | Age: 75
End: 2020-07-07

## 2020-07-07 DIAGNOSIS — Z00.00 ROUTINE GENERAL MEDICAL EXAMINATION AT A HEALTH CARE FACILITY: ICD-10-CM

## 2020-07-07 DIAGNOSIS — R73.09 IMPAIRED GLUCOSE TOLERANCE TEST: ICD-10-CM

## 2020-07-07 DIAGNOSIS — Z00.00 ROUTINE GENERAL MEDICAL EXAMINATION AT A HEALTH CARE FACILITY: Primary | ICD-10-CM

## 2020-07-07 LAB
ALBUMIN SERPL-MCNC: 4.2 G/DL (ref 3.5–5.2)
ALBUMIN/GLOB SERPL: 2 G/DL
ALP SERPL-CCNC: 61 U/L (ref 39–117)
ALT SERPL W P-5'-P-CCNC: 19 U/L (ref 1–33)
ANION GAP SERPL CALCULATED.3IONS-SCNC: 9.7 MMOL/L (ref 5–15)
AST SERPL-CCNC: 18 U/L (ref 1–32)
BACTERIA UR QL AUTO: ABNORMAL /HPF
BASOPHILS # BLD AUTO: 0.05 10*3/MM3 (ref 0–0.2)
BASOPHILS NFR BLD AUTO: 0.7 % (ref 0–1.5)
BILIRUB SERPL-MCNC: 0.5 MG/DL (ref 0–1.2)
BILIRUB UR QL STRIP: NEGATIVE
BUN SERPL-MCNC: 12 MG/DL (ref 8–23)
BUN/CREAT SERPL: 15.2 (ref 7–25)
CALCIUM SPEC-SCNC: 9.5 MG/DL (ref 8.6–10.5)
CHLORIDE SERPL-SCNC: 101 MMOL/L (ref 98–107)
CHOLEST SERPL-MCNC: 199 MG/DL (ref 0–200)
CLARITY UR: CLEAR
CO2 SERPL-SCNC: 30.3 MMOL/L (ref 22–29)
COLOR UR: YELLOW
CREAT SERPL-MCNC: 0.79 MG/DL (ref 0.57–1)
DEPRECATED RDW RBC AUTO: 41.4 FL (ref 37–54)
EOSINOPHIL # BLD AUTO: 0.27 10*3/MM3 (ref 0–0.4)
EOSINOPHIL NFR BLD AUTO: 3.9 % (ref 0.3–6.2)
ERYTHROCYTE [DISTWIDTH] IN BLOOD BY AUTOMATED COUNT: 12.6 % (ref 12.3–15.4)
GFR SERPL CREATININE-BSD FRML MDRD: 72 ML/MIN/1.73
GLOBULIN UR ELPH-MCNC: 2.1 GM/DL
GLUCOSE SERPL-MCNC: 103 MG/DL (ref 65–99)
GLUCOSE UR STRIP-MCNC: NEGATIVE MG/DL
HCT VFR BLD AUTO: 41 % (ref 34–46.6)
HDLC SERPL-MCNC: 46 MG/DL (ref 40–60)
HGB BLD-MCNC: 14 G/DL (ref 12–15.9)
HGB UR QL STRIP.AUTO: NEGATIVE
HYALINE CASTS UR QL AUTO: ABNORMAL /LPF
IMM GRANULOCYTES # BLD AUTO: 0.03 10*3/MM3 (ref 0–0.05)
IMM GRANULOCYTES NFR BLD AUTO: 0.4 % (ref 0–0.5)
KETONES UR QL STRIP: NEGATIVE
LDLC SERPL CALC-MCNC: 117 MG/DL (ref 0–100)
LDLC/HDLC SERPL: 2.55 {RATIO}
LEUKOCYTE ESTERASE UR QL STRIP.AUTO: ABNORMAL
LYMPHOCYTES # BLD AUTO: 2.17 10*3/MM3 (ref 0.7–3.1)
LYMPHOCYTES NFR BLD AUTO: 31 % (ref 19.6–45.3)
MCH RBC QN AUTO: 31.1 PG (ref 26.6–33)
MCHC RBC AUTO-ENTMCNC: 34.1 G/DL (ref 31.5–35.7)
MCV RBC AUTO: 91.1 FL (ref 79–97)
MONOCYTES # BLD AUTO: 0.6 10*3/MM3 (ref 0.1–0.9)
MONOCYTES NFR BLD AUTO: 8.6 % (ref 5–12)
NEUTROPHILS NFR BLD AUTO: 3.87 10*3/MM3 (ref 1.7–7)
NEUTROPHILS NFR BLD AUTO: 55.4 % (ref 42.7–76)
NITRITE UR QL STRIP: NEGATIVE
NRBC BLD AUTO-RTO: 0 /100 WBC (ref 0–0.2)
PH UR STRIP.AUTO: 6 [PH] (ref 5–8)
PLATELET # BLD AUTO: 290 10*3/MM3 (ref 140–450)
PMV BLD AUTO: 9.8 FL (ref 6–12)
POTASSIUM SERPL-SCNC: 3.6 MMOL/L (ref 3.5–5.2)
PROT SERPL-MCNC: 6.3 G/DL (ref 6–8.5)
PROT UR QL STRIP: NEGATIVE
RBC # BLD AUTO: 4.5 10*6/MM3 (ref 3.77–5.28)
RBC # UR: ABNORMAL /HPF
REF LAB TEST METHOD: ABNORMAL
SODIUM SERPL-SCNC: 141 MMOL/L (ref 136–145)
SP GR UR STRIP: 1.02 (ref 1–1.03)
SQUAMOUS #/AREA URNS HPF: ABNORMAL /HPF
TRIGL SERPL-MCNC: 179 MG/DL (ref 0–150)
UROBILINOGEN UR QL STRIP: ABNORMAL
VLDLC SERPL-MCNC: 35.8 MG/DL (ref 5–40)
WBC # BLD AUTO: 6.99 10*3/MM3 (ref 3.4–10.8)
WBC UR QL AUTO: ABNORMAL /HPF

## 2020-07-07 PROCEDURE — 80053 COMPREHEN METABOLIC PANEL: CPT | Performed by: INTERNAL MEDICINE

## 2020-07-07 PROCEDURE — 81001 URINALYSIS AUTO W/SCOPE: CPT | Performed by: INTERNAL MEDICINE

## 2020-07-07 PROCEDURE — 85025 COMPLETE CBC W/AUTO DIFF WBC: CPT | Performed by: INTERNAL MEDICINE

## 2020-07-07 PROCEDURE — 80061 LIPID PANEL: CPT | Performed by: INTERNAL MEDICINE

## 2020-07-07 PROCEDURE — 36415 COLL VENOUS BLD VENIPUNCTURE: CPT

## 2020-08-10 ENCOUNTER — LAB (OUTPATIENT)
Dept: LAB | Facility: HOSPITAL | Age: 75
End: 2020-08-10

## 2020-08-10 ENCOUNTER — TRANSCRIBE ORDERS (OUTPATIENT)
Dept: ADMINISTRATIVE | Facility: HOSPITAL | Age: 75
End: 2020-08-10

## 2020-08-10 DIAGNOSIS — K52.9 INFLAMMATORY BOWEL DISEASE: ICD-10-CM

## 2020-08-10 DIAGNOSIS — K52.9 INFLAMMATORY BOWEL DISEASE: Primary | ICD-10-CM

## 2020-08-10 DIAGNOSIS — R10.9 STOMACH ACHE: ICD-10-CM

## 2020-08-10 LAB
ALBUMIN SERPL-MCNC: 4.4 G/DL (ref 3.5–5.2)
ALBUMIN/GLOB SERPL: 1.9 G/DL
ALP SERPL-CCNC: 55 U/L (ref 39–117)
ALT SERPL W P-5'-P-CCNC: 21 U/L (ref 1–33)
AMYLASE SERPL-CCNC: 45 U/L (ref 28–100)
ANION GAP SERPL CALCULATED.3IONS-SCNC: 12.9 MMOL/L (ref 5–15)
AST SERPL-CCNC: 19 U/L (ref 1–32)
BASOPHILS # BLD AUTO: 0.03 10*3/MM3 (ref 0–0.2)
BASOPHILS NFR BLD AUTO: 0.5 % (ref 0–1.5)
BILIRUB SERPL-MCNC: 0.3 MG/DL (ref 0–1.2)
BUN SERPL-MCNC: 12 MG/DL (ref 8–23)
BUN/CREAT SERPL: 16.2 (ref 7–25)
CALCIUM SPEC-SCNC: 10.1 MG/DL (ref 8.6–10.5)
CHLORIDE SERPL-SCNC: 98 MMOL/L (ref 98–107)
CO2 SERPL-SCNC: 28.1 MMOL/L (ref 22–29)
CREAT SERPL-MCNC: 0.74 MG/DL (ref 0.57–1)
DEPRECATED RDW RBC AUTO: 43.4 FL (ref 37–54)
EOSINOPHIL # BLD AUTO: 0.22 10*3/MM3 (ref 0–0.4)
EOSINOPHIL NFR BLD AUTO: 3.4 % (ref 0.3–6.2)
ERYTHROCYTE [DISTWIDTH] IN BLOOD BY AUTOMATED COUNT: 12.7 % (ref 12.3–15.4)
GFR SERPL CREATININE-BSD FRML MDRD: 77 ML/MIN/1.73
GLOBULIN UR ELPH-MCNC: 2.3 GM/DL
GLUCOSE SERPL-MCNC: 97 MG/DL (ref 65–99)
HCT VFR BLD AUTO: 43.5 % (ref 34–46.6)
HGB BLD-MCNC: 14.5 G/DL (ref 12–15.9)
IMM GRANULOCYTES # BLD AUTO: 0.01 10*3/MM3 (ref 0–0.05)
IMM GRANULOCYTES NFR BLD AUTO: 0.2 % (ref 0–0.5)
LIPASE SERPL-CCNC: 19 U/L (ref 13–60)
LYMPHOCYTES # BLD AUTO: 1.89 10*3/MM3 (ref 0.7–3.1)
LYMPHOCYTES NFR BLD AUTO: 29.2 % (ref 19.6–45.3)
MCH RBC QN AUTO: 31.5 PG (ref 26.6–33)
MCHC RBC AUTO-ENTMCNC: 33.3 G/DL (ref 31.5–35.7)
MCV RBC AUTO: 94.4 FL (ref 79–97)
MONOCYTES # BLD AUTO: 0.7 10*3/MM3 (ref 0.1–0.9)
MONOCYTES NFR BLD AUTO: 10.8 % (ref 5–12)
NEUTROPHILS NFR BLD AUTO: 3.62 10*3/MM3 (ref 1.7–7)
NEUTROPHILS NFR BLD AUTO: 55.9 % (ref 42.7–76)
NRBC BLD AUTO-RTO: 0 /100 WBC (ref 0–0.2)
PLATELET # BLD AUTO: 299 10*3/MM3 (ref 140–450)
PMV BLD AUTO: 10 FL (ref 6–12)
POTASSIUM SERPL-SCNC: 4.1 MMOL/L (ref 3.5–5.2)
PROT SERPL-MCNC: 6.7 G/DL (ref 6–8.5)
RBC # BLD AUTO: 4.61 10*6/MM3 (ref 3.77–5.28)
SODIUM SERPL-SCNC: 139 MMOL/L (ref 136–145)
WBC # BLD AUTO: 6.47 10*3/MM3 (ref 3.4–10.8)

## 2020-08-10 PROCEDURE — 85025 COMPLETE CBC W/AUTO DIFF WBC: CPT | Performed by: INTERNAL MEDICINE

## 2020-08-10 PROCEDURE — 80053 COMPREHEN METABOLIC PANEL: CPT | Performed by: INTERNAL MEDICINE

## 2020-08-10 PROCEDURE — 82150 ASSAY OF AMYLASE: CPT | Performed by: INTERNAL MEDICINE

## 2020-08-10 PROCEDURE — 83690 ASSAY OF LIPASE: CPT | Performed by: INTERNAL MEDICINE

## 2020-08-10 PROCEDURE — 36415 COLL VENOUS BLD VENIPUNCTURE: CPT

## 2020-08-14 ENCOUNTER — LAB (OUTPATIENT)
Dept: LAB | Facility: HOSPITAL | Age: 75
End: 2020-08-14

## 2020-08-14 ENCOUNTER — TRANSCRIBE ORDERS (OUTPATIENT)
Dept: ADMINISTRATIVE | Facility: HOSPITAL | Age: 75
End: 2020-08-14

## 2020-08-14 DIAGNOSIS — R10.9 ABDOMINAL PAIN, UNSPECIFIED ABDOMINAL LOCATION: ICD-10-CM

## 2020-08-14 DIAGNOSIS — K52.9 INFLAMMATORY BOWEL DISEASE: ICD-10-CM

## 2020-08-14 DIAGNOSIS — K52.9 GASTROENTERITIS: ICD-10-CM

## 2020-08-14 DIAGNOSIS — R10.9 STOMACH ACHE: ICD-10-CM

## 2020-08-14 LAB
C DIFF TOX GENS STL QL NAA+PROBE: NEGATIVE
HEMOCCULT STL QL: NEGATIVE
LACTOFERRIN STL QL LA: NEGATIVE

## 2020-08-14 PROCEDURE — 82270 OCCULT BLOOD FECES: CPT | Performed by: INTERNAL MEDICINE

## 2020-08-14 PROCEDURE — 87177 OVA AND PARASITES SMEARS: CPT | Performed by: INTERNAL MEDICINE

## 2020-08-14 PROCEDURE — 87209 SMEAR COMPLEX STAIN: CPT | Performed by: INTERNAL MEDICINE

## 2020-08-14 PROCEDURE — 87493 C DIFF AMPLIFIED PROBE: CPT | Performed by: INTERNAL MEDICINE

## 2020-08-14 PROCEDURE — 87045 FECES CULTURE AEROBIC BACT: CPT | Performed by: INTERNAL MEDICINE

## 2020-08-14 PROCEDURE — 83630 LACTOFERRIN FECAL (QUAL): CPT | Performed by: INTERNAL MEDICINE

## 2020-08-14 PROCEDURE — 87046 STOOL CULTR AEROBIC BACT EA: CPT | Performed by: INTERNAL MEDICINE

## 2020-08-14 PROCEDURE — 87427 SHIGA-LIKE TOXIN AG IA: CPT | Performed by: INTERNAL MEDICINE

## 2020-08-18 LAB
O+P SPEC MICRO: NORMAL
OVA + PARASITE RESULT 1: NORMAL

## 2020-08-19 LAB
CAMPYLOBACTER STL CULT: NORMAL
E COLI SXT STL QL IA: NEGATIVE
Lab: NORMAL
Lab: NORMAL
SALM + SHIG STL CULT: NORMAL

## 2020-08-20 ENCOUNTER — OFFICE (OUTPATIENT)
Dept: URBAN - METROPOLITAN AREA CLINIC 66 | Facility: CLINIC | Age: 75
End: 2020-08-20
Payer: COMMERCIAL

## 2020-08-20 VITALS
WEIGHT: 209 LBS | TEMPERATURE: 97.1 F | DIASTOLIC BLOOD PRESSURE: 68 MMHG | HEIGHT: 64 IN | HEART RATE: 63 BPM | SYSTOLIC BLOOD PRESSURE: 114 MMHG

## 2020-08-20 DIAGNOSIS — R19.4 CHANGE IN BOWEL HABIT: ICD-10-CM

## 2020-08-20 DIAGNOSIS — R10.33 PERIUMBILICAL PAIN: ICD-10-CM

## 2020-08-20 PROCEDURE — 99203 OFFICE O/P NEW LOW 30 MIN: CPT | Performed by: NURSE PRACTITIONER

## 2020-09-14 ENCOUNTER — HOSPITAL ENCOUNTER (EMERGENCY)
Facility: HOSPITAL | Age: 75
Discharge: HOME | End: 2020-09-14
Attending: EMERGENCY MEDICINE | Admitting: EMERGENCY MEDICINE

## 2020-09-14 ENCOUNTER — APPOINTMENT (OUTPATIENT)
Dept: GENERAL RADIOLOGY | Facility: HOSPITAL | Age: 75
End: 2020-09-14

## 2020-09-14 ENCOUNTER — APPOINTMENT (OUTPATIENT)
Dept: CT IMAGING | Facility: HOSPITAL | Age: 75
End: 2020-09-14

## 2020-09-14 VITALS
OXYGEN SATURATION: 97 % | HEIGHT: 64 IN | WEIGHT: 200 LBS | DIASTOLIC BLOOD PRESSURE: 95 MMHG | TEMPERATURE: 99.6 F | HEART RATE: 61 BPM | RESPIRATION RATE: 18 BRPM | BODY MASS INDEX: 34.15 KG/M2 | SYSTOLIC BLOOD PRESSURE: 154 MMHG

## 2020-09-14 DIAGNOSIS — S30.1XXA CONTUSION OF ABDOMINAL WALL, INITIAL ENCOUNTER: ICD-10-CM
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
## 2020-09-14 DIAGNOSIS — S63.642A SPRAIN OF METACARPOPHALANGEAL (MCP) JOINT OF LEFT THUMB, INITIAL ENCOUNTER: ICD-10-CM

## 2020-09-14 LAB
ALBUMIN SERPL-MCNC: 4.3 G/DL (ref 3.5–5.2)
ALBUMIN/GLOB SERPL: 1.6 G/DL
ALP SERPL-CCNC: 71 U/L (ref 39–117)
ALT SERPL W P-5'-P-CCNC: 18 U/L (ref 1–33)
ANION GAP SERPL CALCULATED.3IONS-SCNC: 9.7 MMOL/L (ref 5–15)
AST SERPL-CCNC: 28 U/L (ref 1–32)
BACTERIA UR QL AUTO: ABNORMAL /HPF
BASOPHILS # BLD AUTO: 0.05 10*3/MM3 (ref 0–0.2)
BASOPHILS NFR BLD AUTO: 0.5 % (ref 0–1.5)
BILIRUB SERPL-MCNC: 0.3 MG/DL (ref 0–1.2)
BILIRUB UR QL STRIP: NEGATIVE
BUN SERPL-MCNC: 17 MG/DL (ref 8–23)
BUN/CREAT SERPL: 21.5 (ref 7–25)
CALCIUM SPEC-SCNC: 9.3 MG/DL (ref 8.6–10.5)
CHLORIDE SERPL-SCNC: 101 MMOL/L (ref 98–107)
CLARITY UR: CLEAR
CO2 SERPL-SCNC: 25.3 MMOL/L (ref 22–29)
COLOR UR: YELLOW
CREAT SERPL-MCNC: 0.79 MG/DL (ref 0.57–1)
DEPRECATED RDW RBC AUTO: 46.7 FL (ref 37–54)
EOSINOPHIL # BLD AUTO: 0.31 10*3/MM3 (ref 0–0.4)
EOSINOPHIL NFR BLD AUTO: 3.1 % (ref 0.3–6.2)
ERYTHROCYTE [DISTWIDTH] IN BLOOD BY AUTOMATED COUNT: 13.2 % (ref 12.3–15.4)
GFR SERPL CREATININE-BSD FRML MDRD: 71 ML/MIN/1.73
GLOBULIN UR ELPH-MCNC: 2.7 GM/DL
GLUCOSE SERPL-MCNC: 112 MG/DL (ref 65–99)
GLUCOSE UR STRIP-MCNC: NEGATIVE MG/DL
HCT VFR BLD AUTO: 44.6 % (ref 34–46.6)
HGB BLD-MCNC: 14.6 G/DL (ref 12–15.9)
HGB UR QL STRIP.AUTO: ABNORMAL
HYALINE CASTS UR QL AUTO: ABNORMAL /LPF
IMM GRANULOCYTES # BLD AUTO: 0.03 10*3/MM3 (ref 0–0.05)
IMM GRANULOCYTES NFR BLD AUTO: 0.3 % (ref 0–0.5)
KETONES UR QL STRIP: NEGATIVE
LEUKOCYTE ESTERASE UR QL STRIP.AUTO: ABNORMAL
LIPASE SERPL-CCNC: 23 U/L (ref 13–60)
LYMPHOCYTES # BLD AUTO: 2.41 10*3/MM3 (ref 0.7–3.1)
LYMPHOCYTES NFR BLD AUTO: 23.9 % (ref 19.6–45.3)
MCH RBC QN AUTO: 31.4 PG (ref 26.6–33)
MCHC RBC AUTO-ENTMCNC: 32.7 G/DL (ref 31.5–35.7)
MCV RBC AUTO: 95.9 FL (ref 79–97)
MONOCYTES # BLD AUTO: 0.72 10*3/MM3 (ref 0.1–0.9)
MONOCYTES NFR BLD AUTO: 7.1 % (ref 5–12)
NEUTROPHILS NFR BLD AUTO: 6.58 10*3/MM3 (ref 1.7–7)
NEUTROPHILS NFR BLD AUTO: 65.1 % (ref 42.7–76)
NITRITE UR QL STRIP: NEGATIVE
NRBC BLD AUTO-RTO: 0 /100 WBC (ref 0–0.2)
PH UR STRIP.AUTO: 5.5 [PH] (ref 5–8)
PLATELET # BLD AUTO: 288 10*3/MM3 (ref 140–450)
PMV BLD AUTO: 9.5 FL (ref 6–12)
POTASSIUM SERPL-SCNC: 3.9 MMOL/L (ref 3.5–5.2)
PROT SERPL-MCNC: 7 G/DL (ref 6–8.5)
PROT UR QL STRIP: NEGATIVE
RBC # BLD AUTO: 4.65 10*6/MM3 (ref 3.77–5.28)
RBC # UR: ABNORMAL /HPF
REF LAB TEST METHOD: ABNORMAL
SODIUM SERPL-SCNC: 136 MMOL/L (ref 136–145)
SP GR UR STRIP: 1.01 (ref 1–1.03)
SQUAMOUS #/AREA URNS HPF: ABNORMAL /HPF
UROBILINOGEN UR QL STRIP: ABNORMAL
WBC # BLD AUTO: 10.1 10*3/MM3 (ref 3.4–10.8)
WBC UR QL AUTO: ABNORMAL /HPF

## 2020-09-14 PROCEDURE — 81001 URINALYSIS AUTO W/SCOPE: CPT | Performed by: EMERGENCY MEDICINE

## 2020-09-14 PROCEDURE — 99283 EMERGENCY DEPT VISIT LOW MDM: CPT

## 2020-09-14 PROCEDURE — 85025 COMPLETE CBC W/AUTO DIFF WBC: CPT | Performed by: EMERGENCY MEDICINE

## 2020-09-14 PROCEDURE — 80053 COMPREHEN METABOLIC PANEL: CPT | Performed by: EMERGENCY MEDICINE

## 2020-09-14 PROCEDURE — 25010000002 IOPAMIDOL 61 % SOLUTION: Performed by: EMERGENCY MEDICINE

## 2020-09-14 PROCEDURE — 74177 CT ABD & PELVIS W/CONTRAST: CPT

## 2020-09-14 PROCEDURE — 83690 ASSAY OF LIPASE: CPT | Performed by: EMERGENCY MEDICINE

## 2020-09-14 PROCEDURE — 73130 X-RAY EXAM OF HAND: CPT

## 2020-09-14 RX ORDER — ONDANSETRON 2 MG/ML
4 INJECTION INTRAMUSCULAR; INTRAVENOUS ONCE
Status: DISCONTINUED | OUTPATIENT
Start: 2020-09-14 | End: 2020-09-15 | Stop reason: HOSPADM

## 2020-09-14 RX ORDER — MORPHINE SULFATE 2 MG/ML
2 INJECTION, SOLUTION INTRAMUSCULAR; INTRAVENOUS ONCE
Status: DISCONTINUED | OUTPATIENT
Start: 2020-09-14 | End: 2020-09-15 | Stop reason: HOSPADM

## 2020-09-14 RX ORDER — SODIUM CHLORIDE 0.9 % (FLUSH) 0.9 %
10 SYRINGE (ML) INJECTION AS NEEDED
Status: DISCONTINUED | OUTPATIENT
Start: 2020-09-14 | End: 2020-09-15 | Stop reason: HOSPADM

## 2020-09-14 RX ADMIN — IOPAMIDOL 85 ML: 612 INJECTION, SOLUTION INTRAVENOUS at 22:29
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
## 2020-09-14 NOTE — ED NOTES
Patient was wearing facemask when RN entered the room and throughout our encounter. RN wearing PPE throughout all patient encounter including a face mask, goggles and gloves.      Any Saleh RN  09/14/20 0740

## 2020-09-15 NOTE — DISCHARGE INSTRUCTIONS
Wear splint as needed for the next 4 to 5 days.  Apply ice to affected area off and on for the next 24 hours.  Take Tylenol or ibuprofen as needed for pain.  Return to the emergency department for worsening abdominal pain, nausea, vomiting, dizziness, fainting, chest pain, or other concern.

## 2020-09-22 ENCOUNTER — TRANSCRIBE ORDERS (OUTPATIENT)
Dept: SLEEP MEDICINE | Facility: HOSPITAL | Age: 75
End: 2020-09-22

## 2020-09-22 DIAGNOSIS — Z01.818 OTHER SPECIFIED PRE-OPERATIVE EXAMINATION: Primary | ICD-10-CM

## 2020-09-26 ENCOUNTER — LAB (OUTPATIENT)
Dept: LAB | Facility: HOSPITAL | Age: 75
End: 2020-09-26

## 2020-09-26 DIAGNOSIS — Z01.818 OTHER SPECIFIED PRE-OPERATIVE EXAMINATION: ICD-10-CM

## 2020-09-26 PROCEDURE — C9803 HOPD COVID-19 SPEC COLLECT: HCPCS

## 2020-09-26 PROCEDURE — U0004 COV-19 TEST NON-CDC HGH THRU: HCPCS

## 2020-09-28 LAB — SARS-COV-2 RNA RESP QL NAA+PROBE: NOT DETECTED

## 2020-09-29 ENCOUNTER — INPATIENT HOSPITAL (OUTPATIENT)
Dept: URBAN - METROPOLITAN AREA HOSPITAL 113 | Facility: HOSPITAL | Age: 75
End: 2020-09-29
Payer: COMMERCIAL

## 2020-09-29 ENCOUNTER — ANESTHESIA EVENT (OUTPATIENT)
Dept: GASTROENTEROLOGY | Facility: HOSPITAL | Age: 75
End: 2020-09-29

## 2020-09-29 ENCOUNTER — HOSPITAL ENCOUNTER (OUTPATIENT)
Facility: HOSPITAL | Age: 75
Setting detail: HOSPITAL OUTPATIENT SURGERY
Discharge: HOME | End: 2020-09-29
Attending: INTERNAL MEDICINE | Admitting: INTERNAL MEDICINE

## 2020-09-29 ENCOUNTER — ANESTHESIA (OUTPATIENT)
Dept: GASTROENTEROLOGY | Facility: HOSPITAL | Age: 75
End: 2020-09-29

## 2020-09-29 VITALS
SYSTOLIC BLOOD PRESSURE: 114 MMHG | RESPIRATION RATE: 20 BRPM | BODY MASS INDEX: 35 KG/M2 | HEART RATE: 87 BPM | HEIGHT: 64 IN | WEIGHT: 205 LBS | DIASTOLIC BLOOD PRESSURE: 67 MMHG | OXYGEN SATURATION: 95 %

## 2020-09-29 DIAGNOSIS — K57.30 DIVERTICULOSIS OF LARGE INTESTINE WITHOUT PERFORATION OR ABS: ICD-10-CM

## 2020-09-29 DIAGNOSIS — K31.7 POLYP OF STOMACH AND DUODENUM: ICD-10-CM

## 2020-09-29 DIAGNOSIS — K29.50 UNSPECIFIED CHRONIC GASTRITIS WITHOUT BLEEDING: ICD-10-CM

## 2020-09-29 DIAGNOSIS — R19.7 DIARRHEA, UNSPECIFIED: ICD-10-CM

## 2020-09-29 DIAGNOSIS — K21.9 GERD (GASTROESOPHAGEAL REFLUX DISEASE): ICD-10-CM

## 2020-09-29 DIAGNOSIS — R12 HEARTBURN: ICD-10-CM

## 2020-09-29 PROCEDURE — 25010000002 PROPOFOL 10 MG/ML EMULSION: Performed by: NURSE ANESTHETIST, CERTIFIED REGISTERED

## 2020-09-29 PROCEDURE — 43239 EGD BIOPSY SINGLE/MULTIPLE: CPT | Performed by: INTERNAL MEDICINE

## 2020-09-29 PROCEDURE — 45380 COLONOSCOPY AND BIOPSY: CPT | Performed by: INTERNAL MEDICINE

## 2020-09-29 PROCEDURE — 87071 CULTURE AEROBIC QUANT OTHER: CPT | Performed by: INTERNAL MEDICINE

## 2020-09-29 PROCEDURE — 88305 TISSUE EXAM BY PATHOLOGIST: CPT | Performed by: INTERNAL MEDICINE

## 2020-09-29 RX ORDER — SODIUM CHLORIDE, SODIUM LACTATE, POTASSIUM CHLORIDE, CALCIUM CHLORIDE 600; 310; 30; 20 MG/100ML; MG/100ML; MG/100ML; MG/100ML
1000 INJECTION, SOLUTION INTRAVENOUS CONTINUOUS
Status: DISCONTINUED | OUTPATIENT
Start: 2020-09-29 | End: 2020-09-29 | Stop reason: HOSPADM

## 2020-09-29 RX ORDER — PROPOFOL 10 MG/ML
VIAL (ML) INTRAVENOUS AS NEEDED
Status: DISCONTINUED | OUTPATIENT
Start: 2020-09-29 | End: 2020-09-29 | Stop reason: SURG

## 2020-09-29 RX ORDER — PROPOFOL 10 MG/ML
VIAL (ML) INTRAVENOUS CONTINUOUS PRN
Status: DISCONTINUED | OUTPATIENT
Start: 2020-09-29 | End: 2020-09-29 | Stop reason: SURG

## 2020-09-29 RX ORDER — LIDOCAINE HYDROCHLORIDE 20 MG/ML
INJECTION, SOLUTION INFILTRATION; PERINEURAL AS NEEDED
Status: DISCONTINUED | OUTPATIENT
Start: 2020-09-29 | End: 2020-09-29 | Stop reason: SURG

## 2020-09-29 RX ADMIN — PROPOFOL 20 MG: 10 INJECTION, EMULSION INTRAVENOUS at 09:52

## 2020-09-29 RX ADMIN — PROPOFOL 140 MCG/KG/MIN: 10 INJECTION, EMULSION INTRAVENOUS at 09:50

## 2020-09-29 RX ADMIN — PROPOFOL 60 MG: 10 INJECTION, EMULSION INTRAVENOUS at 09:50

## 2020-09-29 RX ADMIN — LIDOCAINE HYDROCHLORIDE 100 MG: 20 INJECTION, SOLUTION INFILTRATION; PERINEURAL at 09:50

## 2020-09-29 RX ADMIN — SODIUM CHLORIDE, POTASSIUM CHLORIDE, SODIUM LACTATE AND CALCIUM CHLORIDE 1000 ML: 600; 310; 30; 20 INJECTION, SOLUTION INTRAVENOUS at 09:41

## 2020-09-29 NOTE — ANESTHESIA PREPROCEDURE EVALUATION
Anesthesia Evaluation     Patient summary reviewed and Nursing notes reviewed   history of anesthetic complications:               Airway   Mallampati: I  TM distance: >3 FB  Neck ROM: full  No difficulty expected  Dental - normal exam     Pulmonary - normal exam   (+) a smoker Former, asthma,sleep apnea,   Cardiovascular - normal exam    (+) hypertension, valvular problems/murmurs murmur,       Neuro/Psych  (+) psychiatric history,       ROS Comment: Hx multiple  sclerosis  GI/Hepatic/Renal/Endo    (+)  GERD,      Musculoskeletal     Abdominal  - normal exam    Bowel sounds: normal.   Substance History - negative use     OB/GYN negative ob/gyn ROS         Other   arthritis,                      Anesthesia Plan    ASA 3     MAC       Anesthetic plan, all risks, benefits, and alternatives have been provided, discussed and informed consent has been obtained with: patient.

## 2020-09-29 NOTE — ANESTHESIA POSTPROCEDURE EVALUATION
"Patient: Mahnaz Becerra    Procedure Summary     Date: 09/29/20 Room / Location:  [facility]    Anesthesia Start: 0945 Anesthesia Stop: 1025    Procedures:       COLONOSCOPY TO CECUM AND TERM. ILEUM WITH BIOPSIES (N/A )      ESOPHAGOGASTRODUODENOSCOPY WITH DUODENAL ASPIRATE, POLYPECTOMY AND BIOPSIES (N/A Esophagus) Diagnosis:     Surgeon: Inder Pat MD Provider: Lamonte Martínez MD    Anesthesia Type: MAC ASA Status: 3          Anesthesia Type: MAC    Vitals  Vitals Value Taken Time   /67 09/29/20 1049   Temp     Pulse 87 09/29/20 1049   Resp     SpO2 95 % 09/29/20 1049           Post Anesthesia Care and Evaluation    Patient location during evaluation: PACU  Patient participation: complete - patient participated  Level of consciousness: awake  Pain score: 0  Pain management: adequate  Airway patency: patent  Anesthetic complications: No anesthetic complications  PONV Status: none  Cardiovascular status: acceptable  Respiratory status: acceptable  Hydration status: acceptable    Comments: /67 (BP Location: Left arm, Patient Position: Lying)   Pulse 87   Resp 20   Ht 162.6 cm (64\")   Wt 93 kg (205 lb)   SpO2 95%   BMI 35.19 kg/m²       "

## 2020-09-30 LAB
LAB AP CASE REPORT: NORMAL
PATH REPORT.FINAL DX SPEC: NORMAL
PATH REPORT.GROSS SPEC: NORMAL

## 2020-10-01 LAB — BACTERIA SPEC AEROBE CULT: ABNORMAL

## 2020-10-05 ENCOUNTER — CLINICAL SUPPORT (OUTPATIENT)
Dept: ORTHOPEDIC SURGERY | Facility: CLINIC | Age: 75
End: 2020-10-05

## 2020-10-05 VITALS — HEIGHT: 64 IN | WEIGHT: 205 LBS | TEMPERATURE: 97.1 F | BODY MASS INDEX: 35 KG/M2

## 2020-10-05 DIAGNOSIS — M17.11 ARTHRITIS OF RIGHT KNEE: Primary | ICD-10-CM

## 2020-10-05 PROCEDURE — 20610 DRAIN/INJ JOINT/BURSA W/O US: CPT | Performed by: ORTHOPAEDIC SURGERY

## 2020-10-05 PROCEDURE — 73562 X-RAY EXAM OF KNEE 3: CPT | Performed by: ORTHOPAEDIC SURGERY

## 2020-10-05 RX ORDER — METHYLPREDNISOLONE ACETATE 80 MG/ML
80 INJECTION, SUSPENSION INTRA-ARTICULAR; INTRALESIONAL; INTRAMUSCULAR; SOFT TISSUE
Status: COMPLETED | OUTPATIENT
Start: 2020-10-05 | End: 2020-10-05

## 2020-10-05 RX ADMIN — METHYLPREDNISOLONE ACETATE 80 MG: 80 INJECTION, SUSPENSION INTRA-ARTICULAR; INTRALESIONAL; INTRAMUSCULAR; SOFT TISSUE at 07:40

## 2020-10-05 NOTE — PROGRESS NOTES
Patient Name: Mahnaz Becerra   YOB: 1945  Referring Primary Care Physician: Stephen Wilkes MD  BMI: Body mass index is 35.19 kg/m².    Chief Complaint:    Chief Complaint   Patient presents with   • Right Knee - Pain, Follow-up        HPI:     Mahnaz Becerra is a 75 y.o. female who presents today for evaluation of   Chief Complaint   Patient presents with   • Right Knee - Pain, Follow-up   .  Patient follows up with acute on chronic right knee pain no traumatic incident but it is stiff and painful and hard to ambulate occasional give way when she walks with her friend which she is doing for exercise but most time it does well.  Previous left total knee.    This problem is not new to this examiner.     Subjective   Medications:   Home Medications:  Current Outpatient Medications on File Prior to Visit   Medication Sig   • albuterol (VENTOLIN HFA) 108 (90 BASE) MCG/ACT inhaler Inhale 2 puffs Every 6 (Six) Hours As Needed for wheezing.   • cephalexin (KEFLEX) 500 MG capsule 2000 mg po x 1 dose, 1hr prior to dental procedure   • clotrimazole-betamethasone (LOTRISONE) 1-0.05 % cream Apply  topically to the appropriate area as directed 2 (Two) Times a Day.   • cyclobenzaprine (FLEXERIL) 10 MG tablet Take 1 tablet by mouth 2 (Two) Times a Day As Needed for Muscle Spasms.   • diclofenac (VOLTAREN) 1 % gel gel Apply pea size amount to area of pain up to four times a day   • dicyclomine (BENTYL) 20 MG tablet Take 1 tablet by mouth As Needed (abd cramps).   • Famotidine-Ca Carb-Mag Hydrox (PEPCID COMPLETE PO) Take  by mouth.   • hydrochlorothiazide (HYDRODIURIL) 25 MG tablet TAKE 1 TABLET BY MOUTH DAILY   • Multiple Vitamins-Minerals (CENTRUM SILVER PO) Take  by mouth.   • oxyCODONE-acetaminophen (PERCOCET) 7.5-325 MG per tablet 1-2 tabs po q 3-4 hr prn severe pain, wean as tolerated   • pantoprazole (PROTONIX) 40 MG EC tablet TAKE 1 TABLET BY MOUTH TWICE DAILY   • promethazine (PHENERGAN) 12.5 MG tablet  Take 1 tablet by mouth Every 6 (Six) Hours As Needed for Nausea or Vomiting.   • saccharomyces boulardii (FLORASTOR) 250 MG capsule Take 250 mg by mouth 2 (Two) Times a Day.   • traMADol (ULTRAM) 50 MG tablet 1-2 Oral Q4H PRN severe pain   • vitamin E 400 UNIT capsule Take 400 Units by mouth Daily.     Current Facility-Administered Medications on File Prior to Visit   Medication   • ipratropium-albuterol (DUO-NEB) nebulizer solution 3 mL     Current Medications:  Scheduled Meds:ipratropium-albuterol, 3 mL, Nebulization, 4x Daily - RT      Continuous Infusions:   PRN Meds:.    I have reviewed the patient's medical history in detail and updated the computerized patient record.  Review and summarization of old records includes:    Past Medical History:   Diagnosis Date   • Acid reflux    • Anesthesia complication     ANESTHESIA HAS BROUGHT ON ASTHMA ATTACKS    • Asthma    • Bronchitis    • Charleyhorse     FREQUENT   • Edema     LOWER EXT   • Heart murmur    • History of skin cancer     BACK   • IBS (irritable bowel syndrome)    • Knee pain, right    • MS (multiple sclerosis) (CMS/[facility])    • Osteoarthritis    • PONV (postoperative nausea and vomiting)     Patient states she had post-op nausea and vomiting after hysterectomy in .   • Sleep apnea     CANT TOLERATE CPAP        Past Surgical History:   Procedure Laterality Date   • APPENDECTOMY     • BREAST LUMPECTOMY Bilateral    • BREAST SURGERY      REDUCTION   •  SECTION     • CHOLECYSTECTOMY     • COLONOSCOPY  2012    Dr Moe   • COLONOSCOPY N/A 2020    Procedure: COLONOSCOPY TO CECUM AND TERM. ILEUM WITH BIOPSIES;  Surgeon: Inder Pat MD;  Location: [facility] ENDOSCOPY;  Service: Gastroenterology;  Laterality: N/A;  PRE OP - CHANGE IN BOWEL HABITS, DIARRHEA  POST OP - DIVERTICULOSIS   • COSMETIC SURGERY     • DILATATION AND CURETTAGE     • ENDOSCOPY N/A 2016    Procedure: ESOPHAGOGASTRODUODENOSCOPY WITH BX;  Surgeon:  Nasim Moe MD;  Location: [facility] ENDOSCOPY;  Service:    • ENDOSCOPY N/A 2020    Procedure: ESOPHAGOGASTRODUODENOSCOPY WITH DUODENAL ASPIRATE, POLYPECTOMY AND BIOPSIES;  Surgeon: Inder Pat MD;  Location: [facility] ENDOSCOPY;  Service: Gastroenterology;  Laterality: N/A;  PRE OP - GERD  POST OP - GASTRIC POLYP, GASTRITIS   • ENDOSCOPY W/ PEG REMOVAL  2012    Dr Moe   • EYE SURGERY Bilateral     BLEPHAROPLASTY   • HYSTERECTOMY     • KNEE ARTHROSCOPY Right 2016    Procedure: KNEE ARTHROSCOPY, PARTIAL MEDIAL AND LATERAL MENISECTOMY, CHONDROPLASTY OF THE PATELLA, REMOVAL OF LOOSE BODIES;  Surgeon: Artur Pat MD;  Location: [facility] OR OSC;  Service:    • KNEE ARTHROSCOPY W/ MENISCAL REPAIR Left    • OOPHORECTOMY Bilateral    • TONSILLECTOMY     • TOTAL KNEE ARTHROPLASTY Left 2018    Procedure: LEFT TOTAL KNEE ARTHROPLASTY WITH MARGRET NAVIGATION;  Surgeon: Artur Pat MD;  Location: [facility] MAIN OR;  Service:         Social History     Occupational History   • Not on file   Tobacco Use   • Smoking status: Former Smoker     Packs/day: 0.25     Types: Cigarettes     Quit date:      Years since quittin.7   • Smokeless tobacco: Never Used   • Tobacco comment: Patient states she was a social smoker.   Substance and Sexual Activity   • Alcohol use: Yes     Comment: Occasional   • Drug use: No   • Sexual activity: Defer      Social History     Social History Narrative   • Not on file        Family History   Problem Relation Age of Onset   • Hypertension Mother    • Stroke Mother    • Alzheimer's disease Mother    • Heart disease Father    • Emphysema Father    • Stroke Maternal Grandmother    • Cancer Maternal Grandfather    • No Known Problems Paternal Grandmother    • Cerebral aneurysm Paternal Grandfather    • Malig Hyperthermia Neg Hx        ROS: 14 point review of systems was performed and all other systems were reviewed and are negative except for documented  "findings in HPI and today's encounter.     Allergies:   Allergies   Allergen Reactions   • Flagyl [Metronidazole] Hives and Swelling     Lips and Tongue     • Levofloxacin Swelling and Rash     RED RASH   • Lansoprazole Itching and Other (See Comments)     AND TURN RED   • Cefdinir GI Intolerance     Abdominal pain, caused upset stomach   • Trazodone And Nefazodone Myalgia     Severe muscle cramps     Constitutional:  Denies fever, shaking or chills   Eyes:  Denies change in visual acuity   HENT:  Denies nasal congestion or sore throat   Respiratory:  Denies cough or shortness of breath   Cardiovascular:  Denies chest pain or severe LE edema   GI:  Denies abdominal pain, nausea, vomiting, bloody stools or diarrhea   Musculoskeletal:  Numbness, tingling, pain, or loss of motor function only as noted above in history of present illness.  : Denies painful urination or hematuria  Integument:  Denies rash, lesion or ulceration   Neurologic:  Denies headache or focal weakness  Endocrine:  Denies lymphadenopathy  Psych:  Denies confusion or change in mental status   Hem:  Denies active bleeding    OBJECTIVE:  Physical Exam: 75 y.o. female  Wt Readings from Last 3 Encounters:   10/05/20 93 kg (205 lb)   09/29/20 93 kg (205 lb)   09/14/20 90.7 kg (200 lb)     Ht Readings from Last 1 Encounters:   10/05/20 162.6 cm (64\")     Body mass index is 35.19 kg/m².  Vitals:    10/05/20 1107   Temp: 97.1 °F (36.2 °C)     Vital signs reviewed.     General Appearance:    Alert, cooperative, in no acute distress                  Eyes: conjunctiva clear  ENT: external ears and nose atraumatic  CV: no peripheral edema  Resp: normal respiratory effort  Skin: no rashes or wounds; normal turgor  Psych: mood and affect appropriate  Lymph: no nodes appreciated  Neuro: gross sensation intact  Vascular:  Palpable peripheral pulse in noted extremity  Musculoskeletal Extremities: Crepitation synovitis swelling stiffness medial joint line " tenderness of the right knee    Radiology:   AP lateral 40 degree PA right knee taken the office today with comparison views for pain show arthritis the previous left total knee that appears to be in good position    Assessment:     ICD-10-CM ICD-9-CM   1. Arthritis of right knee  M17.11 716.96        Large Joint Arthrocentesis: R knee  Date/Time: 10/5/2020 7:40 AM  Consent given by: patient  Site marked: site marked  Timeout: Immediately prior to procedure a time out was called to verify the correct patient, procedure, equipment, support staff and site/side marked as required   Supporting Documentation  Indications: pain   Procedure Details  Location: knee - R knee  Preparation: Patient was prepped and draped in the usual sterile fashion  Needle gauge: 21.  Approach: anterolateral  Medications administered: 4 mL lidocaine (cardiac); 80 mg methylPREDNISolone acetate 80 MG/ML  Patient tolerance: patient tolerated the procedure well with no immediate complications             Plan: Biomechanics of pertinent body area discussed.  Risks, benefits, alternatives, comparisons, and complications of accepted medicines, injections, recommendations, surgical procedures, and therapies explained and education provided in laymen's terms. Natural history and expected course of this patient's diagnosis discussed along with evaluation of therapies. Questions answered. When appropriate I also discussed proper use of cane, walker, trekking poles.   RICE: Rest, ice, compression, and elevation therapy, Cryotherapy/brachy therapy, and or OTC linaments as indicated with instructions.   Cortisone Injection. See procedure note.      10/5/2020    Much of this encounter note is an electronic transcription/translation of spoken language to printed text. The electronic translation of spoken language may permit erroneous, or at times, nonsensical words or phrases to be inadvertently transcribed; Although I have reviewed the note for such errors,  some may still exist

## 2020-10-23 ENCOUNTER — OFFICE (OUTPATIENT)
Dept: URBAN - METROPOLITAN AREA CLINIC 65 | Facility: CLINIC | Age: 75
End: 2020-10-23
Payer: COMMERCIAL

## 2020-10-23 VITALS
HEART RATE: 82 BPM | DIASTOLIC BLOOD PRESSURE: 83 MMHG | WEIGHT: 199 LBS | SYSTOLIC BLOOD PRESSURE: 119 MMHG | TEMPERATURE: 97.5 F | HEIGHT: 64 IN

## 2020-10-23 DIAGNOSIS — R19.4 CHANGE IN BOWEL HABIT: ICD-10-CM

## 2020-10-23 DIAGNOSIS — K58.9 IRRITABLE BOWEL SYNDROME WITHOUT DIARRHEA: ICD-10-CM

## 2020-10-23 DIAGNOSIS — R10.33 PERIUMBILICAL PAIN: ICD-10-CM

## 2020-10-23 DIAGNOSIS — R11.10 VOMITING, UNSPECIFIED: ICD-10-CM

## 2020-10-23 PROCEDURE — 99214 OFFICE O/P EST MOD 30 MIN: CPT | Performed by: INTERNAL MEDICINE

## 2020-10-23 RX ORDER — AMITRIPTYLINE HYDROCHLORIDE 25 MG/1
25 TABLET, FILM COATED ORAL
Qty: 30 | Refills: 5 | Status: COMPLETED
Start: 2020-10-23 | End: 2022-07-15

## 2020-11-12 ENCOUNTER — TELEPHONE (OUTPATIENT)
Dept: ORTHOPEDIC SURGERY | Facility: CLINIC | Age: 75
End: 2020-11-12

## 2020-11-12 NOTE — TELEPHONE ENCOUNTER
Provider: DR CHAU   Caller: PT  Phone Number: [phone]  Reason for Call: RIGHT FOOT PAIN         When did it start: 11/10/20  PT HIT HER FOOT ON THE EDGE OF HER BED FRAME 11/10/20. PT HAVING SOME BRUISING AND PAIN IN HER LITTLE TOE. SHE THINKS SHE MAY HAVE DISLOCATED IT .  DR CHAU FIRST AVAILBLE IS 12/9/20 AND SHE WANTED TO SEE IF THERE WAS ANYTHING SOONER

## 2020-11-13 ENCOUNTER — APPOINTMENT (OUTPATIENT)
Dept: GENERAL RADIOLOGY | Facility: HOSPITAL | Age: 75
End: 2020-11-13

## 2020-11-13 PROCEDURE — 73630 X-RAY EXAM OF FOOT: CPT | Performed by: NURSE PRACTITIONER

## 2020-11-13 NOTE — TELEPHONE ENCOUNTER
I cannot see today and at does not sound like something that should wait until Monday.  Recommend that she go to the emergency room at [facility] for evaluation of this as this may require more urgent care and I could see her on Monday afternoon and follow-up.

## 2020-11-13 NOTE — TELEPHONE ENCOUNTER
PATIENT CALLED AND MADE AN APPT TO SEE MWM ON Monday.  S[facility] WILL [facility]

## 2020-11-16 ENCOUNTER — OFFICE VISIT (OUTPATIENT)
Dept: ORTHOPEDIC SURGERY | Facility: CLINIC | Age: 75
End: 2020-11-16

## 2020-11-16 VITALS — HEIGHT: 64 IN | TEMPERATURE: 96.9 F | BODY MASS INDEX: 33.97 KG/M2 | WEIGHT: 199 LBS

## 2020-11-16 DIAGNOSIS — M19.079 ARTHRITIS OF FOOT: Primary | ICD-10-CM

## 2020-11-16 DIAGNOSIS — M19.071 ARTHRITIS OF FIRST METATARSOPHALANGEAL (MTP) JOINT OF RIGHT FOOT: ICD-10-CM

## 2020-11-16 DIAGNOSIS — S92.514A CLOSED NONDISPLACED FRACTURE OF PROXIMAL PHALANX OF LESSER TOE OF RIGHT FOOT, INITIAL ENCOUNTER: ICD-10-CM

## 2020-11-16 PROCEDURE — 99214 OFFICE O/P EST MOD 30 MIN: CPT | Performed by: ORTHOPAEDIC SURGERY

## 2020-11-16 NOTE — PROGRESS NOTES
"   New Patient Complaint      Patient: Mahnaz Becerra  YOB: 1945 75 y.o. female  Medical Record Number: [id number]    Chief Complaints: I hurt my toe    History of Present Illness: Patient injured her right foot on 11/5/2020 when she struck her fifth toe and fourth webspace on a step.  She said it initially looked like it was somewhat out of place and she pushed it back over and felt a \"crunch like a chicken gristle\".  Due to persistent pain she was seen at urgent care on 11/13/2020 where x-rays showed the fifth proximal phalanx fracture that appeared to be in good alignment.  She been using a postoperative shoe since then and reports mild intermittent aching pain with burning redness bruising and swelling in the toe and had some popping at the time of her original injury.  Symptoms worse with standing and walking.        HPI    Allergies:   Allergies   Allergen Reactions   • Flagyl [Metronidazole] Hives and Swelling     Lips and Tongue     • Levofloxacin Swelling and Rash     RED RASH   • Lansoprazole Itching and Other (See Comments)     AND TURN RED   • Cefdinir GI Intolerance     Abdominal pain, caused upset stomach   • Trazodone And Nefazodone Myalgia     Severe muscle cramps       Medications:   Current Outpatient Medications on File Prior to Visit   Medication Sig   • albuterol (VENTOLIN HFA) 108 (90 BASE) MCG/ACT inhaler Inhale 2 puffs Every 6 (Six) Hours As Needed for wheezing.   • clotrimazole-betamethasone (LOTRISONE) 1-0.05 % cream Apply  topically to the appropriate area as directed 2 (Two) Times a Day.   • cyclobenzaprine (FLEXERIL) 10 MG tablet Take 1 tablet by mouth 2 (Two) Times a Day As Needed for Muscle Spasms.   • diclofenac (VOLTAREN) 1 % gel gel Apply pea size amount to area of pain up to four times a day   • dicyclomine (BENTYL) 20 MG tablet Take 1 tablet by mouth As Needed (abd cramps).   • Famotidine-Ca Carb-Mag Hydrox (PEPCID COMPLETE PO) Take  by mouth.   • " hydrochlorothiazide (HYDRODIURIL) 25 MG tablet TAKE 1 TABLET BY MOUTH DAILY   • Multiple Vitamins-Minerals (CENTRUM SILVER PO) Take  by mouth.   • oxyCODONE-acetaminophen (PERCOCET) 7.5-325 MG per tablet 1-2 tabs po q 3-4 hr prn severe pain, wean as tolerated   • pantoprazole (PROTONIX) 40 MG EC tablet TAKE 1 TABLET BY MOUTH TWICE DAILY   • promethazine (PHENERGAN) 12.5 MG tablet Take 1 tablet by mouth Every 6 (Six) Hours As Needed for Nausea or Vomiting.   • saccharomyces boulardii (FLORASTOR) 250 MG capsule Take 250 mg by mouth 2 (Two) Times a Day.   • traMADol (ULTRAM) 50 MG tablet 1-2 Oral Q4H PRN severe pain   • vitamin E 400 UNIT capsule Take 400 Units by mouth Daily.     Current Facility-Administered Medications on File Prior to Visit   Medication   • ipratropium-albuterol (DUO-NEB) nebulizer solution 3 mL       Past Medical History:   Diagnosis Date   • Acid reflux    • Anesthesia complication     ANESTHESIA HAS BROUGHT ON ASTHMA ATTACKS    • Asthma    • Bronchitis    • Charleyhorse     FREQUENT   • Edema     LOWER EXT   • Heart murmur    • History of skin cancer     BACK   • IBS (irritable bowel syndrome)    • Knee pain, right    • MS (multiple sclerosis) (CMS/HCC)    • Osteoarthritis    • PONV (postoperative nausea and vomiting)     Patient states she had post-op nausea and vomiting after hysterectomy in .   • Sleep apnea     CANT TOLERATE CPAP     Past Surgical History:   Procedure Laterality Date   • APPENDECTOMY     • BREAST LUMPECTOMY Bilateral    • BREAST SURGERY      REDUCTION   •  SECTION     • CHOLECYSTECTOMY     • COLONOSCOPY  2012    Dr Moe   • COLONOSCOPY N/A 2020    Procedure: COLONOSCOPY TO CECUM AND TERM. ILEUM WITH BIOPSIES;  Surgeon: Inder Pat MD;  Location: [facility] ENDOSCOPY;  Service: Gastroenterology;  Laterality: N/A;  PRE OP - CHANGE IN BOWEL HABITS, DIARRHEA  POST OP - DIVERTICULOSIS   • COSMETIC SURGERY     • DILATATION AND CURETTAGE     •  ENDOSCOPY N/A 2016    Procedure: ESOPHAGOGASTRODUODENOSCOPY WITH BX;  Surgeon: Nsaim Moe MD;  Location: [facility] ENDOSCOPY;  Service:    • ENDOSCOPY N/A 2020    Procedure: ESOPHAGOGASTRODUODENOSCOPY WITH DUODENAL ASPIRATE, POLYPECTOMY AND BIOPSIES;  Surgeon: Inder Pat MD;  Location: [facility] ENDOSCOPY;  Service: Gastroenterology;  Laterality: N/A;  PRE OP - GERD  POST OP - GASTRIC POLYP, GASTRITIS   • ENDOSCOPY W/ PEG REMOVAL  2012    Dr Moe   • EYE SURGERY Bilateral     BLEPHAROPLASTY   • HYSTERECTOMY     • KNEE ARTHROSCOPY Right 2016    Procedure: KNEE ARTHROSCOPY, PARTIAL MEDIAL AND LATERAL MENISECTOMY, CHONDROPLASTY OF THE PATELLA, REMOVAL OF LOOSE BODIES;  Surgeon: Artur Pat MD;  Location: [facility] OR OSC;  Service:    • KNEE ARTHROSCOPY W/ MENISCAL REPAIR Left    • OOPHORECTOMY Bilateral    • TONSILLECTOMY     • TOTAL KNEE ARTHROPLASTY Left 2018    Procedure: LEFT TOTAL KNEE ARTHROPLASTY WITH MARGRET NAVIGATION;  Surgeon: Artur Pat MD;  Location: [facility] MAIN OR;  Service:      Social History     Occupational History   • Not on file   Tobacco Use   • Smoking status: Former Smoker     Packs/day: 0.25     Types: Cigarettes     Quit date:      Years since quittin.9   • Smokeless tobacco: Never Used   • Tobacco comment: Patient states she was a social smoker.   Substance and Sexual Activity   • Alcohol use: Yes     Comment: Occasional   • Drug use: No   • Sexual activity: Defer      Social History     Social History Narrative   • Not on file     Family History   Problem Relation Age of Onset   • Hypertension Mother    • Stroke Mother    • Alzheimer's disease Mother    • Heart disease Father    • Emphysema Father    • Stroke Maternal Grandmother    • Cancer Maternal Grandfather    • No Known Problems Paternal Grandmother    • Cerebral aneurysm Paternal Grandfather    • Malig Hyperthermia Neg Hx        Review of Systems: 14 point review of systems  "performed, positive pertinent findings identified in HPI. All remaining systems negative except wheezing, leg swelling, abdominal pain, muscle aches, tremors    Review of Systems      Physical Exam:   Vitals:    11/16/20 1549   Temp: 96.9 °F (36.1 °C)   Weight: 90.3 kg (199 lb)   Height: 162.6 cm (64\")     Physical Exam   Constitutional: pleasant, well developed   Eyes: sclera non icteric  Hearing : adequate for exam  Cardiovascular: palpable pulses in right foot, right calf/ thigh NT without sign of DVT  Respiratoy: breathing unlabored   Neurological: grossly sensate to LT throughout right LE  Psychiatric: oriented with normal mood and affect.   Lymphatic: No palpable popliteal lymphadenopathy right LE  Skin: intact throughout right leg/foot  Musculoskeletal: Right foot shows resolving ecchymosis in the lesser toes there was slight discomfort to palpation on the fifth toe but no clinical deformity and no tenderness along the lesser toes otherwise.  She was nontender with dorsum of the midfoot  Physical Exam  Ortho Exam    Radiology: 3 views of the right foot reviewed on the [employer] system from 11/13/2020 which shows an oblique essentially nondisplaced fracture of the fifth proximal phalanx.  Also arthritic change of the first MTP joint with some hammering of the lesser toes and arthritis at the midfoot.    Assessment/Plan: Right fifth toe proximal phalanx fracture.    Reviewed her that she probably had a displaced fracture that she reduced and appears to be in good alignment and nothing I would recommend from a surgical standpoint as she has good alignment on x-rays and clinically.  We will have her use a silicone spacer that stem between the fourth and fifth toes and gently swapnil tape this and was fitted with a better fitting postoperative shoe and will limit activities to that of daily living only.    We will see her back in about 4 weeks with x-rays of her right foot.    Recommend the patient see primary care " provider for issues outlined in review of symptoms not pertinent to current orthopedic complaints

## 2020-11-17 ENCOUNTER — TELEPHONE (OUTPATIENT)
Dept: ORTHOPEDICS | Facility: OTHER | Age: 75
End: 2020-11-17

## 2020-11-17 NOTE — TELEPHONE ENCOUNTER
PT CALLING IN SAYING SHE HAD AN APPT YESTERDAY ON 11/16. SHE BROKE HER PINKY TOE AND HAD IT WRAPPED DURING APPT. SHE WOKE UP IN THE MIDDLE OF THE NIGHT WITH IT THROBBING SO SHE REMOVED THE TAPE/TOE SEPARATOR OFF AND SHE HAS HER FOOT IN THE PRE OP BOOT WITH A SOCK ON .     SHE IS WANTING TO LET YOU KNOW AND SEE WHAT ELSE SHE CAN DO OR SHOULD DO.     PLEASE ADVISE.     [facility]- [phone]

## 2020-11-17 NOTE — TELEPHONE ENCOUNTER
Returned patient's call she was seen in the office yesterday and we had swapnil taped her fourth and fifth toes with a small silicone spacer between them using Coban and recommend use of postop shoe.  She woke during the night with quite a bit of throbbing in the fourth and fifth toes and so there is still fairly painful but no deformity.  She is can use a closed toed sock and postoperative shoe for the next several days limit activity on this and if symptoms of settle down some she can get back to swapnil taping with maybe a small piece of gauze or cotton between them and more loosely just to keep this protected and continue with postoperative shoe.  She appreciated the call and will call if she has any further problems or questions

## 2020-12-14 ENCOUNTER — OFFICE VISIT (OUTPATIENT)
Dept: ORTHOPEDIC SURGERY | Facility: CLINIC | Age: 75
End: 2020-12-14

## 2020-12-14 VITALS — HEIGHT: 64 IN | BODY MASS INDEX: 33.99 KG/M2 | TEMPERATURE: 96.6 F | WEIGHT: 199.08 LBS

## 2020-12-14 DIAGNOSIS — S92.514A CLOSED NONDISPLACED FRACTURE OF PROXIMAL PHALANX OF LESSER TOE OF RIGHT FOOT, INITIAL ENCOUNTER: ICD-10-CM

## 2020-12-14 DIAGNOSIS — M19.071 ARTHRITIS OF FIRST METATARSOPHALANGEAL (MTP) JOINT OF RIGHT FOOT: ICD-10-CM

## 2020-12-14 DIAGNOSIS — R52 PAIN: Primary | ICD-10-CM

## 2020-12-14 DIAGNOSIS — M19.079 ARTHRITIS OF FOOT: ICD-10-CM

## 2020-12-14 PROCEDURE — 99214 OFFICE O/P EST MOD 30 MIN: CPT | Performed by: ORTHOPAEDIC SURGERY

## 2020-12-14 PROCEDURE — 73630 X-RAY EXAM OF FOOT: CPT | Performed by: ORTHOPAEDIC SURGERY

## 2020-12-14 NOTE — PROGRESS NOTES
"Foot Follow Up      Patient: Mahnaz Becerra    YOB: 1945 75 y.o. female    Chief Complaints: Little toe feels better    History of Present Illness: Patient was seen on 11/16/2020 after injuring her right foot on 11/5/2020 when she struck her fifth toe and fourth webspace on a step and had a but sound like a displaced fracture that she reduced and was seen in the emergency room.  When I saw her there was good alignment of the toe and no surgical recommendations were made.    She was instructed on swapnil taping techniques for the fourth and fifth toe and fitted with a postoperative shoe.    She denied a postoperative shoe now for about a week using athletic shoes said that her fifth toe feels much better only a slight occasional twinge.    She does report however that she has had some intermittent mild to moderate discomfort over the dorsal medial aspect of the right first MTP joint where shoes rub in this area but really no pain with range of motion has not had any ulceration.      HPI    ROS: Foot pain  Past Medical History:   Diagnosis Date   • Acid reflux    • Anesthesia complication     ANESTHESIA HAS BROUGHT ON ASTHMA ATTACKS    • Asthma    • Bronchitis    • Charleyhorse     FREQUENT   • Edema     LOWER EXT   • Heart murmur    • History of skin cancer     BACK   • IBS (irritable bowel syndrome)    • Knee pain, right    • MS (multiple sclerosis) (CMS/HCC)    • Osteoarthritis    • PONV (postoperative nausea and vomiting)     Patient states she had post-op nausea and vomiting after hysterectomy in 1987.   • Sleep apnea     CANT TOLERATE CPAP     Physical Exam:   Vitals:    12/14/20 1146   Temp: 96.6 °F (35.9 °C)   TempSrc: Temporal   Weight: 90.3 kg (199 lb 1.2 oz)   Height: 162.6 cm (64.02\")   PainSc:   1   PainLoc: Foot     Well developed with normal mood.  On exam her right fifth toe shows slight swelling but no clinical deformity and minimal discomfort to palpation.  Right first MTP joint shows " palpable dorsal medial osteophyte with tenderness to palpation but no overlying skin breakdown.  She had about 40 degrees dorsiflexion 10 degrees plantarflexion but without pain with range of motion or with axial grind testing.  There was moderate hammering of the right second toe.  She did not seem to have any focal pain over the anterior ankle today with range of motion    Radiology: 3 views of the right foot ordered evaluate fracture alignment reviewed and compared to previous x-rays on the [employer] system these show good alignment to the oblique fifth toe proximal phalanx fracture without obvious intra-articular step-off.  These also show arthritic change of the first MTP joint with mild hallux valgus as well as some hammering of the right second toe as well as arthritic change of the anterior aspect of the ankle.      Assessment/Plan:.  Right fifth toe proximal phalanx fracture  2.  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  Right second hammertoe  4.  Right ankle arthritis    We discussed treatment options and nothing I would recommend is for surgical treatment for her fifth toe.    The ankle does not seem to be symptomatic at this time we discussed treatment options for her first MTP joint.  I do not really think this would be amenable to bunion correction given her arthritic change but she does not want to have a fusion and really does not have pain with range of motion but has pain in the dorsal medial prominence.  This may be amenable to a simple cheilectomy as the easiest thing to help relieve her pain with shoe wear and possibly do something with her second hammertoe if it is bothersome to her.    She does not want to do anything with it at this point release to get through the holidays so we had her fitted with a bunion sleeve and may apply Voltaren gel to the area twice daily and she declined any extra-depth shoes.    We will see her back in about 4 weeks with x-rays of her right foot

## 2021-01-11 ENCOUNTER — OFFICE VISIT (OUTPATIENT)
Dept: ORTHOPEDIC SURGERY | Facility: CLINIC | Age: 76
End: 2021-01-11

## 2021-01-11 VITALS — WEIGHT: 199 LBS | HEIGHT: 64 IN | TEMPERATURE: 95.1 F | BODY MASS INDEX: 33.97 KG/M2

## 2021-01-11 DIAGNOSIS — S92.514D CLOSED NONDISPLACED FRACTURE OF PROXIMAL PHALANX OF LESSER TOE OF RIGHT FOOT WITH ROUTINE HEALING, SUBSEQUENT ENCOUNTER: ICD-10-CM

## 2021-01-11 DIAGNOSIS — M19.071 ARTHRITIS OF FIRST METATARSOPHALANGEAL (MTP) JOINT OF RIGHT FOOT: Primary | ICD-10-CM

## 2021-01-11 DIAGNOSIS — R52 PAIN: ICD-10-CM

## 2021-01-11 PROCEDURE — 99214 OFFICE O/P EST MOD 30 MIN: CPT | Performed by: ORTHOPAEDIC SURGERY

## 2021-01-11 PROCEDURE — 73630 X-RAY EXAM OF FOOT: CPT | Performed by: ORTHOPAEDIC SURGERY

## 2021-01-11 RX ORDER — CEFAZOLIN SODIUM 2 G/100ML
2 INJECTION, SOLUTION INTRAVENOUS ONCE
Status: CANCELLED | OUTPATIENT
Start: 2021-02-26 | End: 2021-01-11

## 2021-01-11 NOTE — PROGRESS NOTES
"Foot Follow Up      Patient: Mahnaz Becerra    YOB: 1945 75 y.o. female    Chief Complaints: Foot doing okay    History of Present Illness:Patient was seen on 11/16/2020 after injuring her right foot on 11/5/2020 when she struck her fifth toe and fourth webspace on a step and had a but sound like a displaced fracture that she reduced and was seen in the emergency room.  When I saw her there was good alignment of the toe and no surgical recommendations were made.    She was last seen on 12/14/2020 and her fifth toe has continued to improve and she been using athletic shoes without any significant complaints of pain around the fifth toe.    She does however have continued intermittent mild to moderate discomfort posterior medial aspect of her right first MTP joint only when she is red in that area but no pain otherwise has not had any skin breakdown.  She has been using a bunion sleeve over this area with some relief but is interested in more definitive treatment.  HPI    ROS: Foot pain  Past Medical History:   Diagnosis Date   • Acid reflux    • Anesthesia complication     ANESTHESIA HAS BROUGHT ON ASTHMA ATTACKS    • Asthma    • Bronchitis    • Charleyhorse     FREQUENT   • Edema     LOWER EXT   • Heart murmur    • History of skin cancer     BACK   • IBS (irritable bowel syndrome)    • Knee pain, right    • MS (multiple sclerosis) (CMS/HCC)    • Osteoarthritis    • PONV (postoperative nausea and vomiting)     Patient states she had post-op nausea and vomiting after hysterectomy in 1987.   • Sleep apnea     CANT TOLERATE CPAP     Physical Exam:   Vitals:    01/11/21 1447   Temp: 95.1 °F (35.1 °C)   TempSrc: Temporal   Weight: 90.3 kg (199 lb)   Height: 162.6 cm (64\")     Well developed with normal mood.  Right fifth toe shows slight swelling and no focal discomfort to palpation.    First MTP joint shows palpable dorsal medial osteophyte with tenderness to palpation but no overlying skin breakdown.  " There is no pain to range of motion of the first MTP joint with 4 degrees dorsiflexion 10 degrees plantarflexion and no pain with axial grind testing.  Moderate hammering of the second toe but no focal tenderness to palpation.  No pain with range of motion of the ankle.          Radiology: 3 views of the right foot ordered evaluate fracture alignment reviewed and compared to previous x-rays.  The fifth toe proximal phalanx fracture appears to be healing in acceptable alignment without significant displacement.  There remains moderate arthritic change of the first MTP joint with mild hallux valgus deformity.  There is some mild cock up of the second toe with some relative elongation of the metatarsal compared to the third.    Assessment/Plan:  Right fifth toe proximal phalanx fracture  2.  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  Right second hammertoe  4.  Right ankle arthritis    We discussed treatment options and the fifth toe seems to be doing well and does not need any further work-up or surgical treatment.    The ankle is not bothersome to her and the only thing that bothers her is the dorsal medial aspect of the first MTP joint when shoes rub on it.    We discussed continued nonoperative versus operative treatment measures and she requests proceed with operative treatment but would like to do the least invasive thing possible.    Given the fact that her symptoms remain mainly over the dorsal medial aspect of the cheilectomy of the dorsal first metatarsal joint with her some bony prominence the simplest thing to do would be to perform a simple cheilectomy to this area to remove these spurs but not doing any fusion implant etc. and the second toe is not bothersome enough to do anything with.    Therefore we will plan on right first MTP cheilectomy only.  She voiced a clear understanding the operative procedure and postoperative course and that she would limited in weightbearing for 3 to 4 weeks and  be unlikely return to regular shoe wear for at least 6 to 8 weeks.    She voiced a clear understanding the operative procedure with associated risk benefits potential outcomes and complications which can include but not limited to heart attack stroke death pneumonia infection bleeding damage to blood vessels nerves or tendons blood clots pulmonary embolism persistent or worsening pain stiffness need for subsequent surgery and failure to return to presurgery and precondition levels of activity as well as wound healing complications.    All of her questions answered to her full satisfaction we will plan to proceed with this on outpatient basis at a mutually convenient time and she was encouraged to call if she has any questions prior to surgery

## 2021-01-13 ENCOUNTER — OFFICE VISIT (OUTPATIENT)
Dept: ORTHOPEDIC SURGERY | Facility: CLINIC | Age: 76
End: 2021-01-13

## 2021-01-13 VITALS — WEIGHT: 204 LBS | BODY MASS INDEX: 34.83 KG/M2 | TEMPERATURE: 97.1 F | HEIGHT: 64 IN

## 2021-01-13 DIAGNOSIS — M70.61 TROCHANTERIC BURSITIS OF RIGHT HIP: Primary | ICD-10-CM

## 2021-01-13 DIAGNOSIS — M17.11 ARTHRITIS OF RIGHT KNEE: ICD-10-CM

## 2021-01-13 PROCEDURE — 20610 DRAIN/INJ JOINT/BURSA W/O US: CPT | Performed by: ORTHOPAEDIC SURGERY

## 2021-01-13 PROCEDURE — 99214 OFFICE O/P EST MOD 30 MIN: CPT | Performed by: ORTHOPAEDIC SURGERY

## 2021-01-13 RX ORDER — METHYLPREDNISOLONE ACETATE 80 MG/ML
80 INJECTION, SUSPENSION INTRA-ARTICULAR; INTRALESIONAL; INTRAMUSCULAR; SOFT TISSUE
Status: COMPLETED | OUTPATIENT
Start: 2021-01-13 | End: 2021-01-13

## 2021-01-13 RX ADMIN — METHYLPREDNISOLONE ACETATE 80 MG: 80 INJECTION, SUSPENSION INTRA-ARTICULAR; INTRALESIONAL; INTRAMUSCULAR; SOFT TISSUE at 07:48

## 2021-01-13 NOTE — PROGRESS NOTES
Patient Name: Mahnaz Becerra   YOB: 1945  Referring Primary Care Physician: Stephen Wilkes MD  BMI: Body mass index is 35.02 kg/m².    Chief Complaint:    Chief Complaint   Patient presents with   • Right Knee - Follow-up   Right hip pain    HPI:     Mahnaz Becerra is a 75 y.o. female who presents today for evaluation of   Chief Complaint   Patient presents with   • Right Knee - Follow-up   .  Jocelynn is seen today complaining of right hip greater than right knee pain.  She is had a left total knee in the past but the right knee still has arthritis and bothers her she said she broke a toe was placed in a boot and was limping and now she has right hip hurting her laterally more than the knee.  When she gets more than 1 cortisone injection she tends to get very flushed and she likes to stage them.      Subjective   Medications:   Home Medications:  Current Outpatient Medications on File Prior to Visit   Medication Sig   • albuterol (VENTOLIN HFA) 108 (90 BASE) MCG/ACT inhaler Inhale 2 puffs Every 6 (Six) Hours As Needed for wheezing.   • clotrimazole-betamethasone (LOTRISONE) 1-0.05 % cream Apply  topically to the appropriate area as directed 2 (Two) Times a Day.   • cyclobenzaprine (FLEXERIL) 10 MG tablet Take 1 tablet by mouth 2 (Two) Times a Day As Needed for Muscle Spasms.   • diclofenac (VOLTAREN) 1 % gel gel Apply pea size amount to area of pain up to four times a day   • Diclofenac Sodium (VOLTAREN) 1 % gel gel Apply 4 g topically to the appropriate area as directed 2 (Two) Times a Day. Use per pkg insert   • dicyclomine (BENTYL) 20 MG tablet Take 1 tablet by mouth As Needed (abd cramps).   • Famotidine-Ca Carb-Mag Hydrox (PEPCID COMPLETE PO) Take  by mouth.   • hydrochlorothiazide (HYDRODIURIL) 25 MG tablet TAKE 1 TABLET BY MOUTH DAILY   • Multiple Vitamins-Minerals (CENTRUM SILVER PO) Take  by mouth.   • oxyCODONE-acetaminophen (PERCOCET) 7.5-325 MG per tablet 1-2 tabs po q 3-4 hr prn  severe pain, wean as tolerated   • pantoprazole (PROTONIX) 40 MG EC tablet TAKE 1 TABLET BY MOUTH TWICE DAILY   • promethazine (PHENERGAN) 12.5 MG tablet Take 1 tablet by mouth Every 6 (Six) Hours As Needed for Nausea or Vomiting.   • saccharomyces boulardii (FLORASTOR) 250 MG capsule Take 250 mg by mouth 2 (Two) Times a Day.   • traMADol (ULTRAM) 50 MG tablet 1-2 Oral Q4H PRN severe pain   • vitamin E 400 UNIT capsule Take 400 Units by mouth Daily.     Current Facility-Administered Medications on File Prior to Visit   Medication   • ipratropium-albuterol (DUO-NEB) nebulizer solution 3 mL     Current Medications:  Scheduled Meds:ipratropium-albuterol, 3 mL, Nebulization, 4x Daily - RT      Continuous Infusions:   PRN Meds:.    I have reviewed the patient's medical history in detail and updated the computerized patient record.  Review and summarization of old records includes:    Past Medical History:   Diagnosis Date   • Acid reflux    • Anesthesia complication     ANESTHESIA HAS BROUGHT ON ASTHMA ATTACKS    • Asthma    • Bronchitis    • Charleyhorse     FREQUENT   • Edema     LOWER EXT   • Heart murmur    • History of skin cancer     BACK   • IBS (irritable bowel syndrome)    • Knee pain, right    • MS (multiple sclerosis) (CMS/HCC)    • Osteoarthritis    • PONV (postoperative nausea and vomiting)     Patient states she had post-op nausea and vomiting after hysterectomy in .   • Sleep apnea     CANT TOLERATE CPAP        Past Surgical History:   Procedure Laterality Date   • APPENDECTOMY     • BREAST LUMPECTOMY Bilateral    • BREAST SURGERY      REDUCTION   •  SECTION     • CHOLECYSTECTOMY     • COLONOSCOPY  2012    Dr Moe   • COLONOSCOPY N/A 2020    Procedure: COLONOSCOPY TO CECUM AND TERM. ILEUM WITH BIOPSIES;  Surgeon: Inder Pat MD;  Location[facility] ENDOSCOPY;  Service: Gastroenterology;  Laterality: N/A;  PRE OP - CHANGE IN BOWEL HABITS, DIARRHEA  POST OP -  DIVERTICULOSIS   • COSMETIC SURGERY     • DILATATION AND CURETTAGE     • ENDOSCOPY N/A 2016    Procedure: ESOPHAGOGASTRODUODENOSCOPY WITH BX;  Surgeon: Nasim Moe MD;  Location: [facility] ENDOSCOPY;  Service:    • ENDOSCOPY N/A 2020    Procedure: ESOPHAGOGASTRODUODENOSCOPY WITH DUODENAL ASPIRATE, POLYPECTOMY AND BIOPSIES;  Surgeon: Inder Pat MD;  Location: [facility] ENDOSCOPY;  Service: Gastroenterology;  Laterality: N/A;  PRE OP - GERD  POST OP - GASTRIC POLYP, GASTRITIS   • ENDOSCOPY W/ PEG REMOVAL  2012    Dr Moe   • EYE SURGERY Bilateral     BLEPHAROPLASTY   • HYSTERECTOMY     • KNEE ARTHROSCOPY Right 2016    Procedure: KNEE ARTHROSCOPY, PARTIAL MEDIAL AND LATERAL MENISECTOMY, CHONDROPLASTY OF THE PATELLA, REMOVAL OF LOOSE BODIES;  Surgeon: Artur Pat MD;  Location: [facility] OR [facility];  Service:    • KNEE ARTHROSCOPY W/ MENISCAL REPAIR Left    • OOPHORECTOMY Bilateral    • TONSILLECTOMY     • TOTAL KNEE ARTHROPLASTY Left 2018    Procedure: LEFT TOTAL KNEE ARTHROPLASTY WITH MARGRET NAVIGATION;  Surgeon: Artur Pat MD;  Location: [facility] MAIN OR;  Service:         Social History     Occupational History   • Not on file   Tobacco Use   • Smoking status: Former Smoker     Packs/day: 0.25     Types: Cigarettes     Quit date:      Years since quittin.0   • Smokeless tobacco: Never Used   • Tobacco comment: Patient states she was a social smoker.   Substance and Sexual Activity   • Alcohol use: Yes     Comment: Occasional   • Drug use: No   • Sexual activity: Defer      Social History     Social History Narrative   • Not on file        Family History   Problem Relation Age of Onset   • Hypertension Mother    • Stroke Mother    • Alzheimer's disease Mother    • Heart disease Father    • Emphysema Father    • Stroke Maternal Grandmother    • Cancer Maternal Grandfather    • No Known Problems Paternal Grandmother    • Cerebral aneurysm Paternal Grandfather   "  • Malig Hyperthermia Neg Hx        ROS: 14 point review of systems was performed and all other systems were reviewed and are negative except for documented findings in HPI and today's encounter.     Allergies:   Allergies   Allergen Reactions   • Flagyl [Metronidazole] Hives and Swelling     Lips and Tongue     • Levofloxacin Swelling and Rash     RED RASH   • Lansoprazole Itching and Other (See Comments)     AND TURN RED   • Cefdinir GI Intolerance     Abdominal pain, caused upset stomach   • Trazodone And Nefazodone Myalgia     Severe muscle cramps     Constitutional:  Denies fever, shaking or chills   Eyes:  Denies change in visual acuity   HENT:  Denies nasal congestion or sore throat   Respiratory:  Denies cough or shortness of breath   Cardiovascular:  Denies chest pain or severe LE edema   GI:  Denies abdominal pain, nausea, vomiting, bloody stools or diarrhea   Musculoskeletal:  Numbness, tingling, pain, or loss of motor function only as noted above in history of present illness.  : Denies painful urination or hematuria  Integument:  Denies rash, lesion or ulceration   Neurologic:  Denies headache or focal weakness  Endocrine:  Denies lymphadenopathy  Psych:  Denies confusion or change in mental status   Hem:  Denies active bleeding    OBJECTIVE:  Physical Exam: 75 y.o. female  Wt Readings from Last 3 Encounters:   01/13/21 92.5 kg (204 lb)   01/11/21 90.3 kg (199 lb)   12/14/20 90.3 kg (199 lb 1.2 oz)     Ht Readings from Last 1 Encounters:   01/13/21 162.6 cm (64\")     Body mass index is 35.02 kg/m².  Vitals:    01/13/21 1356   Temp: 97.1 °F (36.2 °C)     Vital signs reviewed.     General Appearance:    Alert, cooperative, in no acute distress                  Eyes: conjunctiva clear  ENT: external ears and nose atraumatic  CV: no peripheral edema  Resp: normal respiratory effort  Skin: no rashes or wounds; normal turgor  Psych: mood and affect appropriate  Lymph: no nodes appreciated  Neuro: gross " "sensation intact  Vascular:  Palpable peripheral pulse in noted extremity  Musculoskeletal Extremities: Her right hip has pretty good internal and external rotation without a lot of pain she is point tender over the lateral trochanter and diffusely in her lower lumbosacral area.  Exam is consistent with lateral hip pain/\"trochanteric\" bursitis.  Her right knee is swollen crepitation stiffness medial joint line tenderness    Radiology:   Could not find any hip x-rays and was an add-on complaint most recent AP lateral 40 degree PA right knee taken in October with comparison view shows severe arthritis right knee with a well-placed total knee arthroplasty on the left    Assessment:     ICD-10-CM ICD-9-CM   1. Trochanteric bursitis of right hip  M70.61 726.5   2. Arthritis of right knee  M17.11 716.96        Large Joint Arthrocentesis: R greater trochanteric bursa  Date/Time: 1/13/2021 7:48 AM  Consent given by: patient  Site marked: site marked  Timeout: Immediately prior to procedure a time out was called to verify the correct patient, procedure, equipment, support staff and site/side marked as required   Supporting Documentation  Indications: pain   Procedure Details  Location: hip - R greater trochanteric bursa  Preparation: Patient was prepped and draped in the usual sterile fashion  Needle gauge: 21.  Approach: anterolateral  Medications administered: 4 mL lidocaine (cardiac); 80 mg methylPREDNISolone acetate 80 MG/ML  Patient tolerance: patient tolerated the procedure well with no immediate complications             Plan: The diagnosis(es), natural history, pathophysiology and treatment for diagnosis(es) were discussed. Opportunity given and questions answered.  Biomechanics of pertinent body areas discussed.  When appropriate, the use of ambulatory aids discussed.  Inflammation/pain control; with cold, heat, elevation and/or liniments discussed as appropriate  Cortisone Injection. See procedure note.  Went ahead " and injected her right hip.  I am assuming this will get better.  If it is not better when I see her back in a month to inject her knee I would like to get hip x-rays at that time just to make sure.    1/13/2021    Much of this encounter note is an electronic transcription/translation of spoken language to printed text. The electronic translation of spoken language may permit erroneous, or at times, nonsensical words or phrases to be inadvertently transcribed; Although I have reviewed the note for such errors, some may still exist

## 2021-02-11 ENCOUNTER — TRANSCRIBE ORDERS (OUTPATIENT)
Dept: PREADMISSION TESTING | Facility: HOSPITAL | Age: 76
End: 2021-02-11

## 2021-02-11 DIAGNOSIS — Z01.818 OTHER SPECIFIED PRE-OPERATIVE EXAMINATION: Primary | ICD-10-CM

## 2021-02-17 ENCOUNTER — TELEPHONE (OUTPATIENT)
Dept: ORTHOPEDIC SURGERY | Facility: CLINIC | Age: 76
End: 2021-02-17

## 2021-02-17 ENCOUNTER — APPOINTMENT (OUTPATIENT)
Dept: PREADMISSION TESTING | Facility: HOSPITAL | Age: 76
End: 2021-02-17

## 2021-02-24 ENCOUNTER — LAB (OUTPATIENT)
Dept: LAB | Facility: HOSPITAL | Age: 76
End: 2021-02-24

## 2021-02-24 DIAGNOSIS — Z01.818 OTHER SPECIFIED PRE-OPERATIVE EXAMINATION: ICD-10-CM

## 2021-02-25 ENCOUNTER — OFFICE VISIT (OUTPATIENT)
Dept: ORTHOPEDIC SURGERY | Facility: CLINIC | Age: 76
End: 2021-02-25

## 2021-02-25 VITALS — BODY MASS INDEX: 34.82 KG/M2 | WEIGHT: 203.93 LBS | HEIGHT: 64 IN | TEMPERATURE: 97.1 F

## 2021-02-25 DIAGNOSIS — R52 PAIN: ICD-10-CM

## 2021-02-25 DIAGNOSIS — M25.551 LATERAL PAIN OF RIGHT HIP: ICD-10-CM

## 2021-02-25 DIAGNOSIS — M17.11 ARTHRITIS OF RIGHT KNEE: Primary | ICD-10-CM

## 2021-02-25 DIAGNOSIS — M51.36 DDD (DEGENERATIVE DISC DISEASE), LUMBAR: ICD-10-CM

## 2021-02-25 PROCEDURE — 99213 OFFICE O/P EST LOW 20 MIN: CPT | Performed by: ORTHOPAEDIC SURGERY

## 2021-02-25 PROCEDURE — 73562 X-RAY EXAM OF KNEE 3: CPT | Performed by: ORTHOPAEDIC SURGERY

## 2021-02-25 PROCEDURE — 20610 DRAIN/INJ JOINT/BURSA W/O US: CPT | Performed by: ORTHOPAEDIC SURGERY

## 2021-02-25 RX ADMIN — METHYLPREDNISOLONE ACETATE 80 MG: 80 INJECTION, SUSPENSION INTRA-ARTICULAR; INTRALESIONAL; INTRAMUSCULAR; SOFT TISSUE at 10:34

## 2021-02-25 NOTE — PROGRESS NOTES
Patient Name: Mahnaz Becerra   YOB: 1945  Referring Primary Care Physician: Stephen Wilkes MD  BMI: Body mass index is 34.99 kg/m².    Chief Complaint:    Chief Complaint   Patient presents with   • Left Knee - Follow-up, Pain        HPI:     Mahnaz Becerra is a 75 y.o. female who presents today for evaluation of   Chief Complaint   Patient presents with   • Left Knee - Follow-up, Pain   . Mahnaz was seen approximately 1 month ago for right lateral hip pain. At that time, we performed a lateral injection which seems to have been significantly helpful. She reports experiencing recurrent right knee pain pain. Mahnaz describes her right knee as being sore and stiff. She has previously undergone a left total knee arthroplasty which has done well for her. She also has a history of MS, and is interested in a local neurology referral.         Subjective   Medications:   Home Medications:  Current Outpatient Medications on File Prior to Visit   Medication Sig   • albuterol (VENTOLIN HFA) 108 (90 BASE) MCG/ACT inhaler Inhale 2 puffs Every 6 (Six) Hours As Needed for wheezing.   • clotrimazole-betamethasone (LOTRISONE) 1-0.05 % cream Apply  topically to the appropriate area as directed 2 (Two) Times a Day.   • cyclobenzaprine (FLEXERIL) 10 MG tablet Take 1 tablet by mouth 2 (Two) Times a Day As Needed for Muscle Spasms.   • diclofenac (VOLTAREN) 1 % gel gel Apply pea size amount to area of pain up to four times a day   • Diclofenac Sodium (VOLTAREN) 1 % gel gel Apply 4 g topically to the appropriate area as directed 2 (Two) Times a Day. Use per pkg insert   • dicyclomine (BENTYL) 20 MG tablet Take 1 tablet by mouth As Needed (abd cramps).   • Famotidine-Ca Carb-Mag Hydrox (PEPCID COMPLETE PO) Take  by mouth.   • hydrochlorothiazide (HYDRODIURIL) 25 MG tablet TAKE 1 TABLET BY MOUTH DAILY   • Multiple Vitamins-Minerals (CENTRUM SILVER PO) Take  by mouth.   • oxyCODONE-acetaminophen (PERCOCET) 7.5-325 MG per  tablet 1-2 tabs po q 3-4 hr prn severe pain, wean as tolerated   • pantoprazole (PROTONIX) 40 MG EC tablet TAKE 1 TABLET BY MOUTH TWICE DAILY   • promethazine (PHENERGAN) 12.5 MG tablet Take 1 tablet by mouth Every 6 (Six) Hours As Needed for Nausea or Vomiting.   • saccharomyces boulardii (FLORASTOR) 250 MG capsule Take 250 mg by mouth 2 (Two) Times a Day.   • traMADol (ULTRAM) 50 MG tablet 1-2 Oral Q4H PRN severe pain   • vitamin E 400 UNIT capsule Take 400 Units by mouth Daily.     Current Facility-Administered Medications on File Prior to Visit   Medication   • ipratropium-albuterol (DUO-NEB) nebulizer solution 3 mL     Current Medications:  Scheduled Meds:ipratropium-albuterol, 3 mL, Nebulization, 4x Daily - RT      Continuous Infusions:   PRN Meds:.    I have reviewed the patient's medical history in detail and updated the computerized patient record.  Review and summarization of old records includes:    Past Medical History:   Diagnosis Date   • Acid reflux    • Anesthesia complication     ANESTHESIA HAS BROUGHT ON ASTHMA ATTACKS    • Asthma    • Bronchitis    • [place]     FREQUENT   • Edema     LOWER EXT   • Heart murmur    • History of skin cancer     BACK   • IBS (irritable bowel syndrome)    • Knee pain, right    • MS (multiple sclerosis) (CMS/HCC)    • Osteoarthritis    • PONV (postoperative nausea and vomiting)     Patient states she had post-op nausea and vomiting after hysterectomy in .   • Sleep apnea     CANT TOLERATE CPAP        Past Surgical History:   Procedure Laterality Date   • APPENDECTOMY     • BREAST LUMPECTOMY Bilateral    • BREAST SURGERY      REDUCTION   •  SECTION     • CHOLECYSTECTOMY     • COLONOSCOPY  2012    Dr Moe   • COLONOSCOPY N/A 2020    Procedure: COLONOSCOPY TO CECUM AND TERM. ILEUM WITH BIOPSIES;  Surgeon: Inder Pat MD;  Location: [facility] ENDOSCOPY;  Service: Gastroenterology;  Laterality: N/A;  PRE OP - CHANGE IN BOWEL HABITS,  DIARRHEA  POST OP - DIVERTICULOSIS   • COSMETIC SURGERY     • DILATATION AND CURETTAGE     • ENDOSCOPY N/A 2016    Procedure: ESOPHAGOGASTRODUODENOSCOPY WITH BX;  Surgeon: Nasim Moe MD;  Location: [facility] ENDOSCOPY;  Service:    • ENDOSCOPY N/A 2020    Procedure: ESOPHAGOGASTRODUODENOSCOPY WITH DUODENAL ASPIRATE, POLYPECTOMY AND BIOPSIES;  Surgeon: Inder Pat MD;  [facility] END[facility];  Service: Gastroenterology;  Laterality: N/A;  PRE OP - GERD  POST OP - GASTRIC POLYP, GASTRITIS   • ENDOSCOPY W/ PEG REMOVAL  2012    Dr Moe   • EYE SURGERY Bilateral     BLEPHAROPLASTY   • HYSTERECTOMY     • KNEE ARTHROSCOPY Right 2016    Procedure: KNEE ARTHROSCOPY, PARTIAL MEDIAL AND LATERAL MENISECTOMY, CHONDROPLASTY OF THE PATELLA, REMOVAL OF LOOSE BODIES;  Surgeon: Artur Pat MD;  Location: [facility] OR OSC;  Service:    • KNEE ARTHROSCOPY W/ MENISCAL REPAIR Left    • OOPHORECTOMY Bilateral    • TONSILLECTOMY     • TOTAL KNEE ARTHROPLASTY Left 2018    Procedure: LEFT TOTAL KNEE ARTHROPLASTY WITH MARGRET NAVIGATION;  Surgeon: Artur Pat MD;  Location: [facility] MAIN OR;  Service:         Social History     Occupational History   • Not on file   Tobacco Use   • Smoking status: Former Smoker     Packs/day: 0.25     Types: Cigarettes     Quit date:      Years since quittin.1   • Smokeless tobacco: Never Used   • Tobacco comment: Patient states she was a social smoker.   Substance and Sexual Activity   • Alcohol use: Yes     Comment: Occasional   • Drug use: No   • Sexual activity: Defer      Social History     Social History Narrative   • Not on file        Family History   Problem Relation Age of Onset   • Hypertension Mother    • Stroke Mother    • Alzheimer's disease Mother    • Heart disease Father    • Emphysema Father    • Stroke Maternal Grandmother    • Cancer Maternal Grandfather    • No Known Problems Paternal Grandmother    • Cerebral aneurysm  "Paternal Grandfather    • Malig Hyperthermia Neg Hx        ROS: 14 point review of systems was performed and all other systems were reviewed and are negative except for documented findings in HPI and today's encounter.     Allergies:   Allergies   Allergen Reactions   • Flagyl [Metronidazole] Hives and Swelling     Lips and Tongue     • Levofloxacin Swelling and Rash     RED RASH   • Lansoprazole Itching and Other (See Comments)     AND TURN RED   • Cefdinir GI Intolerance     Abdominal pain, caused upset stomach   • Trazodone And Nefazodone Myalgia     Severe muscle cramps     Constitutional:  Denies fever, shaking or chills   Eyes:  Denies change in visual acuity   HENT:  Denies nasal congestion or sore throat   Respiratory:  Denies cough or shortness of breath   Cardiovascular:  Denies chest pain or severe LE edema   GI:  Denies abdominal pain, nausea, vomiting, bloody stools or diarrhea   Musculoskeletal:  Numbness, tingling, pain, or loss of motor function only as noted above in history of present illness.  : Denies painful urination or hematuria  Integument:  Denies rash, lesion or ulceration   Neurologic:  Denies headache or focal weakness  Endocrine:  Denies lymphadenopathy  Psych:  Denies confusion or change in mental status   Hem:  Denies active bleeding    OBJECTIVE:  Physical Exam: 75 y.o. female  Wt Readings from Last 3 Encounters:   02/25/21 92.5 kg (203 lb 14.8 oz)   01/13/21 92.5 kg (204 lb)   01/11/21 90.3 kg (199 lb)     Ht Readings from Last 1 Encounters:   02/25/21 162.6 cm (64.02\")     Body mass index is 34.99 kg/m².  Vitals:    02/25/21 1031   Temp: 97.1 °F (36.2 °C)     Vital signs reviewed.     General Appearance:    Alert, cooperative, in no acute distress                  Eyes: conjunctiva clear  ENT: external ears and nose atraumatic  CV: no peripheral edema  Resp: normal respiratory effort  Skin: no rashes or wounds; normal turgor  Psych: mood and affect appropriate  Lymph: no nodes " appreciated  Neuro: gross sensation intact  Vascular:  Palpable peripheral pulse in noted extremity  Musculoskeletal Extremities:   Right Knee - Crepitation, synovitis, swelling, and joint line tenderness. She has some stiffness.   Right Hip - She is no longer tender laterally. She has slightly decreased range of motion.     Radiology:   Two views of the right hip, including AP and lateral views, were obtained and reviewed in the office today for pain. With/Without comparison views, these demonstrated mild-to-moderate arthritic changes, mainly inferiorly. In the superior weight-bearing portion of the hip, she has good space. She does have some degenerative scoliosis and degenerative disc disease, which is likely generating her lateral hip pain.     Assessment:     ICD-10-CM ICD-9-CM   1. Arthritis of right knee  M17.11 716.96   2. Pain  R52 780.96   3. DDD (degenerative disc disease), lumbar  M51.36 722.52   4. Lateral pain of right hip  M25.551 719.45        Large Joint Arthrocentesis: R knee  Date/Time: 2/25/2021 10:34 AM  Consent given by: patient  Site marked: site marked  Timeout: Immediately prior to procedure a time out was called to verify the correct patient, procedure, equipment, support staff and site/side marked as required   Supporting Documentation  Indications: pain   Procedure Details  Location: knee - R knee  Preparation: Patient was prepped and draped in the usual sterile fashion  Needle gauge: 21 G.  Approach: anterolateral  Medications administered: 2 mL lidocaine (cardiac); 80 mg methylPREDNISolone acetate 80 MG/ML  Patient tolerance: patient tolerated the procedure well with no immediate complications          Plan: The diagnosis(es), natural history, pathophysiology and treatment for diagnosis(es) were discussed. Opportunity given and questions answered.  Biomechanics of pertinent body areas discussed.  When appropriate, the use of ambulatory aids discussed.  Inflammation/pain control; with  cold, heat, elevation and/or liniments discussed as appropriate  Cortisone Injection. See procedure note.    Scribed for Artur Pat MD by CHELITA MOYER. I have reviewed and had scribe do this.  I Artur Pat MD have personally performed the services described in this document as scribed by the above individual, and it is both accurate and complete.     Artur Pat MD  2/26/2021  08:47 EST      2/25/2021  12:17 EST    2/25/2021

## 2021-02-26 RX ORDER — METHYLPREDNISOLONE ACETATE 80 MG/ML
80 INJECTION, SUSPENSION INTRA-ARTICULAR; INTRALESIONAL; INTRAMUSCULAR; SOFT TISSUE
Status: COMPLETED | OUTPATIENT
Start: 2021-02-25 | End: 2021-02-25

## 2021-03-03 DIAGNOSIS — Z23 IMMUNIZATION DUE: ICD-10-CM

## 2021-05-21 ENCOUNTER — HOSPITAL ENCOUNTER (OUTPATIENT)
Dept: MRI IMAGING | Facility: HOSPITAL | Age: 76
Discharge: HOME | End: 2021-05-21
Admitting: INTERNAL MEDICINE

## 2021-05-21 DIAGNOSIS — M54.2 NECK PAIN: ICD-10-CM

## 2021-05-21 PROCEDURE — 72141 MRI NECK SPINE W/O DYE: CPT

## 2021-06-04 ENCOUNTER — TELEPHONE (OUTPATIENT)
Dept: NEUROSURGERY | Facility: CLINIC | Age: 76
End: 2021-06-04

## 2021-06-07 NOTE — PROGRESS NOTES
Subjective   History of Present Illness: Mahnaz Becerra is a 75 y.o. female is being seen for consultation today at the request of Stephen Wilkes MD for intermittent neck pain that radiates into the right arm with numbness, tingling and weakness.  She was in severe pain about 5 weeks ago when she woke up with right-sided neck pain and inability to move her neck much.  She would get pain that radiated into the right shoulder and upper arm.  Over the last several years she has been lost to follow-up for MS but she has developed an essential tremor in the right upper extremity that frustrates her.  She asked me some questions about the tremor and treatment options and I suggested she probably needs to have follow-up for the tremor since the MS may be related.  She has tried physical therapy for the neck symptoms and has gotten much better with those treatments over the last few weeks.  She no longer has right shoulder and arm pain.    While in the room and during my examination of the patient I wore a mask and eye protection.  I washed my hands before and after this patient encounter.  The patient was also wearing a mask.    Neck Pain   The problem occurs intermittently. The pain is present in the left side, midline and right side. The quality of the pain is described as aching and shooting. The symptoms are aggravated by position (sitting and lying down). Associated symptoms include numbness, tingling and weakness. Treatments tried: physical therapy.       The following portions of the patient's history were reviewed and updated as appropriate: allergies, current medications, past family history, past medical history, past social history, past surgical history and problem list.    Review of Systems   Constitutional: Negative for activity change.   HENT: Negative for congestion.    Eyes: Negative for visual disturbance.   Respiratory: Negative for chest tightness and shortness of breath.    Gastrointestinal:  "Negative for nausea.   Endocrine: Negative for cold intolerance and heat intolerance.   Genitourinary: Negative for difficulty urinating.   Musculoskeletal: Positive for neck pain.   Skin: Negative for rash.   Allergic/Immunologic: Negative for environmental allergies.   Neurological: Positive for tingling, weakness and numbness.   Hematological: Does not bruise/bleed easily.   Psychiatric/Behavioral: Positive for sleep disturbance.       Objective     Vitals:    06/11/21 1539   BP: 128/80   Temp: 97.8 °F (36.6 °C)   Weight: 91.2 kg (201 lb)   Height: 162.6 cm (64.02\")     Body mass index is 34.48 kg/m².      Physical Exam  Neurologic Exam    Physical Exam:    CONSTITUTIONAL: This 75 year old right handed  female appears well developed, well-nourished and in no acute distress.    HEAD & FACE: the head and face are symmetric, normocephalic and atraumatic.    EYES: Inspection of the conjunctivae and lids reveals no swelling, erythema or discharge.  Pupils are round, equal and reactive to light and there is no scleral icterus.    EARS, NOSE, MOUTH & THROAT: On inspection, the ears and nose are within normal limits.    NECK: the neck is supple and symmetric. The trachea is midline with no masses.    PULMONARY: Respiratory effort is normal with no increased work of breathing or signs of respiratory distress.    CARDIOVASCULAR: Pedal pulses are +2/4 bilaterally. Examination of the extremities shows no edema or varicosities.    LYMPHATIC: There is no palpable lymphadenopathy of the neck.    MUSCULOSKELETAL: Gait and station are within normal limits. The spine has normal alignment and range of motion.    SKIN: The skin is warm, dry and intact    NEUROLOGIC:   Cranial Nerves 2-12 intact  Normal motor strength noted. Muscle bulk and tone are normal.  Sensory exam is normal to fine touch to confrontational testing bilaterally  Reflexes on the right side demonstrates 1/4 Triceps Reflex, 1/4 Biceps Reflex, 0/4 " Brachioradialis Reflex, 0/4 Knee Jerk Reflex, 0/4 Ankle Jerk Reflex and no ankle clonus on the right.   Reflexes on the left side demonstrates 0/4 Triceps Reflex, 0/4 Biceps Reflex, 0/4 Brachioradialis Reflex, 0/4 Knee Jerk Reflex, 0/4 Ankle Jerk Reflex and no ankle clonus on the left.  Superficial/Primitive Reflexes: primitive reflexes were absent.  Kaufman's, Babinski, and Clonus signs all negative.  No coordination deficit observed.  Radicular testing showed a negative Luis A (FERNIE) test and negative straight leg raise.  Spurling's maneuver is negative  Cortical function is intact and without deficits. Speech is normal.    PSYCHIATRIC: oriented to person, place and time. Patient's mood and affect are normal.    Assessment/Plan   Independent Review of Radiographic Studies:      I personally reviewed the images from the following studies.    MRI of the cervical spine done on May 21, 2021 at [facility] reveals multilevel canal narrowing due to degenerative change from C4-C7.  Overall the stenosis is measured as mild to moderate without cord compression.  There is neuroforaminal narrowing to the right at C3-C4, bilateral at C4-C5, bilateral at C5-C6, and left greater than right at C6-C7.  There is slight degenerative anterolisthesis of C3 on C4 and C7 on T1.    Medical Decision Making:      Fortunately she is improved a great deal with conservative management physical therapy but she describes a chronicity beyond the last 5 weeks where she will wake up with a headache due to cervicogenic pain mostly on the right side.  She has seen Dr. Pascual in the past for low back problems and is willing to consult with him regarding options for her chronic neck pain.  She does have cervical spondylosis and some neuroforaminal narrowing but without constant radiculopathy or neurologic deficit I cannot justify an operative intervention for her pattern of pain which is mostly neck pain.    She asked me questions about the  essential tremor and I think given her long-term diagnosis of MS the MS and the tremor might be related especially since her reflexes are present in the bicep and tricep where she is areflexic throughout the rest of the body suggesting there may be a supratentorial explanation for the essential tremor which could be related to MS.  There may be some options that are available medically for the tremor or if the MS is related to tremor so she should follow-up and establish a new relationship with a medical neurologist.  I think it has been a long time since she saw neurologist since one of her neurology providers was Dr. Iraheta who has not seen outpatients for considerable number of years.    No follow-ups on file.    Diagnoses and all orders for this visit:    1. Neck pain (Primary)  -     Ambulatory Referral to Pain Management    2. Spinal stenosis in cervical region    3. Multiple sclerosis (CMS/HCC)  -     Ambulatory Referral to Neurology    4. Essential tremor  -     Ambulatory Referral to Neurology             Edmundo Sellers MD FACS [facility]  Neurological Surgery

## 2021-06-11 ENCOUNTER — OFFICE VISIT (OUTPATIENT)
Dept: NEUROSURGERY | Facility: CLINIC | Age: 76
End: 2021-06-11

## 2021-06-11 VITALS
TEMPERATURE: 97.8 F | WEIGHT: 201 LBS | DIASTOLIC BLOOD PRESSURE: 80 MMHG | HEIGHT: 64 IN | SYSTOLIC BLOOD PRESSURE: 128 MMHG | BODY MASS INDEX: 34.31 KG/M2

## 2021-06-11 DIAGNOSIS — M54.2 NECK PAIN: Primary | ICD-10-CM

## 2021-06-11 DIAGNOSIS — G35 MULTIPLE SCLEROSIS (HCC): ICD-10-CM

## 2021-06-11 DIAGNOSIS — G25.0 ESSENTIAL TREMOR: ICD-10-CM

## 2021-06-11 DIAGNOSIS — M48.02 SPINAL STENOSIS IN CERVICAL REGION: ICD-10-CM

## 2021-06-11 PROCEDURE — 99204 OFFICE O/P NEW MOD 45 MIN: CPT | Performed by: NEUROLOGICAL SURGERY

## 2021-06-24 ENCOUNTER — OFFICE VISIT (OUTPATIENT)
Dept: ORTHOPEDIC SURGERY | Facility: CLINIC | Age: 76
End: 2021-06-24

## 2021-06-24 VITALS — WEIGHT: 208 LBS | HEIGHT: 64 IN | BODY MASS INDEX: 35.51 KG/M2 | TEMPERATURE: 98.2 F

## 2021-06-24 DIAGNOSIS — M17.11 ARTHRITIS OF RIGHT KNEE: Primary | ICD-10-CM

## 2021-06-24 PROCEDURE — 20610 DRAIN/INJ JOINT/BURSA W/O US: CPT | Performed by: ORTHOPAEDIC SURGERY

## 2021-06-24 RX ORDER — METHYLPREDNISOLONE ACETATE 80 MG/ML
160 INJECTION, SUSPENSION INTRA-ARTICULAR; INTRALESIONAL; INTRAMUSCULAR; SOFT TISSUE
Status: COMPLETED | OUTPATIENT
Start: 2021-06-24 | End: 2021-06-24

## 2021-06-24 RX ORDER — LIDOCAINE HYDROCHLORIDE 10 MG/ML
4 INJECTION, SOLUTION EPIDURAL; INFILTRATION; INTRACAUDAL; PERINEURAL
Status: COMPLETED | OUTPATIENT
Start: 2021-06-24 | End: 2021-06-24

## 2021-06-24 RX ADMIN — METHYLPREDNISOLONE ACETATE 160 MG: 80 INJECTION, SUSPENSION INTRA-ARTICULAR; INTRALESIONAL; INTRAMUSCULAR; SOFT TISSUE at 11:24

## 2021-06-24 RX ADMIN — LIDOCAINE HYDROCHLORIDE 4 ML: 10 INJECTION, SOLUTION EPIDURAL; INFILTRATION; INTRACAUDAL; PERINEURAL at 11:24

## 2021-06-24 NOTE — PROGRESS NOTES
Here for 3-month injection of her right knee which was performed  Large Joint Arthrocentesis: R knee  Date/Time: 6/24/2021 11:24 AM  Consent given by: patient  Site marked: site marked  Timeout: Immediately prior to procedure a time out was called to verify the correct patient, procedure, equipment, support staff and site/side marked as required   Supporting Documentation  Indications: pain   Procedure Details  Location: knee - R knee  Preparation: Patient was prepped and draped in the usual sterile fashion  Needle gauge: 21g.  Approach: anterolateral  Medications administered: 4 mL lidocaine PF 1% 1 %; 160 mg methylPREDNISolone acetate 80 MG/ML

## 2021-07-19 ENCOUNTER — TRANSCRIBE ORDERS (OUTPATIENT)
Dept: ADMINISTRATIVE | Facility: HOSPITAL | Age: 76
End: 2021-07-19

## 2021-07-19 ENCOUNTER — LAB (OUTPATIENT)
Dept: LAB | Facility: HOSPITAL | Age: 76
End: 2021-07-19

## 2021-07-19 DIAGNOSIS — Z00.00 ROUTINE GENERAL MEDICAL EXAMINATION AT A HEALTH CARE FACILITY: Primary | ICD-10-CM

## 2021-07-19 DIAGNOSIS — Z00.00 ROUTINE GENERAL MEDICAL EXAMINATION AT A HEALTH CARE FACILITY: ICD-10-CM

## 2021-07-19 LAB
ALBUMIN SERPL-MCNC: 4.1 G/DL (ref 3.5–5.2)
ALBUMIN/GLOB SERPL: 2.2 G/DL
ALP SERPL-CCNC: 65 U/L (ref 39–117)
ALT SERPL W P-5'-P-CCNC: 15 U/L (ref 1–33)
ANION GAP SERPL CALCULATED.3IONS-SCNC: 10.3 MMOL/L (ref 5–15)
AST SERPL-CCNC: 15 U/L (ref 1–32)
BACTERIA UR QL AUTO: ABNORMAL /HPF
BASOPHILS # BLD AUTO: 0.04 10*3/MM3 (ref 0–0.2)
BASOPHILS NFR BLD AUTO: 0.6 % (ref 0–1.5)
BILIRUB SERPL-MCNC: 0.4 MG/DL (ref 0–1.2)
BILIRUB UR QL STRIP: NEGATIVE
BUN SERPL-MCNC: 17 MG/DL (ref 8–23)
BUN/CREAT SERPL: 21 (ref 7–25)
CALCIUM SPEC-SCNC: 9.4 MG/DL (ref 8.6–10.5)
CHLORIDE SERPL-SCNC: 100 MMOL/L (ref 98–107)
CHOLEST SERPL-MCNC: 195 MG/DL (ref 0–200)
CLARITY UR: CLEAR
CO2 SERPL-SCNC: 29.7 MMOL/L (ref 22–29)
COLOR UR: YELLOW
CREAT SERPL-MCNC: 0.81 MG/DL (ref 0.57–1)
DEPRECATED RDW RBC AUTO: 44.2 FL (ref 37–54)
EOSINOPHIL # BLD AUTO: 0.32 10*3/MM3 (ref 0–0.4)
EOSINOPHIL NFR BLD AUTO: 5 % (ref 0.3–6.2)
ERYTHROCYTE [DISTWIDTH] IN BLOOD BY AUTOMATED COUNT: 12.8 % (ref 12.3–15.4)
GFR SERPL CREATININE-BSD FRML MDRD: 69 ML/MIN/1.73
GLOBULIN UR ELPH-MCNC: 1.9 GM/DL
GLUCOSE SERPL-MCNC: 126 MG/DL (ref 65–99)
GLUCOSE UR STRIP-MCNC: NEGATIVE MG/DL
HCT VFR BLD AUTO: 43.4 % (ref 34–46.6)
HDLC SERPL-MCNC: 49 MG/DL (ref 40–60)
HGB BLD-MCNC: 14.1 G/DL (ref 12–15.9)
HGB UR QL STRIP.AUTO: NEGATIVE
HYALINE CASTS UR QL AUTO: ABNORMAL /LPF
IMM GRANULOCYTES # BLD AUTO: 0.02 10*3/MM3 (ref 0–0.05)
IMM GRANULOCYTES NFR BLD AUTO: 0.3 % (ref 0–0.5)
KETONES UR QL STRIP: NEGATIVE
LDLC SERPL CALC-MCNC: 127 MG/DL (ref 0–100)
LDLC/HDLC SERPL: 2.55 {RATIO}
LEUKOCYTE ESTERASE UR QL STRIP.AUTO: ABNORMAL
LYMPHOCYTES # BLD AUTO: 2.26 10*3/MM3 (ref 0.7–3.1)
LYMPHOCYTES NFR BLD AUTO: 35.2 % (ref 19.6–45.3)
MCH RBC QN AUTO: 30.7 PG (ref 26.6–33)
MCHC RBC AUTO-ENTMCNC: 32.5 G/DL (ref 31.5–35.7)
MCV RBC AUTO: 94.3 FL (ref 79–97)
MONOCYTES # BLD AUTO: 0.6 10*3/MM3 (ref 0.1–0.9)
MONOCYTES NFR BLD AUTO: 9.3 % (ref 5–12)
NEUTROPHILS NFR BLD AUTO: 3.18 10*3/MM3 (ref 1.7–7)
NEUTROPHILS NFR BLD AUTO: 49.6 % (ref 42.7–76)
NITRITE UR QL STRIP: NEGATIVE
NRBC BLD AUTO-RTO: 0 /100 WBC (ref 0–0.2)
PH UR STRIP.AUTO: 6.5 [PH] (ref 5–8)
PLATELET # BLD AUTO: 291 10*3/MM3 (ref 140–450)
PMV BLD AUTO: 9.9 FL (ref 6–12)
POTASSIUM SERPL-SCNC: 3.7 MMOL/L (ref 3.5–5.2)
PROT SERPL-MCNC: 6 G/DL (ref 6–8.5)
PROT UR QL STRIP: NEGATIVE
RBC # BLD AUTO: 4.6 10*6/MM3 (ref 3.77–5.28)
RBC # UR: ABNORMAL /HPF
REF LAB TEST METHOD: ABNORMAL
SODIUM SERPL-SCNC: 140 MMOL/L (ref 136–145)
SP GR UR STRIP: 1.02 (ref 1–1.03)
SQUAMOUS #/AREA URNS HPF: ABNORMAL /HPF
TRIGL SERPL-MCNC: 106 MG/DL (ref 0–150)
UROBILINOGEN UR QL STRIP: ABNORMAL
VLDLC SERPL-MCNC: 19 MG/DL (ref 5–40)
WBC # BLD AUTO: 6.42 10*3/MM3 (ref 3.4–10.8)
WBC UR QL AUTO: ABNORMAL /HPF

## 2021-07-19 PROCEDURE — 80053 COMPREHEN METABOLIC PANEL: CPT

## 2021-07-19 PROCEDURE — 81001 URINALYSIS AUTO W/SCOPE: CPT

## 2021-07-19 PROCEDURE — 85025 COMPLETE CBC W/AUTO DIFF WBC: CPT

## 2021-07-19 PROCEDURE — 36415 COLL VENOUS BLD VENIPUNCTURE: CPT

## 2021-07-19 PROCEDURE — 80061 LIPID PANEL: CPT

## 2021-09-27 ENCOUNTER — OFFICE VISIT (OUTPATIENT)
Dept: ORTHOPEDIC SURGERY | Facility: CLINIC | Age: 76
End: 2021-09-27

## 2021-09-27 VITALS — WEIGHT: 199 LBS | HEIGHT: 64 IN | TEMPERATURE: 97.1 F | BODY MASS INDEX: 33.97 KG/M2

## 2021-09-27 DIAGNOSIS — M17.11 ARTHRITIS OF RIGHT KNEE: ICD-10-CM

## 2021-09-27 DIAGNOSIS — M25.561 RIGHT KNEE PAIN, UNSPECIFIED CHRONICITY: Primary | ICD-10-CM

## 2021-09-27 PROCEDURE — 73562 X-RAY EXAM OF KNEE 3: CPT | Performed by: ORTHOPAEDIC SURGERY

## 2021-09-27 PROCEDURE — 99214 OFFICE O/P EST MOD 30 MIN: CPT | Performed by: ORTHOPAEDIC SURGERY

## 2021-09-27 PROCEDURE — 20610 DRAIN/INJ JOINT/BURSA W/O US: CPT | Performed by: ORTHOPAEDIC SURGERY

## 2021-09-27 RX ORDER — METHYLPREDNISOLONE ACETATE 80 MG/ML
80 INJECTION, SUSPENSION INTRA-ARTICULAR; INTRALESIONAL; INTRAMUSCULAR; SOFT TISSUE
Status: COMPLETED | OUTPATIENT
Start: 2021-09-27 | End: 2021-09-27

## 2021-09-27 RX ADMIN — METHYLPREDNISOLONE ACETATE 80 MG: 80 INJECTION, SUSPENSION INTRA-ARTICULAR; INTRALESIONAL; INTRAMUSCULAR; SOFT TISSUE at 11:06

## 2021-09-27 NOTE — PROGRESS NOTES
Large Joint Arthrocentesis: R knee  Date/Time: 9/27/2021 11:06 AM  Consent given by: patient  Site marked: site marked  Timeout: Immediately prior to procedure a time out was called to verify the correct patient, procedure, equipment, support staff and site/side marked as required   Supporting Documentation  Indications: pain   Procedure Details  Location: knee - R knee  Preparation: Patient was prepped and draped in the usual sterile fashion  Needle size: 22 G  Approach: anteromedial  Medications administered: 80 mg methylPREDNISolone acetate 80 MG/ML; 4 mL lidocaine (cardiac)  Patient tolerance: patient tolerated the procedure well with no immediate complications        Patient Name: Mahnaz Becerra   YOB: 1945  Referring Primary Care Physician: Stephen Wilkes MD  BMI: Body mass index is 34.16 kg/m².    Chief Complaint:    Chief Complaint   Patient presents with   • Right Knee - Pain, Follow-up        HPI:     Mahnaz Becerra is a 76 y.o. female who presents today for evaluation of   Chief Complaint   Patient presents with   • Right Knee - Pain, Follow-up   .  Jocelynn follows up today on her right knee.  Is been bothering her for a long time she had previous arthroscopy.  She received injections.  She has had a successful left total knee replacement.  She says the shots are no longer helping and she wishes to go ahead with total knee arthroplasty on the right.  She is been doing her exercises      Subjective   Medications:   Home Medications:  Current Outpatient Medications on File Prior to Visit   Medication Sig   • albuterol (VENTOLIN HFA) 108 (90 BASE) MCG/ACT inhaler Inhale 2 puffs Every 6 (Six) Hours As Needed for wheezing.   • cyclobenzaprine (FLEXERIL) 10 MG tablet Take 1 tablet by mouth 2 (Two) Times a Day As Needed for Muscle Spasms.   • dicyclomine (BENTYL) 20 MG tablet Take 1 tablet by mouth As Needed (abd cramps).   • Famotidine-Ca Carb-Mag Hydrox (PEPCID COMPLETE PO) Take  by mouth.    • hydrochlorothiazide (HYDRODIURIL) 25 MG tablet TAKE 1 TABLET BY MOUTH DAILY   • Multiple Vitamins-Minerals (CENTRUM SILVER PO) Take  by mouth.   • oxyCODONE-acetaminophen (PERCOCET) 7.5-325 MG per tablet 1-2 tabs po q 3-4 hr prn severe pain, wean as tolerated   • pantoprazole (PROTONIX) 40 MG EC tablet TAKE 1 TABLET BY MOUTH TWICE DAILY   • promethazine (PHENERGAN) 12.5 MG tablet Take 1 tablet by mouth Every 6 (Six) Hours As Needed for Nausea or Vomiting.   • traMADol (ULTRAM) 50 MG tablet 1-2 Oral Q4H PRN severe pain   • vitamin E 400 UNIT capsule Take 400 Units by mouth Daily.   • clotrimazole-betamethasone (LOTRISONE) 1-0.05 % cream Apply  topically to the appropriate area as directed 2 (Two) Times a Day.   • diclofenac (VOLTAREN) 1 % gel gel Apply pea size amount to area of pain up to four times a day   • Diclofenac Sodium (VOLTAREN) 1 % gel gel Apply 4 g topically to the appropriate area as directed 2 (Two) Times a Day. Use per pkg insert     Current Facility-Administered Medications on File Prior to Visit   Medication   • ipratropium-albuterol (DUO-NEB) nebulizer solution 3 mL     Current Medications:  Scheduled Meds:ipratropium-albuterol, 3 mL, Nebulization, 4x Daily - RT      Continuous Infusions:   PRN Meds:.    I have reviewed the patient's medical history in detail and updated the computerized patient record.  Review and summarization of old records includes:    Past Medical History:   Diagnosis Date   • Acid reflux    • Anesthesia complication     ANESTHESIA HAS BROUGHT ON ASTHMA ATTACKS    • Asthma    • Bronchitis    • Charleyhorse     FREQUENT   • Edema     LOWER EXT   • Heart murmur    • History of skin cancer     BACK   • IBS (irritable bowel syndrome)    • Knee pain, right    • MS (multiple sclerosis) (CMS/[facility])    • Osteoarthritis    • PONV (postoperative nausea and vomiting)     Patient states she had post-op nausea and vomiting after hysterectomy in 1987.   • Sleep apnea     CANT TOLERATE CPAP         Past Surgical History:   Procedure Laterality Date   • APPENDECTOMY     • BREAST LUMPECTOMY Bilateral    • BREAST SURGERY      REDUCTION   •  SECTION     • CHOLECYSTECTOMY     • COLONOSCOPY  2012    Dr Moe   • COLONOSCOPY N/A 2020    Procedure: COLONOSCOPY TO CECUM AND TERM. ILEUM WITH BIOPSIES;  Surgeon: Inder Pat MD;  Location: [facility] ENDOSCOPY;  Service: Gastroenterology;  Laterality: N/A;  PRE OP - CHANGE IN BOWEL HABITS, DIARRHEA  POST OP - DIVERTICULOSIS   • COSMETIC SURGERY     • DILATATION AND CURETTAGE     • ENDOSCOPY N/A 2016    Procedure: ESOPHAGOGASTRODUODENOSCOPY WITH BX;  Surgeon: Nasim Moe MD;  Location: [facility] ENDOSCOPY;  Service:    • ENDOSCOPY N/A 2020    Procedure: ESOPHAGOGASTRODUODENOSCOPY WITH DUODENAL ASPIRATE, POLYPECTOMY AND BIOPSIES;  Surgeon: Inder Pat MD;  Location: [facility] ENDOSCOPY;  Service: Gastroenterology;  Laterality: N/A;  PRE OP - GERD  POST OP - GASTRIC POLYP, GASTRITIS   • ENDOSCOPY W/ PEG REMOVAL  2012    Dr Moe   • EYE SURGERY Bilateral     BLEPHAROPLASTY   • HYSTERECTOMY     • KNEE ARTHROSCOPY Right 2016    Procedure: KNEE ARTHROSCOPY, PARTIAL MEDIAL AND LATERAL MENISECTOMY, CHONDROPLASTY OF THE PATELLA, REMOVAL OF LOOSE BODIES;  Surgeon: Artur Pat MD;  Location: [facility] OR [facility];  Service:    • KNEE ARTHROSCOPY W/ MENISCAL REPAIR Left    • OOPHORECTOMY Bilateral    • TONSILLECTOMY     • TOTAL KNEE ARTHROPLASTY Left 2018    Procedure: LEFT TOTAL KNEE ARTHROPLASTY WITH MARGRET NAVIGATION;  Surgeon: Artur Pat MD;  Location: [facility] OR;  Service:         Social History     Occupational History   • Not on file   Tobacco Use   • Smoking status: Former Smoker     Packs/day: 0.25     Types: Cigarettes     Quit date:      Years since quittin.7   • Smokeless tobacco: Never Used   • Tobacco comment: Patient states she was a social smoker.   Substance and Sexual  Activity   • Alcohol use: Yes     Comment: Occasional   • Drug use: No   • Sexual activity: Defer      Social History     Social History Narrative   • Not on file        Family History   Problem Relation Age of Onset   • Hypertension Mother    • Stroke Mother    • Alzheimer's disease Mother    • Heart disease Father    • Emphysema Father    • Stroke Maternal Grandmother    • Cancer Maternal Grandfather    • No Known Problems Paternal Grandmother    • Cerebral aneurysm Paternal Grandfather    • Malig Hyperthermia Neg Hx        ROS: 14 point review of systems was performed and all other systems were reviewed and are negative except for documented findings in HPI and today's encounter.     Allergies:   Allergies   Allergen Reactions   • Flagyl [Metronidazole] Hives and Swelling     Lips and Tongue     • Levofloxacin Swelling and Rash     RED RASH   • Lansoprazole Itching and Other (See Comments)     AND TURN RED   • Cefdinir GI Intolerance     Abdominal pain, caused upset stomach   • Trazodone And Nefazodone Myalgia     Severe muscle cramps     Constitutional:  Denies fever, shaking or chills   Eyes:  Denies change in visual acuity   HENT:  Denies nasal congestion or sore throat   Respiratory:  Denies cough or shortness of breath   Cardiovascular:  Denies chest pain or severe LE edema   GI:  Denies abdominal pain, nausea, vomiting, bloody stools or diarrhea   Musculoskeletal:  Numbness, tingling, pain, or loss of motor function only as noted above in history of present illness.  : Denies painful urination or hematuria  Integument:  Denies rash, lesion or ulceration   Neurologic:  Denies headache or focal weakness  Endocrine:  Denies lymphadenopathy  Psych:  Denies confusion or change in mental status   Hem:  Denies active bleeding    OBJECTIVE:  Physical Exam: 76 y.o. female  Wt Readings from Last 3 Encounters:   09/27/21 90.3 kg (199 lb)   06/24/21 94.3 kg (208 lb)   06/11/21 91.2 kg (201 lb)     Ht Readings from  "Last 1 Encounters:   09/27/21 162.6 cm (64\")     Body mass index is 34.16 kg/m².  Vitals:    09/27/21 1050   Temp: 97.1 °F (36.2 °C)     Vital signs reviewed.     General Appearance:    Alert, cooperative, in no acute distress                  Eyes: conjunctiva clear  ENT: external ears and nose atraumatic  CV: no peripheral edema  Resp: normal respiratory effort  Skin: no rashes or wounds; normal turgor  Psych: mood and affect appropriate  Lymph: no nodes appreciated  Neuro: gross sensation intact  Vascular:  Palpable peripheral pulse in noted extremity  Musculoskeletal Extremities: Exam today shows crepitation synovitis swelling she has old arthroscopy scars she has joint line tenderness and she goes about -5 to about 115 and she has a Baker's cyst    Radiology:   AP lateral 40 degree PA x-ray right knee taken the office today with comparison views for pain shows advanced arthritic change with comparison views        Assessment:     ICD-10-CM ICD-9-CM   1. Right knee pain, unspecified chronicity  M25.561 719.46   2. Arthritis of right knee  M17.11 716.96        MDM/Plan:   The diagnosis(es), natural history, pathophysiology and treatment for diagnosis(es) were discussed. Opportunity given and questions answered.  Biomechanics of pertinent body areas discussed.  When appropriate, the use of ambulatory aids discussed.    Inflammation/pain control; with cold, heat, elevation and/or liniments discussed as appropriate  Cortisone Injection. See procedure note.  Total Knee Replacement : Continuation of conservative management vs. TKA: I reviewed anatomy of a total knee arthroplasty in laymen's terms, as well as typical postoperative recovery and possibly 6-12 months for maximal recovery, and possible need for rehabilitation stay after hospitalization. We also discussed risks, benefits, alternatives, and limitations of procedure with risks including but not limited to neurovascular damage, bleeding, infection, " malalignment, chronic pain, failure of implants, osteolysis, loosening of implants, loss of motion, weakness, stiffness, instability, DVT, pulmonary embolus, death, stroke, complex regional pain syndrome, myocardial infarction, and need for additional procedures. Concept of substitution vs. replacement discussed.  No guarantees were given regarding results of surgery.  Patient verbalized understanding, and was given the opportunity to ask and have all questions answered today.       9/27/2021    Much of this encounter note is an electronic transcription/translation of spoken language to printed text. The electronic translation of spoken language may permit erroneous, or at times, nonsensical words or phrases to be inadvertently transcribed; Although I have reviewed the note for such errors, some may still exist

## 2021-09-27 NOTE — PROGRESS NOTES
Patient Name: Mahnaz Becerra   YOB: 1945  Referring Primary Care Physician: Stephen Wilkes MD  BMI: There is no height or weight on file to calculate BMI.    Chief Complaint:  No chief complaint on file.       HPI:     Mahnaz Becerra is a 76 y.o. female who presents today for evaluation of No chief complaint on file.  . ***      Subjective   Medications:   Home Medications:  Current Outpatient Medications on File Prior to Visit   Medication Sig   • albuterol (VENTOLIN HFA) 108 (90 BASE) MCG/ACT inhaler Inhale 2 puffs Every 6 (Six) Hours As Needed for wheezing.   • clotrimazole-betamethasone (LOTRISONE) 1-0.05 % cream Apply  topically to the appropriate area as directed 2 (Two) Times a Day.   • cyclobenzaprine (FLEXERIL) 10 MG tablet Take 1 tablet by mouth 2 (Two) Times a Day As Needed for Muscle Spasms.   • diclofenac (VOLTAREN) 1 % gel gel Apply pea size amount to area of pain up to four times a day   • Diclofenac Sodium (VOLTAREN) 1 % gel gel Apply 4 g topically to the appropriate area as directed 2 (Two) Times a Day. Use per pkg insert   • dicyclomine (BENTYL) 20 MG tablet Take 1 tablet by mouth As Needed (abd cramps).   • Famotidine-Ca Carb-Mag Hydrox (PEPCID COMPLETE PO) Take  by mouth.   • hydrochlorothiazide (HYDRODIURIL) 25 MG tablet TAKE 1 TABLET BY MOUTH DAILY   • Multiple Vitamins-Minerals (CENTRUM SILVER PO) Take  by mouth.   • oxyCODONE-acetaminophen (PERCOCET) 7.5-325 MG per tablet 1-2 tabs po q 3-4 hr prn severe pain, wean as tolerated   • pantoprazole (PROTONIX) 40 MG EC tablet TAKE 1 TABLET BY MOUTH TWICE DAILY   • promethazine (PHENERGAN) 12.5 MG tablet Take 1 tablet by mouth Every 6 (Six) Hours As Needed for Nausea or Vomiting.   • traMADol (ULTRAM) 50 MG tablet 1-2 Oral Q4H PRN severe pain   • vitamin E 400 UNIT capsule Take 400 Units by mouth Daily.     Current Facility-Administered Medications on File Prior to Visit   Medication   • ipratropium-albuterol (DUO-NEB) nebulizer  solution 3 mL     Current Medications:  Scheduled Meds:ipratropium-albuterol, 3 mL, Nebulization, 4x Daily - RT      Continuous Infusions:   PRN Meds:.    I have reviewed the patient's medical history in detail and updated the computerized patient record.  Review and summarization of old records includes:    Past Medical History:   Diagnosis Date   • Acid reflux    • Anesthesia complication     ANESTHESIA HAS BROUGHT ON ASTHMA ATTACKS    • Asthma    • Bronchitis    • Charleyhorse     FREQUENT   • Edema     LOWER EXT   • Heart murmur    • History of skin cancer     BACK   • IBS (irritable bowel syndrome)    • Knee pain, right    • MS (multiple sclerosis) (CMS/HCC)    • Osteoarthritis    • PONV (postoperative nausea and vomiting)     Patient states she had post-op nausea and vomiting after hysterectomy in .   • Sleep apnea     CANT TOLERATE CPAP        Past Surgical History:   Procedure Laterality Date   • APPENDECTOMY     • BREAST LUMPECTOMY Bilateral    • BREAST SURGERY      REDUCTION   •  SECTION     • CHOLECYSTECTOMY     • COLONOSCOPY  2012    Dr Moe   • COLONOSCOPY N/A 2020    Procedure: COLONOSCOPY TO CECUM AND TERM. ILEUM WITH BIOPSIES;  Surgeon: Inder Pat MD;  Location: [facility] ENDOSCOPY;  Service: Gastroenterology;  Laterality: N/A;  PRE OP - CHANGE IN BOWEL HABITS, DIARRHEA  POST OP - DIVERTICULOSIS   • COSMETIC SURGERY     • DILATATION AND CURETTAGE     • ENDOSCOPY N/A 2016    Procedure: ESOPHAGOGASTRODUODENOSCOPY WITH BX;  Surgeon: Nasim Moe MD;  Location: [facility] ENDOSCOPY;  Service:    • ENDOSCOPY N/A 2020    Procedure: ESOPHAGOGASTRODUODENOSCOPY WITH DUODENAL ASPIRATE, POLYPECTOMY AND BIOPSIES;  Surgeon: Inder Pat MD;  Location: [facility] ENDOSCOPY;  Service: Gastroenterology;  Laterality: N/A;  PRE OP - GERD  POST OP - GASTRIC POLYP, GASTRITIS   • ENDOSCOPY W/ PEG REMOVAL  2012    Dr Moe   • EYE SURGERY Bilateral      BLEPHAROPLASTY   • HYSTERECTOMY     • KNEE ARTHROSCOPY Right 2016    Procedure: KNEE ARTHROSCOPY, PARTIAL MEDIAL AND LATERAL MENISECTOMY, CHONDROPLASTY OF THE PATELLA, REMOVAL OF LOOSE BODIES;  Surgeon: Artur Pat MD;  Location: [facility];  Service:    • KNEE ARTHROSCOPY W/ MENISCAL REPAIR Left    • OOPHORECTOMY Bilateral    • TONSILLECTOMY     • TOTAL KNEE ARTHROPLASTY Left 2018    Procedure: LEFT TOTAL KNEE ARTHROPLASTY WITH MARGRET NAVIGATION;  Surgeon: Artur Pat MD;  Location: [facility] OR;  Service:         Social History     Occupational History   • Not on file   Tobacco Use   • Smoking status: Former Smoker     Packs/day: 0.25     Types: Cigarettes     Quit date:      Years since quittin.7   • Smokeless tobacco: Never Used   • Tobacco comment: Patient states she was a social smoker.   Substance and Sexual Activity   • Alcohol use: Yes     Comment: Occasional   • Drug use: No   • Sexual activity: Defer      Social History     Social History Narrative   • Not on file        Family History   Problem Relation Age of Onset   • Hypertension Mother    • Stroke Mother    • Alzheimer's disease Mother    • Heart disease Father    • Emphysema Father    • Stroke Maternal Grandmother    • Cancer Maternal Grandfather    • No Known Problems Paternal Grandmother    • Cerebral aneurysm Paternal Grandfather    • Malig Hyperthermia Neg Hx        ROS: 14 point review of systems was performed and all other systems were reviewed and are negative except for documented findings in HPI and today's encounter.     Allergies:   Allergies   Allergen Reactions   • Flagyl [Metronidazole] Hives and Swelling     Lips and Tongue     • Levofloxacin Swelling and Rash     RED RASH   • Lansoprazole Itching and Other (See Comments)     AND TURN RED   • Cefdinir GI Intolerance     Abdominal pain, caused upset stomach   • Trazodone And Nefazodone Myalgia     Severe muscle cramps     Constitutional:   "Denies fever, shaking or chills   Eyes:  Denies change in visual acuity   HENT:  Denies nasal congestion or sore throat   Respiratory:  Denies cough or shortness of breath   Cardiovascular:  Denies chest pain or severe LE edema   GI:  Denies abdominal pain, nausea, vomiting, bloody stools or diarrhea   Musculoskeletal:  Numbness, tingling, pain, or loss of motor function only as noted above in history of present illness.  : Denies painful urination or hematuria  Integument:  Denies rash, lesion or ulceration   Neurologic:  Denies headache or focal weakness  Endocrine:  Denies lymphadenopathy  Psych:  Denies confusion or change in mental status   Hem:  Denies active bleeding    OBJECTIVE:  Physical Exam: 76 y.o. female  Wt Readings from Last 3 Encounters:   06/24/21 94.3 kg (208 lb)   06/11/21 91.2 kg (201 lb)   02/25/21 92.5 kg (203 lb 14.8 oz)     Ht Readings from Last 1 Encounters:   06/24/21 162.6 cm (64\")     There is no height or weight on file to calculate BMI.  There were no vitals filed for this visit.  Vital signs reviewed.     General Appearance:    Alert, cooperative, in no acute distress                  Eyes: conjunctiva clear  ENT: external ears and nose atraumatic  CV: no peripheral edema  Resp: normal respiratory effort  Skin: no rashes or wounds; normal turgor  Psych: mood and affect appropriate  Lymph: no nodes appreciated  Neuro: gross sensation intact  Vascular:  Palpable peripheral pulse in noted extremity  Musculoskeletal Extremities: ***    Radiology:   ***        Assessment: No diagnosis found.     MDM/Plan:   The diagnosis(es), natural history, pathophysiology and treatment for diagnosis(es) were discussed. Opportunity given and questions answered.  Biomechanics of pertinent body areas discussed.  When appropriate, the use of ambulatory aids discussed.    Large Joint Arthrocentesis: R knee  Date/Time: 9/27/2021 10:49 AM  Consent given by: patient  Site marked: site marked  Timeout: " Immediately prior to procedure a time out was called to verify the correct patient, procedure, equipment, support staff and site/side marked as required   Supporting Documentation  Indications: pain   Procedure Details  Location: knee - R knee  Preparation: Patient was prepped and draped in the usual sterile fashion  Needle size: 22 G  Approach: anteromedial  Medications administered: 80 mg methylPREDNISolone acetate 80 MG/ML; 4 mL lidocaine (cardiac)  Patient tolerance: patient tolerated the procedure well with no immediate complications          {[facility] plan treatment:46512}      9/27/2021    Much of this encounter note is an electronic transcription/translation of spoken language to printed text. The electronic translation of spoken language may permit erroneous, or at times, nonsensical words or phrases to be inadvertently transcribed; Although I have reviewed the note for such errors, some may still exist

## 2021-12-29 ENCOUNTER — OFFICE VISIT (OUTPATIENT)
Dept: ORTHOPEDIC SURGERY | Facility: CLINIC | Age: 76
End: 2021-12-29

## 2021-12-29 VITALS — TEMPERATURE: 96.8 F | BODY MASS INDEX: 33.8 KG/M2 | WEIGHT: 198 LBS | HEIGHT: 64 IN

## 2021-12-29 DIAGNOSIS — M17.11 ARTHRITIS OF RIGHT KNEE: Primary | ICD-10-CM

## 2021-12-29 DIAGNOSIS — Z96.652 STATUS POST TOTAL LEFT KNEE REPLACEMENT: ICD-10-CM

## 2021-12-29 PROCEDURE — 20610 DRAIN/INJ JOINT/BURSA W/O US: CPT | Performed by: ORTHOPAEDIC SURGERY

## 2021-12-29 RX ORDER — TRIAMCINOLONE ACETONIDE 40 MG/ML
80 INJECTION, SUSPENSION INTRA-ARTICULAR; INTRAMUSCULAR
Status: COMPLETED | OUTPATIENT
Start: 2021-12-29 | End: 2021-12-29

## 2021-12-29 RX ADMIN — TRIAMCINOLONE ACETONIDE 80 MG: 40 INJECTION, SUSPENSION INTRA-ARTICULAR; INTRAMUSCULAR at 13:15

## 2021-12-29 NOTE — PROGRESS NOTES
Large Joint Arthrocentesis: R knee  Date/Time: 12/29/2021 1:15 PM  Consent given by: patient  Site marked: site marked  Timeout: Immediately prior to procedure a time out was called to verify the correct patient, procedure, equipment, support staff and site/side marked as required   Supporting Documentation  Indications: pain   Procedure Details  Location: knee - R knee  Preparation: Patient was prepped and draped in the usual sterile fashion  Needle gauge: 21G.  Approach: anteromedial  Medications administered: 4 mL lidocaine (cardiac); 80 mg triamcinolone acetonide 40 MG/ML  Patient tolerance: patient tolerated the procedure well with no immediate complications        Patient is seen today for recurrent injection of her right knee which was performed.  She has had a left total knee in the past

## 2022-03-23 ENCOUNTER — LAB (OUTPATIENT)
Dept: LAB | Facility: HOSPITAL | Age: 77
End: 2022-03-23

## 2022-03-23 ENCOUNTER — TRANSCRIBE ORDERS (OUTPATIENT)
Dept: ADMINISTRATIVE | Facility: HOSPITAL | Age: 77
End: 2022-03-23

## 2022-03-23 DIAGNOSIS — I51.9 MYXEDEMA HEART DISEASE: ICD-10-CM

## 2022-03-23 DIAGNOSIS — R53.83 TIREDNESS: ICD-10-CM

## 2022-03-23 DIAGNOSIS — E03.9 MYXEDEMA HEART DISEASE: ICD-10-CM

## 2022-03-23 DIAGNOSIS — R53.83 TIREDNESS: Primary | ICD-10-CM

## 2022-03-23 LAB
ALBUMIN SERPL-MCNC: 4.4 G/DL (ref 3.5–5.2)
ALBUMIN/GLOB SERPL: 2 G/DL
ALP SERPL-CCNC: 66 U/L (ref 39–117)
ALT SERPL W P-5'-P-CCNC: 14 U/L (ref 1–33)
ANION GAP SERPL CALCULATED.3IONS-SCNC: 9 MMOL/L (ref 5–15)
AST SERPL-CCNC: 17 U/L (ref 1–32)
BASOPHILS # BLD AUTO: 0.06 10*3/MM3 (ref 0–0.2)
BASOPHILS NFR BLD AUTO: 0.9 % (ref 0–1.5)
BILIRUB SERPL-MCNC: 0.4 MG/DL (ref 0–1.2)
BUN SERPL-MCNC: 16 MG/DL (ref 8–23)
BUN/CREAT SERPL: 19 (ref 7–25)
CALCIUM SPEC-SCNC: 9.6 MG/DL (ref 8.6–10.5)
CHLORIDE SERPL-SCNC: 101 MMOL/L (ref 98–107)
CO2 SERPL-SCNC: 31 MMOL/L (ref 22–29)
CREAT SERPL-MCNC: 0.84 MG/DL (ref 0.57–1)
DEPRECATED RDW RBC AUTO: 42.5 FL (ref 37–54)
EGFRCR SERPLBLD CKD-EPI 2021: 73 ML/MIN/1.73
EOSINOPHIL # BLD AUTO: 0.26 10*3/MM3 (ref 0–0.4)
EOSINOPHIL NFR BLD AUTO: 3.9 % (ref 0.3–6.2)
ERYTHROCYTE [DISTWIDTH] IN BLOOD BY AUTOMATED COUNT: 12.6 % (ref 12.3–15.4)
GLOBULIN UR ELPH-MCNC: 2.2 GM/DL
GLUCOSE SERPL-MCNC: 106 MG/DL (ref 65–99)
HCT VFR BLD AUTO: 42.1 % (ref 34–46.6)
HGB BLD-MCNC: 14.3 G/DL (ref 12–15.9)
IMM GRANULOCYTES # BLD AUTO: 0.01 10*3/MM3 (ref 0–0.05)
IMM GRANULOCYTES NFR BLD AUTO: 0.2 % (ref 0–0.5)
LYMPHOCYTES # BLD AUTO: 1.62 10*3/MM3 (ref 0.7–3.1)
LYMPHOCYTES NFR BLD AUTO: 24.3 % (ref 19.6–45.3)
MCH RBC QN AUTO: 31.6 PG (ref 26.6–33)
MCHC RBC AUTO-ENTMCNC: 34 G/DL (ref 31.5–35.7)
MCV RBC AUTO: 93.1 FL (ref 79–97)
MONOCYTES # BLD AUTO: 0.7 10*3/MM3 (ref 0.1–0.9)
MONOCYTES NFR BLD AUTO: 10.5 % (ref 5–12)
NEUTROPHILS NFR BLD AUTO: 4.01 10*3/MM3 (ref 1.7–7)
NEUTROPHILS NFR BLD AUTO: 60.2 % (ref 42.7–76)
NRBC BLD AUTO-RTO: 0 /100 WBC (ref 0–0.2)
PLATELET # BLD AUTO: 297 10*3/MM3 (ref 140–450)
PMV BLD AUTO: 10 FL (ref 6–12)
POTASSIUM SERPL-SCNC: 4.3 MMOL/L (ref 3.5–5.2)
PROT SERPL-MCNC: 6.6 G/DL (ref 6–8.5)
RBC # BLD AUTO: 4.52 10*6/MM3 (ref 3.77–5.28)
SODIUM SERPL-SCNC: 141 MMOL/L (ref 136–145)
T3 SERPL-MCNC: 111 NG/DL (ref 80–200)
T4 SERPL-MCNC: 7.69 MCG/DL (ref 4.5–11.7)
TSH SERPL DL<=0.05 MIU/L-ACNC: 2.97 UIU/ML (ref 0.27–4.2)
WBC NRBC COR # BLD: 6.66 10*3/MM3 (ref 3.4–10.8)

## 2022-03-23 PROCEDURE — 84443 ASSAY THYROID STIM HORMONE: CPT

## 2022-03-23 PROCEDURE — 36415 COLL VENOUS BLD VENIPUNCTURE: CPT

## 2022-03-23 PROCEDURE — 84480 ASSAY TRIIODOTHYRONINE (T3): CPT

## 2022-03-23 PROCEDURE — 84436 ASSAY OF TOTAL THYROXINE: CPT

## 2022-03-23 PROCEDURE — 85025 COMPLETE CBC W/AUTO DIFF WBC: CPT

## 2022-03-23 PROCEDURE — 80053 COMPREHEN METABOLIC PANEL: CPT

## 2022-03-28 ENCOUNTER — HOSPITAL ENCOUNTER (OUTPATIENT)
Dept: CARDIOLOGY | Facility: HOSPITAL | Age: 77
Discharge: HOME | End: 2022-03-28
Admitting: INTERNAL MEDICINE

## 2022-03-28 ENCOUNTER — TRANSCRIBE ORDERS (OUTPATIENT)
Dept: ADMINISTRATIVE | Facility: HOSPITAL | Age: 77
End: 2022-03-28

## 2022-03-28 DIAGNOSIS — M79.89 PAIN AND SWELLING OF LEFT LOWER EXTREMITY: ICD-10-CM

## 2022-03-28 DIAGNOSIS — M79.605 PAIN AND SWELLING OF LEFT LOWER EXTREMITY: ICD-10-CM

## 2022-03-28 DIAGNOSIS — M79.89 PAIN AND SWELLING OF LEFT LOWER EXTREMITY: Primary | ICD-10-CM

## 2022-03-28 DIAGNOSIS — M79.605 PAIN AND SWELLING OF LEFT LOWER EXTREMITY: Primary | ICD-10-CM

## 2022-03-28 LAB
BH CV LOWER VASCULAR LEFT COMMON FEMORAL AUGMENT: NORMAL
BH CV LOWER VASCULAR LEFT COMMON FEMORAL COMPETENT: NORMAL
BH CV LOWER VASCULAR LEFT COMMON FEMORAL COMPRESS: NORMAL
BH CV LOWER VASCULAR LEFT COMMON FEMORAL PHASIC: NORMAL
BH CV LOWER VASCULAR LEFT COMMON FEMORAL SPONT: NORMAL
BH CV LOWER VASCULAR LEFT DISTAL FEMORAL COMPRESS: NORMAL
BH CV LOWER VASCULAR LEFT GASTRONEMIUS COMPRESS: NORMAL
BH CV LOWER VASCULAR LEFT GREATER SAPH AK COMPRESS: NORMAL
BH CV LOWER VASCULAR LEFT GREATER SAPH BK COMPRESS: NORMAL
BH CV LOWER VASCULAR LEFT LESSER SAPH COMPRESS: NORMAL
BH CV LOWER VASCULAR LEFT MID FEMORAL AUGMENT: NORMAL
BH CV LOWER VASCULAR LEFT MID FEMORAL COMPETENT: NORMAL
BH CV LOWER VASCULAR LEFT MID FEMORAL COMPRESS: NORMAL
BH CV LOWER VASCULAR LEFT MID FEMORAL PHASIC: NORMAL
BH CV LOWER VASCULAR LEFT MID FEMORAL SPONT: NORMAL
BH CV LOWER VASCULAR LEFT PERONEAL COMPRESS: NORMAL
BH CV LOWER VASCULAR LEFT POPLITEAL AUGMENT: NORMAL
BH CV LOWER VASCULAR LEFT POPLITEAL COMPETENT: NORMAL
BH CV LOWER VASCULAR LEFT POPLITEAL COMPRESS: NORMAL
BH CV LOWER VASCULAR LEFT POPLITEAL PHASIC: NORMAL
BH CV LOWER VASCULAR LEFT POPLITEAL SPONT: NORMAL
BH CV LOWER VASCULAR LEFT POSTERIOR TIBIAL COMPRESS: NORMAL
BH CV LOWER VASCULAR LEFT PROFUNDA FEMORAL COMPRESS: NORMAL
BH CV LOWER VASCULAR LEFT PROXIMAL FEMORAL COMPRESS: NORMAL
BH CV LOWER VASCULAR LEFT SAPHENOFEMORAL JUNCTION COMPRESS: NORMAL
BH CV LOWER VASCULAR RIGHT COMMON FEMORAL AUGMENT: NORMAL
BH CV LOWER VASCULAR RIGHT COMMON FEMORAL COMPETENT: NORMAL
BH CV LOWER VASCULAR RIGHT COMMON FEMORAL COMPRESS: NORMAL
BH CV LOWER VASCULAR RIGHT COMMON FEMORAL PHASIC: NORMAL
BH CV LOWER VASCULAR RIGHT COMMON FEMORAL SPONT: NORMAL
MAXIMAL PREDICTED HEART RATE: 146 BPM
STRESS TARGET HR: 124 BPM

## 2022-03-28 PROCEDURE — 93971 EXTREMITY STUDY: CPT

## 2022-04-04 ENCOUNTER — OFFICE VISIT (OUTPATIENT)
Dept: ORTHOPEDIC SURGERY | Facility: CLINIC | Age: 77
End: 2022-04-04

## 2022-04-04 VITALS — TEMPERATURE: 97.1 F | WEIGHT: 198 LBS | HEIGHT: 64 IN | BODY MASS INDEX: 33.8 KG/M2

## 2022-04-04 DIAGNOSIS — M17.11 ARTHRITIS OF RIGHT KNEE: Primary | ICD-10-CM

## 2022-04-04 PROCEDURE — 20610 DRAIN/INJ JOINT/BURSA W/O US: CPT | Performed by: ORTHOPAEDIC SURGERY

## 2022-04-04 PROCEDURE — 73562 X-RAY EXAM OF KNEE 3: CPT | Performed by: ORTHOPAEDIC SURGERY

## 2022-04-04 RX ORDER — METHYLPREDNISOLONE ACETATE 80 MG/ML
80 INJECTION, SUSPENSION INTRA-ARTICULAR; INTRALESIONAL; INTRAMUSCULAR; SOFT TISSUE
Status: COMPLETED | OUTPATIENT
Start: 2022-04-04 | End: 2022-04-04

## 2022-04-04 RX ADMIN — METHYLPREDNISOLONE ACETATE 80 MG: 80 INJECTION, SUSPENSION INTRA-ARTICULAR; INTRALESIONAL; INTRAMUSCULAR; SOFT TISSUE at 10:49

## 2022-04-04 NOTE — PROGRESS NOTES
Jocelynn follows up today on her right knee for recurrent cortisone injection which was performed.  She has MS and is concerned about having a right total knee done.  X-ray AP lateral 40 degree PA x-ray right knee taken the office today for complaints of pain with comparison view shows advanced arthritic change with a well-placed total knee on the left.  She was injected      Large Joint Arthrocentesis: R knee  Date/Time: 4/4/2022 10:49 AM  Consent given by: patient  Site marked: site marked  Timeout: Immediately prior to procedure a time out was called to verify the correct patient, procedure, equipment, support staff and site/side marked as required   Supporting Documentation  Indications: pain   Procedure Details  Location: knee - R knee  Preparation: Patient was prepped and draped in the usual sterile fashion  Needle gauge: 21.  Approach: medial  Medications administered: 1 mL lidocaine (cardiac); 80 mg methylPREDNISolone acetate 80 MG/ML  Patient tolerance: patient tolerated the procedure well with no immediate complications

## 2022-05-27 ENCOUNTER — TELEPHONE (OUTPATIENT)
Dept: ORTHOPEDIC SURGERY | Facility: CLINIC | Age: 77
End: 2022-05-27

## 2022-05-27 NOTE — TELEPHONE ENCOUNTER
Patient called back and I have given the needed information she no longer need the prophylaxis dental treatment

## 2022-05-27 NOTE — TELEPHONE ENCOUNTER
Provider: NA  Caller:  DAVID  Phone Number: [phone]  Reason for Call: PT HAD KNEE REPLACEMENT FEB 2018 AND WANTED TO KNOW IF SHE STILL NEEDED TO TAKE ANTIBIOTICS FOR DENTAL WORK. IF SO SHE NEEDS THAT CALLED IN     Pharmacy:  [id number] [address] AT [facility] [place] [facility] [address]  - [phone]  - [phone] FX

## 2022-05-27 NOTE — TELEPHONE ENCOUNTER
She no longer needs to take them for routine dental cleanings. They recommend them for 2 years after the knee replacement. Could you please let her know. Thanks!

## 2022-07-06 ENCOUNTER — CLINICAL SUPPORT (OUTPATIENT)
Dept: ORTHOPEDIC SURGERY | Facility: CLINIC | Age: 77
End: 2022-07-06

## 2022-07-06 ENCOUNTER — PREP FOR SURGERY (OUTPATIENT)
Dept: OTHER | Facility: HOSPITAL | Age: 77
End: 2022-07-06

## 2022-07-06 VITALS — BODY MASS INDEX: 33.8 KG/M2 | HEIGHT: 64 IN | TEMPERATURE: 98.2 F | WEIGHT: 198 LBS

## 2022-07-06 DIAGNOSIS — M17.11 ARTHRITIS OF RIGHT KNEE: Primary | ICD-10-CM

## 2022-07-06 PROCEDURE — 20610 DRAIN/INJ JOINT/BURSA W/O US: CPT | Performed by: ORTHOPAEDIC SURGERY

## 2022-07-06 PROCEDURE — 99214 OFFICE O/P EST MOD 30 MIN: CPT | Performed by: ORTHOPAEDIC SURGERY

## 2022-07-06 RX ORDER — POVIDONE-IODINE 10 MG/ML
SOLUTION TOPICAL ONCE
Status: CANCELLED | OUTPATIENT
Start: 2022-10-11 | End: 2022-07-06

## 2022-07-06 RX ORDER — CEFAZOLIN SODIUM 2 G/100ML
2 INJECTION, SOLUTION INTRAVENOUS ONCE
Status: CANCELLED | OUTPATIENT
Start: 2022-10-11 | End: 2022-07-06

## 2022-07-06 RX ORDER — PREGABALIN 75 MG/1
150 CAPSULE ORAL ONCE
Status: CANCELLED | OUTPATIENT
Start: 2022-10-11 | End: 2022-07-06

## 2022-07-06 RX ORDER — METHYLPREDNISOLONE ACETATE 80 MG/ML
80 INJECTION, SUSPENSION INTRA-ARTICULAR; INTRALESIONAL; INTRAMUSCULAR; SOFT TISSUE
Status: COMPLETED | OUTPATIENT
Start: 2022-07-06 | End: 2022-07-06

## 2022-07-06 RX ORDER — MELOXICAM 15 MG/1
15 TABLET ORAL ONCE
Status: CANCELLED | OUTPATIENT
Start: 2022-10-11 | End: 2022-07-06

## 2022-07-06 RX ORDER — LIDOCAINE HYDROCHLORIDE 10 MG/ML
4 INJECTION, SOLUTION EPIDURAL; INFILTRATION; INTRACAUDAL; PERINEURAL
Status: COMPLETED | OUTPATIENT
Start: 2022-07-06 | End: 2022-07-06

## 2022-07-06 RX ORDER — ACETAMINOPHEN 500 MG
1000 TABLET ORAL ONCE
Status: CANCELLED | OUTPATIENT
Start: 2022-10-11 | End: 2022-07-06

## 2022-07-06 RX ORDER — CHLORHEXIDINE GLUCONATE 500 MG/1
1 CLOTH TOPICAL TAKE AS DIRECTED
Status: CANCELLED | OUTPATIENT
Start: 2022-07-06

## 2022-07-06 RX ADMIN — LIDOCAINE HYDROCHLORIDE 4 ML: 10 INJECTION, SOLUTION EPIDURAL; INFILTRATION; INTRACAUDAL; PERINEURAL at 08:47

## 2022-07-06 RX ADMIN — METHYLPREDNISOLONE ACETATE 80 MG: 80 INJECTION, SUSPENSION INTRA-ARTICULAR; INTRALESIONAL; INTRAMUSCULAR; SOFT TISSUE at 08:47

## 2022-07-06 NOTE — PROGRESS NOTES
"Patient Name: Mahnaz Becerra   YOB: 1945  Referring Primary Care Physician: Stephen Wilkes MD  BMI: Body mass index is 33.99 kg/m².    Chief Complaint:    Chief Complaint   Patient presents with   • Right Knee - Follow-up        HPI:     Mahnaz Becerra is a 76 y.o. female who presents today for evaluation of   Chief Complaint   Patient presents with   • Right Knee - Follow-up   .  Jocelynn follows back up today she says \"can you replaced my knee this afternoon\".  Shots are not really helping she is trying to do her therapy BMI is currently at 33.99.  Been through therapy she has had a scope and she had a successful total knee on the other side.  Right knee is interfering with the quality of her life at this time      Subjective   Medications:   Home Medications:  Current Outpatient Medications on File Prior to Visit   Medication Sig   • albuterol (VENTOLIN HFA) 108 (90 BASE) MCG/ACT inhaler Inhale 2 puffs Every 6 (Six) Hours As Needed for wheezing.   • clotrimazole-betamethasone (LOTRISONE) 1-0.05 % cream Apply  topically to the appropriate area as directed 2 (Two) Times a Day.   • cyclobenzaprine (FLEXERIL) 10 MG tablet Take 1 tablet by mouth 2 (Two) Times a Day As Needed for Muscle Spasms.   • diclofenac (VOLTAREN) 1 % gel gel Apply pea size amount to area of pain up to four times a day   • Diclofenac Sodium (VOLTAREN) 1 % gel gel Apply 4 g topically to the appropriate area as directed 2 (Two) Times a Day. Use per pkg insert   • dicyclomine (BENTYL) 20 MG tablet Take 1 tablet by mouth As Needed (abd cramps).   • hydrochlorothiazide (HYDRODIURIL) 25 MG tablet TAKE 1 TABLET BY MOUTH DAILY   • Multiple Vitamins-Minerals (CENTRUM SILVER PO) Take  by mouth.   • pantoprazole (PROTONIX) 40 MG EC tablet TAKE 1 TABLET BY MOUTH TWICE DAILY   • promethazine (PHENERGAN) 12.5 MG tablet Take 1 tablet by mouth Every 6 (Six) Hours As Needed for Nausea or Vomiting.   • traMADol (ULTRAM) 50 MG tablet 1-2 Oral Q4H " PRN severe pain   • vitamin E 400 UNIT capsule Take 400 Units by mouth Daily.   • Famotidine-Ca Carb-Mag Hydrox (PEPCID COMPLETE PO) Take  by mouth.   • oxyCODONE-acetaminophen (PERCOCET) 7.5-325 MG per tablet 1-2 tabs po q 3-4 hr prn severe pain, wean as tolerated     Current Facility-Administered Medications on File Prior to Visit   Medication   • ipratropium-albuterol (DUO-NEB) nebulizer solution 3 mL     Current Medications:  Scheduled Meds:ipratropium-albuterol, 3 mL, Nebulization, 4x Daily - RT      Continuous Infusions:   PRN Meds:.    I have reviewed the patient's medical history in detail and updated the computerized patient record.  Review and summarization of old records includes:    Past Medical History:   Diagnosis Date   • Acid reflux    • Anesthesia complication     ANESTHESIA HAS BROUGHT ON ASTHMA ATTACKS    • Asthma    • Bronchitis    • Charleyhorse     FREQUENT   • Edema     LOWER EXT   • Heart murmur    • History of skin cancer     BACK   • IBS (irritable bowel syndrome)    • Knee pain, right    • MS (multiple sclerosis) ([facility])    • Osteoarthritis    • PONV (postoperative nausea and vomiting)     Patient states she had post-op nausea and vomiting after hysterectomy in .   • Sleep apnea     CANT TOLERATE CPAP        Past Surgical History:   Procedure Laterality Date   • APPENDECTOMY     • BREAST LUMPECTOMY Bilateral    • BREAST SURGERY      REDUCTION   •  SECTION     • CHOLECYSTECTOMY     • COLONOSCOPY  2012    Dr Moe   • COLONOSCOPY N/A 2020    Procedure: COLONOSCOPY TO CECUM AND TERM. ILEUM WITH BIOPSIES;  Surgeon: Inder Pat MD;  Location: [facility] ENDOSCOPY;  Service: Gastroenterology;  Laterality: N/A;  PRE OP - CHANGE IN BOWEL HABITS, DIARRHEA  POST OP - DIVERTICULOSIS   • COSMETIC SURGERY     • DILATATION AND CURETTAGE     • ENDOSCOPY N/A 2016    Procedure: ESOPHAGOGASTRODUODENOSCOPY WITH BX;  Surgeon: Nasim Moe MD;  Location: [facility]  ENDOSCOPY;  Service:    • ENDOSCOPY N/A 2020    Procedure: ESOPHAGOGASTRODUODENOSCOPY WITH DUODENAL ASPIRATE, POLYPECTOMY AND BIOPSIES;  Surgeon: Inder Pat MD;  Location: [facility] ENDOSCOPY;  Service: Gastroenterology;  Laterality: N/A;  PRE OP - GERD  POST OP - GASTRIC POLYP, GASTRITIS   • ENDOSCOPY W/ PEG REMOVAL  2012    Dr Moe   • EYE SURGERY Bilateral     BLEPHAROPLASTY   • HYSTERECTOMY     • KNEE ARTHROSCOPY Right 2016    Procedure: KNEE ARTHROSCOPY, PARTIAL MEDIAL AND LATERAL MENISECTOMY, CHONDROPLASTY OF THE PATELLA, REMOVAL OF LOOSE BODIES;  Surgeon: Artur Pat MD;  Location: [facility] OR OSC;  Service:    • KNEE ARTHROSCOPY W/ MENISCAL REPAIR Left    • OOPHORECTOMY Bilateral    • TONSILLECTOMY     • TOTAL KNEE ARTHROPLASTY Left 2018    Procedure: LEFT TOTAL KNEE ARTHROPLASTY WITH MARGRET NAVIGATION;  Surgeon: Artur Pat MD;  Location: [facility] MAIN OR;  Service:         Social History     Occupational History   • Not on file   Tobacco Use   • Smoking status: Former Smoker     Packs/day: 0.25     Types: Cigarettes     Quit date:      Years since quittin.5   • Smokeless tobacco: Never Used   • Tobacco comment: Patient states she was a social smoker.   Substance and Sexual Activity   • Alcohol use: Yes     Comment: Occasional   • Drug use: No   • Sexual activity: Defer      Social History     Social History Narrative   • Not on file        Family History   Problem Relation Age of Onset   • Hypertension Mother    • Stroke Mother    • Alzheimer's disease Mother    • Heart disease Father    • Emphysema Father    • Stroke Maternal Grandmother    • Cancer Maternal Grandfather    • No Known Problems Paternal Grandmother    • Cerebral aneurysm Paternal Grandfather    • Malig Hyperthermia Neg Hx        ROS: 14 point review of systems was performed and all other systems were reviewed and are negative except for documented findings in HPI and today's encounter.  "    Allergies:   Allergies   Allergen Reactions   • Flagyl [Metronidazole] Hives and Swelling     Lips and Tongue     • Levofloxacin Swelling and Rash     RED RASH   • Lansoprazole Itching and Other (See Comments)     AND TURN RED   • Cefdinir GI Intolerance     Abdominal pain, caused upset stomach   • Trazodone And Nefazodone Myalgia     Severe muscle cramps     Constitutional:  Denies fever, shaking or chills   Eyes:  Denies change in visual acuity   HENT:  Denies nasal congestion or sore throat   Respiratory:  Denies cough or shortness of breath   Cardiovascular:  Denies chest pain or severe LE edema   GI:  Denies abdominal pain, nausea, vomiting, bloody stools or diarrhea   Musculoskeletal:  Numbness, tingling, pain, or loss of motor function only as noted above in history of present illness.  : Denies painful urination or hematuria  Integument:  Denies rash, lesion or ulceration   Neurologic:  Denies headache or focal weakness  Endocrine:  Denies lymphadenopathy  Psych:  Denies confusion or change in mental status   Hem:  Denies active bleeding    OBJECTIVE:  Physical Exam: 76 y.o. female  Wt Readings from Last 3 Encounters:   07/06/22 89.8 kg (198 lb)   04/04/22 89.8 kg (198 lb)   12/29/21 89.8 kg (198 lb)     Ht Readings from Last 1 Encounters:   07/06/22 162.6 cm (64\")     Body mass index is 33.99 kg/m².  Vitals:    07/06/22 1101   Temp: 98.2 °F (36.8 °C)     Vital signs reviewed.     General Appearance:    Alert, cooperative, in no acute distress                  Eyes: conjunctiva clear  ENT: external ears and nose atraumatic  CV: no peripheral edema  Resp: normal respiratory effort  Skin: no rashes or wounds; normal turgor  Psych: mood and affect appropriate  Lymph: no nodes appreciated  Neuro: gross sensation intact  Vascular:  Palpable peripheral pulse in noted extremity  Musculoskeletal Extremities: Examination shows crepitation synovitis and swelling of medial joint line tenderness is about a 5 " degree flexion contracture and flexes back to about 115 calf is soft she has a healed incision on the left    Radiology:   AP lateral 40 degree PA x-ray right knee taken the office in the past with comparison view shows advanced tricompartmental arthritis with bone-on-bone osteophytes and relative osteopenia with subchondral sclerosis noted in her right knee with a well aligned total knee on the left        Assessment:     ICD-10-CM ICD-9-CM   1. Arthritis of right knee  M17.11 716.96        MDM/Plan:   The diagnosis(es), natural history, pathophysiology and treatment for diagnosis(es) were discussed. Opportunity given and questions answered.  Biomechanics of pertinent body areas discussed.  When appropriate, the use of ambulatory aids discussed.    Inflammation/pain control; with cold, heat, elevation and/or liniments discussed as appropriate  HOME EXERCISE/PT program encouraged  MEDICAL RECORDS reviewed from other provider(s) for past and current medical history pertinent to this complaint.  Total Knee Replacement : Continuation of conservative management vs. TKA: I reviewed anatomy of a total knee arthroplasty in laymen's terms, as well as typical postoperative recovery and possibly 6-12 months for maximal recovery, and possible need for rehabilitation stay after hospitalization. We also discussed risks, benefits, alternatives, and limitations of procedure with risks including but not limited to neurovascular damage, bleeding, infection, malalignment, chronic pain, failure of implants, osteolysis, loosening of implants, loss of motion, weakness, stiffness, instability, DVT, pulmonary embolus, death, stroke, complex regional pain syndrome, myocardial infarction, and need for additional procedures. Concept of substitution vs. replacement discussed.  No guarantees were given regarding results of surgery.  Patient verbalized understanding, and was given the opportunity to ask and have all questions answered today.        7/6/2022    Dictated utilizing Dragon dictation  The patient**        Large Joint Arthrocentesis: R knee  Date/Time: 7/6/2022 8:47 AM  Consent given by: patient  Site marked: site marked  Timeout: Immediately prior to procedure a time out was called to verify the correct patient, procedure, equipment, support staff and site/side marked as required   Supporting Documentation  Indications: pain, joint swelling and diagnostic evaluation   Procedure Details  Location: knee - R knee  Preparation: Patient was prepped and draped in the usual sterile fashion  Needle gauge: 21G.  Medications administered: 80 mg methylPREDNISolone acetate 80 MG/ML; 4 mL lidocaine PF 1% 1 %  Patient tolerance: patient tolerated the procedure well with no immediate complications

## 2022-07-07 DIAGNOSIS — M17.11 ARTHRITIS OF RIGHT KNEE: ICD-10-CM

## 2022-07-07 NOTE — TELEPHONE ENCOUNTER
Caller: PATIENT    Relationship: SELF     Best call back number: [phone]    Requested Prescriptions: TRAMADOL 50 MG    Pharmacy where request should be sent:  [facility]     Additional details provided by patient: PATIENT WAS CALLING TO REQUEST A REFILL ON HER PRESCRIPTION OF TRAMADOL 50 MG. PATIENT STATED SHE HAS ONE TABLET LEFT OF HER CURRENT PRESCRIPTION. PATIENT WOULD LIKE A CALL WHEN THIS IS TAKEN CARE OF. THANK YOU!    Does the patient have less than a 3 day supply:  [x] Yes  [] No

## 2022-07-08 RX ORDER — TRAMADOL HYDROCHLORIDE 50 MG/1
TABLET ORAL
Qty: 36 TABLET | Refills: 0 | Status: SHIPPED | OUTPATIENT
Start: 2022-07-08 | End: 2022-11-09 | Stop reason: HOSPADM

## 2022-07-15 ENCOUNTER — OFFICE (OUTPATIENT)
Dept: URBAN - METROPOLITAN AREA CLINIC 65 | Facility: CLINIC | Age: 77
End: 2022-07-15

## 2022-07-15 VITALS
DIASTOLIC BLOOD PRESSURE: 72 MMHG | WEIGHT: 202 LBS | HEART RATE: 78 BPM | HEIGHT: 64 IN | SYSTOLIC BLOOD PRESSURE: 118 MMHG

## 2022-07-15 DIAGNOSIS — R10.13 EPIGASTRIC PAIN: ICD-10-CM

## 2022-07-15 DIAGNOSIS — R19.7 DIARRHEA, UNSPECIFIED: ICD-10-CM

## 2022-07-15 PROCEDURE — 99213 OFFICE O/P EST LOW 20 MIN: CPT | Performed by: INTERNAL MEDICINE

## 2022-07-15 RX ORDER — FAMOTIDINE 40 MG/1
40 TABLET, FILM COATED ORAL
Qty: 30 | Refills: 11 | Status: ACTIVE
Start: 2022-07-15

## 2022-07-15 RX ORDER — PANTOPRAZOLE SODIUM 40 MG/1
80 TABLET, DELAYED RELEASE ORAL
Qty: 180 | Refills: 3 | Status: COMPLETED
End: 2024-03-01

## 2022-08-08 ENCOUNTER — TELEPHONE (OUTPATIENT)
Dept: ORTHOPEDIC SURGERY | Facility: CLINIC | Age: 77
End: 2022-08-08

## 2022-08-08 NOTE — TELEPHONE ENCOUNTER
Caller: DAVID LR    Relationship to patient: SELF    Best call back number: [phone]    Chief complaint: RIGHT HIP    Type of visit: CORTISONE INJECTION    Requested date: ASAP    If rescheduling, when is the original appointment: N/A    Additional notes: WANTS TO SEE DR MONZON ONLY

## 2022-08-24 ENCOUNTER — TELEPHONE (OUTPATIENT)
Dept: ORTHOPEDIC SURGERY | Facility: CLINIC | Age: 77
End: 2022-08-24

## 2022-08-24 NOTE — TELEPHONE ENCOUNTER
Called and spoke with patient. She is interested in pushing her surgery back to give her body time to heal. Can you take a look and see if there are any spots to move her back maybe 2-3 weeks possibly. Thank you!

## 2022-08-24 NOTE — TELEPHONE ENCOUNTER
Hub staff attempted to follow warm transfer process and was unsuccessful     Caller: Mahnaz Becerra    Relationship to patient: Self    Best call back number: [phone]    Patient is needing: PATIENT WAS SPEAKING WITH JELLY AND THE PHONE SLIPPED OUT OF HER HAND AND THE CALL WAS DISCONNECTED. I ATTEMPTED TO WT TO NONCLINICAL- UNABLE TO CONNECT

## 2022-08-24 NOTE — TELEPHONE ENCOUNTER
I SPOKE WITH PATIENT AND MOVED HER SURGERY TO THE 8TH OF November.  SHE WAS PLEASED AND UNDERSTANDING WITH THIS ISSUE.

## 2022-09-07 ENCOUNTER — OFFICE VISIT (OUTPATIENT)
Dept: ORTHOPEDIC SURGERY | Facility: CLINIC | Age: 77
End: 2022-09-07

## 2022-09-07 VITALS — WEIGHT: 203.3 LBS | BODY MASS INDEX: 34.71 KG/M2 | HEIGHT: 64 IN | TEMPERATURE: 96.7 F

## 2022-09-07 DIAGNOSIS — M17.11 ARTHRITIS OF RIGHT KNEE: Primary | ICD-10-CM

## 2022-09-07 DIAGNOSIS — Z96.652 STATUS POST TOTAL LEFT KNEE REPLACEMENT: ICD-10-CM

## 2022-09-07 PROCEDURE — 99213 OFFICE O/P EST LOW 20 MIN: CPT | Performed by: ORTHOPAEDIC SURGERY

## 2022-09-07 RX ORDER — METHYLPREDNISOLONE 4 MG/1
TABLET ORAL
Qty: 21 TABLET | Refills: 0 | Status: SHIPPED | OUTPATIENT
Start: 2022-09-07 | End: 2022-11-01

## 2022-09-07 NOTE — PROGRESS NOTES
Patient Name: Mahnaz Becerra   YOB: 1945  Referring Primary Care Physician: Stephen Wilkes MD  BMI: Body mass index is 34.9 kg/m².    Chief Complaint:    Chief Complaint   Patient presents with   • Right Knee - Follow-up        HPI:     Mahnaz Becerra is a 77 y.o. female who presents today for evaluation of   Chief Complaint   Patient presents with   • Right Knee - Follow-up   .  Patient follows up today on her right knee.  She has upcoming right total knee surgery.  She is post to get it done in the next few weeks however its been rescheduled for November 8 because her primary care doctor wanted rescheduled because she had had COVID is having some night pain even though taking some Ultram it does not help with making it hard to sleep.  She said that Alicia to give her some cream in the past and was asking about that among other measures      Subjective   Medications:   Home Medications:  Current Outpatient Medications on File Prior to Visit   Medication Sig   • albuterol (VENTOLIN HFA) 108 (90 BASE) MCG/ACT inhaler Inhale 2 puffs Every 6 (Six) Hours As Needed for wheezing.   • Famotidine-Ca Carb-Mag Hydrox (PEPCID COMPLETE PO) Take  by mouth.   • hydrochlorothiazide (HYDRODIURIL) 25 MG tablet TAKE 1 TABLET BY MOUTH DAILY   • Multiple Vitamins-Minerals (CENTRUM SILVER PO) Take  by mouth.   • pantoprazole (PROTONIX) 40 MG EC tablet TAKE 1 TABLET BY MOUTH TWICE DAILY   • traMADol (ULTRAM) 50 MG tablet 1-2 Oral Q4H PRN severe pain   • vitamin E 400 UNIT capsule Take 400 Units by mouth Daily.   • clotrimazole-betamethasone (LOTRISONE) 1-0.05 % cream Apply  topically to the appropriate area as directed 2 (Two) Times a Day.   • cyclobenzaprine (FLEXERIL) 10 MG tablet Take 1 tablet by mouth 2 (Two) Times a Day As Needed for Muscle Spasms.   • diclofenac (VOLTAREN) 1 % gel gel Apply pea size amount to area of pain up to four times a day   • Diclofenac Sodium (VOLTAREN) 1 % gel gel Apply 4 g topically to  the appropriate area as directed 2 (Two) Times a Day. Use per pkg insert   • dicyclomine (BENTYL) 20 MG tablet Take 1 tablet by mouth As Needed (abd cramps).   • oxyCODONE-acetaminophen (PERCOCET) 7.5-325 MG per tablet 1-2 tabs po q 3-4 hr prn severe pain, wean as tolerated   • promethazine (PHENERGAN) 12.5 MG tablet Take 1 tablet by mouth Every 6 (Six) Hours As Needed for Nausea or Vomiting.     Current Facility-Administered Medications on File Prior to Visit   Medication   • ipratropium-albuterol (DUO-NEB) nebulizer solution 3 mL     Current Medications:  Scheduled Meds:ipratropium-albuterol, 3 mL, Nebulization, 4x Daily - RT      Continuous Infusions:   PRN Meds:.    I have reviewed the patient's medical history in detail and updated the computerized patient record.  Review and summarization of old records includes:    Past Medical History:   Diagnosis Date   • Acid reflux    • Anesthesia complication     ANESTHESIA HAS BROUGHT ON ASTHMA ATTACKS    • Asthma    • Bronchitis    • Charleyhorse     FREQUENT   • Edema     LOWER EXT   • Heart murmur    • History of skin cancer     BACK   • IBS (irritable bowel syndrome)    • Knee pain, right    • MS (multiple sclerosis) ([facility])    • Osteoarthritis    • PONV (postoperative nausea and vomiting)     Patient states she had post-op nausea and vomiting after hysterectomy in .   • Sleep apnea     CANT TOLERATE CPAP        Past Surgical History:   Procedure Laterality Date   • APPENDECTOMY     • BREAST LUMPECTOMY Bilateral    • BREAST SURGERY      REDUCTION   •  SECTION     • CHOLECYSTECTOMY     • COLONOSCOPY  2012    Dr Moe   • COLONOSCOPY N/A 2020    Procedure: COLONOSCOPY TO CECUM AND TERM. ILEUM WITH BIOPSIES;  Surgeon: Inder Pat MD;  Location: [facility] ENDOSCOPY;  Service: Gastroenterology;  Laterality: N/A;  PRE OP - CHANGE IN BOWEL HABITS, DIARRHEA  POST OP - DIVERTICULOSIS   • COSMETIC SURGERY     • DILATATION AND CURETTAGE      • ENDOSCOPY N/A 2016    Procedure: ESOPHAGOGASTRODUODENOSCOPY WITH BX;  Surgeon: Nasim Moe MD;  Location: [facility] ENDOSCOPY;  Service:    • ENDOSCOPY N/A 2020    Procedure: ESOPHAGOGASTRODUODENOSCOPY WITH DUODENAL ASPIRATE, POLYPECTOMY AND BIOPSIES;  Surgeon: Inder Pat MD;  Location: [facility] ENDOSCOPY;  Service: Gastroenterology;  Laterality: N/A;  PRE OP - GERD  POST OP - GASTRIC POLYP, GASTRITIS   • ENDOSCOPY W/ PEG REMOVAL  2012    Dr Moe   • EYE SURGERY Bilateral     BLEPHAROPLASTY   • HYSTERECTOMY     • KNEE ARTHROSCOPY Right 2016    Procedure: KNEE ARTHROSCOPY, PARTIAL MEDIAL AND LATERAL MENISECTOMY, CHONDROPLASTY OF THE PATELLA, REMOVAL OF LOOSE BODIES;  Surgeon: Artur Pat MD;  Location: [facility] OR OSC;  Service:    • KNEE ARTHROSCOPY W/ MENISCAL REPAIR Left    • OOPHORECTOMY Bilateral    • TONSILLECTOMY     • TOTAL KNEE ARTHROPLASTY Left 2018    Procedure: LEFT TOTAL KNEE ARTHROPLASTY WITH MARGRET NAVIGATION;  Surgeon: Artur Pat MD;  Location: [facility] MAIN OR;  Service:         Social History     Occupational History   • Not on file   Tobacco Use   • Smoking status: Former Smoker     Packs/day: 0.25     Types: Cigarettes     Quit date:      Years since quittin.7   • Smokeless tobacco: Never Used   • Tobacco comment: Patient states she was a social smoker.   Substance and Sexual Activity   • Alcohol use: Yes     Comment: Occasional   • Drug use: No   • Sexual activity: Defer      Social History     Social History Narrative   • Not on file        Family History   Problem Relation Age of Onset   • Hypertension Mother    • Stroke Mother    • Alzheimer's disease Mother    • Heart disease Father    • Emphysema Father    • Stroke Maternal Grandmother    • Cancer Maternal Grandfather    • No Known Problems Paternal Grandmother    • Cerebral aneurysm Paternal Grandfather    • Malig Hyperthermia Neg Hx        ROS: 14 point review of systems was  "performed and all other systems were reviewed and are negative except for documented findings in HPI and today's encounter.     Allergies:   Allergies   Allergen Reactions   • Flagyl [Metronidazole] Hives and Swelling     Lips and Tongue     • Levofloxacin Swelling and Rash     RED RASH   • Lansoprazole Itching and Other (See Comments)     AND TURN RED   • Cefdinir GI Intolerance     Abdominal pain, caused upset stomach   • Trazodone And Nefazodone Myalgia     Severe muscle cramps     Constitutional:  Denies fever, shaking or chills   Eyes:  Denies change in visual acuity   HENT:  Denies nasal congestion or sore throat   Respiratory:  Denies cough or shortness of breath   Cardiovascular:  Denies chest pain or severe LE edema   GI:  Denies abdominal pain, nausea, vomiting, bloody stools or diarrhea   Musculoskeletal:  Numbness, tingling, pain, or loss of motor function only as noted above in history of present illness.  : Denies painful urination or hematuria  Integument:  Denies rash, lesion or ulceration   Neurologic:  Denies headache or focal weakness  Endocrine:  Denies lymphadenopathy  Psych:  Denies confusion or change in mental status   Hem:  Denies active bleeding    OBJECTIVE:  Physical Exam: 77 y.o. female  Wt Readings from Last 3 Encounters:   09/07/22 92.2 kg (203 lb 4.8 oz)   07/06/22 89.8 kg (198 lb)   04/04/22 89.8 kg (198 lb)     Ht Readings from Last 1 Encounters:   09/07/22 162.6 cm (64\")     Body mass index is 34.9 kg/m².  Vitals:    09/07/22 1310   Temp: 96.7 °F (35.9 °C)     Vital signs reviewed.     General Appearance:    Alert, cooperative, in no acute distress                  Eyes: conjunctiva clear  ENT: external ears and nose atraumatic  CV: no peripheral edema  Resp: normal respiratory effort  Skin: no rashes or wounds; normal turgor  Psych: mood and affect appropriate  Lymph: no nodes appreciated  Neuro: gross sensation intact  Vascular:  Palpable peripheral pulse in noted " extremity  Musculoskeletal Extremities: Exam today pleasant lady she has crepitation synovitis swelling in her knee on the right left knee has a well healed total knee replacement with good range of motion    Radiology:   Reviewing her x-ray whether she has bone-on-bone tricompartmentally on the right with subchondral sclerosis osteophytes etc. she has a well aligned total knee on the left I talked her about options and she is taken Medrol Dosepaks before suggest we try 1 of those.  Also she can try some Voltaren gel which is the medicine that was on the chart.  See her back for preoperative visit.        Assessment:     ICD-10-CM ICD-9-CM   1. Arthritis of right knee  M17.11 716.96   2. Status post total left knee replacement  Z96.652 V43.65        MDM/Plan:   The diagnosis(es), natural history, pathophysiology and treatment for diagnosis(es) were discussed. Opportunity given and questions answered.  Biomechanics of pertinent body areas discussed.  When appropriate, the use of ambulatory aids discussed.          9/7/2022    Dictated utilizing Dragon dictation

## 2022-10-11 ENCOUNTER — TRANSCRIBE ORDERS (OUTPATIENT)
Dept: ADMINISTRATIVE | Facility: HOSPITAL | Age: 77
End: 2022-10-11

## 2022-10-11 ENCOUNTER — LAB (OUTPATIENT)
Dept: LAB | Facility: HOSPITAL | Age: 77
End: 2022-10-11

## 2022-10-11 DIAGNOSIS — E78.5 HYPERLIPIDEMIA, UNSPECIFIED HYPERLIPIDEMIA TYPE: ICD-10-CM

## 2022-10-11 DIAGNOSIS — Z00.00 ROUTINE GENERAL MEDICAL EXAMINATION AT A HEALTH CARE FACILITY: Primary | ICD-10-CM

## 2022-10-11 DIAGNOSIS — R73.09 IMPAIRED GLUCOSE TOLERANCE TEST: ICD-10-CM

## 2022-10-11 DIAGNOSIS — Z00.00 ROUTINE GENERAL MEDICAL EXAMINATION AT A HEALTH CARE FACILITY: ICD-10-CM

## 2022-10-11 LAB
ALBUMIN SERPL-MCNC: 4.5 G/DL (ref 3.5–5.2)
ALBUMIN/GLOB SERPL: 2 G/DL
ALP SERPL-CCNC: 64 U/L (ref 39–117)
ALT SERPL W P-5'-P-CCNC: 17 U/L (ref 1–33)
ANION GAP SERPL CALCULATED.3IONS-SCNC: 12 MMOL/L (ref 5–15)
AST SERPL-CCNC: 22 U/L (ref 1–32)
BACTERIA UR QL AUTO: ABNORMAL /HPF
BASOPHILS # BLD AUTO: 0.04 10*3/MM3 (ref 0–0.2)
BASOPHILS NFR BLD AUTO: 0.5 % (ref 0–1.5)
BILIRUB SERPL-MCNC: 0.4 MG/DL (ref 0–1.2)
BILIRUB UR QL STRIP: NEGATIVE
BUN SERPL-MCNC: 17 MG/DL (ref 8–23)
BUN/CREAT SERPL: 25 (ref 7–25)
CALCIUM SPEC-SCNC: 9.6 MG/DL (ref 8.6–10.5)
CHLORIDE SERPL-SCNC: 100 MMOL/L (ref 98–107)
CHOLEST SERPL-MCNC: 223 MG/DL (ref 0–200)
CLARITY UR: CLEAR
CO2 SERPL-SCNC: 28 MMOL/L (ref 22–29)
COLOR UR: YELLOW
CREAT SERPL-MCNC: 0.68 MG/DL (ref 0.57–1)
DEPRECATED RDW RBC AUTO: 41.2 FL (ref 37–54)
EGFRCR SERPLBLD CKD-EPI 2021: 91 ML/MIN/1.73
EOSINOPHIL # BLD AUTO: 0.2 10*3/MM3 (ref 0–0.4)
EOSINOPHIL NFR BLD AUTO: 2.7 % (ref 0.3–6.2)
ERYTHROCYTE [DISTWIDTH] IN BLOOD BY AUTOMATED COUNT: 12.6 % (ref 12.3–15.4)
GLOBULIN UR ELPH-MCNC: 2.3 GM/DL
GLUCOSE SERPL-MCNC: 109 MG/DL (ref 65–99)
GLUCOSE UR STRIP-MCNC: NEGATIVE MG/DL
HCT VFR BLD AUTO: 39.2 % (ref 34–46.6)
HDLC SERPL-MCNC: 41 MG/DL (ref 40–60)
HGB BLD-MCNC: 13.4 G/DL (ref 12–15.9)
HGB UR QL STRIP.AUTO: NEGATIVE
HYALINE CASTS UR QL AUTO: ABNORMAL /LPF
IMM GRANULOCYTES # BLD AUTO: 0.01 10*3/MM3 (ref 0–0.05)
IMM GRANULOCYTES NFR BLD AUTO: 0.1 % (ref 0–0.5)
KETONES UR QL STRIP: NEGATIVE
LDLC SERPL CALC-MCNC: 150 MG/DL (ref 0–100)
LDLC/HDLC SERPL: 3.58 {RATIO}
LEUKOCYTE ESTERASE UR QL STRIP.AUTO: ABNORMAL
LYMPHOCYTES # BLD AUTO: 2.19 10*3/MM3 (ref 0.7–3.1)
LYMPHOCYTES NFR BLD AUTO: 29.7 % (ref 19.6–45.3)
MCH RBC QN AUTO: 31.1 PG (ref 26.6–33)
MCHC RBC AUTO-ENTMCNC: 34.2 G/DL (ref 31.5–35.7)
MCV RBC AUTO: 91 FL (ref 79–97)
MONOCYTES # BLD AUTO: 0.58 10*3/MM3 (ref 0.1–0.9)
MONOCYTES NFR BLD AUTO: 7.9 % (ref 5–12)
NEUTROPHILS NFR BLD AUTO: 4.36 10*3/MM3 (ref 1.7–7)
NEUTROPHILS NFR BLD AUTO: 59.1 % (ref 42.7–76)
NITRITE UR QL STRIP: NEGATIVE
NRBC BLD AUTO-RTO: 0 /100 WBC (ref 0–0.2)
PH UR STRIP.AUTO: 6.5 [PH] (ref 5–8)
PLATELET # BLD AUTO: 374 10*3/MM3 (ref 140–450)
PMV BLD AUTO: 9.7 FL (ref 6–12)
POTASSIUM SERPL-SCNC: 3.9 MMOL/L (ref 3.5–5.2)
PROT SERPL-MCNC: 6.8 G/DL (ref 6–8.5)
PROT UR QL STRIP: NEGATIVE
RBC # BLD AUTO: 4.31 10*6/MM3 (ref 3.77–5.28)
RBC # UR STRIP: ABNORMAL /HPF
REF LAB TEST METHOD: ABNORMAL
SODIUM SERPL-SCNC: 140 MMOL/L (ref 136–145)
SP GR UR STRIP: 1.02 (ref 1–1.03)
SQUAMOUS #/AREA URNS HPF: ABNORMAL /HPF
TRIGL SERPL-MCNC: 176 MG/DL (ref 0–150)
UROBILINOGEN UR QL STRIP: ABNORMAL
VLDLC SERPL-MCNC: 32 MG/DL (ref 5–40)
WBC # UR STRIP: ABNORMAL /HPF
WBC NRBC COR # BLD: 7.38 10*3/MM3 (ref 3.4–10.8)

## 2022-10-11 PROCEDURE — 80053 COMPREHEN METABOLIC PANEL: CPT

## 2022-10-11 PROCEDURE — 80061 LIPID PANEL: CPT

## 2022-10-11 PROCEDURE — 36415 COLL VENOUS BLD VENIPUNCTURE: CPT

## 2022-10-11 PROCEDURE — 81001 URINALYSIS AUTO W/SCOPE: CPT

## 2022-10-11 PROCEDURE — 85025 COMPLETE CBC W/AUTO DIFF WBC: CPT

## 2022-10-11 PROCEDURE — 83036 HEMOGLOBIN GLYCOSYLATED A1C: CPT

## 2022-10-12 LAB — HBA1C MFR BLD: 6.1 % (ref 4.8–5.6)

## 2022-10-27 ENCOUNTER — APPOINTMENT (OUTPATIENT)
Dept: PREADMISSION TESTING | Facility: HOSPITAL | Age: 77
End: 2022-10-27

## 2022-11-01 ENCOUNTER — OFFICE VISIT (OUTPATIENT)
Dept: ORTHOPEDIC SURGERY | Facility: CLINIC | Age: 77
End: 2022-11-01

## 2022-11-01 VITALS — HEIGHT: 64 IN | BODY MASS INDEX: 34.66 KG/M2 | WEIGHT: 203 LBS | TEMPERATURE: 97.1 F

## 2022-11-01 DIAGNOSIS — R52 PAIN: Primary | ICD-10-CM

## 2022-11-01 PROCEDURE — 73562 X-RAY EXAM OF KNEE 3: CPT | Performed by: NURSE PRACTITIONER

## 2022-11-01 PROCEDURE — S0260 H&P FOR SURGERY: HCPCS | Performed by: NURSE PRACTITIONER

## 2022-11-01 RX ORDER — SULFAMETHOXAZOLE AND TRIMETHOPRIM 800; 160 MG/1; MG/1
1 TABLET ORAL EVERY 12 HOURS
COMMUNITY
Start: 2022-10-17 | End: 2022-11-02

## 2022-11-01 RX ORDER — FAMOTIDINE 40 MG/1
1 TABLET, FILM COATED ORAL NIGHTLY PRN
COMMUNITY
Start: 2022-01-01

## 2022-11-01 RX ORDER — NIRMATRELVIR AND RITONAVIR 300-100 MG
KIT ORAL
COMMUNITY
Start: 2022-08-16 | End: 2022-11-02

## 2022-11-01 RX ORDER — SUCRALFATE 1 G/1
1 TABLET ORAL 3 TIMES DAILY PRN
COMMUNITY
Start: 2022-01-01

## 2022-11-01 NOTE — PROGRESS NOTES
"   History & Physical       Patient: Mahnaz Becerra  YOB: 1945  Medical Record Number: [id number]  Wt Readings from Last 3 Encounters:   11/01/22 92.1 kg (203 lb)   09/07/22 92.2 kg (203 lb 4.8 oz)   07/06/22 89.8 kg (198 lb)     Ht Readings from Last 3 Encounters:   11/01/22 162.6 cm (64\")   09/07/22 162.6 cm (64\")   07/06/22 162.6 cm (64\")     Body mass index is 34.84 kg/m².  Facility age limit for growth percentiles is 20 years.    Surgeon:  Dr. Artur Pat    Chief Complaints:   Chief Complaint   Patient presents with   • Right Knee - Follow-up     Surgery:  Right Total Knee Arthroplasty with [place] Navigation    Subjective:    History of Present Illness: 77 y.o. female presents with   Chief Complaint   Patient presents with   • Right Knee - Follow-up   . Chronic symptoms have been progressively worsening despite more conservative treatment measures including medications OTC and prescription, cortisone injections and physical therapy.  Symptoms are associated with ability to move, exercise, and perform activities of daily living.  Symptoms are aggravated by weight bearing and ROM necessary for activities of daily living.   Symptoms improve with rest, ice and elevation only minimally.      Allergies:   Allergies   Allergen Reactions   • Flagyl [Metronidazole] Hives and Swelling     Lips and Tongue     • Levofloxacin Swelling and Rash     RED RASH   • Lansoprazole Itching and Other (See Comments)     AND TURN RED   • Cefdinir GI Intolerance     Abdominal pain, caused upset stomach   • Trazodone And Nefazodone Myalgia     Severe muscle cramps       Medications:   Home Medications:  Current Outpatient Medications on File Prior to Visit   Medication Sig   • albuterol (VENTOLIN HFA) 108 (90 BASE) MCG/ACT inhaler Inhale 2 puffs Every 6 (Six) Hours As Needed for wheezing.   • clotrimazole-betamethasone (LOTRISONE) 1-0.05 % cream Apply  topically to the appropriate area as directed 2 (Two) Times a " Day.   • cyclobenzaprine (FLEXERIL) 10 MG tablet Take 1 tablet by mouth 2 (Two) Times a Day As Needed for Muscle Spasms.   • diclofenac (VOLTAREN) 1 % gel gel Apply pea size amount to area of pain up to four times a day   • Diclofenac Sodium (VOLTAREN) 1 % gel gel Apply 4 g topically to the appropriate area as directed 2 (Two) Times a Day. Use per pkg insert   • dicyclomine (BENTYL) 20 MG tablet Take 1 tablet by mouth As Needed (abd cramps).   • Famotidine-Ca Carb-Mag Hydrox (PEPCID COMPLETE PO) Take  by mouth.   • hydrochlorothiazide (HYDRODIURIL) 25 MG tablet TAKE 1 TABLET BY MOUTH DAILY   • methylPREDNISolone (MEDROL) 4 MG dose pack Take as directed on package instructions.   • Multiple Vitamins-Minerals (CENTRUM SILVER PO) Take  by mouth.   • oxyCODONE-acetaminophen (PERCOCET) 7.5-325 MG per tablet 1-2 tabs po q 3-4 hr prn severe pain, wean as tolerated   • pantoprazole (PROTONIX) 40 MG EC tablet TAKE 1 TABLET BY MOUTH TWICE DAILY   • promethazine (PHENERGAN) 12.5 MG tablet Take 1 tablet by mouth Every 6 (Six) Hours As Needed for Nausea or Vomiting.   • traMADol (ULTRAM) 50 MG tablet 1-2 Oral Q4H PRN severe pain   • vitamin E 400 UNIT capsule Take 400 Units by mouth Daily.   • mupirocin (BACTROBAN) 2 % ointment    • Paxlovid, 300/100, 20 x 150 MG & 10 x 100MG tablet therapy pack tablet TAKE 3 TABLETS BY MOUTH TWICE DAILY -- MORNING AND EVENING- FOR 5 DAYS AS DIRECTED ON PACKAGE   • sulfamethoxazole-trimethoprim (BACTRIM DS,SEPTRA DS) 800-160 MG per tablet Take 1 tablet by mouth Every 12 (Twelve) Hours.     Current Facility-Administered Medications on File Prior to Visit   Medication   • ipratropium-albuterol (DUO-NEB) nebulizer solution 3 mL     Current Medications:  Scheduled Meds:ipratropium-albuterol, 3 mL, Nebulization, 4x Daily - RT      Continuous Infusions:   PRN Meds:.    I have reviewed the patient's medical history in detail and updated the computerized patient record.  Review and summarization of old  records include:    Past Medical History:   Diagnosis Date   • Acid reflux    • Anesthesia complication     ANESTHESIA HAS BROUGHT ON ASTHMA ATTACKS    • Asthma    • Bronchitis    • [place]     FREQUENT   • Edema     LOWER EXT   • Heart murmur    • History of skin cancer     BACK   • IBS (irritable bowel syndrome)    • Knee pain, right    • MS (multiple sclerosis) (HCC)    • Osteoarthritis    • PONV (postoperative nausea and vomiting)     Patient states she had post-op nausea and vomiting after hysterectomy in .   • Sleep apnea     CANT TOLERATE CPAP        Past Surgical History:   Procedure Laterality Date   • APPENDECTOMY     • BREAST LUMPECTOMY Bilateral    • BREAST SURGERY      REDUCTION   •  SECTION     • CHOLECYSTECTOMY     • COLONOSCOPY  2012    Dr Moe   • COLONOSCOPY N/A 2020    Procedure: COLONOSCOPY TO CECUM AND TERM. ILEUM WITH BIOPSIES;  Surgeon: Inder Pat MD;  Location: [facility] ENDOSCOPY;  Service: Gastroenterology;  Laterality: N/A;  PRE OP - CHANGE IN BOWEL HABITS, DIARRHEA  POST OP - DIVERTICULOSIS   • COSMETIC SURGERY     • DILATATION AND CURETTAGE     • ENDOSCOPY N/A 2016    Procedure: ESOPHAGOGASTRODUODENOSCOPY WITH BX;  Surgeon: Nasim Moe MD;  Location: [facility] ENDOSCOPY;  Service:    • ENDOSCOPY N/A 2020    Procedure: ESOPHAGOGASTRODUODENOSCOPY WITH DUODENAL ASPIRATE, POLYPECTOMY AND BIOPSIES;  Surgeon: Inder Pat MD;  Location: [facility] ENDOSCOPY;  Service: Gastroenterology;  Laterality: N/A;  PRE OP - GERD  POST OP - GASTRIC POLYP, GASTRITIS   • ENDOSCOPY W/ PEG REMOVAL  2012    Dr Moe   • EYE SURGERY Bilateral     BLEPHAROPLASTY   • HYSTERECTOMY     • KNEE ARTHROSCOPY Right 2016    Procedure: KNEE ARTHROSCOPY, PARTIAL MEDIAL AND LATERAL MENISECTOMY, CHONDROPLASTY OF THE PATELLA, REMOVAL OF LOOSE BODIES;  Surgeon: Artur Pat MD;  Location[facility] [place];  Service:    • KNEE ARTHROSCOPY W/ MENISCAL  REPAIR Left    • OOPHORECTOMY Bilateral    • TONSILLECTOMY     • TOTAL KNEE ARTHROPLASTY Left 2018    Procedure: LEFT TOTAL KNEE ARTHROPLASTY WITH [place] NAVIGATION;  Surgeon: Artur Pat MD;  Location: [facility] OR;  Service:         Social History     Occupational History   • Not on file   Tobacco Use   • Smoking status: Former     Packs/day: 0.25     Types: Cigarettes     Quit date:      Years since quittin.8   • Smokeless tobacco: Never   • Tobacco comments:     Patient states she was a social smoker.   Substance and Sexual Activity   • Alcohol use: Yes     Comment: Occasional   • Drug use: No   • Sexual activity: Defer      Social History     Social History Narrative   • Not on file        Family History   Problem Relation Age of Onset   • Hypertension Mother    • Stroke Mother    • Alzheimer's disease Mother    • Heart disease Father    • Emphysema Father    • Stroke Maternal Grandmother    • Cancer Maternal Grandfather    • No Known Problems Paternal Grandmother    • Cerebral aneurysm Paternal Grandfather    • Malig Hyperthermia Neg Hx        ROS: 14 point review of systems was performed and was negative except for documented findings in HPI and today's encounter.       Constitutional:  Denies fever, shaking or chills   Eyes:  Denies change in visual acuity   HENT:  Denies nasal congestion or sore throat   Respiratory:  Denies cough or shortness of breath   Cardiovascular:  Denies chest pain or severe LE edema   GI:  Denies abdominal pain, nausea, vomiting, bloody stools or diarrhea   Musculoskeletal:  Denies numbness tingling or loss of motor function except as outlined above in history of present illness.  : Denies painful urination or hematuria  Integument:  Denies rash, lesion or ulceration   Neurologic:  Denies headache or focal weakness  Endocrine:  Denies lymphadenopathy  Psych:  Denies confusion or change in mental status   Hem:  Denies active bleeding    Physical Exam:  "77 y.o. female  Wt Readings from Last 3 Encounters:   11/01/22 92.1 kg (203 lb)   09/07/22 92.2 kg (203 lb 4.8 oz)   07/06/22 89.8 kg (198 lb)     Ht Readings from Last 3 Encounters:   11/01/22 162.6 cm (64\")   09/07/22 162.6 cm (64\")   07/06/22 162.6 cm (64\")     Body mass index is 34.84 kg/m².  Facility age limit for growth percentiles is 20 years.  Vitals:    11/01/22 1018   Temp: 97.1 °F (36.2 °C)       Vital signs reviewed.   General Appearance:    Alert, cooperative, in no acute distress                  Eyes: conjunctiva clear  ENT: external ears and nose atraumatic  CV: no peripheral edema  Resp: normal respiratory effort  Skin: no rashes or wounds; normal turgor  Psych: mood and affect appropriate  Lymph: no nodes appreciated  Neuro: gross sensation intact  Vascular:  Palpable peripheral pulse in noted extremity  Musculoskeletal Extremities: KNEE Exam: medial joint line tenderness with crepitation, synovitis, swelling, and joint effusion right knee.        Diagnostic Tests:  Results for orders placed or performed in visit on 10/11/22   CBC Auto Differential    Specimen: Blood   Result Value Ref Range    WBC 7.38 3.40 - 10.80 10*3/mm3    RBC 4.31 3.77 - 5.28 10*6/mm3    Hemoglobin 13.4 12.0 - 15.9 g/dL    Hematocrit 39.2 34.0 - 46.6 %    MCV 91.0 79.0 - 97.0 fL    MCH 31.1 26.6 - 33.0 pg    MCHC 34.2 31.5 - 35.7 g/dL    RDW 12.6 12.3 - 15.4 %    RDW-SD 41.2 37.0 - 54.0 fl    MPV 9.7 6.0 - 12.0 fL    Platelets 374 140 - 450 10*3/mm3    Neutrophil % 59.1 42.7 - 76.0 %    Lymphocyte % 29.7 19.6 - 45.3 %    Monocyte % 7.9 5.0 - 12.0 %    Eosinophil % 2.7 0.3 - 6.2 %    Basophil % 0.5 0.0 - 1.5 %    Immature Grans % 0.1 0.0 - 0.5 %    Neutrophils, Absolute 4.36 1.70 - 7.00 10*3/mm3    Lymphocytes, Absolute 2.19 0.70 - 3.10 10*3/mm3    Monocytes, Absolute 0.58 0.10 - 0.90 10*3/mm3    Eosinophils, Absolute 0.20 0.00 - 0.40 10*3/mm3    Basophils, Absolute 0.04 0.00 - 0.20 10*3/mm3    Immature Grans, Absolute 0.01 " 0.00 - 0.05 10*3/mm3    nRBC 0.0 0.0 - 0.2 /100 WBC   Results for orders placed or performed in visit on 11/03/16   CBC & Differential    Specimen: Blood   Result Value Ref Range    WBC 19.8 (H) 3.4 - 10.8 x10E3/uL    RBC 4.50 3.77 - 5.28 x10E6/uL    Hemoglobin 14.3 11.1 - 15.9 g/dL    Hematocrit 41.6 34.0 - 46.6 %    MCV 92 79 - 97 fL    MCH 31.8 26.6 - 33.0 pg    MCHC 34.4 31.5 - 35.7 g/dL    RDW 13.4 12.3 - 15.4 %    Platelets 339 150 - 379 x10E3/uL    Neutrophil Rel % 92 %    Lymphocyte Rel % 5 %    Monocyte Rel % 3 %    Eosinophil Rel % 0 %    Basophil Rel % 0 %    Neutrophils Absolute 18.1 (H) 1.4 - 7.0 x10E3/uL    Lymphocytes Absolute 1.1 0.7 - 3.1 x10E3/uL    Monocytes Absolute 0.6 0.1 - 0.9 x10E3/uL    Eosinophils Absolute 0.0 0.0 - 0.4 x10E3/uL    Basophils Absolute 0.0 0.0 - 0.2 x10E3/uL    Immature Granulocyte Rel % 0 %    Immature Grans Absolute 0.0 0.0 - 0.1 x10E3/uL     Lab Results   Component Value Date    GLUCOSE 109 (H) 10/11/2022    CALCIUM 9.6 10/11/2022     10/11/2022    K 3.9 10/11/2022    CO2 28.0 10/11/2022     10/11/2022    BUN 17 10/11/2022    CREATININE 0.68 10/11/2022    EGFRIFAFRI 96 11/03/2016    EGFRIFNONA 69 07/19/2021    BCR 25.0 10/11/2022    ANIONGAP 12.0 10/11/2022       EKG:    - PAT appt tomorrow 11/2/22 - will check back    I have reviewed all the lab & EKG results.     Imaging was done today, viewed images and discussed with the patient:    Indication: pain related symptoms,  Assessment:  Patient Active Problem List   Diagnosis   • Anxiety   • Benign essential hypertension   • Contact dermatitis   • Dyslipidemia   • Gastroesophageal reflux disease   • Hypothyroidism   • Insomnia   • Pain of lower extremity   • Multiple sclerosis (HCC)   • Venous stasis   • Arthritis of right knee   • Arthritis of both knees   • Knee pain, right   • S/P right knee arthroscopy   • Arthritis of left knee   • Asthma   • Irritable bowel syndrome   • Periumbilical abdominal pain   •  Epigastric pain   • PEARL (obstructive sleep apnea)   • Chronic pain of left knee   • History of total knee arthroplasty, left   • Diarrhea   • Nausea   • Colitis, Clostridium difficile   • Status post total left knee replacement   • Trochanteric bursitis of right hip   • Right hip pain   • Closed nondisplaced fracture of proximal phalanx of lesser toe of right foot   • Arthritis of first metatarsophalangeal (MTP) joint of right foot   • Arthritis of foot   • Neck pain   • Spinal stenosis in cervical region   • Essential tremor       Plan:  Reviewed anatomy of a total joint arthroplasty in laymen's terms, as well as typical postoperative recovery and possibly 6-12 months for maximal recovery, and possible need for rehabilitation stay after hospitalization. We also discussed risks, benefits, alternatives, and limitations of procedure with risks including but not limited to neurovascular damage, bleeding, infection, malalignment, chronic pian, failure of implants, osteolysis, loosening of implants, loss of motion, weakness, stiffness, instability, DVT, pulmonary embolus, death, stroke, complex regional pain syndrome, myocardial infarction, and need for additional procedures. Concept of substitution vs. replacement discussed.  No guarantees were given regarding results of surgery.      Mahnaz Becerra was given the opportunity to ask and have all questions answered today.  The patient voiced understanding of the risks, benefits, and alternative forms of treatment that were discussed and the patient consents to proceed with surgery.       Cleared by - PCP clearance resent 11/1/22  Was seen 10/2022 for physical and states she is clear to go, was pushed back from original surgery due to COVID    Skin breakdown? WNL   Metal allergy? No  COVID Status:Vaccinated with Booster and History of Positive COVID test - symptomatic  DVT Risk Factors: no significant increased risk factors    DVT Prophylaxis:  Aspirin 81mg BID x 4  anastasiya Horn: has one at home  Consults: none  Additional orders: none  Pharmacy: outside    Discharge Plan: POD 1 to home, home health and when cleared by physical therapy as safe for discharge    To be updated    Date: 11/1/2022  ROBERTA Gomez    Dictated Utilizing Dragon Dictation

## 2022-11-01 NOTE — H&P (VIEW-ONLY)
"   History & Physical       Patient: Mahnaz Becerra  YOB: 1945  Medical Record Number: [id number]  Wt Readings from Last 3 Encounters:   11/01/22 92.1 kg (203 lb)   09/07/22 92.2 kg (203 lb 4.8 oz)   07/06/22 89.8 kg (198 lb)     Ht Readings from Last 3 Encounters:   11/01/22 162.6 cm (64\")   09/07/22 162.6 cm (64\")   07/06/22 162.6 cm (64\")     Body mass index is 34.84 kg/m².  Facility age limit for growth percentiles is 20 years.    Surgeon:  Dr. Artur Pat    Chief Complaints:   Chief Complaint   Patient presents with   • Right Knee - Follow-up     Surgery:  Right Total Knee Arthroplasty with [place] Navigation    Subjective:    History of Present Illness: 77 y.o. female presents with   Chief Complaint   Patient presents with   • Right Knee - Follow-up   . Chronic symptoms have been progressively worsening despite more conservative treatment measures including medications OTC and prescription, cortisone injections and physical therapy.  Symptoms are associated with ability to move, exercise, and perform activities of daily living.  Symptoms are aggravated by weight bearing and ROM necessary for activities of daily living.   Symptoms improve with rest, ice and elevation only minimally.      Allergies:   Allergies   Allergen Reactions   • Flagyl [Metronidazole] Hives and Swelling     Lips and Tongue     • Levofloxacin Swelling and Rash     RED RASH   • Lansoprazole Itching and Other (See Comments)     AND TURN RED   • Cefdinir GI Intolerance     Abdominal pain, caused upset stomach   • Trazodone And Nefazodone Myalgia     Severe muscle cramps       Medications:   Home Medications:  Current Outpatient Medications on File Prior to Visit   Medication Sig   • albuterol (VENTOLIN HFA) 108 (90 BASE) MCG/ACT inhaler Inhale 2 puffs Every 6 (Six) Hours As Needed for wheezing.   • clotrimazole-betamethasone (LOTRISONE) 1-0.05 % cream Apply  topically to the appropriate area as directed 2 (Two) Times a " Day.   • cyclobenzaprine (FLEXERIL) 10 MG tablet Take 1 tablet by mouth 2 (Two) Times a Day As Needed for Muscle Spasms.   • diclofenac (VOLTAREN) 1 % gel gel Apply pea size amount to area of pain up to four times a day   • Diclofenac Sodium (VOLTAREN) 1 % gel gel Apply 4 g topically to the appropriate area as directed 2 (Two) Times a Day. Use per pkg insert   • dicyclomine (BENTYL) 20 MG tablet Take 1 tablet by mouth As Needed (abd cramps).   • Famotidine-Ca Carb-Mag Hydrox (PEPCID COMPLETE PO) Take  by mouth.   • hydrochlorothiazide (HYDRODIURIL) 25 MG tablet TAKE 1 TABLET BY MOUTH DAILY   • methylPREDNISolone (MEDROL) 4 MG dose pack Take as directed on package instructions.   • Multiple Vitamins-Minerals (CENTRUM SILVER PO) Take  by mouth.   • oxyCODONE-acetaminophen (PERCOCET) 7.5-325 MG per tablet 1-2 tabs po q 3-4 hr prn severe pain, wean as tolerated   • pantoprazole (PROTONIX) 40 MG EC tablet TAKE 1 TABLET BY MOUTH TWICE DAILY   • promethazine (PHENERGAN) 12.5 MG tablet Take 1 tablet by mouth Every 6 (Six) Hours As Needed for Nausea or Vomiting.   • traMADol (ULTRAM) 50 MG tablet 1-2 Oral Q4H PRN severe pain   • vitamin E 400 UNIT capsule Take 400 Units by mouth Daily.   • mupirocin (BACTROBAN) 2 % ointment    • Paxlovid, 300/100, 20 x 150 MG & 10 x 100MG tablet therapy pack tablet TAKE 3 TABLETS BY MOUTH TWICE DAILY -- MORNING AND EVENING- FOR 5 DAYS AS DIRECTED ON PACKAGE   • sulfamethoxazole-trimethoprim (BACTRIM DS,SEPTRA DS) 800-160 MG per tablet Take 1 tablet by mouth Every 12 (Twelve) Hours.     Current Facility-Administered Medications on File Prior to Visit   Medication   • ipratropium-albuterol (DUO-NEB) nebulizer solution 3 mL     Current Medications:  Scheduled Meds:ipratropium-albuterol, 3 mL, Nebulization, 4x Daily - RT      Continuous Infusions:   PRN Meds:.    I have reviewed the patient's medical history in detail and updated the computerized patient record.  Review and summarization of old  records include:    Past Medical History:   Diagnosis Date   • Acid reflux    • Anesthesia complication     ANESTHESIA HAS BROUGHT ON ASTHMA ATTACKS    • Asthma    • Bronchitis    • [place]     FREQUENT   • Edema     LOWER EXT   • Heart murmur    • History of skin cancer     BACK   • IBS (irritable bowel syndrome)    • Knee pain, right    • MS (multiple sclerosis) (HCC)    • Osteoarthritis    • PONV (postoperative nausea and vomiting)     Patient states she had post-op nausea and vomiting after hysterectomy in .   • Sleep apnea     CANT TOLERATE CPAP        Past Surgical History:   Procedure Laterality Date   • APPENDECTOMY     • BREAST LUMPECTOMY Bilateral    • BREAST SURGERY      REDUCTION   •  SECTION     • CHOLECYSTECTOMY     • COLONOSCOPY  2012    Dr Moe   • COLONOSCOPY N/A 2020    Procedure: COLONOSCOPY TO CECUM AND TERM. ILEUM WITH BIOPSIES;  Surgeon: Inder Pat MD;  Location: [facility] ENDOSCOPY;  Service: Gastroenterology;  Laterality: N/A;  PRE OP - CHANGE IN BOWEL HABITS, DIARRHEA  POST OP - DIVERTICULOSIS   • COSMETIC SURGERY     • DILATATION AND CURETTAGE     • ENDOSCOPY N/A 2016    Procedure: ESOPHAGOGASTRODUODENOSCOPY WITH BX;  Surgeon: Nasim Moe MD;  Location: [facility] ENDOSCOPY;  Service:    • ENDOSCOPY N/A 2020    Procedure: ESOPHAGOGASTRODUODENOSCOPY WITH DUODENAL ASPIRATE, POLYPECTOMY AND BIOPSIES;  Surgeon: Inder Pat MD;  Location: [facility] ENDOSCOPY;  Service: Gastroenterology;  Laterality: N/A;  PRE OP - GERD  POST OP - GASTRIC POLYP, GASTRITIS   • ENDOSCOPY W/ PEG REMOVAL  2012    Dr Moe   • EYE SURGERY Bilateral     BLEPHAROPLASTY   • HYSTERECTOMY     • KNEE ARTHROSCOPY Right 2016    Procedure: KNEE ARTHROSCOPY, PARTIAL MEDIAL AND LATERAL MENISECTOMY, CHONDROPLASTY OF THE PATELLA, REMOVAL OF LOOSE BODIES;  Surgeon: Artur Pat MD;  [facility] [place];  Service:    • KNEE ARTHROSCOPY W/ MENISCAL  REPAIR Left    • OOPHORECTOMY Bilateral    • TONSILLECTOMY     • TOTAL KNEE ARTHROPLASTY Left 2018    Procedure: LEFT TOTAL KNEE ARTHROPLASTY WITH MARGRET NAVIGATION;  Surgeon: Artur Pat MD;  Location: [facility] OR;  Service:         Social History     Occupational History   • Not on file   Tobacco Use   • Smoking status: Former     Packs/day: 0.25     Types: Cigarettes     Quit date:      Years since quittin.8   • Smokeless tobacco: Never   • Tobacco comments:     Patient states she was a social smoker.   Substance and Sexual Activity   • Alcohol use: Yes     Comment: Occasional   • Drug use: No   • Sexual activity: Defer      Social History     Social History Narrative   • Not on file        Family History   Problem Relation Age of Onset   • Hypertension Mother    • Stroke Mother    • Alzheimer's disease Mother    • Heart disease Father    • Emphysema Father    • Stroke Maternal Grandmother    • Cancer Maternal Grandfather    • No Known Problems Paternal Grandmother    • Cerebral aneurysm Paternal Grandfather    • Malig Hyperthermia Neg Hx        ROS: 14 point review of systems was performed and was negative except for documented findings in HPI and today's encounter.       Constitutional:  Denies fever, shaking or chills   Eyes:  Denies change in visual acuity   HENT:  Denies nasal congestion or sore throat   Respiratory:  Denies cough or shortness of breath   Cardiovascular:  Denies chest pain or severe LE edema   GI:  Denies abdominal pain, nausea, vomiting, bloody stools or diarrhea   Musculoskeletal:  Denies numbness tingling or loss of motor function except as outlined above in history of present illness.  : Denies painful urination or hematuria  Integument:  Denies rash, lesion or ulceration   Neurologic:  Denies headache or focal weakness  Endocrine:  Denies lymphadenopathy  Psych:  Denies confusion or change in mental status   Hem:  Denies active bleeding    Physical Exam:  "77 y.o. female  Wt Readings from Last 3 Encounters:   11/01/22 92.1 kg (203 lb)   09/07/22 92.2 kg (203 lb 4.8 oz)   07/06/22 89.8 kg (198 lb)     Ht Readings from Last 3 Encounters:   11/01/22 162.6 cm (64\")   09/07/22 162.6 cm (64\")   07/06/22 162.6 cm (64\")     Body mass index is 34.84 kg/m².  Facility age limit for growth percentiles is 20 years.  Vitals:    11/01/22 1018   Temp: 97.1 °F (36.2 °C)       Vital signs reviewed.   General Appearance:    Alert, cooperative, in no acute distress                  Eyes: conjunctiva clear  ENT: external ears and nose atraumatic  CV: no peripheral edema  Resp: normal respiratory effort  Skin: no rashes or wounds; normal turgor  Psych: mood and affect appropriate  Lymph: no nodes appreciated  Neuro: gross sensation intact  Vascular:  Palpable peripheral pulse in noted extremity  Musculoskeletal Extremities: KNEE Exam: medial joint line tenderness with crepitation, synovitis, swelling, and joint effusion right knee.        Diagnostic Tests:  Results for orders placed or performed in visit on 10/11/22   CBC Auto Differential    Specimen: Blood   Result Value Ref Range    WBC 7.38 3.40 - 10.80 10*3/mm3    RBC 4.31 3.77 - 5.28 10*6/mm3    Hemoglobin 13.4 12.0 - 15.9 g/dL    Hematocrit 39.2 34.0 - 46.6 %    MCV 91.0 79.0 - 97.0 fL    MCH 31.1 26.6 - 33.0 pg    MCHC 34.2 31.5 - 35.7 g/dL    RDW 12.6 12.3 - 15.4 %    RDW-SD 41.2 37.0 - 54.0 fl    MPV 9.7 6.0 - 12.0 fL    Platelets 374 140 - 450 10*3/mm3    Neutrophil % 59.1 42.7 - 76.0 %    Lymphocyte % 29.7 19.6 - 45.3 %    Monocyte % 7.9 5.0 - 12.0 %    Eosinophil % 2.7 0.3 - 6.2 %    Basophil % 0.5 0.0 - 1.5 %    Immature Grans % 0.1 0.0 - 0.5 %    Neutrophils, Absolute 4.36 1.70 - 7.00 10*3/mm3    Lymphocytes, Absolute 2.19 0.70 - 3.10 10*3/mm3    Monocytes, Absolute 0.58 0.10 - 0.90 10*3/mm3    Eosinophils, Absolute 0.20 0.00 - 0.40 10*3/mm3    Basophils, Absolute 0.04 0.00 - 0.20 10*3/mm3    Immature Grans, Absolute 0.01 " 0.00 - 0.05 10*3/mm3    nRBC 0.0 0.0 - 0.2 /100 WBC   Results for orders placed or performed in visit on 11/03/16   CBC & Differential    Specimen: Blood   Result Value Ref Range    WBC 19.8 (H) 3.4 - 10.8 x10E3/uL    RBC 4.50 3.77 - 5.28 x10E6/uL    Hemoglobin 14.3 11.1 - 15.9 g/dL    Hematocrit 41.6 34.0 - 46.6 %    MCV 92 79 - 97 fL    MCH 31.8 26.6 - 33.0 pg    MCHC 34.4 31.5 - 35.7 g/dL    RDW 13.4 12.3 - 15.4 %    Platelets 339 150 - 379 x10E3/uL    Neutrophil Rel % 92 %    Lymphocyte Rel % 5 %    Monocyte Rel % 3 %    Eosinophil Rel % 0 %    Basophil Rel % 0 %    Neutrophils Absolute 18.1 (H) 1.4 - 7.0 x10E3/uL    Lymphocytes Absolute 1.1 0.7 - 3.1 x10E3/uL    Monocytes Absolute 0.6 0.1 - 0.9 x10E3/uL    Eosinophils Absolute 0.0 0.0 - 0.4 x10E3/uL    Basophils Absolute 0.0 0.0 - 0.2 x10E3/uL    Immature Granulocyte Rel % 0 %    Immature Grans Absolute 0.0 0.0 - 0.1 x10E3/uL     Lab Results   Component Value Date    GLUCOSE 109 (H) 10/11/2022    CALCIUM 9.6 10/11/2022     10/11/2022    K 3.9 10/11/2022    CO2 28.0 10/11/2022     10/11/2022    BUN 17 10/11/2022    CREATININE 0.68 10/11/2022    EGFRIFAFRI 96 11/03/2016    EGFRIFNONA 69 07/19/2021    BCR 25.0 10/11/2022    ANIONGAP 12.0 10/11/2022       EKG:    - PAT appt tomorrow 11/2/22 - will check back    I have reviewed all the lab & EKG results.     Imaging was done today, viewed images and discussed with the patient:    Indication: pain related symptoms,  Assessment:  Patient Active Problem List   Diagnosis   • Anxiety   • Benign essential hypertension   • Contact dermatitis   • Dyslipidemia   • Gastroesophageal reflux disease   • Hypothyroidism   • Insomnia   • Pain of lower extremity   • Multiple sclerosis (HCC)   • Venous stasis   • Arthritis of right knee   • Arthritis of both knees   • Knee pain, right   • S/P right knee arthroscopy   • Arthritis of left knee   • Asthma   • Irritable bowel syndrome   • Periumbilical abdominal pain   •  Epigastric pain   • PEARL (obstructive sleep apnea)   • Chronic pain of left knee   • History of total knee arthroplasty, left   • Diarrhea   • Nausea   • Colitis, Clostridium difficile   • Status post total left knee replacement   • Trochanteric bursitis of right hip   • Right hip pain   • Closed nondisplaced fracture of proximal phalanx of lesser toe of right foot   • Arthritis of first metatarsophalangeal (MTP) joint of right foot   • Arthritis of foot   • Neck pain   • Spinal stenosis in cervical region   • Essential tremor       Plan:  Reviewed anatomy of a total joint arthroplasty in laymen's terms, as well as typical postoperative recovery and possibly 6-12 months for maximal recovery, and possible need for rehabilitation stay after hospitalization. We also discussed risks, benefits, alternatives, and limitations of procedure with risks including but not limited to neurovascular damage, bleeding, infection, malalignment, chronic pian, failure of implants, osteolysis, loosening of implants, loss of motion, weakness, stiffness, instability, DVT, pulmonary embolus, death, stroke, complex regional pain syndrome, myocardial infarction, and need for additional procedures. Concept of substitution vs. replacement discussed.  No guarantees were given regarding results of surgery.      Mahnaz Becerra was given the opportunity to ask and have all questions answered today.  The patient voiced understanding of the risks, benefits, and alternative forms of treatment that were discussed and the patient consents to proceed with surgery.       Cleared by - PCP clearance resent 11/1/22  Was seen 10/2022 for physical and states she is clear to go, was pushed back from original surgery due to COVID    Skin breakdown? WNL   Metal allergy? No  COVID Status:Vaccinated with Booster and History of Positive COVID test - symptomatic  DVT Risk Factors: no significant increased risk factors    DVT Prophylaxis:  Aspirin 81mg BID x 4  anastasiya Horn: has one at home  Consults: none  Additional orders: none  Pharmacy: outside    Discharge Plan: POD 1 to home, home health and when cleared by physical therapy as safe for discharge    To be updated    Date: 11/1/2022  ROBERTA Gomez    Dictated Utilizing Dragon Dictation

## 2022-11-02 ENCOUNTER — PRE-ADMISSION TESTING (OUTPATIENT)
Dept: PREADMISSION TESTING | Facility: HOSPITAL | Age: 77
End: 2022-11-02

## 2022-11-02 VITALS
BODY MASS INDEX: 35.61 KG/M2 | WEIGHT: 201 LBS | SYSTOLIC BLOOD PRESSURE: 129 MMHG | OXYGEN SATURATION: 97 % | HEART RATE: 72 BPM | TEMPERATURE: 97.7 F | HEIGHT: 63 IN | DIASTOLIC BLOOD PRESSURE: 82 MMHG | RESPIRATION RATE: 18 BRPM

## 2022-11-02 DIAGNOSIS — M17.11 ARTHRITIS OF RIGHT KNEE: ICD-10-CM

## 2022-11-02 LAB
ANION GAP SERPL CALCULATED.3IONS-SCNC: 8.6 MMOL/L (ref 5–15)
BUN SERPL-MCNC: 16 MG/DL (ref 8–23)
BUN/CREAT SERPL: 20 (ref 7–25)
CALCIUM SPEC-SCNC: 9.5 MG/DL (ref 8.6–10.5)
CHLORIDE SERPL-SCNC: 100 MMOL/L (ref 98–107)
CO2 SERPL-SCNC: 30.4 MMOL/L (ref 22–29)
CREAT SERPL-MCNC: 0.8 MG/DL (ref 0.57–1)
DEPRECATED RDW RBC AUTO: 41.6 FL (ref 37–54)
EGFRCR SERPLBLD CKD-EPI 2021: 76.9 ML/MIN/1.73
ERYTHROCYTE [DISTWIDTH] IN BLOOD BY AUTOMATED COUNT: 12.6 % (ref 12.3–15.4)
GLUCOSE SERPL-MCNC: 105 MG/DL (ref 65–99)
HCT VFR BLD AUTO: 36.8 % (ref 34–46.6)
HGB BLD-MCNC: 12.8 G/DL (ref 12–15.9)
MCH RBC QN AUTO: 31.1 PG (ref 26.6–33)
MCHC RBC AUTO-ENTMCNC: 34.8 G/DL (ref 31.5–35.7)
MCV RBC AUTO: 89.5 FL (ref 79–97)
PLATELET # BLD AUTO: 254 10*3/MM3 (ref 140–450)
PMV BLD AUTO: 9.2 FL (ref 6–12)
POTASSIUM SERPL-SCNC: 3.6 MMOL/L (ref 3.5–5.2)
RBC # BLD AUTO: 4.11 10*6/MM3 (ref 3.77–5.28)
SODIUM SERPL-SCNC: 139 MMOL/L (ref 136–145)
WBC NRBC COR # BLD: 6.66 10*3/MM3 (ref 3.4–10.8)

## 2022-11-02 PROCEDURE — 85027 COMPLETE CBC AUTOMATED: CPT

## 2022-11-02 PROCEDURE — 93010 ELECTROCARDIOGRAM REPORT: CPT | Performed by: INTERNAL MEDICINE

## 2022-11-02 PROCEDURE — 80048 BASIC METABOLIC PNL TOTAL CA: CPT

## 2022-11-02 PROCEDURE — 36415 COLL VENOUS BLD VENIPUNCTURE: CPT

## 2022-11-02 PROCEDURE — 93005 ELECTROCARDIOGRAM TRACING: CPT

## 2022-11-02 RX ORDER — CHLORHEXIDINE GLUCONATE 500 MG/1
CLOTH TOPICAL TAKE AS DIRECTED
Status: ON HOLD | COMMUNITY
End: 2022-11-08

## 2022-11-02 RX ORDER — OXYCODONE HYDROCHLORIDE AND ACETAMINOPHEN 5; 325 MG/1; MG/1
1 TABLET ORAL EVERY 6 HOURS PRN
Status: ON HOLD | COMMUNITY
End: 2022-11-08

## 2022-11-02 NOTE — DISCHARGE INSTRUCTIONS
Take the following medications the morning of surgery: ALBUTEROL AND PANTOPRAZOLE    ARRIVE AT 9 AM ON 11/822    If you are on prescription narcotic pain medication to control your pain you may also take that medication the morning of surgery.    General Instructions:  Do not eat solid food after midnight the night before surgery.  You may drink clear liquids day of surgery but must stop at least one hour before your hospital arrival time.  It is beneficial for you to have a clear drink that contains carbohydrates the day of surgery.  We suggest a 12 to 20 ounce bottle of Gatorade or Powerade for non-diabetic patients or a 12 to 20 ounce bottle of G2 or Powerade Zero for diabetic patients. (Pediatric patients, are not advised to drink a 12 to 20 ounce carbohydrate drink)    Clear liquids are liquids you can see through.  Nothing red in color.     Plain water                               Sports drinks  Sodas                                   Gelatin (Jell-O)  Fruit juices without pulp such as white grape juice and apple juice  Popsicles that contain no fruit or yogurt  Tea or coffee (no cream or milk added)  Gatorade / Powerade  G2 / Powerade Zero    Infants may have breast milk up to four hours before surgery.  Infants drinking formula may drink formula up to six hours before surgery.   Patients who avoid smoking, chewing tobacco and alcohol for 4 weeks prior to surgery have a reduced risk of post-operative complications.  Quit smoking as many days before surgery as you can.  Do not smoke, use chewing tobacco or drink alcohol the day of surgery.   If applicable bring your C-PAP/ BI-PAP machine.  Bring any papers given to you in the doctor’s office.  Wear clean comfortable clothes.  Do not wear contact lenses, false eyelashes or make-up.  Bring a case for your glasses.   Bring crutches or walker if applicable.  Remove all piercings.  Leave jewelry and any other valuables at home.  Hair extensions with metal clips  must be removed prior to surgery.  The Pre-Admission Testing nurse will instruct you to bring medications if unable to obtain an accurate list in Pre-Admission Testing.        If you were given a blood bank ID arm band remember to bring it with you the day of surgery.    Preventing a Surgical Site Infection:  For 2 to 3 days before surgery, avoid shaving with a razor because the razor can irritate skin and make it easier to develop an infection.    Any areas of open skin can increase the risk of a post-operative wound infection by allowing bacteria to enter and travel throughout the body.  Notify your surgeon if you have any skin wounds / rashes even if it is not near the expected surgical site.  The area will need assessed to determine if surgery should be delayed until it is healed.  The night prior to surgery shower using a fresh bar of anti-bacterial soap (such as Dial) and clean washcloth.  Sleep in a clean bed with clean clothing.  Do not allow pets to sleep with you.  Shower on the morning of surgery using a fresh bar of anti-bacterial soap (such as Dial) and clean washcloth.  Dry with a clean towel and dress in clean clothing.  Ask your surgeon if you will be receiving antibiotics prior to surgery.  Make sure you, your family, and all healthcare providers clean their hands with soap and water or an alcohol based hand  before caring for you or your wound.    Day of surgery:  Your arrival time is approximately two hours before your scheduled surgery time.  Upon arrival, a Pre-op nurse and Anesthesiologist will review your health history, obtain vital signs, and answer questions you may have.  The only belongings needed at this time will be a list of your home medications and if applicable your C-PAP/BI-PAP machine.  A Pre-op nurse will start an IV and you may receive medication in preparation for surgery, including something to help you relax.     Please be aware that surgery does come with  discomfort.  We want to make every effort to control your discomfort so please discuss any uncontrolled symptoms with your nurse.   Your doctor will most likely have prescribed pain medications.      If you are going home after surgery you will receive individualized written care instructions before being discharged.  A responsible adult must drive you to and from the hospital on the day of your surgery and stay with you for 24 hours.  Discharge prescriptions can be filled by the hospital pharmacy during regular pharmacy hours.  If you are having surgery late in the day/evening your prescription may be e-prescribed to your pharmacy.  Please verify your pharmacy hours or chose a 24 hour pharmacy to avoid not having access to your prescription because your pharmacy has closed for the day.    If you are staying overnight following surgery, you will be transported to your hospital room following the recovery period.  [facility] has all private rooms.    If you have any questions please call Pre-Admission Testing at [phone].  Deductibles and co-payments are collected on the day of service. Please be prepared to pay the required co-pay, deductible or deposit on the day of service as defined by your plan.    Call your surgeon immediately if you experience any of the following symptoms:  Sore Throat  Shortness of Breath or difficulty breathing  Cough  Chills  Body soreness or muscle pain  Headache  Fever  New loss of taste or smell  Do not arrive for your surgery ill.  Your procedure will need to be rescheduled to another time.  You will need to call your physician before the day of surgery to avoid any unnecessary exposure to hospital staff as well as other patients.   CHLORHEXIDINE CLOTH INSTRUCTIONS  The morning of surgery follow these instructions using the Chlorhexidine cloths you've been given.  These steps reduce bacteria on the body.  Do not use the cloths near your eyes, ears mouth, genitalia  or on open wounds.  Throw the cloths away after use but do not try to flush them down a toilet.      Open and remove one cloth at a time from the package.    Leave the cloth unfolded and begin the bathing.  Massage the skin with the cloths using gentle pressure to remove bacteria.  Do not scrub harshly.   Follow the steps below with one 2% CHG cloth per area (6 total cloths).  One cloth for neck, shoulders and chest.  One cloth for both arms, hands, fingers and underarms (do underarms last).  One cloth for the abdomen followed by groin.  One cloth for right leg and foot including between the toes.  One cloth for left leg and foot including between the toes.  The last cloth is to be used for the back of the neck, back and buttocks.    Allow the CHG to air dry 3 minutes on the skin which will give it time to work and decrease the chance of irritation.  The skin may feel sticky until it is dry.  Do not rinse with water or any other liquid or you will lose the beneficial effects of the CHG.  If mild skin irritation occurs, do rinse the skin to remove the CHG.  Report this to the nurse at time of admission.  Do not apply lotions, creams, ointments, deodorants or perfumes after using the clothes. Dress in clean clothes before coming to the hospital.

## 2022-11-03 LAB — QT INTERVAL: 424 MS

## 2022-11-04 ENCOUNTER — TELEPHONE (OUTPATIENT)
Dept: ORTHOPEDIC SURGERY | Facility: CLINIC | Age: 77
End: 2022-11-04

## 2022-11-04 NOTE — TELEPHONE ENCOUNTER
Caller: DAVID CASTILLOALE    Relationship: SELF    Best call back number: [phone]    What is the best time to reach you: ANYTIME    Who are you requesting to speak with (clinical staff, provider,  specific staff member): ROBERTA POMPA    Do you require a callback: YES - PT STATED THAT ROBERTA POMPA TOLD PT SHE COULD CALL HER IF SHE HAD ANY QUESTIONS. PT HAS QUESTIONS IN REGARDS TO HER SURGERY. PLEASE SEE QUESTIONS BELOW:    1. CAN SHE TAKE TRAMADOL RIGHT UP UNTIL Monday NIGHT RIGHT BEFORE SHE GOES TO BED. PATIENTS SX IS 11/08/2022    2. HAS THE SURGERY CLEARANCE BEEN GOTTEN BY PATIENTS PCP.    PATIENT IS REQUESTING ROBERTA POMPA TO GIVE HER A CALL.    THANK YOU

## 2022-11-07 NOTE — TELEPHONE ENCOUNTER
Called and spoke with patient. She is ok to take Tramadol tonight prior to bed. I did receive her surgery clearance. She took Azso for bladder spasms due to MS recommended by her urologist. Last dose was Sunday, but urine still may be colored, she states she does not have a urinary tract infection. It is just spasms due to the MS.

## 2022-11-07 NOTE — TELEPHONE ENCOUNTER
Caller: DAVID LR     Relationship to patient: SELF     Best call back number: [phone]  Patient is needing: PATIENT IS NEEDING A CALL BACK REGARDING QUESTIONS FOR 11/08/2022

## 2022-11-08 ENCOUNTER — ANESTHESIA (OUTPATIENT)
Dept: PERIOP | Facility: HOSPITAL | Age: 77
End: 2022-11-08

## 2022-11-08 ENCOUNTER — HOSPITAL ENCOUNTER (OUTPATIENT)
Facility: HOSPITAL | Age: 77
Discharge: HOME | End: 2022-11-09
Attending: ORTHOPAEDIC SURGERY | Admitting: ORTHOPAEDIC SURGERY

## 2022-11-08 ENCOUNTER — ANESTHESIA EVENT (OUTPATIENT)
Dept: PERIOP | Facility: HOSPITAL | Age: 77
End: 2022-11-08

## 2022-11-08 ENCOUNTER — APPOINTMENT (OUTPATIENT)
Dept: GENERAL RADIOLOGY | Facility: HOSPITAL | Age: 77
End: 2022-11-08

## 2022-11-08 DIAGNOSIS — Z96.651 S/P TKR (TOTAL KNEE REPLACEMENT), RIGHT: Primary | ICD-10-CM

## 2022-11-08 DIAGNOSIS — M17.11 ARTHRITIS OF RIGHT KNEE: ICD-10-CM

## 2022-11-08 PROCEDURE — 25010000002 ONDANSETRON PER 1 MG: Performed by: NURSE ANESTHETIST, CERTIFIED REGISTERED

## 2022-11-08 PROCEDURE — 25010000002 FENTANYL CITRATE (PF) 50 MCG/ML SOLUTION: Performed by: NURSE ANESTHETIST, CERTIFIED REGISTERED

## 2022-11-08 PROCEDURE — 25010000002 EPINEPHRINE 1 MG/ML SOLUTION 30 ML VIAL: Performed by: ORTHOPAEDIC SURGERY

## 2022-11-08 PROCEDURE — 25010000002 FENTANYL CITRATE (PF) 100 MCG/2ML SOLUTION: Performed by: NURSE ANESTHETIST, CERTIFIED REGISTERED

## 2022-11-08 PROCEDURE — 20985 CPTR-ASST DIR MS PX: CPT | Performed by: ORTHOPAEDIC SURGERY

## 2022-11-08 PROCEDURE — 25010000002 CLONIDINE PER 1 MG: Performed by: ORTHOPAEDIC SURGERY

## 2022-11-08 PROCEDURE — 25010000002 CEFAZOLIN IN DEXTROSE 2-4 GM/100ML-% SOLUTION: Performed by: ORTHOPAEDIC SURGERY

## 2022-11-08 PROCEDURE — C1776 JOINT DEVICE (IMPLANTABLE): HCPCS | Performed by: ORTHOPAEDIC SURGERY

## 2022-11-08 PROCEDURE — 27447 TOTAL KNEE ARTHROPLASTY: CPT | Performed by: ORTHOPAEDIC SURGERY

## 2022-11-08 PROCEDURE — 25010000002 PROPOFOL 10 MG/ML EMULSION: Performed by: NURSE ANESTHETIST, CERTIFIED REGISTERED

## 2022-11-08 PROCEDURE — 25010000002 NEOSTIGMINE 5 MG/10ML SOLUTION: Performed by: STUDENT IN AN ORGANIZED HEALTH CARE EDUCATION/TRAINING PROGRAM

## 2022-11-08 PROCEDURE — C1713 ANCHOR/SCREW BN/BN,TIS/BN: HCPCS | Performed by: ORTHOPAEDIC SURGERY

## 2022-11-08 PROCEDURE — 25010000002 DEXAMETHASONE SODIUM PHOSPHATE 20 MG/5ML SOLUTION: Performed by: NURSE ANESTHETIST, CERTIFIED REGISTERED

## 2022-11-08 PROCEDURE — 25010000002 ROPIVACAINE PER 1 MG: Performed by: ORTHOPAEDIC SURGERY

## 2022-11-08 PROCEDURE — 25010000002 KETOROLAC TROMETHAMINE PER 15 MG: Performed by: ORTHOPAEDIC SURGERY

## 2022-11-08 PROCEDURE — 25010000002 HYDROMORPHONE PER 4 MG: Performed by: NURSE ANESTHETIST, CERTIFIED REGISTERED

## 2022-11-08 PROCEDURE — G0378 HOSPITAL OBSERVATION PER HR: HCPCS

## 2022-11-08 PROCEDURE — 73560 X-RAY EXAM OF KNEE 1 OR 2: CPT

## 2022-11-08 DEVICE — HEMOST ABS SURGICEL PWDR 3GM: Type: IMPLANTABLE DEVICE | Site: KNEE | Status: FUNCTIONAL

## 2022-11-08 DEVICE — SMARTSET HIGH PERFORMANCE MV MEDIUM VISCOSITY BONE CEMENT 40G
Type: IMPLANTABLE DEVICE | Site: KNEE | Status: FUNCTIONAL
Brand: SMARTSET

## 2022-11-08 DEVICE — CAP KN ATTUNE FB CMT: Type: IMPLANTABLE DEVICE | Site: KNEE | Status: FUNCTIONAL

## 2022-11-08 DEVICE — ATTUNE KNEE SYSTEM FEMORAL CRUCIATE RETAINING NARROW SIZE 4N RIGHT CEMENTED
Type: IMPLANTABLE DEVICE | Site: KNEE | Status: FUNCTIONAL
Brand: ATTUNE

## 2022-11-08 DEVICE — DEV CONTRL TISS STRATAFIX SYMM PDS PLUS VIL CT-1 60CM: Type: IMPLANTABLE DEVICE | Site: KNEE | Status: FUNCTIONAL

## 2022-11-08 DEVICE — ATTUNE KNEE SYSTEM TIBIAL BASE FIXED BEARING SIZE 4 CEMENTED
Type: IMPLANTABLE DEVICE | Site: KNEE | Status: FUNCTIONAL
Brand: ATTUNE

## 2022-11-08 DEVICE — ATTUNE KNEE SYSTEM TIBIAL INSERT FIXED BEARING CRUCIATE RETAINING 4 8MM AOX
Type: IMPLANTABLE DEVICE | Site: KNEE | Status: FUNCTIONAL
Brand: ATTUNE

## 2022-11-08 DEVICE — ATTUNE PATELLA MEDIALIZED DOME 32MM CEMENTED AOX
Type: IMPLANTABLE DEVICE | Site: KNEE | Status: FUNCTIONAL
Brand: ATTUNE

## 2022-11-08 DEVICE — DEV CONTRL TISS STRATAFIXSPIRALMNCRYL PLSPS2 REV3/0 45CM: Type: IMPLANTABLE DEVICE | Site: KNEE | Status: FUNCTIONAL

## 2022-11-08 RX ORDER — LIDOCAINE HYDROCHLORIDE 10 MG/ML
0.5 INJECTION, SOLUTION EPIDURAL; INFILTRATION; INTRACAUDAL; PERINEURAL ONCE AS NEEDED
Status: DISCONTINUED | OUTPATIENT
Start: 2022-11-08 | End: 2022-11-08 | Stop reason: HOSPADM

## 2022-11-08 RX ORDER — FLUMAZENIL 0.1 MG/ML
0.2 INJECTION INTRAVENOUS AS NEEDED
Status: DISCONTINUED | OUTPATIENT
Start: 2022-11-08 | End: 2022-11-08 | Stop reason: HOSPADM

## 2022-11-08 RX ORDER — ONDANSETRON 4 MG/1
4 TABLET, FILM COATED ORAL EVERY 6 HOURS PRN
Status: DISCONTINUED | OUTPATIENT
Start: 2022-11-08 | End: 2022-11-09 | Stop reason: HOSPADM

## 2022-11-08 RX ORDER — HYDROCODONE BITARTRATE AND ACETAMINOPHEN 7.5; 325 MG/1; MG/1
1 TABLET ORAL EVERY 4 HOURS PRN
Status: DISCONTINUED | OUTPATIENT
Start: 2022-11-08 | End: 2022-11-09 | Stop reason: HOSPADM

## 2022-11-08 RX ORDER — TRANEXAMIC ACID 100 MG/ML
INJECTION, SOLUTION INTRAVENOUS AS NEEDED
Status: DISCONTINUED | OUTPATIENT
Start: 2022-11-08 | End: 2022-11-08 | Stop reason: SURG

## 2022-11-08 RX ORDER — NALOXONE HCL 0.4 MG/ML
0.1 VIAL (ML) INJECTION
Status: DISCONTINUED | OUTPATIENT
Start: 2022-11-08 | End: 2022-11-09 | Stop reason: HOSPADM

## 2022-11-08 RX ORDER — GLYCOPYRROLATE 0.2 MG/ML
INJECTION INTRAMUSCULAR; INTRAVENOUS AS NEEDED
Status: DISCONTINUED | OUTPATIENT
Start: 2022-11-08 | End: 2022-11-08 | Stop reason: SURG

## 2022-11-08 RX ORDER — ACETAMINOPHEN 325 MG/1
325 TABLET ORAL EVERY 4 HOURS PRN
Status: DISCONTINUED | OUTPATIENT
Start: 2022-11-08 | End: 2022-11-09 | Stop reason: HOSPADM

## 2022-11-08 RX ORDER — HYDROCHLOROTHIAZIDE 25 MG/1
25 TABLET ORAL DAILY
Status: DISCONTINUED | OUTPATIENT
Start: 2022-11-08 | End: 2022-11-09 | Stop reason: HOSPADM

## 2022-11-08 RX ORDER — POVIDONE-IODINE 10 MG/ML
SOLUTION TOPICAL ONCE
Status: COMPLETED | OUTPATIENT
Start: 2022-11-08 | End: 2022-11-08

## 2022-11-08 RX ORDER — POLYETHYLENE GLYCOL 3350 17 G/17G
17 POWDER, FOR SOLUTION ORAL DAILY
Status: DISCONTINUED | OUTPATIENT
Start: 2022-11-08 | End: 2022-11-09 | Stop reason: HOSPADM

## 2022-11-08 RX ORDER — ONDANSETRON 2 MG/ML
INJECTION INTRAMUSCULAR; INTRAVENOUS AS NEEDED
Status: DISCONTINUED | OUTPATIENT
Start: 2022-11-08 | End: 2022-11-08 | Stop reason: SURG

## 2022-11-08 RX ORDER — SUCRALFATE 1 G/1
1 TABLET ORAL 3 TIMES DAILY PRN
Status: DISCONTINUED | OUTPATIENT
Start: 2022-11-08 | End: 2022-11-09 | Stop reason: HOSPADM

## 2022-11-08 RX ORDER — ONDANSETRON 2 MG/ML
4 INJECTION INTRAMUSCULAR; INTRAVENOUS EVERY 6 HOURS PRN
Status: DISCONTINUED | OUTPATIENT
Start: 2022-11-08 | End: 2022-11-09 | Stop reason: HOSPADM

## 2022-11-08 RX ORDER — DICYCLOMINE HYDROCHLORIDE 10 MG/1
20 CAPSULE ORAL AS NEEDED
Status: DISCONTINUED | OUTPATIENT
Start: 2022-11-08 | End: 2022-11-09 | Stop reason: HOSPADM

## 2022-11-08 RX ORDER — BISACODYL 5 MG/1
10 TABLET, DELAYED RELEASE ORAL DAILY PRN
Status: DISCONTINUED | OUTPATIENT
Start: 2022-11-08 | End: 2022-11-09 | Stop reason: HOSPADM

## 2022-11-08 RX ORDER — HYDROCODONE BITARTRATE AND ACETAMINOPHEN 5; 325 MG/1; MG/1
1 TABLET ORAL ONCE AS NEEDED
Status: DISCONTINUED | OUTPATIENT
Start: 2022-11-08 | End: 2022-11-08 | Stop reason: HOSPADM

## 2022-11-08 RX ORDER — DIPHENHYDRAMINE HYDROCHLORIDE 50 MG/ML
12.5 INJECTION INTRAMUSCULAR; INTRAVENOUS
Status: DISCONTINUED | OUTPATIENT
Start: 2022-11-08 | End: 2022-11-08 | Stop reason: HOSPADM

## 2022-11-08 RX ORDER — ACETAMINOPHEN 500 MG
1000 TABLET ORAL ONCE
Status: COMPLETED | OUTPATIENT
Start: 2022-11-08 | End: 2022-11-08

## 2022-11-08 RX ORDER — PROMETHAZINE HYDROCHLORIDE 25 MG/1
25 TABLET ORAL ONCE AS NEEDED
Status: DISCONTINUED | OUTPATIENT
Start: 2022-11-08 | End: 2022-11-08 | Stop reason: HOSPADM

## 2022-11-08 RX ORDER — DIPHENHYDRAMINE HCL 25 MG
25 CAPSULE ORAL
Status: DISCONTINUED | OUTPATIENT
Start: 2022-11-08 | End: 2022-11-08 | Stop reason: HOSPADM

## 2022-11-08 RX ORDER — FENTANYL CITRATE 50 UG/ML
50 INJECTION, SOLUTION INTRAMUSCULAR; INTRAVENOUS
Status: DISCONTINUED | OUTPATIENT
Start: 2022-11-08 | End: 2022-11-08 | Stop reason: HOSPADM

## 2022-11-08 RX ORDER — ROCURONIUM BROMIDE 10 MG/ML
INJECTION, SOLUTION INTRAVENOUS AS NEEDED
Status: DISCONTINUED | OUTPATIENT
Start: 2022-11-08 | End: 2022-11-08 | Stop reason: SURG

## 2022-11-08 RX ORDER — FENTANYL CITRATE 50 UG/ML
INJECTION, SOLUTION INTRAMUSCULAR; INTRAVENOUS AS NEEDED
Status: DISCONTINUED | OUTPATIENT
Start: 2022-11-08 | End: 2022-11-08 | Stop reason: SURG

## 2022-11-08 RX ORDER — DEXAMETHASONE SODIUM PHOSPHATE 4 MG/ML
INJECTION, SOLUTION INTRA-ARTICULAR; INTRALESIONAL; INTRAMUSCULAR; INTRAVENOUS; SOFT TISSUE AS NEEDED
Status: DISCONTINUED | OUTPATIENT
Start: 2022-11-08 | End: 2022-11-08 | Stop reason: SURG

## 2022-11-08 RX ORDER — ONDANSETRON 2 MG/ML
4 INJECTION INTRAMUSCULAR; INTRAVENOUS ONCE AS NEEDED
Status: COMPLETED | OUTPATIENT
Start: 2022-11-08 | End: 2022-11-08

## 2022-11-08 RX ORDER — LIDOCAINE HYDROCHLORIDE 20 MG/ML
INJECTION, SOLUTION INFILTRATION; PERINEURAL AS NEEDED
Status: DISCONTINUED | OUTPATIENT
Start: 2022-11-08 | End: 2022-11-08 | Stop reason: SURG

## 2022-11-08 RX ORDER — MAGNESIUM HYDROXIDE 1200 MG/15ML
LIQUID ORAL AS NEEDED
Status: DISCONTINUED | OUTPATIENT
Start: 2022-11-08 | End: 2022-11-08 | Stop reason: HOSPADM

## 2022-11-08 RX ORDER — NEOSTIGMINE METHYLSULFATE 0.5 MG/ML
INJECTION, SOLUTION INTRAVENOUS AS NEEDED
Status: DISCONTINUED | OUTPATIENT
Start: 2022-11-08 | End: 2022-11-08 | Stop reason: SURG

## 2022-11-08 RX ORDER — DOCUSATE SODIUM 100 MG/1
100 CAPSULE, LIQUID FILLED ORAL 2 TIMES DAILY
Status: DISCONTINUED | OUTPATIENT
Start: 2022-11-08 | End: 2022-11-09 | Stop reason: HOSPADM

## 2022-11-08 RX ORDER — HYDROCODONE BITARTRATE AND ACETAMINOPHEN 7.5; 325 MG/1; MG/1
2 TABLET ORAL EVERY 4 HOURS PRN
Status: DISCONTINUED | OUTPATIENT
Start: 2022-11-08 | End: 2022-11-09 | Stop reason: HOSPADM

## 2022-11-08 RX ORDER — LABETALOL HYDROCHLORIDE 5 MG/ML
5 INJECTION, SOLUTION INTRAVENOUS
Status: DISCONTINUED | OUTPATIENT
Start: 2022-11-08 | End: 2022-11-08 | Stop reason: HOSPADM

## 2022-11-08 RX ORDER — BISACODYL 10 MG
10 SUPPOSITORY, RECTAL RECTAL DAILY PRN
Status: DISCONTINUED | OUTPATIENT
Start: 2022-11-08 | End: 2022-11-09 | Stop reason: HOSPADM

## 2022-11-08 RX ORDER — CEFAZOLIN SODIUM 2 G/100ML
2 INJECTION, SOLUTION INTRAVENOUS EVERY 8 HOURS
Status: COMPLETED | OUTPATIENT
Start: 2022-11-08 | End: 2022-11-09

## 2022-11-08 RX ORDER — PANTOPRAZOLE SODIUM 40 MG/1
40 TABLET, DELAYED RELEASE ORAL 2 TIMES DAILY
Status: DISCONTINUED | OUTPATIENT
Start: 2022-11-08 | End: 2022-11-09 | Stop reason: HOSPADM

## 2022-11-08 RX ORDER — HYDROMORPHONE HYDROCHLORIDE 1 MG/ML
0.5 INJECTION, SOLUTION INTRAMUSCULAR; INTRAVENOUS; SUBCUTANEOUS
Status: DISCONTINUED | OUTPATIENT
Start: 2022-11-08 | End: 2022-11-09 | Stop reason: HOSPADM

## 2022-11-08 RX ORDER — PROPOFOL 10 MG/ML
VIAL (ML) INTRAVENOUS AS NEEDED
Status: DISCONTINUED | OUTPATIENT
Start: 2022-11-08 | End: 2022-11-08 | Stop reason: SURG

## 2022-11-08 RX ORDER — NALOXONE HCL 0.4 MG/ML
0.2 VIAL (ML) INJECTION AS NEEDED
Status: DISCONTINUED | OUTPATIENT
Start: 2022-11-08 | End: 2022-11-08 | Stop reason: HOSPADM

## 2022-11-08 RX ORDER — CHLORHEXIDINE GLUCONATE 500 MG/1
1 CLOTH TOPICAL TAKE AS DIRECTED
Status: DISCONTINUED | OUTPATIENT
Start: 2022-11-08 | End: 2022-11-08

## 2022-11-08 RX ORDER — SODIUM CHLORIDE, SODIUM LACTATE, POTASSIUM CHLORIDE, CALCIUM CHLORIDE 600; 310; 30; 20 MG/100ML; MG/100ML; MG/100ML; MG/100ML
100 INJECTION, SOLUTION INTRAVENOUS CONTINUOUS
Status: DISCONTINUED | OUTPATIENT
Start: 2022-11-08 | End: 2022-11-09 | Stop reason: HOSPADM

## 2022-11-08 RX ORDER — PROMETHAZINE HYDROCHLORIDE 25 MG/1
25 SUPPOSITORY RECTAL ONCE AS NEEDED
Status: DISCONTINUED | OUTPATIENT
Start: 2022-11-08 | End: 2022-11-08 | Stop reason: HOSPADM

## 2022-11-08 RX ORDER — EPHEDRINE SULFATE 50 MG/ML
5 INJECTION, SOLUTION INTRAVENOUS ONCE AS NEEDED
Status: DISCONTINUED | OUTPATIENT
Start: 2022-11-08 | End: 2022-11-08 | Stop reason: HOSPADM

## 2022-11-08 RX ORDER — SODIUM CHLORIDE 0.9 % (FLUSH) 0.9 %
3-10 SYRINGE (ML) INJECTION AS NEEDED
Status: DISCONTINUED | OUTPATIENT
Start: 2022-11-08 | End: 2022-11-08 | Stop reason: HOSPADM

## 2022-11-08 RX ORDER — MELOXICAM 15 MG/1
15 TABLET ORAL ONCE
Status: COMPLETED | OUTPATIENT
Start: 2022-11-08 | End: 2022-11-08

## 2022-11-08 RX ORDER — SODIUM CHLORIDE 0.9 % (FLUSH) 0.9 %
3 SYRINGE (ML) INJECTION EVERY 12 HOURS SCHEDULED
Status: DISCONTINUED | OUTPATIENT
Start: 2022-11-08 | End: 2022-11-08 | Stop reason: HOSPADM

## 2022-11-08 RX ORDER — PREGABALIN 75 MG/1
150 CAPSULE ORAL ONCE
Status: COMPLETED | OUTPATIENT
Start: 2022-11-08 | End: 2022-11-08

## 2022-11-08 RX ORDER — CEFAZOLIN SODIUM 2 G/100ML
2 INJECTION, SOLUTION INTRAVENOUS ONCE
Status: COMPLETED | OUTPATIENT
Start: 2022-11-08 | End: 2022-11-08

## 2022-11-08 RX ORDER — HYDRALAZINE HYDROCHLORIDE 20 MG/ML
5 INJECTION INTRAMUSCULAR; INTRAVENOUS
Status: DISCONTINUED | OUTPATIENT
Start: 2022-11-08 | End: 2022-11-08 | Stop reason: HOSPADM

## 2022-11-08 RX ORDER — HYDROMORPHONE HYDROCHLORIDE 1 MG/ML
0.5 INJECTION, SOLUTION INTRAMUSCULAR; INTRAVENOUS; SUBCUTANEOUS
Status: DISCONTINUED | OUTPATIENT
Start: 2022-11-08 | End: 2022-11-08 | Stop reason: HOSPADM

## 2022-11-08 RX ORDER — SODIUM CHLORIDE, SODIUM LACTATE, POTASSIUM CHLORIDE, CALCIUM CHLORIDE 600; 310; 30; 20 MG/100ML; MG/100ML; MG/100ML; MG/100ML
9 INJECTION, SOLUTION INTRAVENOUS CONTINUOUS
Status: DISCONTINUED | OUTPATIENT
Start: 2022-11-08 | End: 2022-11-08

## 2022-11-08 RX ORDER — HYDROCODONE BITARTRATE AND ACETAMINOPHEN 7.5; 325 MG/1; MG/1
2 TABLET ORAL EVERY 4 HOURS PRN
Status: DISCONTINUED | OUTPATIENT
Start: 2022-11-08 | End: 2022-11-08 | Stop reason: HOSPADM

## 2022-11-08 RX ORDER — ALBUTEROL SULFATE 2.5 MG/3ML
2.5 SOLUTION RESPIRATORY (INHALATION) EVERY 6 HOURS PRN
Status: DISCONTINUED | OUTPATIENT
Start: 2022-11-08 | End: 2022-11-09 | Stop reason: HOSPADM

## 2022-11-08 RX ORDER — ASPIRIN 81 MG/1
81 TABLET ORAL EVERY 12 HOURS SCHEDULED
Status: DISCONTINUED | OUTPATIENT
Start: 2022-11-09 | End: 2022-11-09 | Stop reason: HOSPADM

## 2022-11-08 RX ORDER — BUPIVACAINE HCL/0.9 % NACL/PF 0.125 %
PLASTIC BAG, INJECTION (ML) EPIDURAL AS NEEDED
Status: DISCONTINUED | OUTPATIENT
Start: 2022-11-08 | End: 2022-11-08 | Stop reason: SURG

## 2022-11-08 RX ORDER — CEFAZOLIN SODIUM 2 G/100ML
2 INJECTION, SOLUTION INTRAVENOUS EVERY 8 HOURS
Status: DISCONTINUED | OUTPATIENT
Start: 2022-11-08 | End: 2022-11-08

## 2022-11-08 RX ADMIN — PREGABALIN 150 MG: 75 CAPSULE ORAL at 10:03

## 2022-11-08 RX ADMIN — CEFAZOLIN SODIUM 2 G: 2 INJECTION, SOLUTION INTRAVENOUS at 13:04

## 2022-11-08 RX ADMIN — PROPOFOL 150 MG: 10 INJECTION, EMULSION INTRAVENOUS at 13:24

## 2022-11-08 RX ADMIN — POVIDONE-IODINE 4 EACH: 10 SOLUTION TOPICAL at 10:03

## 2022-11-08 RX ADMIN — LIDOCAINE HYDROCHLORIDE 100 MG: 20 INJECTION, SOLUTION INFILTRATION; PERINEURAL at 13:24

## 2022-11-08 RX ADMIN — GLYCOPYRROLATE 0.7 MCG: 1 INJECTION INTRAMUSCULAR; INTRAVENOUS at 15:04

## 2022-11-08 RX ADMIN — PROPOFOL 25 MCG/KG/MIN: 10 INJECTION, EMULSION INTRAVENOUS at 13:29

## 2022-11-08 RX ADMIN — Medication 100 MCG: at 15:02

## 2022-11-08 RX ADMIN — PANTOPRAZOLE SODIUM 40 MG: 40 TABLET, DELAYED RELEASE ORAL at 21:42

## 2022-11-08 RX ADMIN — NEOSTIGMINE METHYLSULFATE 5 MG: 0.5 INJECTION INTRAVENOUS at 15:04

## 2022-11-08 RX ADMIN — FENTANYL CITRATE 50 MCG: 50 INJECTION INTRAMUSCULAR; INTRAVENOUS at 15:51

## 2022-11-08 RX ADMIN — FENTANYL CITRATE 50 MCG: 50 INJECTION, SOLUTION INTRAMUSCULAR; INTRAVENOUS at 13:22

## 2022-11-08 RX ADMIN — MELOXICAM 15 MG: 15 TABLET ORAL at 10:03

## 2022-11-08 RX ADMIN — TRANEXAMIC ACID 1000 MG: 1 INJECTION, SOLUTION INTRAVENOUS at 13:32

## 2022-11-08 RX ADMIN — FENTANYL CITRATE 50 MCG: 50 INJECTION, SOLUTION INTRAMUSCULAR; INTRAVENOUS at 13:52

## 2022-11-08 RX ADMIN — DEXAMETHASONE SODIUM PHOSPHATE 8 MG: 4 INJECTION, SOLUTION INTRAMUSCULAR; INTRAVENOUS at 13:30

## 2022-11-08 RX ADMIN — HYDROMORPHONE HYDROCHLORIDE 0.5 MG: 1 INJECTION, SOLUTION INTRAMUSCULAR; INTRAVENOUS; SUBCUTANEOUS at 16:00

## 2022-11-08 RX ADMIN — ONDANSETRON 4 MG: 2 INJECTION INTRAMUSCULAR; INTRAVENOUS at 14:50

## 2022-11-08 RX ADMIN — ROCURONIUM BROMIDE 50 MG: 10 INJECTION, SOLUTION INTRAVENOUS at 13:25

## 2022-11-08 RX ADMIN — Medication 100 MCG: at 13:37

## 2022-11-08 RX ADMIN — SODIUM CHLORIDE, POTASSIUM CHLORIDE, SODIUM LACTATE AND CALCIUM CHLORIDE 9 ML/HR: 600; 310; 30; 20 INJECTION, SOLUTION INTRAVENOUS at 10:25

## 2022-11-08 RX ADMIN — CEFAZOLIN SODIUM 2 G: 2 INJECTION, SOLUTION INTRAVENOUS at 15:44

## 2022-11-08 RX ADMIN — FENTANYL CITRATE 50 MCG: 50 INJECTION INTRAMUSCULAR; INTRAVENOUS at 16:14

## 2022-11-08 RX ADMIN — ACETAMINOPHEN 1000 MG: 500 TABLET ORAL at 10:03

## 2022-11-08 RX ADMIN — ONDANSETRON 4 MG: 2 INJECTION INTRAMUSCULAR; INTRAVENOUS at 16:44

## 2022-11-08 NOTE — ANESTHESIA POSTPROCEDURE EVALUATION
Patient: Mahnaz Becerra    Procedure Summary     Date: 11/08/22 Room / Location: [facility] OR [address] [place] [address] [facility]    Anesthesia Start: 1315 Anesthesia Stop: 1530    Procedure: RIGHT TOTAL KNEE ARTHROPLASTY WITH MARGRET NAVIGATION (Right: Knee) Diagnosis:       Arthritis of right knee      (Arthritis of right knee [M17.11])    Surgeons: Artur Pat MD Provider: Kirk Avelar MD    Anesthesia Type: general ASA Status: 3          Anesthesia Type: general    Vitals  Vitals Value Taken Time   /56 11/08/22 1645   Temp 36.5 °C (97.7 °F) 11/08/22 1530   Pulse 60 11/08/22 1658   Resp 16 11/08/22 1645   SpO2 96 % 11/08/22 1658   Vitals shown include unvalidated device data.        Post Anesthesia Care and Evaluation    Patient location during evaluation: PACU  Patient participation: complete - patient participated  Level of consciousness: awake and alert  Pain management: adequate    Airway patency: patent  Anesthetic complications: No anesthetic complications  PONV Status: controlled  Cardiovascular status: acceptable  Respiratory status: acceptable  Hydration status: acceptable    Comments: /56   Pulse 66   Temp 36.5 °C (97.7 °F) (Oral)   Resp 16   SpO2 94%

## 2022-11-08 NOTE — ANESTHESIA PREPROCEDURE EVALUATION
Anesthesia Evaluation     Patient summary reviewed and Nursing notes reviewed   history of anesthetic complications: PONV  NPO Solid Status: > 8 hours  NPO Liquid Status: > 2 hours           Airway   Mallampati: II  TM distance: >3 FB  Neck ROM: full  Dental      Pulmonary    (+) asthma,sleep apnea,   Cardiovascular     ECG reviewed    (+) hypertension,       Neuro/Psych    ROS Comment: Multiple Sclerosis  GI/Hepatic/Renal/Endo    (+) obesity,  GERD,  thyroid problem hypothyroidism    Musculoskeletal     Abdominal    Substance History      OB/GYN          Other                        Anesthesia Plan    ASA 3     general     (Patient has had a couple episodes of PONV. Given age and delayed emergence from anesthesia, will avoid scope patch. Plan for decadron, zofran and sub-hypnotic propofol infusion. )  intravenous induction     Anesthetic plan, risks, benefits, and alternatives have been provided, discussed and informed consent has been obtained with: patient.        CODE STATUS:

## 2022-11-08 NOTE — OP NOTE
Operative Note    Name: Mahnaz Becerra  YOB: 1945  MRN: [id number]  BMI: There is no height or weight on file to calculate BMI.    DATE OF SURGERY: 11/8/2022    PREOPERATIVE DIAGNOSIS: right knee end-stage osteoarthritis    POSTOPERATIVE DIAGNOSIS: right knee end-stage osteoarthritis    PROCEDURE PERFORMED: right total knee replacement with Groom navigation    SURGEON: Dr. Artur Pat    ASSISTANT: TATIANNA De Jesus    IMPLANTS: DepuyAttune    Estimated Blood Loss: 100 mL  Specimens : none  Complications: none  22 Modifier:  none    DESCRIPTION OF PROCEDURE: The patient was taken to the operating room and placed in the supine position. Preoperative antibiotics were administered. Surgical time out was performed. After adequate induction of anesthesia, the leg was prepped and draped in the usual sterile fashion. The leg was exsanguinated with an Esmarch bandage and the tourniquet inflated to 250 mmHg. A midline incision was performed followed by a medial parapatellar arthrotomy. The patella was subluxed laterally.  A portion of the fat pad, ACL, and anterior horns of the meniscus were excised.  The OneMln navigation device was affixed and navigated. The distal cut was made. The femur was then sized with a sizing guide. The femoral cutting block was placed and all femoral cuts were performed. The proximal tibia was exposed. Groom navigation protocol was accomplished.  9 millimeters were removed.  We used the extramedullary tibial cutting guide set for removal of 3 mm of bone off the low side. The tibial cut was performed. The posterior horns of the menisci were excised. The posterior osteophytes were removed. Flexion extension blocks were then used to balance the knee. The tibial cut surface was then sized with the sizing templates and the tibial and femoral trial were then placed. The tray was aligned with the middle third of the tubercle.    Attention was then placed to the patella. The  patella was balanced to track centrally through range of motion.  It measured 24 and patella resurfacing was performed.   At this point all trial components were removed, the knee was copiously irrigated with pulsed lavage, and the knee was injected with anesthetic cocktail solution. The cut surfaces were then dried with clean lap sponges, and the components were cemented, first the tibia, followed by femur. The knee was held in full extension and all excess cement was removed. The knee was held still until the cement had completely hardened. We then placed the trial polyethylene spacer which resulted in full extension and excellent flexion-extension balance. We placed the final polyethylene spacer.  The patella would laterally subluxate therefore lateral release was performed and closure medial sutures were placed with a running strata fix.  It tracked beautifully.  A light soft tissue coverage was placed over the lateral release defect to provide a watertight closure in the joint   The knee was then copiously irrigated with the full 3 liters. The tourniquet was then released. There was excellent hemostasis. We closed the knee in multiple layers in standard fashion.  A sterile dressing was applied. At the end of the case, the sponge and needle counts were reported as being correct. There were no known complications. The patient was then transported to the recovery room.    The surgical assistant performed retraction, suction, hemostasis, and specific limb positioning and manipulation required for accuracy of joint placement, and assistance in wound closure and dressing application.       Artur Pat M.D.

## 2022-11-09 ENCOUNTER — HOME HEALTH ADMISSION (OUTPATIENT)
Dept: HOME HEALTH SERVICES | Facility: HOME HEALTHCARE | Age: 77
End: 2022-11-09

## 2022-11-09 VITALS
RESPIRATION RATE: 19 BRPM | WEIGHT: 201 LBS | OXYGEN SATURATION: 96 % | SYSTOLIC BLOOD PRESSURE: 106 MMHG | HEART RATE: 93 BPM | HEIGHT: 63 IN | BODY MASS INDEX: 35.61 KG/M2 | TEMPERATURE: 97.6 F | DIASTOLIC BLOOD PRESSURE: 70 MMHG

## 2022-11-09 LAB
HCT VFR BLD AUTO: 36.7 % (ref 34–46.6)
HGB BLD-MCNC: 12.6 G/DL (ref 12–15.9)

## 2022-11-09 PROCEDURE — 85014 HEMATOCRIT: CPT | Performed by: NURSE PRACTITIONER

## 2022-11-09 PROCEDURE — G0378 HOSPITAL OBSERVATION PER HR: HCPCS

## 2022-11-09 PROCEDURE — 99024 POSTOP FOLLOW-UP VISIT: CPT | Performed by: NURSE PRACTITIONER

## 2022-11-09 PROCEDURE — 97116 GAIT TRAINING THERAPY: CPT

## 2022-11-09 PROCEDURE — 97530 THERAPEUTIC ACTIVITIES: CPT

## 2022-11-09 PROCEDURE — 25010000002 CEFAZOLIN IN DEXTROSE 2-4 GM/100ML-% SOLUTION: Performed by: ORTHOPAEDIC SURGERY

## 2022-11-09 PROCEDURE — 97162 PT EVAL MOD COMPLEX 30 MIN: CPT

## 2022-11-09 PROCEDURE — 63710000001 ONDANSETRON PER 8 MG: Performed by: NURSE PRACTITIONER

## 2022-11-09 PROCEDURE — 85018 HEMOGLOBIN: CPT | Performed by: NURSE PRACTITIONER

## 2022-11-09 RX ORDER — HYDROCODONE BITARTRATE AND ACETAMINOPHEN 7.5; 325 MG/1; MG/1
TABLET ORAL
Qty: 40 TABLET | Refills: 0 | Status: SHIPPED | OUTPATIENT
Start: 2022-11-09 | End: 2022-11-16 | Stop reason: SDUPTHER

## 2022-11-09 RX ORDER — PSEUDOEPHEDRINE HCL 30 MG
100 TABLET ORAL 2 TIMES DAILY
Qty: 60 CAPSULE | Refills: 0 | Status: SHIPPED | OUTPATIENT
Start: 2022-11-09 | End: 2023-03-28

## 2022-11-09 RX ORDER — ASPIRIN 81 MG/1
81 TABLET ORAL EVERY 12 HOURS SCHEDULED
Qty: 60 TABLET | Refills: 0 | Status: SHIPPED | OUTPATIENT
Start: 2022-11-09

## 2022-11-09 RX ORDER — ONDANSETRON 4 MG/1
4 TABLET, FILM COATED ORAL EVERY 6 HOURS PRN
Qty: 10 TABLET | Refills: 0 | Status: SHIPPED | OUTPATIENT
Start: 2022-11-09 | End: 2023-03-28

## 2022-11-09 RX ORDER — POLYETHYLENE GLYCOL 3350 17 G/17G
17 POWDER, FOR SOLUTION ORAL DAILY
Qty: 10 PACKET | Refills: 0 | Status: SHIPPED | OUTPATIENT
Start: 2022-11-09 | End: 2022-11-19

## 2022-11-09 RX ADMIN — HYDROCHLOROTHIAZIDE 25 MG: 25 TABLET ORAL at 08:44

## 2022-11-09 RX ADMIN — ASPIRIN 81 MG: 81 TABLET, COATED ORAL at 08:39

## 2022-11-09 RX ADMIN — HYDROCODONE BITARTRATE AND ACETAMINOPHEN 2 TABLET: 7.5; 325 TABLET ORAL at 08:42

## 2022-11-09 RX ADMIN — CEFAZOLIN SODIUM 2 G: 2 INJECTION, SOLUTION INTRAVENOUS at 00:11

## 2022-11-09 RX ADMIN — PANTOPRAZOLE SODIUM 40 MG: 40 TABLET, DELAYED RELEASE ORAL at 08:39

## 2022-11-09 RX ADMIN — ONDANSETRON HYDROCHLORIDE 4 MG: 4 TABLET, FILM COATED ORAL at 08:39

## 2022-11-09 RX ADMIN — DOCUSATE SODIUM 100 MG: 100 CAPSULE, LIQUID FILLED ORAL at 08:39

## 2022-11-09 NOTE — THERAPY DISCHARGE NOTE
Patient Name: Mahnaz Becerra  : 1945    MRN: [id number]                              Today's Date: 2022       Admit Date: 2022    Visit Dx:     ICD-10-CM ICD-9-CM   1. S/P TKR (total knee replacement), right  Z96.651 V43.65   2. Arthritis of right knee  M17.11 716.96     Patient Active Problem List   Diagnosis   • Anxiety   • Benign essential hypertension   • Contact dermatitis   • Dyslipidemia   • Gastroesophageal reflux disease   • Hypothyroidism   • Insomnia   • Pain of lower extremity   • Multiple sclerosis (HCC)   • Venous stasis   • Arthritis of right knee   • Arthritis of both knees   • Knee pain, right   • S/P right knee arthroscopy   • Arthritis of left knee   • Asthma   • Irritable bowel syndrome   • Periumbilical abdominal pain   • Epigastric pain   • PEARL (obstructive sleep apnea)   • Chronic pain of left knee   • History of total knee arthroplasty, left   • Diarrhea   • Nausea   • Colitis, Clostridium difficile   • Status post total left knee replacement   • Trochanteric bursitis of right hip   • Right hip pain   • Closed nondisplaced fracture of proximal phalanx of lesser toe of right foot   • Arthritis of first metatarsophalangeal (MTP) joint of right foot   • Arthritis of foot   • Neck pain   • Spinal stenosis in cervical region   • Essential tremor     Past Medical History:   Diagnosis Date   • Acid reflux    • Anesthesia complication     ANESTHESIA HAS BROUGHT ON ASTHMA ATTACKS    • Asthma    • Bronchitis    • [place]     FREQUENT   • Edema     LOWER EXT   • Gastritis    • Heart murmur    • History of Clostridioides difficile infection    • History of skin cancer     BACK   • IBS (irritable bowel syndrome)    • Knee pain, right    • MS (multiple sclerosis) ([facility])    • Osteoarthritis    • PONV (postoperative nausea and vomiting)     Patient states she had post-op nausea and vomiting after hysterectomy in .   • Sleep apnea     CANT TOLERATE CPAP   • Tremor     RIGHT ARM      Past Surgical History:   Procedure Laterality Date   • APPENDECTOMY     • BREAST LUMPECTOMY Bilateral    • BREAST SURGERY      REDUCTION   •  SECTION     • CHOLECYSTECTOMY     • COLONOSCOPY  2012    Dr Moe   • COLONOSCOPY N/A 2020    Procedure: COLONOSCOPY TO CECUM AND TERM. ILEUM WITH BIOPSIES;  Surgeon: Inder Pat MD;  Location: [facility] ENDOSCOPY;  Service: Gastroenterology;  Laterality: N/A;  PRE OP - CHANGE IN BOWEL HABITS, DIARRHEA  POST OP - DIVERTICULOSIS   • COSMETIC SURGERY     • DILATATION AND CURETTAGE     • ENDOSCOPY N/A 2016    Procedure: ESOPHAGOGASTRODUODENOSCOPY WITH BX;  Surgeon: Nasim Moe MD;  [facility];  Service:    • ENDOSCOPY N/A 2020    Procedure: ESOPHAGOGASTRODUODENOSCOPY WITH DUODENAL ASPIRATE, POLYPECTOMY AND BIOPSIES;  Surgeon: Inder Pat MD;  Location: [facility] ENDOSCOPY;  Service: Gastroenterology;  Laterality: N/A;  PRE OP - GERD  POST OP - GASTRIC POLYP, GASTRITIS   • ENDOSCOPY W/ PEG REMOVAL  2012    Dr Moe   • EYE SURGERY Bilateral     BLEPHAROPLASTY   • HYSTERECTOMY     • KNEE ARTHROSCOPY Right 2016    Procedure: KNEE ARTHROSCOPY, PARTIAL MEDIAL AND LATERAL MENISECTOMY, CHONDROPLASTY OF THE PATELLA, REMOVAL OF LOOSE BODIES;  Surgeon: Artur Pat MD;  [place] [facility] [place] [facility] [place]  Service:    • KNEE ARTHROSCOPY W/ MENISCAL REPAIR Left    • OOPHORECTOMY Bilateral    • TONSILLECTOMY     • TOTAL KNEE ARTHROPLASTY Left 2018    Procedure: LEFT TOTAL KNEE ARTHROPLASTY WITH MARGRET NAVIGATION;  Surgeon: Artur Pat MD;  Location: [facility] MAIN OR;  Service:    • TOTAL KNEE ARTHROPLASTY Right 2022    Procedure: RIGHT TOTAL KNEE ARTHROPLASTY WITH MARGRET NAVIGATION;  Surgeon: Artur Pat MD;  Location: [facility] OR [facility];  Service: Orthopedics;  Laterality: Right;      General Information     Row Name 22 0931          Physical Therapy Time and Intention    Document  Type discharge evaluation/summary  -DJ     Mode of Treatment individual therapy;physical therapy  -DJ     Row Name 11/09/22 0931          General Information    Patient Profile Reviewed yes  -DJ     Prior Level of Function independent:  -DJ     Existing Precautions/Restrictions fall  -DJ     Barriers to Rehab medically complex  -DJ     Row Name 11/09/22 0931          Living Environment    People in Home spouse  -DJ     Row Name 11/09/22 0931          Home Main Entrance    Number of Stairs, Main Entrance none  -DJ     Row Name 11/09/22 0931          Stairs Within Home, Primary    Stairs, Within Home, Primary 2 steps to access her 4 poster bed  -DJ     Row Name 11/09/22 0931          Cognition    Orientation Status (Cognition) oriented x 4  -DJ     Row Name 11/09/22 0931          Safety Issues, Functional Mobility    Safety Issues Affecting Function (Mobility) safety precaution awareness  -DJ     Impairments Affecting Function (Mobility) balance;pain;endurance/activity tolerance;strength  -DJ     Comment, Safety Issues/Impairments (Mobility) gt belt, nonskid socks  -DJ           User Key  (r) = Recorded By, (t) = Taken By, (c) = Cosigned By    Initials Name Provider Type    DJ Bhavya Jordan, PT Physical Therapist               Mobility     Row Name 11/09/22 0933          Bed Mobility    Bed Mobility supine-sit  -DJ     Supine-Sit [place] (Bed Mobility) modified independence  -DJ     Row Name 11/09/22 0933          Transfers    Comment, (Transfers) sit/stand from EOB  -DJ     Row Name 11/09/22 0933          Bed-Chair Transfer    Bed-Chair [place] (Transfers) not tested  -DJ     Row Name 11/09/22 0933          Sit-Stand Transfer    Sit-Stand [place] (Transfers) standby assist  -DJ     Assistive Device (Sit-Stand Transfers) walker, front-wheeled  -DJ     Row Name 11/09/22 0933          Gait/Stairs (Locomotion)    [place] Level (Gait) standby assist  -DJ     Assistive Device (Gait) walker, front-wheeled   -DJ     Distance in Feet (Gait) >400'  -DJ     Deviations/Abnormal Patterns (Gait) antalgic;gait speed decreased;stride length decreased  -DJ     Bilateral Gait Deviations forward flexed posture  -DJ     Right Sided Gait Deviations heel strike decreased  -DJ     [place] Level (Stairs) not tested  -DJ     Comment, (Gait/Stairs) Pt amb >400' with r wx and SBA - slow pace but good balance and endurance. Pt declined stairs since she only has 2 steps to get into bed and she can sleep in recliner for a few nights prn  -DJ           User Key  (r) = Recorded By, (t) = Taken By, (c) = Cosigned By    Initials Name Provider Type    Bhavya Villafuerte, PT Physical Therapist               Obj/Interventions     Row Name 11/09/22 0935          Range of Motion Comprehensive    Comment, General Range of Motion R knee 85 flex while sitting, -5 exten grossly  -DJ     Row Name 11/09/22 0935          Strength Comprehensive (MMT)    Comment, General Manual Muscle Testing (MMT) Assessment R Q 3/5, albe to LAQ  -DJ     Row Name 11/09/22 0935          Motor Skills    Motor Skills functional endurance  -DJ     Functional Endurance fair  -DJ     Therapeutic Exercise other (see comments)  AP, QS, LAQ  -DJ     Row Name 11/09/22 0935          Balance    Balance Assessment standing static balance;standing dynamic balance  -DJ     Static Standing Balance standby assist;verbal cues  -DJ     Dynamic Standing Balance standby assist;verbal cues  -DJ     Position/Device Used, Standing Balance walker, front-wheeled;supported  -DJ     Balance Interventions sitting;standing;sit to stand;supported;weight shifting activity  -DJ     Comment, Balance good  -DJ     Row Name 11/09/22 0935          Sensory Assessment (Somatosensory)    Sensory Assessment (Somatosensory) not tested  -DJ           User Key  (r) = Recorded By, (t) = Taken By, (c) = Cosigned By    Initials Name Provider Type    Bhavya Villafuerte, PT Physical Therapist               Goals/Plan    No  documentation.                Clinical Impression     Row Name 11/09/22 0936          Pain    Pain Location - Side/Orientation Right  -DJ     Pain Location incisional  -DJ     Pain Location - knee  -DJ     Pre/Posttreatment Pain Comment expected c/o post-op pain kaity with increased knee flex  -DJ     Pain Intervention(s) Repositioned;Rest  -DJ     Row Name 11/09/22 0936          Plan of Care Review    Plan of Care Reviewed With patient  -DJ     Outcome Evaluation 78yo white female admitted 11/8/22 due to R knee OA; now s/p R TKA 11/8/22. PMH includes MS, anxiety, hbp, L TKA, asthma, IBS. PLOF: Pt lives at home with 0 MAINOR and only steps inside are to access her 4 poster bed. She was independent with ADLs and IADLs including driving. Today, she was resting in bed in NAD, pleasant and cooperative. Pt was independent with bed mobility and SBA to stand from EOB. Pt amb >400' with r wx and SBA - slow pace but good balance and endurance. Pt declined stairs since she only has 2 steps to get into bed and she can sleep in recliner for a few nights prn. Pt returned to recliner and instructed in AP, QS, LAQ. Pt is safe to return home with  and home health services.  -DJ     Row Name 11/09/22 0936          Therapy Assessment/Plan (PT)    Patient/Family Therapy Goals Statement (PT) home  -DJ     Criteria for Skilled Interventions Met (PT) yes;meets criteria;skilled treatment is necessary  -DJ     Therapy Frequency (PT) evaluation only  -DJ     Row Name 11/09/22 0936          Vital Signs    O2 Delivery Pre Treatment supplemental O2  -DJ     O2 Delivery Intra Treatment room air  -DJ     Post SpO2 (%) 96  -DJ     O2 Delivery Post Treatment room air  -DJ     Pre Patient Position Supine  -DJ     Intra Patient Position Standing  -DJ     Post Patient Position Sitting  -DJ     Row Name 11/09/22 0936          Positioning and Restraints    Pre-Treatment Position in bed  -DJ     Post Treatment Position chair  -DJ     In Chair  reclined;call light within reach;encouraged to call for assist;exit alarm on  -DJ           User Key  (r) = Recorded By, (t) = Taken By, (c) = Cosigned By    Initials Name Provider Type    Bhavya Villafuerte, MACY Physical Therapist               Outcome Measures     Row Name 11/09/22 0942          How much help from another person do you currently need...    Turning from your back to your side while in flat bed without using bedrails? 4  -DJ     Moving from lying on back to sitting on the side of a flat bed without bedrails? 4  -DJ     Moving to and from a bed to a chair (including a wheelchair)? 3  -DJ     Standing up from a chair using your arms (e.g., wheelchair, bedside chair)? 4  -DJ     Climbing 3-5 steps with a railing? 3  -DJ     To walk in hospital room? 3  -DJ     AM-PAC 6 Clicks Score (PT) 21  -DJ     Highest level of mobility 6 --> Walked 10 steps or more  -DJ     Row Name 11/09/22 0942          Functional Assessment    Outcome Measure Options AM-PAC 6 Clicks Basic Mobility (PT)  -DJ           User Key  (r) = Recorded By, (t) = Taken By, (c) = Cosigned By    Initials Name Provider Type    Bhavya Villafuerte PT Physical Therapist              Physical Therapy Education     Title: PT OT SLP Therapies (Done)     Topic: Physical Therapy (Done)     Point: Mobility training (Done)     Learning Progress Summary           Patient Acceptance, E, DU by  at 11/9/2022 0943                   Point: Home exercise program (Done)     Learning Progress Summary           Patient Acceptance, E, DU by  at 11/9/2022 0943                   Point: Body mechanics (Done)     Learning Progress Summary           Patient Acceptance, E, DU by  at 11/9/2022 0943                   Point: Precautions (Done)     Learning Progress Summary           Patient Acceptance, E, DU by  at 11/9/2022 0943                               User Key     Initials Effective Dates Name Provider Type [facility] 10/25/19 -  Bhavya Jordan PT Physical  Therapist PT              PT Recommendation and Plan     Plan of Care Reviewed With: patient  Outcome Evaluation: 78yo white female admitted 11/8/22 due to R knee OA; now s/p R TKA 11/8/22. PMH includes MS, anxiety, hbp, L TKA, asthma, IBS. PLOF: Pt lives at home with 0 MAINOR and only steps inside are to access her 4 poster bed. She was independent with ADLs and IADLs including driving. Today, she was resting in bed in NAD, pleasant and cooperative. Pt was independent with bed mobility and SBA to stand from EOB. Pt amb >400' with r wx and SBA - slow pace but good balance and endurance. Pt declined stairs since she only has 2 steps to get into bed and she can sleep in recliner for a few nights prn. Pt returned to recliner and instructed in AP, QS, LAQ. Pt is safe to return home with  and home health services.     Time Calculation:    PT Charges     Row Name 11/09/22 0944             Time Calculation    Start Time 0844  -DJ      Stop Time 0917  -DJ      Time Calculation (min) 33 min  -DJ      PT Non-Billable Time (min) 10 min  -DJ      PT Received On 11/09/22  -DJ            User Key  (r) = Recorded By, (t) = Taken By, (c) = Cosigned By    Initials Name Provider Type    Bhavya Villafuerte PT Physical Therapist              Therapy Charges for Today     Code Description Service Date Service Provider Modifiers Qty    02391062721 HC PT EVAL MOD COMPLEXITY 2 11/9/2022 Bhavya Jordan, PT GP 1    99868962548 HC PT THERAPEUTIC ACT EA 15 MIN 11/9/2022 Bhavya Jordan, PT GP 1    37920624149 HC GAIT TRAINING EA 15 MIN 11/9/2022 Bhavya Jordan, PT GP 1          PT G-Codes  Outcome Measure Options: AM-PAC 6 Clicks Basic Mobility (PT)  AM-PAC 6 Clicks Score (PT): 21         Bhavya Jordan PT  11/9/2022

## 2022-11-09 NOTE — DISCHARGE PLACEMENT REQUEST
"Mahnaz Lr (77 y.o. Female)     Date of Birth   1945    Social Security Number       Address   [address] [place] [address]    Home Phone   [phone]    MRN   [id number]       [facility]    Marital Status                               Admission Date   11/8/22    Admission Type   Elective    Admitting Provider   Artur Pat MD    Attending Provider   Artur Pat MD    Department, Room/Bed   [address], 90/1       Discharge Date       Discharge Disposition   Home or Self Care    Discharge Destination                               Attending Provider: Artur Pat MD    Allergies: Flagyl [Metronidazole], Levofloxacin, Lansoprazole, Cefdinir, Trazodone And Nefazodone    Isolation: None   Infection: None   Code Status: CPR    Ht: 160 cm (63\")   Wt: 91.2 kg (201 lb)    Admission Cmt: None   Principal Problem: Arthritis of right knee [M17.11]                 Active Insurance as of 11/8/2022     Primary Coverage     Payor Plan Insurance Group Employer/Plan Group    MEDICARE MEDICARE A & B      Payor Plan Address Payor Plan Phone Number Payor Plan Fax Number Effective Dates    [address] [phone]  7/1/2012 - None Entered    [facility] [id number]       Subscriber Name Subscriber Birth Date Member ID       MAHNAZ LR 1945 [id number]           Secondary Coverage     Payor Plan Insurance Group Employer/Plan Group    [employer] SUP AAR HEALTH CARE OPTIONS      Payor Plan Address Payor Plan Phone Number Payor Plan Fax Number Effective Dates    [facility] [phone]  5/1/2019 - None Entered    [address]       [facility] [address]       Subscriber Name Subscriber Birth Date Member ID       MAHNAZ LR 1945 [id number]                 Emergency Contacts      (Rel.) Home Phone Work Phone Mobile Phone    HernanBerlin (Spouse) [phone] -- [phone]    HernanDustin (Son) [phone] -- --          "

## 2022-11-09 NOTE — PROGRESS NOTES
Orthopedic Total Joint Progress Note        Patient: Mahnaz Becerra    Date of Admission: 11/8/2022  9:19 AM    YOB: 1945    Medical Record Number: [id number]    Attending Physician: Artur Pat MD      POD # 1 Day Post-Op Procedure(s) (LRB):  RIGHT TOTAL KNEE ARTHROPLASTY WITH MARGRET NAVIGATION (Right)       Systemic or Specific Complaints: The patient has had a relatively normal postoperative course.  The patient has had no current complaints. The patient has had improving normal postoperative pain.  The patient has had no issues with the wound.  Patient states she feels well today.  She states the knee is doing pretty good.  She has questions about the CHANCE dressing, I explained this to her in detail.  She states she knows that the pain will increase when the block wears off.  She states she is peeing okay with her bladder spasms from her MS have actually decreased since the surgery.  She has no other complaints at this time.      Allergies:   Allergies   Allergen Reactions   • Flagyl [Metronidazole] Hives and Swelling     Lips and Tongue     • Levofloxacin Swelling and Rash     RED RASH   • Lansoprazole Itching and Other (See Comments)     AND TURN RED   • Cefdinir GI Intolerance     Abdominal pain, caused upset stomach   • Trazodone And Nefazodone Myalgia     Severe muscle cramps       Medications:   Current Medications:  Scheduled Meds:aspirin, 81 mg, Oral, Q12H  docusate sodium, 100 mg, Oral, BID  hydroCHLOROthiazide, 25 mg, Oral, Daily  pantoprazole, 40 mg, Oral, BID  polyethylene glycol, 17 g, Oral, Daily      Continuous Infusions:lactated ringers, 100 mL/hr      PRN Meds:.•  acetaminophen  •  albuterol  •  bisacodyl  •  bisacodyl  •  dicyclomine  •  HYDROcodone-acetaminophen  •  HYDROcodone-acetaminophen  •  HYDROmorphone **AND** naloxone  •  influenza vaccine  •  magnesium hydroxide  •  ondansetron **OR** ondansetron  •  sucralfate      Physical Exam: 77 y.o. female   Wt Readings  "from Last 3 Encounters:   11/08/22 91.2 kg (201 lb)   11/02/22 91.2 kg (201 lb)   11/01/22 92.1 kg (203 lb)     Ht Readings from Last 3 Encounters:   11/08/22 160 cm (63\")   11/02/22 160 cm (63\")   11/01/22 162.6 cm (64\")     Body mass index is 35.61 kg/m².    Vitals:    11/08/22 2056 11/08/22 2207 11/09/22 0148 11/09/22 0513   BP:  123/64 99/64 110/64   BP Location:  Right arm Right arm Right arm   Patient Position:  Lying Lying Lying   Pulse:  56 53 61   Resp:  16 16 16   Temp:  98.6 °F (37 °C) 97.8 °F (36.6 °C) 97.9 °F (36.6 °C)   TempSrc:  Oral Axillary Oral   SpO2:  99% 97% 95%   Weight: 91.2 kg (201 lb)      Height: 160 cm (63\")           General Appearance:    General: alert and oriented         Abdomen/:     soft non-tender, non-distended, voiding without difficulty       Extremities:   Operative extremity neurovascular status intact. ROM appropriate.  Incision intact w/out signs or symptoms of infection.  No cyanosis, calf is soft and nontender.  Ace bandage and cast padding removed.  CHANCE dressing clean dry and intact, battery functioning, no signs of drainage or active bleeding.     Activity: Mobilizing Per P.T.   Weight Bearing: As Tolerated    Diagnostic Tests:   Admission on 11/08/2022   Component Date Value Ref Range Status   • Hemoglobin 11/09/2022 12.6  12.0 - 15.9 g/dL Final   • Hematocrit 11/09/2022 36.7  34.0 - 46.6 % Final       Imaging Results (Last 72 Hours)     Procedure Component Value Units Date/Time    XR Knee 1 or 2 View Right [[id number]] Collected: 11/08/22 1600     Updated: 11/08/22 1603    Narrative:      TWO-VIEW PORTABLE RIGHT KNEE     HISTORY: Knee replacement for osteoarthritis     FINDINGS: The patient has had recent total knee replacement and the  alignment appears satisfactory.     This report was finalized on 11/8/2022 4:00 PM by Dr. Eloy Mackenzie M.D.             Personally viewed ortho images and report     Assessment:  - Doing well 1 Day Post-Op following total joint " replacement  - Acute Blood Loss Anemia, postoperative hemoglobin 12.6, preoperative hemoglobin 12.8 - stable  - Post-operative Pain  - Limited mobility, requires use of walker and assistance when OOB.    Patient Active Problem List   Diagnosis   • Anxiety   • Benign essential hypertension   • Contact dermatitis   • Dyslipidemia   • Gastroesophageal reflux disease   • Hypothyroidism   • Insomnia   • Pain of lower extremity   • Multiple sclerosis (HCC)   • Venous stasis   • Arthritis of right knee   • Arthritis of both knees   • Knee pain, right   • S/P right knee arthroscopy   • Arthritis of left knee   • Asthma   • Irritable bowel syndrome   • Periumbilical abdominal pain   • Epigastric pain   • PEARL (obstructive sleep apnea)   • Chronic pain of left knee   • History of total knee arthroplasty, left   • Diarrhea   • Nausea   • Colitis, Clostridium difficile   • Status post total left knee replacement   • Trochanteric bursitis of right hip   • Right hip pain   • Closed nondisplaced fracture of proximal phalanx of lesser toe of right foot   • Arthritis of first metatarsophalangeal (MTP) joint of right foot   • Arthritis of foot   • Neck pain   • Spinal stenosis in cervical region   • Essential tremor        Plan:    - Consults: none  - Continue to monitor labs and/or v/s, for tolerance to post op blood loss.  - Continue efforts to increase mobilization.  - Continue Pain Control Measures.  - Continue incisional Care.  - DVT prophylaxis - aspirin  - Follow up in office with Artur Pat M.D. In 2 weeks.    Discharge Plan:today to home, home health and when cleared by physical therapy as safe for discharge    Date: 11/9/2022  ROBERTA Gomez

## 2022-11-09 NOTE — PROGRESS NOTES
Continued Stay Note  [facility]     Patient Name: Mahnaz Becerra  MRN: [id number]  Today's Date: 11/9/2022    Admit Date: 11/8/2022    Plan: [facility]   Discharge Plan     Row Name 11/09/22 1123       Plan    Plan [facility]    Patient/Family in Agreement with Plan yes    Plan Comments Spoke with pt, verified correct information on facesheet and explained the role of CCP. Pt would like to d/c home with [facility], referral sent in [facility] to [facility]. Plan will be to d/c home with [facility] and family support. No other needs identified.    Final Discharge Disposition Code 06 - home with home health care    Final Note [facility]               Discharge Codes    No documentation.               Expected Discharge Date and Time     Expected Discharge Date Expected Discharge Time    Nov 9, 2022             Rupali Chan RN

## 2022-11-09 NOTE — PLAN OF CARE
Goal Outcome Evaluation:  Plan of Care Reviewed With: patient        Progress: improving  Outcome Evaluation: Pt remains stable. Will be evaluated by PT later today. Able to ambulate with tech with walker use and 1 assist. Voiding per BRP without difficulty. No c/o pain. CHANCE is cdi. Remains on 2L O2 through the night. Educated on htn management. Plans to dc home today.

## 2022-11-09 NOTE — PLAN OF CARE
Goal Outcome Evaluation:PT POD1 Right total knee, VSS, afebrile, CHANCE intact blinking green, pain controlled with po pain medication, worked well with therapy today, up to BR voiding well, DC instructions reviewed with pt and spouse, All questions answered. Pt educated on use of IS r/t Asthma dx. Pt taken via WC with all belongings to DC exit.

## 2022-11-09 NOTE — PLAN OF CARE
Goal Outcome Evaluation:  Plan of Care Reviewed With: patient           Outcome Evaluation: 76yo white female admitted 11/8/22 due to R knee OA; now s/p R TKA 11/8/22. PMH includes MS, anxiety, hbp, L TKA, asthma, IBS. PLOF: Pt lives at home with 0 MAINOR and only steps inside are to access her 4 poster bed. She was independent with ADLs and IADLs including driving. Today, she was resting in bed in NAD, pleasant and cooperative. Pt was independent with bed mobility and SBA to stand from EOB. Pt amb >400' with r wx and SBA - slow pace but good balance and endurance. Pt declined stairs since she only has 2 steps to get into bed and she can sleep in recliner for a few nights prn. Pt returned to recliner and instructed in AP, QS, LAQ. Pt is safe to return home with  and home health services.

## 2022-11-09 NOTE — PROGRESS NOTES
[facility] to provide home health PT after D/C.  Spoke with patient and .  Used our services in 2018.  No recent HH voiced.  Is happy for our services again, all info confirmed and is correct.  Order is in epic.

## 2022-11-09 NOTE — DISCHARGE SUMMARY
Orthopedic Discharge Summary      Patient: Mahnaz Becerra  YOB: 1945  Medical Record Number: [id number]    Attending Physician: Artur Pat MD  Consulting Physician(s):   Consults     No orders found for last 30 day(s).          Date of Admission: 11/8/2022  9:19 AM  Date of Discharge: 11/9/2022    Discharge Diagnosis: [place] TOTAL KNEE ARTHROPLASTY [54407] (RIGHT TOTAL KNEE ARTHROPLASTY WITH MARGRET NAVIGATION),   Acute Blood Loss Anemia, stable  Post-operative Pain  Limited mobility, requires use of walker and assistance when OOB.    Presenting Problem/History of Present Illness: Arthritis of right knee [M17.11]    Allergies:   Allergies   Allergen Reactions   • Flagyl [Metronidazole] Hives and Swelling     Lips and Tongue     • Levofloxacin Swelling and Rash     RED RASH   • Lansoprazole Itching and Other (See Comments)     AND TURN RED   • Cefdinir GI Intolerance     Abdominal pain, caused upset stomach   • Trazodone And Nefazodone Myalgia     Severe muscle cramps       Discharge Medications       Discharge Medications      New Medications      Instructions Start Date   aspirin 81 MG EC tablet   81 mg, Oral, Every 12 Hours Scheduled      docusate sodium 100 MG capsule   100 mg, Oral, 2 Times Daily      HYDROcodone-acetaminophen 7.5-325 MG per tablet  Commonly known as: NORCO   Take 1-2 tablets PO every 6 hours as needed for severe pain      ondansetron 4 MG tablet  Commonly known as: ZOFRAN   4 mg, Oral, Every 6 Hours PRN      polyethylene glycol 17 g packet  Commonly known as: MIRALAX   17 g, Oral, Daily         Changes to Medications      Instructions Start Date   pantoprazole 40 MG EC tablet  Commonly known as: PROTONIX  What changed: when to take this   TAKE 1 TABLET BY MOUTH TWICE DAILY         Continue These Medications      Instructions Start Date   albuterol sulfate  (90 Base) MCG/ACT inhaler  Commonly known as: Ventolin HFA   2 puffs, Inhalation, Every 6 Hours PRN       cyclobenzaprine 10 MG tablet  Commonly known as: FLEXERIL   10 mg, Oral, 2 Times Daily PRN      dicyclomine 20 MG tablet  Commonly known as: BENTYL   20 mg, Oral, As Needed      famotidine 40 MG tablet  Commonly known as: PEPCID   40 mg, Oral, Nightly PRN      hydroCHLOROthiazide 25 MG tablet  Commonly known as: HYDRODIURIL   25 mg, Oral, Daily      sucralfate 1 g tablet  Commonly known as: CARAFATE   1 g, Oral, 3 Times Daily PRN         Stop These Medications    multivitamin with minerals tablet tablet     traMADol 50 MG tablet  Commonly known as: ULTRAM     vitamin E 400 UNIT capsule              Past Medical History:   Diagnosis Date   • Acid reflux    • Anesthesia complication     ANESTHESIA HAS BROUGHT ON ASTHMA ATTACKS    • Asthma    • Bronchitis    • [place]     FREQUENT   • Edema     LOWER EXT   • Gastritis    • Heart murmur    • History of Clostridioides difficile infection    • History of skin cancer     BACK   • IBS (irritable bowel syndrome)    • Knee pain, right    • MS (multiple sclerosis) (HCC)    • Osteoarthritis    • PONV (postoperative nausea and vomiting)     Patient states she had post-op nausea and vomiting after hysterectomy in .   • Sleep apnea     CANT TOLERATE CPAP   • Tremor     RIGHT ARM        Past Surgical History:   Procedure Laterality Date   • APPENDECTOMY     • BREAST LUMPECTOMY Bilateral    • BREAST SURGERY      REDUCTION   •  SECTION     • CHOLECYSTECTOMY     • COLONOSCOPY  2012    Dr Moe   • COLONOSCOPY N/A 2020    Procedure: COLONOSCOPY TO CECUM AND TERM. ILEUM WITH BIOPSIES;  Surgeon: Inder Pat MD;  Location: [facility] ENDOSCOPY;  Service: Gastroenterology;  Laterality: N/A;  PRE OP - CHANGE IN BOWEL HABITS, DIARRHEA  POST OP - DIVERTICULOSIS   • COSMETIC SURGERY     • DILATATION AND CURETTAGE     • ENDOSCOPY N/A 2016    Procedure: ESOPHAGOGASTRODUODENOSCOPY WITH BX;  Surgeon: Nasim Moe MD;  Location: [facility]  ENDOSCOPY;  Service:    • ENDOSCOPY N/A 2020    Procedure: ESOPHAGOGASTRODUODENOSCOPY WITH DUODENAL ASPIRATE, POLYPECTOMY AND BIOPSIES;  Surgeon: Inder Pat MD;  Location: [facility] ENDOSCOPY;  Service: Gastroenterology;  Laterality: N/A;  PRE OP - GERD  POST OP - GASTRIC POLYP, GASTRITIS   • ENDOSCOPY W/ PEG REMOVAL  2012    Dr Moe   • EYE SURGERY Bilateral     BLEPHAROPLASTY   • HYSTERECTOMY     • KNEE ARTHROSCOPY Right 2016    Procedure: KNEE ARTHROSCOPY, PARTIAL MEDIAL AND LATERAL MENISECTOMY, CHONDROPLASTY OF THE PATELLA, REMOVAL OF LOOSE BODIES;  Surgeon: Artur Pat MD;  Location:  [facility];  Service:    • KNEE ARTHROSCOPY W/ MENISCAL REPAIR Left    • OOPHORECTOMY Bilateral    • TONSILLECTOMY     • TOTAL KNEE ARTHROPLASTY Left 2018    Procedure: LEFT TOTAL KNEE ARTHROPLASTY WITH MARGRET NAVIGATION;  Surgeon: Artur Pat MD;  Location: [facility] MAIN OR;  Service:    • TOTAL KNEE ARTHROPLASTY Right 2022    Procedure: RIGHT TOTAL KNEE ARTHROPLASTY WITH MARGRET NAVIGATION;  Surgeon: Artur Pat MD;  Location: [facility];  Service: Orthopedics;  Laterality: Right;        Social History     Occupational History   • Not on file   Tobacco Use   • Smoking status: Former     Packs/day: 0.25     Types: Cigarettes     Quit date:      Years since quittin.8   • Smokeless tobacco: Never   • Tobacco comments:     Patient states she was a social smoker.   Vaping Use   • Vaping Use: Never used   Substance and Sexual Activity   • Alcohol use: Yes     Comment: Occasional   • Drug use: No   • Sexual activity: Defer      Social History     Social History Narrative   • Not on file        Family History   Problem Relation Age of Onset   • Hypertension Mother    • Stroke Mother    • Alzheimer's disease Mother    • Heart disease Father    • Emphysema Father    • Stroke Maternal Grandmother    • Cancer Maternal Grandfather    • No Known Problems Paternal Grandmother   "  • Cerebral aneurysm Paternal Grandfather    • Malig Hyperthermia Neg Hx          Physical Exam: 77 y.o. female   Body mass index is 35.61 kg/m².  Facility age limit for growth percentiles is 20 years.  Vitals:    11/09/22 0513   BP: 110/64   Pulse: 61   Resp: 16   Temp: 97.9 °F (36.6 °C)   SpO2: 95%         General Appearance:    Alert, cooperative, in no acute distress                      Vitals:    11/08/22 2056 11/08/22 2207 11/09/22 0148 11/09/22 0513   BP:  123/64 99/64 110/64   BP Location:  Right arm Right arm Right arm   Patient Position:  Lying Lying Lying   Pulse:  56 53 61   Resp:  16 16 16   Temp:  98.6 °F (37 °C) 97.8 °F (36.6 °C) 97.9 °F (36.6 °C)   TempSrc:  Oral Axillary Oral   SpO2:  99% 97% 95%   Weight: 91.2 kg (201 lb)      Height: 160 cm (63\")           DIAGNOSTIC TESTS:   Admission on 11/08/2022   Component Date Value Ref Range Status   • Hemoglobin 11/09/2022 12.6  12.0 - 15.9 g/dL Final   • Hematocrit 11/09/2022 36.7  34.0 - 46.6 % Final       Imaging Results (Last 72 Hours)     Procedure Component Value Units Date/Time    XR Knee 1 or 2 View Right [[id number]] Collected: 11/08/22 1600     Updated: 11/08/22 1603    Narrative:      TWO-VIEW PORTABLE RIGHT KNEE     HISTORY: Knee replacement for osteoarthritis     FINDINGS: The patient has had recent total knee replacement and the  alignment appears satisfactory.     This report was finalized on 11/8/2022 4:00 PM by Dr. Eloy Mackenzie M.D.             Hospital Course:  77 y.o. female admitted to [facility] [place], operated by [facility] to services of Artur Pat MD with Arthritis of right knee [M17.11] on 11/8/2022 and underwent [place] TOTAL KNEE ARTHROPLASTY [38632] (RIGHT TOTAL KNEE ARTHROPLASTY WITH MARGRET NAVIGATION)  Per Artur Pat MD. Antibiotic and VTE prophylaxis were per [facility]IP protocols. Post-operatively the patient transferred to the post-operative floor where the patient underwent mobilization therapy that included active as well as passive ROM " exercises. Opioids were titrated to achieve appropriate pain management to allow for participation in mobilization exercises. Vital signs are now stable. On the day of discharge the wound was clean, dry and intact and calf was soft and nontender and Homans sign was negative. Operative extremity neurovascular status remains intact.   Appropriate education re: incision care, activity levels, medications, and follow up visits was completed and all questions were answered. The patient is now deemed stable for discharge.    Condition on Discharge:  Stable    Total Joint Replacement Discharge Instructions: Patient is to continue with physical therapy exercises twice daily and continue working with the physical therapist as ordered  and should be up walking every 2 hours during the day in addition to the physical therapy exercises. Patient may weight bear as tolerated unless otherwise specified. Continue to ice regularly. Do frequent ankle pumping exercises while you are sitting with legs elevated.  Patient also instructed on deep breathing, coughing, and using  incentive spirometer during hospitalization and encouraged to continue to use at home regularly.        VI. FOLLOW-UP VISITS:  ? Follow up in the office with Dr Artur Pat in 2 weeks - If already scheduled (see Future Appointments) for date and time, if not yet scheduled, patient to call the office at [phone] to schedule. Prescriptions were given for pain medication, nausea, constipation, and blood thinner therapy.    ? If you have any concerns or suspected complications prior to your follow up visit, please call your surgeon's office. Do not wait until your appointment time if you suspect complications. These will need to be addressed in the office promptly.      Future Appointments   Date Time Provider Department Center   11/22/2022  9:30 AM Varsha Grider APRN [facility] [address] [facility]     Additional Instructions for the Follow-ups that You Need to Schedule      Ambulatory Referral to Home Health   As directed      Face to Face Visit Date: 11/9/2022    Follow-up provider for Plan of Care?: I will be treating the patient on an ongoing basis.  Please send me the Plan of Care for signature.    Follow-up provider: ARTUR MONZON [[id number]]    Reason/Clinical Findings: post surgical    Describe mobility limitations that make leaving home difficult: Requires the assistance of another to leave home    Nursing/Therapeutic Services Requested: Physical Therapy    PT orders: Total joint pathway    Frequency: 1 Week 1         Discharge Follow-up with Specialty: Orthopedics; 2 Weeks   As directed      Specialty: Orthopedics    Follow Up: 2 Weeks    Follow Up Details: Return to the office to see Dr. Artur Monzon               Discharge Disposition Plan:today to home, home health and when cleared by physical therapy as safe for discharge    Date: 11/9/2022    ROBERTA Gomez    Dictated Utilizing Dragon Dictation

## 2022-11-10 ENCOUNTER — TELEPHONE (OUTPATIENT)
Dept: ORTHOPEDIC SURGERY | Facility: CLINIC | Age: 77
End: 2022-11-10

## 2022-11-10 NOTE — TELEPHONE ENCOUNTER
Caller: PATIENT    Relationship to patient: SELF     Best call back number: [phone]    Patient is needing:  PATIENT WAS CALLING TO SPEAK TO [facility] REGARDING HER PICCO BUZZING. PATIENT STATED THE MACHINE IS FOR HER WOUND DUE TO HAVING RECENT SURGERY WITH DR. MONZON ON 11.08.22. PATIENT STATED SHE WAS ADVISED TO CALL [facility]  WITH ANY ISSUES. I ATTEMPTED TO WARM TRANSFER CALL TO THE OFFICE BUT RECEIVED NO ANSWER. THANK YOU!

## 2022-11-10 NOTE — TELEPHONE ENCOUNTER
Call returned to the patient.  She was having some problems with her dominique sitting buzzing.  The light was still flashing green.  Her dressing remains dry and intact.  She did have the battery pack in her pajama pants pocket and may have gotten the tube kinked.  She took the battery out and laid on the table next to her chair and the buzzing stopped.  Have advised her that the battery pack is only good for 5 to 7 days and if it does continue to buzz or if she has problems with it as long as her dressing is dry and intact would recommend discontinuing the battery but she needs to leave her dressing on until she follows up in the office with Dr. Pat.  I have left it open for her to call if she has any other questions or concerns

## 2022-11-11 ENCOUNTER — HOME CARE VISIT (OUTPATIENT)
Dept: HOME HEALTH SERVICES | Facility: HOME HEALTHCARE | Age: 77
End: 2022-11-11

## 2022-11-11 ENCOUNTER — TELEPHONE (OUTPATIENT)
Dept: ORTHOPEDIC SURGERY | Facility: CLINIC | Age: 77
End: 2022-11-11

## 2022-11-11 PROCEDURE — G0151 HHCP-SERV OF PT,EA 15 MIN: HCPCS

## 2022-11-11 NOTE — TELEPHONE ENCOUNTER
Call returned to the patient.  She was little frustrated because she has not heard anything from home health.  States that somebody finally called her this morning to set up an appointment.  Patient had contacted home health several times over the last day or 2 and unfortunately was not successful in getting a straight answer.  I will have [facility] contact the patient to discuss the situation

## 2022-11-11 NOTE — TELEPHONE ENCOUNTER
Caller: PATIENT    Relationship to patient: SELF     Best call back number: [phone]    Patient is needing: PATIENT WAS CALLING TO TALK TO YOUR OFFICE REGARDING AN ISSUE WITH  HOME HEALTH NOT COMING TO HER HOUSE LIKE EXPECTED. PATIENT HAS BEEN HAVING A FEW DIFFERENT ISSUES WITH  HOME HEALTH AND SHE IS WANTING TO GET THIS ISSUE RESOLVED. I ATTEMPTED TO WARM TRANSFER CALL TO THE OFFICE BUT RECEIVED NO ANSWER. THANK YOU!

## 2022-11-12 NOTE — HOME HEALTH
76 y/o female presents with PT Home Health referral for post op management right total knee arthroplasty on 11/08/22. She was discharged on 11/09/22. Patient denies post op complications but notes her pain level has gone up since she has been home. Patient also notes she has signficant history for MS and her UE weakness has made transfers difficult at home. She has a high bed so has been sleeping in a recliner chair. She had to call her son yesterday to help her out of the recliner.   PMH:   • Acid reflux    • Anesthesia complication     ANESTHESIA HAS BROUGHT ON ASTHMA ATTACKS     • Asthma    • Bronchitis    • Charleyhorse  FREQUENT    • Edema  LOWER EXT    • Gastritis    • Heart murmur    • History of Clostridioides difficile infection    • History of skin cancer     BACK    • IBS (irritable bowel syndrome)    • Knee pain, right    • MS (multiple sclerosis)    • Osteoarthritis    • PONV (postoperative nausea and vomiting)     Patient states she had post-op nausea and vomiting after hysterectomy in 1987.    • Sleep apnea  CANT TOLERATE CPAP    • Tremor  RIGHT ARM    Past Surgical History: She had a left total knee arthroplasty in Feb 2018.     Previous Level of Function: Patient was independent with all ADLs including driving and was ambulating without an assistive device most of the time but did use a cane intermittently. Patient has been going to outpatient at [facility] 2x week prior to surgery to work on strength and balance. As previously nooted, she does have history of MS with more weakness on the rt upper and lower extremity. Patient is active with [employer] and [employer].     Social: Patient lives in a ranch home with her supportive spouse. She has one wide step to enter the rear or front of home. No basement. She has a high bed and uses a 2 step stool to get in and out of bed. Pt has a tub shower, hand hold shower head, and shower chair  with no grab bar. BSC over toilet.     Skilled Physical Therapy is  indicated for instruction and progression of a home exercise program, pain/edema management education, medication management, monitor vitals, bed mobility, transfer training, balance, energy conservation, wound care, and gait training to maximize independence with ADLs and improve home safety. Project 1week1, 3week1, 1 week 1.    Appointments: Patient has a follow with ortho and is scheduled to start outpatient at [facility] on 11/22/22.    Plan for next visit: Continue education for pain/edema management, transfer training, review supine protocol with progression of reps as tolerated, manual ROM rt knee, bed mobility training, and gait training.

## 2022-11-14 ENCOUNTER — HOME CARE VISIT (OUTPATIENT)
Dept: HOME HEALTH SERVICES | Facility: HOME HEALTHCARE | Age: 77
End: 2022-11-14

## 2022-11-14 VITALS
DIASTOLIC BLOOD PRESSURE: 76 MMHG | HEART RATE: 66 BPM | TEMPERATURE: 98 F | SYSTOLIC BLOOD PRESSURE: 110 MMHG | RESPIRATION RATE: 18 BRPM | OXYGEN SATURATION: 94 %

## 2022-11-14 PROCEDURE — G0151 HHCP-SERV OF PT,EA 15 MIN: HCPCS

## 2022-11-15 VITALS
HEART RATE: 63 BPM | SYSTOLIC BLOOD PRESSURE: 134 MMHG | TEMPERATURE: 97.8 F | DIASTOLIC BLOOD PRESSURE: 80 MMHG | OXYGEN SATURATION: 94 %

## 2022-11-15 NOTE — HOME HEALTH
When I arrived, patient anxious and worried about her right knee/quad because she was unable to lift it up on her own.  She denied any instability in standing and even stated she can put full weight on the right leg without feeling unstable. After assessing, the right quad is able to contract and she is lifting the right leg with just incidental support.  She stated she felt better after the session about her right leg strength.  Her CHANCE dressing is clean and intact.  The pump was buzzing intermittently, but still operating correctly.  The pump can be removed tomorrow.      Plan for next visit  Progress her right knee ROM as tolerated.     Progress to standing exercises.  Progress gait distance

## 2022-11-16 ENCOUNTER — TELEPHONE (OUTPATIENT)
Dept: ORTHOPEDIC SURGERY | Facility: CLINIC | Age: 77
End: 2022-11-16

## 2022-11-16 ENCOUNTER — HOSPITAL ENCOUNTER (OUTPATIENT)
Dept: CARDIOLOGY | Facility: HOSPITAL | Age: 77
Discharge: HOME | End: 2022-11-16
Admitting: NURSE PRACTITIONER

## 2022-11-16 ENCOUNTER — HOME CARE VISIT (OUTPATIENT)
Dept: HOME HEALTH SERVICES | Facility: HOME HEALTHCARE | Age: 77
End: 2022-11-16

## 2022-11-16 DIAGNOSIS — Z96.651 S/P TKR (TOTAL KNEE REPLACEMENT), RIGHT: ICD-10-CM

## 2022-11-16 DIAGNOSIS — M79.661 PAIN AND SWELLING OF RIGHT LOWER LEG: ICD-10-CM

## 2022-11-16 DIAGNOSIS — M79.661 PAIN AND SWELLING OF RIGHT LOWER LEG: Primary | ICD-10-CM

## 2022-11-16 DIAGNOSIS — M79.89 PAIN AND SWELLING OF RIGHT LOWER LEG: Primary | ICD-10-CM

## 2022-11-16 DIAGNOSIS — M79.89 PAIN AND SWELLING OF RIGHT LOWER LEG: ICD-10-CM

## 2022-11-16 LAB
BH CV LOWER VASCULAR LEFT COMMON FEMORAL AUGMENT: NORMAL
BH CV LOWER VASCULAR LEFT COMMON FEMORAL COMPETENT: NORMAL
BH CV LOWER VASCULAR LEFT COMMON FEMORAL COMPRESS: NORMAL
BH CV LOWER VASCULAR LEFT COMMON FEMORAL PHASIC: NORMAL
BH CV LOWER VASCULAR LEFT COMMON FEMORAL SPONT: NORMAL
BH CV LOWER VASCULAR RIGHT COMMON FEMORAL AUGMENT: NORMAL
BH CV LOWER VASCULAR RIGHT COMMON FEMORAL COMPETENT: NORMAL
BH CV LOWER VASCULAR RIGHT COMMON FEMORAL COMPRESS: NORMAL
BH CV LOWER VASCULAR RIGHT COMMON FEMORAL PHASIC: NORMAL
BH CV LOWER VASCULAR RIGHT COMMON FEMORAL SPONT: NORMAL
BH CV LOWER VASCULAR RIGHT DISTAL FEMORAL COMPRESS: NORMAL
BH CV LOWER VASCULAR RIGHT GASTRONEMIUS COMPRESS: NORMAL
BH CV LOWER VASCULAR RIGHT GREATER SAPH AK COMPRESS: NORMAL
BH CV LOWER VASCULAR RIGHT GREATER SAPH BK COMPRESS: NORMAL
BH CV LOWER VASCULAR RIGHT LESSER SAPH COMPRESS: NORMAL
BH CV LOWER VASCULAR RIGHT MID FEMORAL AUGMENT: NORMAL
BH CV LOWER VASCULAR RIGHT MID FEMORAL COMPETENT: NORMAL
BH CV LOWER VASCULAR RIGHT MID FEMORAL COMPRESS: NORMAL
BH CV LOWER VASCULAR RIGHT MID FEMORAL PHASIC: NORMAL
BH CV LOWER VASCULAR RIGHT MID FEMORAL SPONT: NORMAL
BH CV LOWER VASCULAR RIGHT PERONEAL COMPRESS: NORMAL
BH CV LOWER VASCULAR RIGHT POPLITEAL AUGMENT: NORMAL
BH CV LOWER VASCULAR RIGHT POPLITEAL COMPETENT: NORMAL
BH CV LOWER VASCULAR RIGHT POPLITEAL COMPRESS: NORMAL
BH CV LOWER VASCULAR RIGHT POPLITEAL PHASIC: NORMAL
BH CV LOWER VASCULAR RIGHT POPLITEAL SPONT: NORMAL
BH CV LOWER VASCULAR RIGHT POSTERIOR TIBIAL COMPRESS: NORMAL
BH CV LOWER VASCULAR RIGHT PROFUNDA FEMORAL COMPRESS: NORMAL
BH CV LOWER VASCULAR RIGHT PROXIMAL FEMORAL COMPRESS: NORMAL
BH CV LOWER VASCULAR RIGHT SAPHENOFEMORAL JUNCTION COMPRESS: NORMAL
BH CV VAS POP FLUID COLLECTED: 1
MAXIMAL PREDICTED HEART RATE: 145 BPM
STRESS TARGET HR: 123 BPM

## 2022-11-16 PROCEDURE — 93971 EXTREMITY STUDY: CPT

## 2022-11-16 PROCEDURE — G0151 HHCP-SERV OF PT,EA 15 MIN: HCPCS

## 2022-11-16 RX ORDER — HYDROCODONE BITARTRATE AND ACETAMINOPHEN 7.5; 325 MG/1; MG/1
TABLET ORAL
Qty: 40 TABLET | Refills: 0 | OUTPATIENT
Start: 2022-11-16 | End: 2022-11-20

## 2022-11-16 NOTE — TELEPHONE ENCOUNTER
Caller: DAVID LR    Relationship: SELF    Best call back number:   Requested Prescriptions:   Requested Prescriptions     Pending Prescriptions Disp Refills   • HYDROcodone-acetaminophen (NORCO) 7.5-325 MG per tablet 40 tablet 0     Sig: Take 1-2 tablets PO every 6 hours as needed for severe pain        Pharmacy where request should be sent:  [facility]    Additional details provided by patient:OUT    Does the patient have less than a 3 day supply:  [x] Yes  [] No    Melodie Reed Rep   11/16/22 11:27 EST

## 2022-11-16 NOTE — TELEPHONE ENCOUNTER
HYDROcodone-acetaminophen (NORCO) 7.5-325 MG per tablet 40 tablet 0      Sig: Take 1-2 tablets PO every 6 hours as needed for severe pain        Pharmacy where request should be sent:  [facility]    Last fill: 11/09/2022  Last office visit: 11/01/2022

## 2022-11-16 NOTE — TELEPHONE ENCOUNTER
Yes the ice could be irritating due to the MS, she can do it for short bursts if that is more comfortable for her. Even doing in a few minutes every couple of hours can help with the swelling. Thank you!

## 2022-11-16 NOTE — TELEPHONE ENCOUNTER
Patient states the dominique dressing feels tight to the skin. PT has disconnected the dressing. Patient says the the knee is swollen every night. Patient states she is icing the leg as well as elevating the leg. Patient states she cant with stand that for only five minutes. Patient thinks it could be from the MS that she is experiencing.

## 2022-11-17 ENCOUNTER — TELEPHONE (OUTPATIENT)
Dept: ORTHOPEDIC SURGERY | Facility: CLINIC | Age: 77
End: 2022-11-17

## 2022-11-17 VITALS
TEMPERATURE: 98.4 F | HEART RATE: 67 BPM | OXYGEN SATURATION: 98 % | SYSTOLIC BLOOD PRESSURE: 128 MMHG | DIASTOLIC BLOOD PRESSURE: 80 MMHG

## 2022-11-17 DIAGNOSIS — Z96.651 S/P TKR (TOTAL KNEE REPLACEMENT), RIGHT: Primary | ICD-10-CM

## 2022-11-17 RX ORDER — OXYCODONE HYDROCHLORIDE AND ACETAMINOPHEN 5; 325 MG/1; MG/1
TABLET ORAL
Qty: 42 TABLET | Refills: 0 | Status: SHIPPED | OUTPATIENT
Start: 2022-11-17 | End: 2023-03-28

## 2022-11-17 NOTE — TELEPHONE ENCOUNTER
Caller: NATHANAEL LR     Relationship to patient:      Best call back number: [phone]    Patient is needing: PATIENT HAS A RASH THAT IS ITCHING AND BURNING ON THE SIDE ON THE KNEE AND THE PATIENT IS IN SEVERE PAIN- UNABLE TO WARM TRANSFER

## 2022-11-17 NOTE — HOME HEALTH
Patient states a lot of pain since Monday and having hard time sleeping.  She and her  report a big increase in right LE swelling yesterday, but some better today.  Her swelling did look increased, but her calf was soft and non-tender with no redness noted.  She had no increase in pain with weight bearing (actually, walking decreased her overall knee pain).  Her dressing is intact and clean.     Plan for next visit  Amend HEP based on her pain/tolerance  Pain/edema teaching as needed\  progress gait  Progress right knee ROM as tolerated.

## 2022-11-17 NOTE — TELEPHONE ENCOUNTER
Called and spoke with the patient. Instructed her we sent a new prescription for pain medication to her pharmacy, it will be for percocet which she had after her last knee replacement in 2018. I also instructed her to try benadryl or Claritin/zyrtec if benadryl makes her to sleepy. She knows to be cautious to avoid falls if sleepy due to medications.    I told her I want to home PT to remove the dressing tomorrow to see how her skin is, and see if she is reacting to the bandage. Then to re dress appropriately. Maybe an island dressing will be less irritating to the skin.     She verbalized understanding and is in agreement with this plan. She will let us know if she has any fevers, chills, or additional issues.

## 2022-11-18 ENCOUNTER — HOME CARE VISIT (OUTPATIENT)
Dept: HOME HEALTH SERVICES | Facility: HOME HEALTHCARE | Age: 77
End: 2022-11-18

## 2022-11-18 PROCEDURE — G0151 HHCP-SERV OF PT,EA 15 MIN: HCPCS

## 2022-11-20 ENCOUNTER — HOSPITAL ENCOUNTER (EMERGENCY)
Facility: HOSPITAL | Age: 77
Discharge: HOME | End: 2022-11-20
Attending: EMERGENCY MEDICINE | Admitting: EMERGENCY MEDICINE

## 2022-11-20 VITALS
RESPIRATION RATE: 16 BRPM | TEMPERATURE: 98.9 F | HEART RATE: 66 BPM | OXYGEN SATURATION: 96 % | DIASTOLIC BLOOD PRESSURE: 62 MMHG | SYSTOLIC BLOOD PRESSURE: 125 MMHG

## 2022-11-20 DIAGNOSIS — L27.0 DRUG RASH: Primary | ICD-10-CM

## 2022-11-20 PROCEDURE — 25010000002 METHYLPREDNISOLONE PER 125 MG: Performed by: EMERGENCY MEDICINE

## 2022-11-20 PROCEDURE — 99283 EMERGENCY DEPT VISIT LOW MDM: CPT

## 2022-11-20 PROCEDURE — 96374 THER/PROPH/DIAG INJ IV PUSH: CPT

## 2022-11-20 PROCEDURE — 96375 TX/PRO/DX INJ NEW DRUG ADDON: CPT

## 2022-11-20 PROCEDURE — 25010000002 DIPHENHYDRAMINE PER 50 MG: Performed by: EMERGENCY MEDICINE

## 2022-11-20 RX ORDER — DIPHENHYDRAMINE HYDROCHLORIDE 50 MG/ML
25 INJECTION INTRAMUSCULAR; INTRAVENOUS ONCE
Status: COMPLETED | OUTPATIENT
Start: 2022-11-20 | End: 2022-11-20

## 2022-11-20 RX ORDER — METHYLPREDNISOLONE SODIUM SUCCINATE 125 MG/2ML
125 INJECTION, POWDER, LYOPHILIZED, FOR SOLUTION INTRAMUSCULAR; INTRAVENOUS ONCE
Status: COMPLETED | OUTPATIENT
Start: 2022-11-20 | End: 2022-11-20

## 2022-11-20 RX ORDER — OXYCODONE HYDROCHLORIDE AND ACETAMINOPHEN 5; 325 MG/1; MG/1
2 TABLET ORAL ONCE
Status: COMPLETED | OUTPATIENT
Start: 2022-11-20 | End: 2022-11-20

## 2022-11-20 RX ORDER — METHYLPREDNISOLONE 4 MG/1
TABLET ORAL
Qty: 1 EACH | Refills: 0 | Status: SHIPPED | OUTPATIENT
Start: 2022-11-20

## 2022-11-20 RX ORDER — SODIUM CHLORIDE 0.9 % (FLUSH) 0.9 %
10 SYRINGE (ML) INJECTION AS NEEDED
Status: DISCONTINUED | OUTPATIENT
Start: 2022-11-20 | End: 2022-11-20 | Stop reason: HOSPADM

## 2022-11-20 RX ADMIN — DIPHENHYDRAMINE HYDROCHLORIDE 25 MG: 50 INJECTION, SOLUTION INTRAMUSCULAR; INTRAVENOUS at 16:45

## 2022-11-20 RX ADMIN — OXYCODONE AND ACETAMINOPHEN 2 TABLET: 5; 325 TABLET ORAL at 17:06

## 2022-11-20 RX ADMIN — METHYLPREDNISOLONE SODIUM SUCCINATE 125 MG: 125 INJECTION, POWDER, FOR SOLUTION INTRAMUSCULAR; INTRAVENOUS at 16:45

## 2022-11-20 NOTE — ED NOTES
Pt has a rash that developed all over her body a few days ago. It started on her right leg where she had knee replacement surgery, they thought it was due to the Band-Aid, they removed the Band-Aid. The rash has not improved and has spread to her stomach and chest.

## 2022-11-20 NOTE — ED PROVIDER NOTES
EMERGENCY DEPARTMENT ENCOUNTER    Room Number:  28/28  Date of encounter:  11/20/2022  PCP: Stephen Wilkes MD  Historian: Patient      HPI:  Chief Complaint: Rash    A complete HPI/ROS/PMH/PSH/SH/FH are unobtainable due to: N/A    Context: Mahnaz Becerra is a 77 y.o. female who presents to the ED c/o Diffuse erythematous pruritic rash which started several days ago.  She was placed on hydrocodone after recent knee replacement surgery which she has never taken before.  This is subsequently been switched to oxycodone which she has tolerated well in the past.  No recent antibiotics.  She is having a lot of pain in the knee-she tells me she had a negative Doppler ultrasound a few days ago.  No fevers or chills.  No cough or congestion.  No chest pain.    Duration: Several days ago  Onset: Sudden  Timing: Constant  Location: Generalized  Radiation: General  Quality: Itchy  Intensity/Severity: Moderate-severe  Progression: Worse today  Associated Symptoms: Right knee pain-unrevealing  Aggravating Factors: Movement  Alleviating Factors: Minimal relief with Benadryl  Previous Episodes: No  Treatment before arrival: Over-the-counter Benadryl    Summary of prior records: She has a history of hypertension, asthma, GERD, insomnia, multiple sclerosis, bilateral TKR (most recently right knee), sleep apnea, history of C. difficile.  She had a TKR in early November with Dr. Pat.    The patient was placed in a mask in triage, hand hygiene was performed before and after my interaction with the patient.  I wore a mask, safety glasses and gloves during my entire interaction with the patient.    PAST MEDICAL HISTORY  Active Ambulatory Problems     Diagnosis Date Noted   • Anxiety 03/18/2016   • Benign essential hypertension 03/18/2016   • Contact dermatitis 03/18/2016   • Dyslipidemia 03/18/2016   • Gastroesophageal reflux disease 03/18/2016   • Hypothyroidism 03/18/2016   • Insomnia 03/18/2016   • Pain of lower extremity  2016   • Multiple sclerosis (HCC) 2016   • Venous stasis 2016   • Arthritis of right knee 2016   • Arthritis of both knees 2016   • Knee pain, right 2016   • S/P right knee arthroscopy 2016   • Arthritis of left knee 2016   • Asthma 2013   • Irritable bowel syndrome 2013   • Periumbilical abdominal pain 2016   • Epigastric pain 2016   • PEARL (obstructive sleep apnea) 05/15/2017   • Chronic pain of left knee 2017   • History of total knee arthroplasty, left 2018   • Diarrhea 2018   • Nausea 2018   • Colitis, Clostridium difficile 2018   • Status post total left knee replacement 2018   • Trochanteric bursitis of right hip 2019   • Right hip pain 2019   • Closed nondisplaced fracture of proximal phalanx of lesser toe of right foot 2020   • Arthritis of first metatarsophalangeal (MTP) joint of right foot 2020   • Arthritis of foot 2020   • Neck pain 2021   • Spinal stenosis in cervical region 2021   • Essential tremor 2021     Resolved Ambulatory Problems     Diagnosis Date Noted   • Cellulitis 2016   • Hyperglycemia 2016     Past Medical History:   Diagnosis Date   • Acid reflux    • Anesthesia complication    • Bronchitis    • Charleyhorse    • Edema    • Gastritis    • Heart murmur    • History of Clostridioides difficile infection    • History of skin cancer    • IBS (irritable bowel syndrome)    • MS (multiple sclerosis) ([facility])    • Osteoarthritis    • PONV (postoperative nausea and vomiting)    • Sleep apnea    • Tremor          PAST SURGICAL HISTORY  Past Surgical History:   Procedure Laterality Date   • APPENDECTOMY     • BREAST LUMPECTOMY Bilateral    • BREAST SURGERY      REDUCTION   •  SECTION     • CHOLECYSTECTOMY     • COLONOSCOPY  2012    Dr Moe   • COLONOSCOPY N/A 2020    Procedure: COLONOSCOPY TO CECUM AND TERM.  ILEUM WITH BIOPSIES;  Surgeon: Inder Pat MD;  Location: [facility] ENDOSCOPY;  Service: Gastroenterology;  Laterality: N/A;  PRE OP - CHANGE IN BOWEL HABITS, DIARRHEA  POST OP - DIVERTICULOSIS   • COSMETIC SURGERY     • DILATATION AND CURETTAGE  1979   • ENDOSCOPY N/A 12/22/2016    Procedure: ESOPHAGOGASTRODUODENOSCOPY WITH BX;  Surgeon: Nasim Moe MD;  [facility];  Service:    • ENDOSCOPY N/A 9/29/2020    Procedure: ESOPHAGOGASTRODUODENOSCOPY WITH DUODENAL ASPIRATE, POLYPECTOMY AND BIOPSIES;  Surgeon: Inder Pat MD;  Location: [facility] ENDOSCOPY;  Service: Gastroenterology;  Laterality: N/A;  PRE OP - GERD  POST OP - GASTRIC POLYP, GASTRITIS   • ENDOSCOPY W/ PEG REMOVAL  09/20/2012    Dr Moe   • EYE SURGERY Bilateral     BLEPHAROPLASTY   • HYSTERECTOMY  1987   • KNEE ARTHROSCOPY Right 8/12/2016    Procedure: KNEE ARTHROSCOPY, PARTIAL MEDIAL AND LATERAL MENISECTOMY, CHONDROPLASTY OF THE PATELLA, REMOVAL OF LOOSE BODIES;  Surgeon: Artur Pat MD;  [place] [facility] [place] [facility]  Service:    • KNEE ARTHROSCOPY W/ MENISCAL REPAIR Left    • OOPHORECTOMY Bilateral 1997   • TONSILLECTOMY  1952   • TOTAL KNEE ARTHROPLASTY Left 2/7/2018    Procedure: LEFT TOTAL KNEE ARTHROPLASTY WITH MARGRET NAVIGATION;  Surgeon: Artur Pat MD;  Location: [facility] MAIN OR;  Service:    • TOTAL KNEE ARTHROPLASTY Right 11/8/2022    Procedure: RIGHT TOTAL KNEE ARTHROPLASTY WITH MARGRET NAVIGATION;  Surgeon: Artur Pat MD;  [place] [facility] [place] [facility];  Service: Orthopedics;  Laterality: Right;         FAMILY HISTORY  Family History   Problem Relation Age of Onset   • Hypertension Mother    • Stroke Mother    • Alzheimer's disease Mother    • Heart disease Father    • Emphysema Father    • Stroke Maternal Grandmother    • Cancer Maternal Grandfather    • No Known Problems Paternal Grandmother    • Cerebral aneurysm Paternal Grandfather    • Malig Hyperthermia Neg Hx          SOCIAL HISTORY  Social History      Socioeconomic History   • Marital status:    Tobacco Use   • Smoking status: Former     Packs/day: 0.25     Types: Cigarettes     Quit date:      Years since quittin.9   • Smokeless tobacco: Never   • Tobacco comments:     Patient states she was a social smoker.   Vaping Use   • Vaping Use: Never used   Substance and Sexual Activity   • Alcohol use: Yes     Comment: Occasional   • Drug use: No   • Sexual activity: Defer         ALLERGIES  Flagyl [metronidazole], Levofloxacin, Lansoprazole, Cefdinir, and Trazodone and nefazodone        REVIEW OF SYSTEMS  Review of Systems   Constitutional: Negative for chills and fever.   Cardiovascular: Positive for leg swelling. Negative for chest pain.   Gastrointestinal: Negative for abdominal pain, diarrhea, nausea and vomiting.   Musculoskeletal: Positive for gait problem and joint swelling.   Skin: Positive for rash.        All systems reviewed and negative except for those discussed in HPI.       PHYSICAL EXAM    I have reviewed the triage vital signs and nursing notes.    ED Triage Vitals [22 1422]   Temp Heart Rate Resp BP SpO2   98.9 °F (37.2 °C) 96 16 -- 97 %      Temp src Heart Rate Source Patient Position BP Location FiO2 (%)   Tympanic Monitor -- -- --       Physical Exam   Constitutional: Pt. is oriented to person, place, and time and well-developed, well-nourished, and in mild distress due to pain.   HENT: Normocephalic and atraumatic.   Neck: Normal range of motion. Neck supple. No JVD present.   Cardiovascular: Normal rate, regular rhythm and normal heart sounds. No murmur heard.  Pulmonary/Chest: Effort normal and breath sounds normal. No stridor. No respiratory distress. No wheezes, no rales.   Abdominal: Soft. Bowel sounds are normal. No distension. There is no tenderness. There is no rebound and no guarding.   Musculoskeletal: Right knee incision looks great.  There is some mild swelling and tenderness.  Remaining extremities exhibit  normal range of motion and are without tenderness or deformity.   Neurological: Pt. is alert and oriented to person, place, and time.  She has no focal neurologic deficits  Skin: There is a diffuse, urticarial pruritic rash.   Psychiatric: Mood, affect and judgment normal.  She is pleasant and cooperative.  Nursing note and vitals reviewed.        LAB RESULTS  No results found for this or any previous visit (from the past 24 hour(s)).    Ordered the above labs and independently reviewed the results.        RADIOLOGY  No Radiology Exams Resulted Within Past 24 Hours    I ordered the above noted radiological studies. Reviewed by me and discussed with radiologist.  See dictation for official radiology interpretation.      PROCEDURES    Procedures      MEDICATIONS GIVEN IN ER    Medications   sodium chloride 0.9 % flush 10 mL (has no administration in time range)   diphenhydrAMINE (BENADRYL) injection 25 mg (25 mg Intravenous Given 11/20/22 1645)   methylPREDNISolone sodium succinate (SOLU-Medrol) injection 125 mg (125 mg Intravenous Given 11/20/22 1645)   oxyCODONE-acetaminophen (PERCOCET) 5-325 MG per tablet 2 tablet (2 tablets Oral Given 11/20/22 1706)         PROGRESS, DATA ANALYSIS, CONSULTS, AND MEDICAL DECISION MAKING    Any/all labs have been independently reviewed by me.  Any/all radiology studies have been reviewed by me and discussed with radiologist dictating the report.   EKG's independently viewed and interpreted by me.  Discussion below represents my analysis of pertinent findings related to patient's condition, differential diagnosis, treatment plan and final disposition.    Number of Diagnoses or Management Options     Amount and/or Complexity of Data Reviewed  Clinical lab tests: Yes  Tests in the radiology section of CPT®: Yes  Tests in the medicine section of CPT®: Yes  Review and summarize past medical records:  (Yes - see HPI)  Independent visualization of images, tracings, or specimens: (Yes - see  below)      ED Course as of 11/20/22 1906   Sun Nov 20, 2022   1849 Rashes starting to improve.  She is a bit sleepy from the Benadryl, Percocet-will continue to monitor. [WC]      ED Course User Index  [WC] Ham Carlos MD       AS OF 19:06 EST VITALS:    BP - 125/62  HR - 59  TEMP - 98.9 °F (37.2 °C) (Tympanic)  02 SATS - 97%        DIAGNOSIS  Final diagnoses:   Drug rash         DISPOSITION  Discharged          Note Disclaimer: At [facility], we believe that sharing information builds trust and better relationships. You are receiving this note because you recently visited [facility]. It is possible you will see health information before a provider has talked with you about it. This kind of information can be easy to misunderstand. To help you fully understand what it means for your health, we urge you to discuss this note with your provider.\         Ham Carlos MD  11/20/22 1906

## 2022-11-20 NOTE — ED TRIAGE NOTES
Rash allover body started a few days ago.  She just had knee replacement surgery.  It was thought she was allergic to bandage so they took the bandage off but the rash continues.  She has been taking benadryl - last time 1 hour ago    Patient was placed in face mask during first look triage.  Patient was wearing a face mask throughout encounter.  I wore personal protective equipment throughout the encounter.  Hand hygiene was performed before and after patient encounter.

## 2022-11-21 ENCOUNTER — HOME CARE VISIT (OUTPATIENT)
Dept: HOME HEALTH SERVICES | Facility: HOME HEALTHCARE | Age: 77
End: 2022-11-21

## 2022-11-21 VITALS
DIASTOLIC BLOOD PRESSURE: 78 MMHG | TEMPERATURE: 98.7 F | HEART RATE: 80 BPM | SYSTOLIC BLOOD PRESSURE: 124 MMHG | OXYGEN SATURATION: 99 %

## 2022-11-21 PROCEDURE — G0151 HHCP-SERV OF PT,EA 15 MIN: HCPCS

## 2022-11-21 NOTE — DISCHARGE INSTRUCTIONS
In addition to the steroid that you have been prescribed, you may take 25 to 50 mg of Benadryl every 6 hours as needed for itching and rash.  Be careful-this can make you sleepy, especially when taken in conjunction with your pain medication.

## 2022-11-21 NOTE — HOME HEALTH
Patient has had a rash in and around her right knee dressing since Wendsday night.  She called the orthopedic MD an they wanted to dressing to be removed so she waited till my visit today to get it removed.  I removed the CHANCE dressing and took pictures of her rash (in EPIC). The rash is red, but not hot. The redness will jammie with touch and it is not painful to the touch.  It is widespread around the knee, including on the sides and back of the knee.  I marked some of the redness and educated patient of s/s of infection and to watch for any spread of the redness and to report to the MD.  Her incision looks good and is closed with adhesive strip left in place.  Overall, she continues to improve, walking with no AD when I arrived and ROM improving.     Plan for next visit  DC patient if still going to outpt on Tuesday 11/22  Finalize HEP and ROM.

## 2022-11-22 VITALS
OXYGEN SATURATION: 96 % | SYSTOLIC BLOOD PRESSURE: 130 MMHG | TEMPERATURE: 97.7 F | HEART RATE: 77 BPM | DIASTOLIC BLOOD PRESSURE: 84 MMHG

## 2022-11-28 PROCEDURE — G0180 MD CERTIFICATION HHA PATIENT: HCPCS | Performed by: ORTHOPAEDIC SURGERY

## 2022-12-01 ENCOUNTER — OFFICE VISIT (OUTPATIENT)
Dept: ORTHOPEDIC SURGERY | Facility: CLINIC | Age: 77
End: 2022-12-01

## 2022-12-01 VITALS — TEMPERATURE: 97.6 F | BODY MASS INDEX: 35.97 KG/M2 | HEIGHT: 63 IN | WEIGHT: 203 LBS | RESPIRATION RATE: 12 BRPM

## 2022-12-01 DIAGNOSIS — Z96.651 S/P TKR (TOTAL KNEE REPLACEMENT), RIGHT: Primary | ICD-10-CM

## 2022-12-01 PROCEDURE — 99024 POSTOP FOLLOW-UP VISIT: CPT | Performed by: ORTHOPAEDIC SURGERY

## 2022-12-01 PROCEDURE — 73560 X-RAY EXAM OF KNEE 1 OR 2: CPT | Performed by: ORTHOPAEDIC SURGERY

## 2022-12-01 RX ORDER — TRAMADOL HYDROCHLORIDE 50 MG/1
50 TABLET ORAL EVERY 4 HOURS PRN
Qty: 60 TABLET | Refills: 0 | Status: SHIPPED | OUTPATIENT
Start: 2022-12-01

## 2022-12-01 NOTE — PROGRESS NOTES
Jocelynn is 3 to 4 weeks after her total knee on the right she is doing very well as far as total knee goes she is actively flexing to 110 has full extension her incisions healing she had hives all over her whole body and had to go to the ER looks like is probably related to hydrocodone she is getting by on some oxycodone that is running low.  She was on Ultram before which she can tolerate and we gave her prescription of this with precautions and warnings incision looks nice her calf is soft she complains of burning feelings going down her legs she does have MS and think it is attributable to that said Dr. Wilkes follows her MS and see if she can get a recommendations as far as that goes out on other things like Neurontin would be helpful or not MS.  As far as the knee goes she had an x-ray today AP lateral right knee taken the office today for postop follow-up with comparison view shows a well aligned total knee.  Postop total knee recommendations and advice given follow-up in about month with x-rays and if she is having problems

## 2022-12-22 ENCOUNTER — TRANSCRIBE ORDERS (OUTPATIENT)
Dept: ADMINISTRATIVE | Facility: HOSPITAL | Age: 77
End: 2022-12-22

## 2022-12-22 ENCOUNTER — LAB (OUTPATIENT)
Dept: LAB | Facility: HOSPITAL | Age: 77
End: 2022-12-22

## 2022-12-22 DIAGNOSIS — R10.9 STOMACH ACHE: Primary | ICD-10-CM

## 2022-12-22 DIAGNOSIS — R10.9 STOMACH ACHE: ICD-10-CM

## 2022-12-22 LAB
ALBUMIN SERPL-MCNC: 4.2 G/DL (ref 3.5–5.2)
ALBUMIN/GLOB SERPL: 2 G/DL
ALP SERPL-CCNC: 68 U/L (ref 39–117)
ALT SERPL W P-5'-P-CCNC: 11 U/L (ref 1–33)
AMYLASE SERPL-CCNC: 44 U/L (ref 28–100)
ANION GAP SERPL CALCULATED.3IONS-SCNC: 7 MMOL/L (ref 5–15)
AST SERPL-CCNC: 18 U/L (ref 1–32)
BACTERIA UR QL AUTO: ABNORMAL /HPF
BASOPHILS # BLD AUTO: 0.06 10*3/MM3 (ref 0–0.2)
BASOPHILS NFR BLD AUTO: 0.8 % (ref 0–1.5)
BILIRUB SERPL-MCNC: 0.3 MG/DL (ref 0–1.2)
BILIRUB UR QL STRIP: NEGATIVE
BUN SERPL-MCNC: 16 MG/DL (ref 8–23)
BUN/CREAT SERPL: 21.1 (ref 7–25)
CALCIUM SPEC-SCNC: 9.6 MG/DL (ref 8.6–10.5)
CHLORIDE SERPL-SCNC: 102 MMOL/L (ref 98–107)
CLARITY UR: ABNORMAL
CO2 SERPL-SCNC: 32 MMOL/L (ref 22–29)
COLOR UR: YELLOW
CREAT SERPL-MCNC: 0.76 MG/DL (ref 0.57–1)
DEPRECATED RDW RBC AUTO: 43.5 FL (ref 37–54)
EGFRCR SERPLBLD CKD-EPI 2021: 81.8 ML/MIN/1.73
EOSINOPHIL # BLD AUTO: 0.35 10*3/MM3 (ref 0–0.4)
EOSINOPHIL NFR BLD AUTO: 4.6 % (ref 0.3–6.2)
ERYTHROCYTE [DISTWIDTH] IN BLOOD BY AUTOMATED COUNT: 12.9 % (ref 12.3–15.4)
GLOBULIN UR ELPH-MCNC: 2.1 GM/DL
GLUCOSE SERPL-MCNC: 110 MG/DL (ref 65–99)
GLUCOSE UR STRIP-MCNC: NEGATIVE MG/DL
HCT VFR BLD AUTO: 37.6 % (ref 34–46.6)
HGB BLD-MCNC: 12.7 G/DL (ref 12–15.9)
HGB UR QL STRIP.AUTO: NEGATIVE
HYALINE CASTS UR QL AUTO: ABNORMAL /LPF
IMM GRANULOCYTES # BLD AUTO: 0.02 10*3/MM3 (ref 0–0.05)
IMM GRANULOCYTES NFR BLD AUTO: 0.3 % (ref 0–0.5)
KETONES UR QL STRIP: NEGATIVE
LEUKOCYTE ESTERASE UR QL STRIP.AUTO: ABNORMAL
LIPASE SERPL-CCNC: 17 U/L (ref 13–60)
LYMPHOCYTES # BLD AUTO: 2.04 10*3/MM3 (ref 0.7–3.1)
LYMPHOCYTES NFR BLD AUTO: 27.1 % (ref 19.6–45.3)
MCH RBC QN AUTO: 31.1 PG (ref 26.6–33)
MCHC RBC AUTO-ENTMCNC: 33.8 G/DL (ref 31.5–35.7)
MCV RBC AUTO: 92.2 FL (ref 79–97)
MONOCYTES # BLD AUTO: 0.7 10*3/MM3 (ref 0.1–0.9)
MONOCYTES NFR BLD AUTO: 9.3 % (ref 5–12)
NEUTROPHILS NFR BLD AUTO: 4.37 10*3/MM3 (ref 1.7–7)
NEUTROPHILS NFR BLD AUTO: 57.9 % (ref 42.7–76)
NITRITE UR QL STRIP: NEGATIVE
NRBC BLD AUTO-RTO: 0 /100 WBC (ref 0–0.2)
PH UR STRIP.AUTO: 7 [PH] (ref 5–8)
PLATELET # BLD AUTO: 366 10*3/MM3 (ref 140–450)
PMV BLD AUTO: 10.1 FL (ref 6–12)
POTASSIUM SERPL-SCNC: 3.7 MMOL/L (ref 3.5–5.2)
PROT SERPL-MCNC: 6.3 G/DL (ref 6–8.5)
PROT UR QL STRIP: ABNORMAL
RBC # BLD AUTO: 4.08 10*6/MM3 (ref 3.77–5.28)
RBC # UR STRIP: ABNORMAL /HPF
REF LAB TEST METHOD: ABNORMAL
SODIUM SERPL-SCNC: 141 MMOL/L (ref 136–145)
SP GR UR STRIP: 1.02 (ref 1–1.03)
SQUAMOUS #/AREA URNS HPF: ABNORMAL /HPF
UROBILINOGEN UR QL STRIP: ABNORMAL
WBC # UR STRIP: ABNORMAL /HPF
WBC NRBC COR # BLD: 7.54 10*3/MM3 (ref 3.4–10.8)

## 2022-12-22 PROCEDURE — 36415 COLL VENOUS BLD VENIPUNCTURE: CPT

## 2022-12-22 PROCEDURE — 83690 ASSAY OF LIPASE: CPT

## 2022-12-22 PROCEDURE — 81001 URINALYSIS AUTO W/SCOPE: CPT

## 2022-12-22 PROCEDURE — 85025 COMPLETE CBC W/AUTO DIFF WBC: CPT

## 2022-12-22 PROCEDURE — 80053 COMPREHEN METABOLIC PANEL: CPT

## 2022-12-22 PROCEDURE — 82150 ASSAY OF AMYLASE: CPT

## 2023-01-04 ENCOUNTER — OFFICE VISIT (OUTPATIENT)
Dept: ORTHOPEDIC SURGERY | Facility: CLINIC | Age: 78
End: 2023-01-04
Payer: MEDICARE

## 2023-01-04 VITALS — WEIGHT: 202.4 LBS | TEMPERATURE: 96.7 F | BODY MASS INDEX: 35.86 KG/M2 | HEIGHT: 63 IN

## 2023-01-04 DIAGNOSIS — Z96.651 STATUS POST TOTAL RIGHT KNEE REPLACEMENT: ICD-10-CM

## 2023-01-04 DIAGNOSIS — R52 PAIN: Primary | ICD-10-CM

## 2023-01-04 PROCEDURE — 99024 POSTOP FOLLOW-UP VISIT: CPT | Performed by: ORTHOPAEDIC SURGERY

## 2023-01-04 PROCEDURE — 1160F RVW MEDS BY RX/DR IN RCRD: CPT | Performed by: ORTHOPAEDIC SURGERY

## 2023-01-04 PROCEDURE — 73562 X-RAY EXAM OF KNEE 3: CPT | Performed by: ORTHOPAEDIC SURGERY

## 2023-01-04 PROCEDURE — 1159F MED LIST DOCD IN RCRD: CPT | Performed by: ORTHOPAEDIC SURGERY

## 2023-01-04 RX ORDER — SULFAMETHOXAZOLE AND TRIMETHOPRIM 800; 160 MG/1; MG/1
TABLET ORAL
COMMUNITY
Start: 2023-01-03

## 2023-01-04 NOTE — PROGRESS NOTES
Patient follows up today on her total knee if she is about 2 months out.  Says she is doing very very well with it she gets 120 degrees passively 115 degrees actively her incisions healing her calf is soft.  She has MS and was having some pain with that but is doing much much better at this point.  AP lateral sunrise view knee taken the office today for postop follow-up with comparison view shows well aligned total knee replacement postop total knee recommendations and advice given she had previous knee replacement and 1 to see her back in about 2 months with x-rays sooner if she having problems

## 2023-01-06 ENCOUNTER — OFFICE (OUTPATIENT)
Dept: URBAN - METROPOLITAN AREA CLINIC 65 | Facility: CLINIC | Age: 78
End: 2023-01-06

## 2023-01-06 VITALS
HEIGHT: 64 IN | WEIGHT: 199 LBS | DIASTOLIC BLOOD PRESSURE: 80 MMHG | SYSTOLIC BLOOD PRESSURE: 139 MMHG | HEART RATE: 70 BPM

## 2023-01-06 DIAGNOSIS — K58.9 IRRITABLE BOWEL SYNDROME WITHOUT DIARRHEA: ICD-10-CM

## 2023-01-06 DIAGNOSIS — K57.92 DIVERTICULITIS OF INTESTINE, PART UNSPECIFIED, WITHOUT PERFO: ICD-10-CM

## 2023-01-06 DIAGNOSIS — K21.9 GASTRO-ESOPHAGEAL REFLUX DISEASE WITHOUT ESOPHAGITIS: ICD-10-CM

## 2023-01-06 PROCEDURE — 99214 OFFICE O/P EST MOD 30 MIN: CPT | Performed by: INTERNAL MEDICINE

## 2023-01-06 RX ORDER — PANTOPRAZOLE SODIUM 40 MG/1
80 TABLET, DELAYED RELEASE ORAL
Qty: 180 | Refills: 3 | Status: COMPLETED
End: 2024-03-01

## 2023-02-07 ENCOUNTER — LAB (OUTPATIENT)
Dept: LAB | Facility: HOSPITAL | Age: 78
End: 2023-02-07
Payer: MEDICARE

## 2023-02-07 ENCOUNTER — TRANSCRIBE ORDERS (OUTPATIENT)
Dept: ADMINISTRATIVE | Facility: HOSPITAL | Age: 78
End: 2023-02-07
Payer: MEDICARE

## 2023-02-07 DIAGNOSIS — R73.03 PREDIABETES: ICD-10-CM

## 2023-02-07 DIAGNOSIS — E78.5 HYPERLIPIDEMIA, UNSPECIFIED HYPERLIPIDEMIA TYPE: Primary | ICD-10-CM

## 2023-02-07 DIAGNOSIS — E78.5 HYPERLIPIDEMIA, UNSPECIFIED HYPERLIPIDEMIA TYPE: ICD-10-CM

## 2023-02-07 LAB
ALBUMIN SERPL-MCNC: 4.3 G/DL (ref 3.5–5.2)
ALBUMIN/GLOB SERPL: 2.2 G/DL
ALP SERPL-CCNC: 66 U/L (ref 39–117)
ALT SERPL W P-5'-P-CCNC: 12 U/L (ref 1–33)
ANION GAP SERPL CALCULATED.3IONS-SCNC: 8 MMOL/L (ref 5–15)
AST SERPL-CCNC: 17 U/L (ref 1–32)
BILIRUB SERPL-MCNC: 0.3 MG/DL (ref 0–1.2)
BUN SERPL-MCNC: 15 MG/DL (ref 8–23)
BUN/CREAT SERPL: 20.5 (ref 7–25)
CALCIUM SPEC-SCNC: 9.7 MG/DL (ref 8.6–10.5)
CHLORIDE SERPL-SCNC: 100 MMOL/L (ref 98–107)
CHOLEST SERPL-MCNC: 202 MG/DL (ref 0–200)
CO2 SERPL-SCNC: 30 MMOL/L (ref 22–29)
CREAT SERPL-MCNC: 0.73 MG/DL (ref 0.57–1)
EGFRCR SERPLBLD CKD-EPI 2021: 85.9 ML/MIN/1.73
GLOBULIN UR ELPH-MCNC: 2 GM/DL
GLUCOSE SERPL-MCNC: 102 MG/DL (ref 65–99)
HBA1C MFR BLD: 6 % (ref 4.8–5.6)
HDLC SERPL-MCNC: 55 MG/DL (ref 40–60)
LDLC SERPL CALC-MCNC: 130 MG/DL (ref 0–100)
LDLC/HDLC SERPL: 2.33 {RATIO}
POTASSIUM SERPL-SCNC: 4 MMOL/L (ref 3.5–5.2)
PROT SERPL-MCNC: 6.3 G/DL (ref 6–8.5)
SODIUM SERPL-SCNC: 138 MMOL/L (ref 136–145)
TRIGL SERPL-MCNC: 94 MG/DL (ref 0–150)
VLDLC SERPL-MCNC: 17 MG/DL (ref 5–40)

## 2023-02-07 PROCEDURE — 80061 LIPID PANEL: CPT

## 2023-02-07 PROCEDURE — 80053 COMPREHEN METABOLIC PANEL: CPT

## 2023-02-07 PROCEDURE — 83036 HEMOGLOBIN GLYCOSYLATED A1C: CPT

## 2023-02-07 PROCEDURE — 36415 COLL VENOUS BLD VENIPUNCTURE: CPT

## 2023-03-08 ENCOUNTER — OFFICE VISIT (OUTPATIENT)
Dept: ORTHOPEDIC SURGERY | Facility: CLINIC | Age: 78
End: 2023-03-08
Payer: MEDICARE

## 2023-03-08 VITALS — WEIGHT: 202 LBS | BODY MASS INDEX: 35.79 KG/M2 | TEMPERATURE: 97.3 F | HEIGHT: 63 IN

## 2023-03-08 DIAGNOSIS — R52 PAIN: Primary | ICD-10-CM

## 2023-03-08 DIAGNOSIS — Z96.651 STATUS POST TOTAL RIGHT KNEE REPLACEMENT: ICD-10-CM

## 2023-03-08 PROCEDURE — 73562 X-RAY EXAM OF KNEE 3: CPT | Performed by: ORTHOPAEDIC SURGERY

## 2023-03-08 PROCEDURE — 99213 OFFICE O/P EST LOW 20 MIN: CPT | Performed by: ORTHOPAEDIC SURGERY

## 2023-03-08 NOTE — PROGRESS NOTES
Patient Name: Mahnaz Becerra   YOB: 1945  Referring Primary Care Physician: Stephen Wilkes MD  BMI: Body mass index is 35.78 kg/m².    Chief Complaint:    Chief Complaint   Patient presents with   • Right Knee - Follow-up        HPI:     Mahnaz Becerra is a 77 y.o. female who presents today for evaluation of   Chief Complaint   Patient presents with   • Right Knee - Follow-up   .  Adrian is about 4 months status post her right total knee and doing very well she had her left knee replaced in the past.  Mild pain and stiffness but overall is healing nicely.  She is 0-100 2530 degrees good stability incisions healed nicely and calf is soft      Subjective   Medications:   Home Medications:  Current Outpatient Medications on File Prior to Visit   Medication Sig   • albuterol (VENTOLIN HFA) 108 (90 BASE) MCG/ACT inhaler Inhale 2 puffs Every 6 (Six) Hours As Needed for wheezing.   • aspirin 81 MG EC tablet Take 1 tablet by mouth Every 12 (Twelve) Hours.   • cyclobenzaprine (FLEXERIL) 10 MG tablet Take 1 tablet by mouth 2 (Two) Times a Day As Needed for Muscle Spasms.   • dicyclomine (BENTYL) 20 MG tablet Take 1 tablet by mouth As Needed (abd cramps).   • docusate sodium 100 MG capsule Take 1 capsule by mouth 2 (Two) Times a Day.   • famotidine (PEPCID) 40 MG tablet Take 1 tablet by mouth At Night As Needed for Heartburn.   • hydrochlorothiazide (HYDRODIURIL) 25 MG tablet TAKE 1 TABLET BY MOUTH DAILY   • methylPREDNISolone (MEDROL) 4 MG dose pack Take as directed on package instructions.   • ondansetron (ZOFRAN) 4 MG tablet Take 1 tablet by mouth Every 6 (Six) Hours As Needed for Nausea or Vomiting.   • oxyCODONE-acetaminophen (PERCOCET) 5-325 MG per tablet Take 1-2 tablets every 4-6 hours as needed for severe pain   • pantoprazole (PROTONIX) 40 MG EC tablet TAKE 1 TABLET BY MOUTH TWICE DAILY (Patient taking differently: Take 1 tablet by mouth Daily.)   • sucralfate (CARAFATE) 1 g tablet Take 1 tablet by  mouth 3 (Three) Times a Day As Needed (heartburn).   • sulfamethoxazole-trimethoprim (BACTRIM DS,SEPTRA DS) 800-160 MG per tablet    • traMADol (ULTRAM) 50 MG tablet Take 1 tablet by mouth Every 4 (Four) Hours As Needed for Moderate Pain.     Current Facility-Administered Medications on File Prior to Visit   Medication   • ipratropium-albuterol (DUO-NEB) nebulizer solution 3 mL     Current Medications:  Scheduled Meds:ipratropium-albuterol, 3 mL, Nebulization, 4x Daily - RT      Continuous Infusions:   PRN Meds:.    I have reviewed the patient's medical history in detail and updated the computerized patient record.  Review and summarization of old records includes:    Past Medical History:   Diagnosis Date   • Acid reflux    • Anesthesia complication     ANESTHESIA HAS BROUGHT ON ASTHMA ATTACKS    • Asthma    • Bronchitis    • Charleyhorse     FREQUENT   • Edema     LOWER EXT   • Gastritis    • Heart murmur    • History of Clostridioides difficile infection    • History of skin cancer     BACK   • IBS (irritable bowel syndrome)    • Knee pain, right    • MS (multiple sclerosis) ([facility])    • Osteoarthritis    • PONV (postoperative nausea and vomiting)     Patient states she had post-op nausea and vomiting after hysterectomy in .   • Sleep apnea     CANT TOLERATE CPAP   • Tremor     RIGHT ARM        Past Surgical History:   Procedure Laterality Date   • APPENDECTOMY     • BREAST LUMPECTOMY Bilateral    • BREAST SURGERY      REDUCTION   •  SECTION     • CHOLECYSTECTOMY     • COLONOSCOPY  2012    Dr Moe   • COLONOSCOPY N/A 2020    Procedure: COLONOSCOPY TO CECUM AND TERM. ILEUM WITH BIOPSIES;  Surgeon: Inder Pat MD;  Location: [facility] ENDOSCOPY;  Service: Gastroenterology;  Laterality: N/A;  PRE OP - CHANGE IN BOWEL HABITS, DIARRHEA  POST OP - DIVERTICULOSIS   • COSMETIC SURGERY     • DILATATION AND CURETTAGE     • ENDOSCOPY N/A 2016    Procedure:  ESOPHAGOGASTRODUODENOSCOPY WITH BX;  Surgeon: Nasim Moe MD;  Location: [facility] ENDOSCOPY;  Service:    • ENDOSCOPY N/A 2020    Procedure: ESOPHAGOGASTRODUODENOSCOPY WITH DUODENAL ASPIRATE, POLYPECTOMY AND BIOPSIES;  Surgeon: Inder Pat MD;  Location: [facility] ENDOSCOPY;  Service: Gastroenterology;  Laterality: N/A;  PRE OP - GERD  POST OP - GASTRIC POLYP, GASTRITIS   • ENDOSCOPY W/ PEG REMOVAL  2012    Dr Moe   • EYE SURGERY Bilateral     BLEPHAROPLASTY   • HYSTERECTOMY     • KNEE ARTHROSCOPY Right 2016    Procedure: KNEE ARTHROSCOPY, PARTIAL MEDIAL AND LATERAL MENISECTOMY, CHONDROPLASTY OF THE PATELLA, REMOVAL OF LOOSE BODIES;  Surgeon: Artur Pat MD;  Location: [facility] OR [facility] [place];  Service:    • KNEE ARTHROSCOPY W/ MENISCAL REPAIR Left    • OOPHORECTOMY Bilateral    • TONSILLECTOMY     • TOTAL KNEE ARTHROPLASTY Left 2018    Procedure: LEFT TOTAL KNEE ARTHROPLASTY WITH MARGRET NAVIGATION;  Surgeon: Artur Pat MD;  Location: [facility] MAIN OR;  Service:    • TOTAL KNEE ARTHROPLASTY Right 2022    Procedure: RIGHT TOTAL KNEE ARTHROPLASTY WITH MARGRET NAVIGATION;  Surgeon: Artur Pat MD;  Location: [facility] OR [facility] [place];  Service: Orthopedics;  Laterality: Right;        Social History     Occupational History   • Not on file   Tobacco Use   • Smoking status: Former     Packs/day: 0.25     Types: Cigarettes     Quit date:      Years since quittin.2   • Smokeless tobacco: Never   • Tobacco comments:     Patient states she was a social smoker.   Vaping Use   • Vaping Use: Never used   Substance and Sexual Activity   • Alcohol use: Yes     Comment: Occasional   • Drug use: No   • Sexual activity: Defer      Social History     Social History Narrative   • Not on file        Family History   Problem Relation Age of Onset   • Hypertension Mother    • Stroke Mother    • Alzheimer's disease Mother    • Heart disease Father    • Emphysema Father    • Stroke Maternal  "Grandmother    • Cancer Maternal Grandfather    • No Known Problems Paternal Grandmother    • Cerebral aneurysm Paternal Grandfather    • Malig Hyperthermia Neg Hx        ROS: 14 point review of systems was performed and all other systems were reviewed and are negative except for documented findings in HPI and today's encounter.     Allergies:   Allergies   Allergen Reactions   • Flagyl [Metronidazole] Hives and Swelling     Lips and Tongue     • Levofloxacin Swelling and Rash     RED RASH   • Lansoprazole Itching and Other (See Comments)     AND TURN RED   • Cefdinir GI Intolerance     Abdominal pain, caused upset stomach   • Trazodone And Nefazodone Myalgia     Severe muscle cramps     Constitutional:  Denies fever, shaking or chills   Eyes:  Denies change in visual acuity   HENT:  Denies nasal congestion or sore throat   Respiratory:  Denies cough or shortness of breath   Cardiovascular:  Denies chest pain or severe LE edema   GI:  Denies abdominal pain, nausea, vomiting, bloody stools or diarrhea   Musculoskeletal:  Numbness, tingling, pain, or loss of motor function only as noted above in history of present illness.  : Denies painful urination or hematuria  Integument:  Denies rash, lesion or ulceration   Neurologic:  Denies headache or focal weakness  Endocrine:  Denies lymphadenopathy  Psych:  Denies confusion or change in mental status   Hem:  Denies active bleeding    OBJECTIVE:  Physical Exam: 77 y.o. female  Wt Readings from Last 3 Encounters:   03/08/23 91.6 kg (202 lb)   01/04/23 91.8 kg (202 lb 6.4 oz)   12/01/22 92.1 kg (203 lb)     Ht Readings from Last 1 Encounters:   03/08/23 160 cm (63\")     Body mass index is 35.78 kg/m².  Vitals:    03/08/23 1343   Temp: 97.3 °F (36.3 °C)     Vital signs reviewed.     General Appearance:    Alert, cooperative, in no acute distress                  Eyes: conjunctiva clear  ENT: external ears and nose atraumatic  CV: no peripheral edema  Resp: normal respiratory " effort  Skin: no rashes or wounds; normal turgor  Psych: mood and affect appropriate  Lymph: no nodes appreciated  Neuro: gross sensation intact  Vascular:  Palpable peripheral pulse in noted extremity  Musculoskeletal Extremities: See above she also has some tenderness over her right greater trochanter that does not radiate.  Is not bothering her enough to have anything done at this time    Radiology:   AP lateral sunrise view right knee x-ray taken the office today for postop follow-up with comparison view shows well aligned total knee arthroplasty        Assessment:     ICD-10-CM ICD-9-CM   1. Pain  R52 780.96   2. Status post total right knee replacement  Z96.651 V43.65        MDM/Plan:   The diagnosis(es), natural history, pathophysiology and treatment for diagnosis(es) were discussed. Opportunity given and questions answered.  Biomechanics of pertinent body areas discussed.  When appropriate, the use of ambulatory aids discussed.    TOTAL JOINT recommendations and precautions, including antibiotic prophylaxis discussed. Advice given and questions answered.       3/8/2023    Dictated utilizing Dragon dictation

## 2023-03-27 ENCOUNTER — TELEPHONE (OUTPATIENT)
Dept: ORTHOPEDIC SURGERY | Facility: CLINIC | Age: 78
End: 2023-03-27

## 2023-03-27 NOTE — TELEPHONE ENCOUNTER
Caller: PATIENT     Relationship to patient: SELF     Best call back number: [phone]     Chief complaint: LEFT FOOT, THIRD TOE     Type of visit: WORK IN      Requested date: ASAP     IAdditional notes: PT. STATES THAT SHE WAS MAKING HER BED Saturday MORNING.   SHE HIT HER TOE ON THE BED POST.   ASKING IF SHE CAN BE WORKED IN SOON.   THERE ARE NO OPENINGS UNTIL MID April.  PLEASE CALL TO ADVISE.

## 2023-03-28 ENCOUNTER — OFFICE VISIT (OUTPATIENT)
Dept: ORTHOPEDIC SURGERY | Facility: CLINIC | Age: 78
End: 2023-03-28
Payer: MEDICARE

## 2023-03-28 VITALS — BODY MASS INDEX: 34.94 KG/M2 | TEMPERATURE: 97.3 F | WEIGHT: 197.2 LBS | HEIGHT: 63 IN

## 2023-03-28 DIAGNOSIS — M20.5X2 ACQUIRED MALLET TOE, LEFT: ICD-10-CM

## 2023-03-28 DIAGNOSIS — M20.12 HALLUX VALGUS OF LEFT FOOT: ICD-10-CM

## 2023-03-28 DIAGNOSIS — M19.072 ARTHRITIS OF FIRST METATARSOPHALANGEAL (MTP) JOINT OF LEFT FOOT: ICD-10-CM

## 2023-03-28 DIAGNOSIS — S90.122A CONTUSION OF LESSER TOE OF LEFT FOOT WITHOUT DAMAGE TO NAIL, INITIAL ENCOUNTER: ICD-10-CM

## 2023-03-28 DIAGNOSIS — M19.079 ARTHRITIS OF FOOT: ICD-10-CM

## 2023-03-28 DIAGNOSIS — R52 PAIN: Primary | ICD-10-CM

## 2023-03-28 PROCEDURE — 73630 X-RAY EXAM OF FOOT: CPT | Performed by: ORTHOPAEDIC SURGERY

## 2023-03-28 PROCEDURE — 99214 OFFICE O/P EST MOD 30 MIN: CPT | Performed by: ORTHOPAEDIC SURGERY

## 2023-03-28 RX ORDER — EZETIMIBE 10 MG/1
TABLET ORAL
COMMUNITY
Start: 2023-03-22

## 2023-03-28 NOTE — PROGRESS NOTES
"Foot Follow Up      Patient: Mahnaz Becerra    YOB: 1945 77 y.o. female    Chief Complaints: I hurt my toe    History of Present Illness: Patient injured her left third toe on 3/25/2023 when she struck her bedpost with it she had pain swelling and bruising which has improved some mainly over the DIP joint of the third toe but still with moderate complaints of pain.    Have seen her in the past on 1/11/2021 for a right fifth toe fracture as well as a left first MTP arthritis with mild hallux valgus mainly with symptoms of pain with shoes rubbing over the dorsal prominence and we had plan to proceed with cheilectomy which she decided to hold off on until she got her COVID vaccines and then subsequently had her knee replaced and is just now \"getting over that\".  Right ankle was not bothersome to her at that time.  HPI    ROS: Foot pain  Past Medical History:   Diagnosis Date   • Acid reflux    • Anesthesia complication     ANESTHESIA HAS BROUGHT ON ASTHMA ATTACKS    • Asthma    • Bronchitis    • Charleyhorse     FREQUENT   • Edema     LOWER EXT   • Gastritis    • Heart murmur    • History of Clostridioides difficile infection    • History of skin cancer     BACK   • IBS (irritable bowel syndrome)    • Knee pain, right    • MS (multiple sclerosis) ([facility])    • Osteoarthritis    • PONV (postoperative nausea and vomiting)     Patient states she had post-op nausea and vomiting after hysterectomy in 1987.   • Sleep apnea     CANT TOLERATE CPAP   • Tremor     RIGHT ARM     Physical Exam:   Vitals:    03/28/23 1326   Temp: 97.3 °F (36.3 °C)   Weight: 89.4 kg (197 lb 3.2 oz)   Height: 160 cm (63\")   PainSc:   4     Well developed with normal mood.  Left foot shows mild bruising and swelling around the distal aspect of the third toe with some flexion deformity at the DIP joint which she said was not present previously.  She was able to extend the toe some at the MTP and PIP joint bilaterally at the DIP joint and " "had difficulty flexing as well but was able to flex to some degree at the MTP joint.  There was no focal tenderness over the dorsum of the first proximal phalanx of the third toe.  There is some mild bruising at the base of the nailbed but no evidence of any open fracture and no subungual hematoma.      Radiology: 3 views of the left foot ordered evaluate alignment and pain reviewed and no prior x-rays of the left foot available for comparison I do not see any clear fracture of the third toe but there is some flexion deformity at the DIP joint as well as arthritic change at the DIP and IP joints as well as mild hallux valgus with moderate first MTP arthritis and arthritis to the midfoot and as well as chronic ossifications over the dorsum of the talus.      Assessment/Plan: Left third toe contusion with probable traumatic mallet toe  2.  Left hallux valgus with first MTP arthritis  3.  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  Right first MTP arthritis with mild hallux valgus with right second hammertoe and ankle arthritis.    We discussed treatment going forward and although looks like she probably has some traumatic hallux of the third toe  The way to splint this or corrected short of surgical treatment would which would require fusion of the DIP joint and would not recommend that in the traumatic setting of demand and not being particularly symptomatic.    I did swapnil tape her second third and fourth toes together with Coban wrap and silicone spacers as well as use postoperative shoe.    We discussed proceeding eventually with surgical shoe on the right foot as we have discussed previously which she wants to hold off on at this point as she is still getting over her right knee replacement and has a history of MS and her right side is her \"bad side\".    She will avoid coming up on her toes and we will see her back in about 3 weeks with x-rays of her left foot.  "

## 2023-04-20 ENCOUNTER — OFFICE VISIT (OUTPATIENT)
Dept: ORTHOPEDIC SURGERY | Facility: CLINIC | Age: 78
End: 2023-04-20
Payer: MEDICARE

## 2023-04-20 VITALS — HEIGHT: 64 IN | WEIGHT: 196 LBS | BODY MASS INDEX: 33.46 KG/M2 | TEMPERATURE: 97.3 F

## 2023-04-20 DIAGNOSIS — M19.072 ARTHRITIS OF FIRST METATARSOPHALANGEAL (MTP) JOINT OF LEFT FOOT: ICD-10-CM

## 2023-04-20 DIAGNOSIS — S90.122A CONTUSION OF LESSER TOE OF LEFT FOOT WITHOUT DAMAGE TO NAIL, INITIAL ENCOUNTER: ICD-10-CM

## 2023-04-20 DIAGNOSIS — M20.5X2 ACQUIRED MALLET TOE, LEFT: ICD-10-CM

## 2023-04-20 DIAGNOSIS — M20.12 HALLUX VALGUS OF LEFT FOOT: ICD-10-CM

## 2023-04-20 DIAGNOSIS — R52 PAIN: Primary | ICD-10-CM

## 2023-04-20 RX ORDER — DIPHENOXYLATE HYDROCHLORIDE AND ATROPINE SULFATE 2.5; .025 MG/1; MG/1
TABLET ORAL DAILY
COMMUNITY

## 2023-04-20 NOTE — PROGRESS NOTES
"Foot Follow Up      Patient: Mahnaz Becerra    YOB: 1945 77 y.o. female    Chief Complaints: Toe  getting better    History of Present Illness: Patient was seen on 3/20/2023 reporting that she injured her left third toe on 3/25/2023 when she struck her bedpost with it.  She had pain swelling and bruising which has improved some mainly over the DIP joint of the third toe but still with moderate complaints of pain.    Reviewed with her that she potentially had a traumatic third mallet toe and reviewed with her there really was not a great way to splint that but did not recommend surgical treatment at that time.  She was instructed on swapnil wrapping her second third and fourth toe and use of silicone spacers and postoperative shoe    We discussed potentially eventually proceeding with surgical treatment of the right foot as we discussed previously which wanted to hold off on.  She was still getting over a knee replacement and had a history of MS and her right side was her \"bad side\".    She is seen back today stating that she is doing better still with some discomfort in left third toe but is improving ankle the discomfort around the third MTP joint.  Current pain is rated 0 out of 10         I had seen her in the past on 1/11/2021 for a right fifth toe fracture as well as a left first MTP arthritis with mild hallux valgus mainly with symptoms of pain with shoes rubbing over the dorsal prominence and we had plan to proceed with cheilectomy which she decided to hold off on until she got her COVID vaccines and then subsequently had her knee replaced and is just now \"getting over that\".  Right ankle was not bothersome to her at that time.  HPI    ROS: No foot pain  Past Medical History:   Diagnosis Date   • Acid reflux    • Anesthesia complication     ANESTHESIA HAS BROUGHT ON ASTHMA ATTACKS    • Asthma    • Bronchitis    • Charleyhorse     FREQUENT   • Edema     LOWER EXT   • Gastritis    • Heart murmur  " "  • History of Clostridioides difficile infection    • History of skin cancer     BACK   • IBS (irritable bowel syndrome)    • Knee pain, right    • MS (multiple sclerosis)    • Osteoarthritis    • PONV (postoperative nausea and vomiting)     Patient states she had post-op nausea and vomiting after hysterectomy in 1987.   • Sleep apnea     CANT TOLERATE CPAP   • Tremor     RIGHT ARM     Physical Exam:   Vitals:    04/20/23 1546   Temp: 97.3 °F (36.3 °C)   Weight: 88.9 kg (196 lb)   Height: 162.6 cm (64\")   PainSc: 0-No pain     Well developed with normal mood.  On exam she has malleting of the left third toe with minimal discomfort to palpation no ulceration.  Minimal discomfort around the PIP joint.  There is some mild hallux valgus with first MTP arthritis with mild discomfort on range of motion      Radiology: 3 views of the left foot ordered evaluate alignment reviewed and compared to previous x-rays.  There remains some flexion deformity at the DIP joint of the left third toe as well as chronic deformity and arthritis at the first MTP joint.  There is persistent arthritis at the midfoot fairly extensively through the TMT joints.      Assessment/Plan:  Left third toe contusion with probable traumatic mallet toe  2.  Left hallux valgus with first MTP arthritis  3.  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  Right first MTP arthritis with mild hallux valgus with right second hammertoe and ankle arthritis.    We discussed treatment going forward and nothing is bothering her bad enough with the third toe to consider surgical treatment nor any treatment for her left or right first MTP joints or midfoot arthritis    Overall she seems to be improved    She will wean out of the postoperative shoe into some sturdy accommodative athletic shoes that she can get from [employer]    I recommend heel cord stretching exercises to do at least 4 times a day.  Instruction sheet was provided and demonstrated for the " patient. We discussed etiology of heel cord tightness to midfoot and forefoot overload.      She is also using Voltaren gel around the first MTP joint and third toe if needed    Anything worsens distally does she desire surgical treatment should let me know otherwise I will see her back as needed

## 2023-06-26 PROBLEM — E66.811 OBESITY (BMI 30.0-34.9): Status: ACTIVE | Noted: 2023-06-26

## 2023-06-26 PROBLEM — E66.9 OBESITY (BMI 30.0-34.9): Status: ACTIVE | Noted: 2023-06-26

## 2023-07-14 ENCOUNTER — OFFICE (OUTPATIENT)
Dept: URBAN - METROPOLITAN AREA CLINIC 65 | Facility: CLINIC | Age: 78
End: 2023-07-14

## 2023-07-14 VITALS
WEIGHT: 190 LBS | DIASTOLIC BLOOD PRESSURE: 78 MMHG | SYSTOLIC BLOOD PRESSURE: 124 MMHG | HEART RATE: 54 BPM | HEIGHT: 64 IN

## 2023-07-14 DIAGNOSIS — K57.90 DIVERTICULOSIS OF INTESTINE, PART UNSPECIFIED, WITHOUT PERFO: ICD-10-CM

## 2023-07-14 DIAGNOSIS — K58.9 IRRITABLE BOWEL SYNDROME WITHOUT DIARRHEA: ICD-10-CM

## 2023-07-14 DIAGNOSIS — G43.D0 ABDOMINAL MIGRAINE, NOT INTRACTABLE: ICD-10-CM

## 2023-07-14 DIAGNOSIS — R11.10 VOMITING, UNSPECIFIED: ICD-10-CM

## 2023-07-14 DIAGNOSIS — M62.9 DISORDER OF MUSCLE, UNSPECIFIED: ICD-10-CM

## 2023-07-14 DIAGNOSIS — K21.9 GASTRO-ESOPHAGEAL REFLUX DISEASE WITHOUT ESOPHAGITIS: ICD-10-CM

## 2023-07-14 PROCEDURE — 99214 OFFICE O/P EST MOD 30 MIN: CPT | Performed by: INTERNAL MEDICINE

## 2023-07-14 RX ORDER — ONDANSETRON 4 MG/1
12 TABLET, ORALLY DISINTEGRATING ORAL
Qty: 30 | Refills: 4 | Status: COMPLETED
Start: 2023-07-14 | End: 2024-03-01

## 2023-07-14 RX ORDER — PANTOPRAZOLE SODIUM 40 MG/1
80 TABLET, DELAYED RELEASE ORAL
Qty: 180 | Refills: 3 | Status: ACTIVE
Start: 2023-07-14

## 2023-07-14 NOTE — SERVICEHPINOTES
here for followup.  has reflux and irritable bowel syndrome   has fluid regurgitation at times with ibs, may eventually vomit.   has had more vomiting and less diarrhea.   varies with food. ? MS related.  these episodes out of clear blue, then throws up /dry heaves .  left sided focal pain worse with movement .

## 2023-07-31 ENCOUNTER — OFFICE VISIT (OUTPATIENT)
Dept: ORTHOPEDIC SURGERY | Facility: CLINIC | Age: 78
End: 2023-07-31
Payer: MEDICARE

## 2023-07-31 VITALS — HEIGHT: 64 IN | BODY MASS INDEX: 32.61 KG/M2 | WEIGHT: 191 LBS | TEMPERATURE: 97.6 F

## 2023-07-31 DIAGNOSIS — M19.071 ARTHRITIS OF FIRST METATARSOPHALANGEAL (MTP) JOINT OF RIGHT FOOT: Primary | ICD-10-CM

## 2023-07-31 DIAGNOSIS — R52 PAIN: ICD-10-CM

## 2023-07-31 PROCEDURE — 99214 OFFICE O/P EST MOD 30 MIN: CPT | Performed by: ORTHOPAEDIC SURGERY

## 2023-08-04 ENCOUNTER — LAB (OUTPATIENT)
Dept: LAB | Facility: HOSPITAL | Age: 78
End: 2023-08-04
Payer: MEDICARE

## 2023-08-04 ENCOUNTER — TRANSCRIBE ORDERS (OUTPATIENT)
Dept: ADMINISTRATIVE | Facility: HOSPITAL | Age: 78
End: 2023-08-04
Payer: MEDICARE

## 2023-08-04 DIAGNOSIS — K57.92 DIVERTICULITIS: Primary | ICD-10-CM

## 2023-08-04 DIAGNOSIS — K57.92 DIVERTICULITIS: ICD-10-CM

## 2023-08-04 LAB
BASOPHILS # BLD AUTO: 0.04 10*3/MM3 (ref 0–0.2)
BASOPHILS NFR BLD AUTO: 0.6 % (ref 0–1.5)
DEPRECATED RDW RBC AUTO: 43.2 FL (ref 37–54)
EOSINOPHIL # BLD AUTO: 0.27 10*3/MM3 (ref 0–0.4)
EOSINOPHIL NFR BLD AUTO: 4.1 % (ref 0.3–6.2)
ERYTHROCYTE [DISTWIDTH] IN BLOOD BY AUTOMATED COUNT: 12.7 % (ref 12.3–15.4)
HCT VFR BLD AUTO: 41.4 % (ref 34–46.6)
HGB BLD-MCNC: 14 G/DL (ref 12–15.9)
IMM GRANULOCYTES # BLD AUTO: 0.01 10*3/MM3 (ref 0–0.05)
IMM GRANULOCYTES NFR BLD AUTO: 0.2 % (ref 0–0.5)
LYMPHOCYTES # BLD AUTO: 1.65 10*3/MM3 (ref 0.7–3.1)
LYMPHOCYTES NFR BLD AUTO: 25.2 % (ref 19.6–45.3)
MCH RBC QN AUTO: 31.3 PG (ref 26.6–33)
MCHC RBC AUTO-ENTMCNC: 33.8 G/DL (ref 31.5–35.7)
MCV RBC AUTO: 92.4 FL (ref 79–97)
MONOCYTES # BLD AUTO: 0.58 10*3/MM3 (ref 0.1–0.9)
MONOCYTES NFR BLD AUTO: 8.9 % (ref 5–12)
NEUTROPHILS NFR BLD AUTO: 3.99 10*3/MM3 (ref 1.7–7)
NEUTROPHILS NFR BLD AUTO: 61 % (ref 42.7–76)
NRBC BLD AUTO-RTO: 0 /100 WBC (ref 0–0.2)
PLATELET # BLD AUTO: 283 10*3/MM3 (ref 140–450)
PMV BLD AUTO: 9.9 FL (ref 6–12)
RBC # BLD AUTO: 4.48 10*6/MM3 (ref 3.77–5.28)
WBC NRBC COR # BLD: 6.54 10*3/MM3 (ref 3.4–10.8)

## 2023-08-04 PROCEDURE — 36415 COLL VENOUS BLD VENIPUNCTURE: CPT

## 2023-08-04 PROCEDURE — 85025 COMPLETE CBC W/AUTO DIFF WBC: CPT

## 2023-09-07 ENCOUNTER — PRE-ADMISSION TESTING (OUTPATIENT)
Dept: PREADMISSION TESTING | Facility: HOSPITAL | Age: 78
End: 2023-09-07
Payer: MEDICARE

## 2023-09-07 VITALS
SYSTOLIC BLOOD PRESSURE: 134 MMHG | HEART RATE: 68 BPM | DIASTOLIC BLOOD PRESSURE: 70 MMHG | HEIGHT: 65 IN | WEIGHT: 196.6 LBS | OXYGEN SATURATION: 98 % | BODY MASS INDEX: 32.76 KG/M2 | TEMPERATURE: 98 F

## 2023-09-07 LAB
ANION GAP SERPL CALCULATED.3IONS-SCNC: 11 MMOL/L (ref 5–15)
BUN SERPL-MCNC: 16 MG/DL (ref 8–23)
BUN/CREAT SERPL: 20.8 (ref 7–25)
CALCIUM SPEC-SCNC: 9.5 MG/DL (ref 8.6–10.5)
CHLORIDE SERPL-SCNC: 102 MMOL/L (ref 98–107)
CO2 SERPL-SCNC: 29 MMOL/L (ref 22–29)
CREAT SERPL-MCNC: 0.77 MG/DL (ref 0.57–1)
DEPRECATED RDW RBC AUTO: 42.3 FL (ref 37–54)
EGFRCR SERPLBLD CKD-EPI 2021: 80.1 ML/MIN/1.73
ERYTHROCYTE [DISTWIDTH] IN BLOOD BY AUTOMATED COUNT: 12.7 % (ref 12.3–15.4)
GLUCOSE SERPL-MCNC: 108 MG/DL (ref 65–99)
HCT VFR BLD AUTO: 40 % (ref 34–46.6)
HGB BLD-MCNC: 13.6 G/DL (ref 12–15.9)
MCH RBC QN AUTO: 31.1 PG (ref 26.6–33)
MCHC RBC AUTO-ENTMCNC: 34 G/DL (ref 31.5–35.7)
MCV RBC AUTO: 91.5 FL (ref 79–97)
PLATELET # BLD AUTO: 268 10*3/MM3 (ref 140–450)
PMV BLD AUTO: 9.1 FL (ref 6–12)
POTASSIUM SERPL-SCNC: 3.9 MMOL/L (ref 3.5–5.2)
RBC # BLD AUTO: 4.37 10*6/MM3 (ref 3.77–5.28)
SODIUM SERPL-SCNC: 142 MMOL/L (ref 136–145)
WBC NRBC COR # BLD: 7.02 10*3/MM3 (ref 3.4–10.8)

## 2023-09-07 PROCEDURE — 80048 BASIC METABOLIC PNL TOTAL CA: CPT

## 2023-09-07 PROCEDURE — 85027 COMPLETE CBC AUTOMATED: CPT

## 2023-09-07 PROCEDURE — 93005 ELECTROCARDIOGRAM TRACING: CPT

## 2023-09-07 PROCEDURE — 36415 COLL VENOUS BLD VENIPUNCTURE: CPT

## 2023-09-07 RX ORDER — AMOXICILLIN AND CLAVULANATE POTASSIUM 500; 125 MG/1; MG/1
1 TABLET, FILM COATED ORAL EVERY 12 HOURS SCHEDULED
COMMUNITY
Start: 2023-09-04

## 2023-09-07 NOTE — DISCHARGE INSTRUCTIONS
Arrive to hospital on your day of surgery at 8AM      Take the following medications the morning of surgery:PANTOPRAZOLE/BRING INHALER DAY OF SURGERY      If you are on prescription narcotic pain medication to control your pain you may also take that medication the morning of surgery.    General Instructions:  Do not eat solid food after midnight the night before surgery.  You may drink clear liquids day of surgery but must stop at least one hour before your hospital arrival time.  It is beneficial for you to have a clear drink that contains carbohydrates the day of surgery.  We suggest a 12 to 20 ounce bottle of Gatorade or Powerade for non-diabetic patients or a 12 to 20 ounce bottle of G2 or Powerade Zero for diabetic patients. (Pediatric patients, are not advised to drink a 12 to 20 ounce carbohydrate drink)    Clear liquids are liquids you can see through.  Nothing red in color.     Plain water                               Sports drinks  Sodas                                   Gelatin (Jell-O)  Fruit juices without pulp such as white grape juice and apple juice  Popsicles that contain no fruit or yogurt  Tea or coffee (no cream or milk added)  Gatorade / Powerade  G2 / Powerade Zero    Infants may have breast milk up to four hours before surgery.  Infants drinking formula may drink formula up to six hours before surgery.   Patients who avoid smoking, chewing tobacco and alcohol for 4 weeks prior to surgery have a reduced risk of post-operative complications.  Quit smoking as many days before surgery as you can.  Do not smoke, use chewing tobacco or drink alcohol the day of surgery.   If applicable bring your C-PAP/ BI-PAP machine in with you to preop day of surgery.  Bring any papers given to you in the doctor’s office.  Wear clean comfortable clothes.  Do not wear contact lenses, false eyelashes or make-up.  Bring a case for your glasses.   Bring crutches or walker if applicable.  Remove all piercings.  Leave  jewelry and any other valuables at home.  Hair extensions with metal clips must be removed prior to surgery.  The Pre-Admission Testing nurse will instruct you to bring medications if unable to obtain an accurate list in Pre-Admission Testing.        If you were given a blood bank ID arm band remember to bring it with you the day of surgery.    Preventing a Surgical Site Infection:  For 2 to 3 days before surgery, avoid shaving with a razor because the razor can irritate skin and make it easier to develop an infection.    Any areas of open skin can increase the risk of a post-operative wound infection by allowing bacteria to enter and travel throughout the body.  Notify your surgeon if you have any skin wounds / rashes even if it is not near the expected surgical site.  The area will need assessed to determine if surgery should be delayed until it is healed.  The night prior to surgery shower using a fresh bar of anti-bacterial soap (such as Dial) and clean washcloth.  Sleep in a clean bed with clean clothing.  Do not allow pets to sleep with you.  Shower on the morning of surgery using a fresh bar of anti-bacterial soap (such as Dial) and clean washcloth.  Dry with a clean towel and dress in clean clothing.  Ask your surgeon if you will be receiving antibiotics prior to surgery.  Make sure you, your family, and all healthcare providers clean their hands with soap and water or an alcohol based hand  before caring for you or your wound.    Day of surgery:  Your arrival time is approximately two hours before your scheduled surgery time.  Upon arrival, a Pre-op nurse and Anesthesiologist will review your health history, obtain vital signs, and answer questions you may have.  The only belongings needed at this time will be a list of your home medications and if applicable your C-PAP/BI-PAP machine.  A Pre-op nurse will start an IV and you may receive medication in preparation for surgery, including something to  help you relax.     Please be aware that surgery does come with discomfort.  We want to make every effort to control your discomfort so please discuss any uncontrolled symptoms with your nurse.   Your doctor will most likely have prescribed pain medications.      If you are going home after surgery you will receive individualized written care instructions before being discharged.  A responsible adult must drive you to and from the hospital on the day of your surgery and stay with you for 24 hours.  Discharge prescriptions can be filled by the hospital pharmacy during regular pharmacy hours.  If you are having surgery late in the day/evening your prescription may be e-prescribed to your pharmacy.  Please verify your pharmacy hours or chose a 24 hour pharmacy to avoid not having access to your prescription because your pharmacy has closed for the day.    If you are staying overnight following surgery, you will be transported to your hospital room following the recovery period.  [facility] has all private rooms.    If you have any questions please call Pre-Admission Testing at [phone].  Deductibles and co-payments are collected on the day of service. Please be prepared to pay the required co-pay, deductible or deposit on the day of service as defined by your plan.    Call your surgeon immediately if you experience any of the following symptoms:  Sore Throat  Shortness of Breath or difficulty breathing  Cough  Chills  Body soreness or muscle pain  Headache  Fever  New loss of taste or smell  Do not arrive for your surgery ill.  Your procedure will need to be rescheduled to another time.  You will need to call your physician before the day of surgery to avoid any unnecessary exposure to hospital staff as well as other patients.

## 2023-09-08 LAB
QT INTERVAL: 413 MS
QTC INTERVAL: 423 MS

## 2023-09-18 ENCOUNTER — TELEPHONE (OUTPATIENT)
Dept: ORTHOPEDIC SURGERY | Facility: CLINIC | Age: 78
End: 2023-09-18
Payer: MEDICARE

## 2023-09-18 NOTE — TELEPHONE ENCOUNTER
Caller: Mahnaz Becerra    Relationship to patient: Self    Best call back number:   [phone]    Patient is needing: UNABLE TO WARM TRANSFER.  PATIENT IS RETURNING [facility]NETTES PHONE CALL AND HAS SURGERY TOMORROW MORNING

## 2023-09-18 NOTE — H&P
"Patient: Mahnaz Becerra     YOB: 1945 78 y.o. female     Chief Complaints: Bump on right big toe hurts     History of Present Illness:Patient was seen on 3/20/2023 reporting that she injured her left third toe on 3/25/2023 when she struck her bedpost with it.  She had pain swelling and bruising which has improved some mainly over the DIP joint of the third toe but still with moderate complaints of pain.     Reviewed with her that she potentially had a traumatic third mallet toe and reviewed with her there really was not a great way to splint that but did not recommend surgical treatment at that time.  She was instructed on swapnil wrapping her second third and fourth toe and use of silicone spacers and postoperative shoe     We discussed potentially eventually proceeding with surgical treatment of the right foot as we discussed previously which wanted to hold off on.  She was still getting over a knee replacement and had a history of MS and her right side was her \"bad side\".     Patient was seen on 4/20/2023 stating that she is doing better still with some discomfort in left third toe but was improving without discomfort around the third MTP joint.  Current pain was rated 0 out of 10     We discussed treatment at that time and nothing was bothering her enough for the left third toe to consider surgical treatment nor did she desire surgical treatment for the left or right first MTP joints or gout midfoot arthritis.  She was instructed on weaning out of postoperative shoe and getting discharged accommodative athletic shoes from [employer].  She instructed on heel cord stretching exercises and use of Voltaren gel around the first MTP joint and third toe if needed     Patient was seen on 7/31/2023 stating that she got some Hoka's from [employer] her left foot was doing okay.  She reported pain around the right first MTP joint mainly over her dorsal medial eminence that did rub in her shoes had not had any skin " "breakdown and really did not have pain with range of motion to the right first MTP joint.  She did not have pain associated with second hammertoe.  She had some occasional discomfort around the third PIP joint of the right foot but nothing terribly bothersome.  She has no right ankle pain.  Current pain is rated 0 out of 10           I have seen her in the past on 1/11/2021 for a right fifth toe fracture as well as a left first MTP arthritis with mild hallux valgus mainly with symptoms of pain with shoes rubbing over the dorsal prominence and we had plan to proceed with cheilectomy which she decided to hold off on until she got her COVID vaccines and then subsequently had her knee replaced and is just now \"getting over that\".  Right ankle was not bothersome to her at that time.  HPI     ROS: Foot pain  Medical History[]Expand by Default        Past Medical History:   Diagnosis Date    Acid reflux      Anesthesia complication       ANESTHESIA HAS BROUGHT ON ASTHMA ATTACKS     Asthma      Bronchitis      [place]       FREQUENT    Edema       LOWER EXT    Gastritis      Heart murmur      History of Clostridioides difficile infection      History of skin cancer       BACK    IBS (irritable bowel syndrome)      Knee pain, right      MS (multiple sclerosis)      Osteoarthritis      PONV (postoperative nausea and vomiting)       Patient states she had post-op nausea and vomiting after hysterectomy in 1987.    Sleep apnea       CANT TOLERATE CPAP    Tremor       RIGHT ARM         Physical Exam:   Vitals       Vitals:     07/31/23 1538   Temp: 97.6 °F (36.4 °C)   Weight: 86.6 kg (191 lb)   Height: 162.6 cm (64\")   PainSc: 0-No pain      Performed 7/31/2023  Well developed with normal mood.  Right foot does show hallux valgus deformity but no specific pain with range of motion.  There is prominence over the dorsal medial aspect of the first MTP joint which is somewhat tender to palpation but has good extension " strength.        Radiology: 3 views of the right foot ordered evaluate pain and alignment reviewed and compared to previous x-rays from 1/11/2021.  There has been some progression of arthritic change with mild valgus deformity of the first MTP joint.  There Meints arthritis to the midfoot as well as some hammering and crossover deformity of the second more so than third and fourth and somewhat to the fifth toes.        Assessment/Plan: 1.  Right hallux valgus with first MTP arthritis and dorsal medial eminence with asymptomatic second hammertoe and minimally symptomatic third hammertoe  2.  Previous left third toe contusion with probable traumatic mallet toe  3.  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     On 7/31/2023 discussed treatment going forward and the right second and third toes are not bothering her bad enough to do anything surgically on the right foot and we reviewed treatment options for the right great toe and she n was ot really having pain other than with the dorsal medial eminence and did not wish to proceed with fusion but wished to proceed with surgical treatment to remove the dorsal medial eminence     Reviewed her that we could certainly do that and hopefully this will help decompress this area some but could have worsening symptoms of arthritis/hallux valgus that could eventually necessitate fusion which she clearly understood     Plans were made to proceed with right first MTP dorsal cheilectomy only.     She voiced clear understanding the operative procedure and postoperative course with associated risk benefits potential outcomes and complications which can include but not limited to heart attack stroke death pneumonia infection bleeding damage to blood vessels nerves or tendons blood clots pulmonary embolism persistent or worsening pain stiffness instability need for subsequent surgery and failure to return to presurgery and precondition levels of activity.     All of her  questions were answered to her full satisfaction with plans made proceed with this on outpatient basis at mutually convenient time.     She was encouraged to call if she had any questions prior to surgery     Copied text in this note has been reviewed by me and is accurate as of  09/18/23 .

## 2023-09-19 ENCOUNTER — ANESTHESIA (OUTPATIENT)
Dept: PERIOP | Facility: HOSPITAL | Age: 78
End: 2023-09-19
Payer: MEDICARE

## 2023-09-19 ENCOUNTER — APPOINTMENT (OUTPATIENT)
Dept: GENERAL RADIOLOGY | Facility: HOSPITAL | Age: 78
End: 2023-09-19

## 2023-09-19 ENCOUNTER — HOSPITAL ENCOUNTER (OUTPATIENT)
Facility: HOSPITAL | Age: 78
Setting detail: HOSPITAL OUTPATIENT SURGERY
Discharge: HOME | End: 2023-09-19
Attending: ORTHOPAEDIC SURGERY | Admitting: ORTHOPAEDIC SURGERY

## 2023-09-19 ENCOUNTER — ANESTHESIA EVENT (OUTPATIENT)
Dept: PERIOP | Facility: HOSPITAL | Age: 78
End: 2023-09-19
Payer: MEDICARE

## 2023-09-19 VITALS
OXYGEN SATURATION: 96 % | DIASTOLIC BLOOD PRESSURE: 70 MMHG | SYSTOLIC BLOOD PRESSURE: 133 MMHG | HEART RATE: 69 BPM | RESPIRATION RATE: 18 BRPM | TEMPERATURE: 97.3 F

## 2023-09-19 DIAGNOSIS — M19.071 ARTHRITIS OF FIRST METATARSOPHALANGEAL (MTP) JOINT OF RIGHT FOOT: Primary | ICD-10-CM

## 2023-09-19 PROCEDURE — 25010000002 MIDAZOLAM PER 1 MG: Performed by: ANESTHESIOLOGY

## 2023-09-19 PROCEDURE — 25010000002 CEFAZOLIN IN DEXTROSE 2-4 GM/100ML-% SOLUTION: Performed by: ORTHOPAEDIC SURGERY

## 2023-09-19 PROCEDURE — 76000 FLUOROSCOPY <1 HR PHYS/QHP: CPT

## 2023-09-19 PROCEDURE — 25010000002 FENTANYL CITRATE (PF) 50 MCG/ML SOLUTION: Performed by: ANESTHESIOLOGY

## 2023-09-19 PROCEDURE — 25010000002 KETOROLAC TROMETHAMINE PER 15 MG: Performed by: NURSE ANESTHETIST, CERTIFIED REGISTERED

## 2023-09-19 PROCEDURE — 25010000002 ONDANSETRON PER 1 MG: Performed by: NURSE ANESTHETIST, CERTIFIED REGISTERED

## 2023-09-19 PROCEDURE — 25010000002 ROPIVACAINE PER 1 MG: Performed by: ANESTHESIOLOGY

## 2023-09-19 PROCEDURE — 25010000002 DEXAMETHASONE SODIUM PHOSPHATE 20 MG/5ML SOLUTION: Performed by: ANESTHESIOLOGY

## 2023-09-19 PROCEDURE — 25010000002 FENTANYL CITRATE (PF) 50 MCG/ML SOLUTION: Performed by: NURSE ANESTHETIST, CERTIFIED REGISTERED

## 2023-09-19 PROCEDURE — 25010000002 PROPOFOL 10 MG/ML EMULSION: Performed by: NURSE ANESTHETIST, CERTIFIED REGISTERED

## 2023-09-19 PROCEDURE — 73620 X-RAY EXAM OF FOOT: CPT

## 2023-09-19 PROCEDURE — 28289 CORRJ HALUX RIGDUS W/O IMPLT: CPT | Performed by: ORTHOPAEDIC SURGERY

## 2023-09-19 RX ORDER — KETOROLAC TROMETHAMINE 30 MG/ML
INJECTION, SOLUTION INTRAMUSCULAR; INTRAVENOUS AS NEEDED
Status: DISCONTINUED | OUTPATIENT
Start: 2023-09-19 | End: 2023-09-19 | Stop reason: SURG

## 2023-09-19 RX ORDER — NALOXONE HCL 0.4 MG/ML
0.2 VIAL (ML) INJECTION AS NEEDED
Status: DISCONTINUED | OUTPATIENT
Start: 2023-09-19 | End: 2023-09-19 | Stop reason: HOSPADM

## 2023-09-19 RX ORDER — ONDANSETRON 2 MG/ML
4 INJECTION INTRAMUSCULAR; INTRAVENOUS ONCE AS NEEDED
Status: DISCONTINUED | OUTPATIENT
Start: 2023-09-19 | End: 2023-09-19 | Stop reason: HOSPADM

## 2023-09-19 RX ORDER — OXYCODONE HYDROCHLORIDE AND ACETAMINOPHEN 5; 325 MG/1; MG/1
1 TABLET ORAL ONCE AS NEEDED
Status: DISCONTINUED | OUTPATIENT
Start: 2023-09-19 | End: 2023-09-19 | Stop reason: HOSPADM

## 2023-09-19 RX ORDER — DROPERIDOL 2.5 MG/ML
0.62 INJECTION, SOLUTION INTRAMUSCULAR; INTRAVENOUS
Status: DISCONTINUED | OUTPATIENT
Start: 2023-09-19 | End: 2023-09-19 | Stop reason: HOSPADM

## 2023-09-19 RX ORDER — LABETALOL HYDROCHLORIDE 5 MG/ML
5 INJECTION, SOLUTION INTRAVENOUS
Status: DISCONTINUED | OUTPATIENT
Start: 2023-09-19 | End: 2023-09-19 | Stop reason: HOSPADM

## 2023-09-19 RX ORDER — HYDRALAZINE HYDROCHLORIDE 20 MG/ML
5 INJECTION INTRAMUSCULAR; INTRAVENOUS
Status: DISCONTINUED | OUTPATIENT
Start: 2023-09-19 | End: 2023-09-19 | Stop reason: HOSPADM

## 2023-09-19 RX ORDER — DEXAMETHASONE SODIUM PHOSPHATE 4 MG/ML
INJECTION, SOLUTION INTRA-ARTICULAR; INTRALESIONAL; INTRAMUSCULAR; INTRAVENOUS; SOFT TISSUE
Status: COMPLETED | OUTPATIENT
Start: 2023-09-19 | End: 2023-09-19

## 2023-09-19 RX ORDER — ROPIVACAINE HYDROCHLORIDE 5 MG/ML
INJECTION, SOLUTION EPIDURAL; INFILTRATION; PERINEURAL
Status: COMPLETED | OUTPATIENT
Start: 2023-09-19 | End: 2023-09-19

## 2023-09-19 RX ORDER — CEPHALEXIN 500 MG/1
500 CAPSULE ORAL EVERY 6 HOURS
Qty: 8 CAPSULE | Refills: 0 | Status: SHIPPED | OUTPATIENT
Start: 2023-09-19 | End: 2023-09-21

## 2023-09-19 RX ORDER — ASPIRIN 325 MG
325 TABLET, DELAYED RELEASE (ENTERIC COATED) ORAL DAILY
Qty: 30 TABLET | Refills: 0 | Status: SHIPPED | OUTPATIENT
Start: 2023-09-20

## 2023-09-19 RX ORDER — OXYCODONE AND ACETAMINOPHEN 7.5; 325 MG/1; MG/1
1 TABLET ORAL EVERY 4 HOURS PRN
Status: DISCONTINUED | OUTPATIENT
Start: 2023-09-19 | End: 2023-09-19 | Stop reason: HOSPADM

## 2023-09-19 RX ORDER — ONDANSETRON 2 MG/ML
INJECTION INTRAMUSCULAR; INTRAVENOUS AS NEEDED
Status: DISCONTINUED | OUTPATIENT
Start: 2023-09-19 | End: 2023-09-19 | Stop reason: SURG

## 2023-09-19 RX ORDER — FENTANYL CITRATE 50 UG/ML
100 INJECTION, SOLUTION INTRAMUSCULAR; INTRAVENOUS
Status: DISCONTINUED | OUTPATIENT
Start: 2023-09-19 | End: 2023-09-19 | Stop reason: HOSPADM

## 2023-09-19 RX ORDER — SODIUM CHLORIDE, SODIUM LACTATE, POTASSIUM CHLORIDE, CALCIUM CHLORIDE 600; 310; 30; 20 MG/100ML; MG/100ML; MG/100ML; MG/100ML
9 INJECTION, SOLUTION INTRAVENOUS CONTINUOUS
Status: DISCONTINUED | OUTPATIENT
Start: 2023-09-19 | End: 2023-09-19 | Stop reason: HOSPADM

## 2023-09-19 RX ORDER — EPHEDRINE SULFATE 50 MG/ML
5 INJECTION, SOLUTION INTRAVENOUS ONCE AS NEEDED
Status: DISCONTINUED | OUTPATIENT
Start: 2023-09-19 | End: 2023-09-19 | Stop reason: HOSPADM

## 2023-09-19 RX ORDER — MAGNESIUM HYDROXIDE 1200 MG/15ML
LIQUID ORAL AS NEEDED
Status: DISCONTINUED | OUTPATIENT
Start: 2023-09-19 | End: 2023-09-19 | Stop reason: HOSPADM

## 2023-09-19 RX ORDER — DIPHENHYDRAMINE HYDROCHLORIDE 50 MG/ML
12.5 INJECTION INTRAMUSCULAR; INTRAVENOUS
Status: DISCONTINUED | OUTPATIENT
Start: 2023-09-19 | End: 2023-09-19 | Stop reason: HOSPADM

## 2023-09-19 RX ORDER — PHENAZOPYRIDINE HYDROCHLORIDE 200 MG/1
200 TABLET, FILM COATED ORAL ONCE
Status: COMPLETED | OUTPATIENT
Start: 2023-09-19 | End: 2023-09-19

## 2023-09-19 RX ORDER — SODIUM CHLORIDE 0.9 % (FLUSH) 0.9 %
3 SYRINGE (ML) INJECTION EVERY 12 HOURS SCHEDULED
Status: DISCONTINUED | OUTPATIENT
Start: 2023-09-19 | End: 2023-09-19 | Stop reason: HOSPADM

## 2023-09-19 RX ORDER — SODIUM CHLORIDE 0.9 % (FLUSH) 0.9 %
3-10 SYRINGE (ML) INJECTION AS NEEDED
Status: DISCONTINUED | OUTPATIENT
Start: 2023-09-19 | End: 2023-09-19 | Stop reason: HOSPADM

## 2023-09-19 RX ORDER — FLUMAZENIL 0.1 MG/ML
0.2 INJECTION INTRAVENOUS AS NEEDED
Status: DISCONTINUED | OUTPATIENT
Start: 2023-09-19 | End: 2023-09-19 | Stop reason: HOSPADM

## 2023-09-19 RX ORDER — PROMETHAZINE HYDROCHLORIDE 25 MG/1
25 SUPPOSITORY RECTAL ONCE AS NEEDED
Status: DISCONTINUED | OUTPATIENT
Start: 2023-09-19 | End: 2023-09-19 | Stop reason: HOSPADM

## 2023-09-19 RX ORDER — HYDROMORPHONE HYDROCHLORIDE 1 MG/ML
0.5 INJECTION, SOLUTION INTRAMUSCULAR; INTRAVENOUS; SUBCUTANEOUS
Status: DISCONTINUED | OUTPATIENT
Start: 2023-09-19 | End: 2023-09-19 | Stop reason: HOSPADM

## 2023-09-19 RX ORDER — MIDAZOLAM HYDROCHLORIDE 1 MG/ML
2 INJECTION INTRAMUSCULAR; INTRAVENOUS
Status: COMPLETED | OUTPATIENT
Start: 2023-09-19 | End: 2023-09-19

## 2023-09-19 RX ORDER — FENTANYL CITRATE 50 UG/ML
50 INJECTION, SOLUTION INTRAMUSCULAR; INTRAVENOUS
Status: DISCONTINUED | OUTPATIENT
Start: 2023-09-19 | End: 2023-09-19 | Stop reason: HOSPADM

## 2023-09-19 RX ORDER — PROMETHAZINE HYDROCHLORIDE 25 MG/1
25 TABLET ORAL ONCE AS NEEDED
Status: DISCONTINUED | OUTPATIENT
Start: 2023-09-19 | End: 2023-09-19 | Stop reason: HOSPADM

## 2023-09-19 RX ORDER — NALOXONE HYDROCHLORIDE 4 MG/.1ML
SPRAY NASAL
Qty: 2 EACH | Refills: 0 | Status: SHIPPED | OUTPATIENT
Start: 2023-09-19

## 2023-09-19 RX ORDER — LIDOCAINE HYDROCHLORIDE 20 MG/ML
INJECTION, SOLUTION EPIDURAL; INFILTRATION; INTRACAUDAL; PERINEURAL AS NEEDED
Status: DISCONTINUED | OUTPATIENT
Start: 2023-09-19 | End: 2023-09-19 | Stop reason: SURG

## 2023-09-19 RX ORDER — FENTANYL CITRATE 50 UG/ML
INJECTION, SOLUTION INTRAMUSCULAR; INTRAVENOUS AS NEEDED
Status: DISCONTINUED | OUTPATIENT
Start: 2023-09-19 | End: 2023-09-19 | Stop reason: SURG

## 2023-09-19 RX ORDER — PROPOFOL 10 MG/ML
VIAL (ML) INTRAVENOUS AS NEEDED
Status: DISCONTINUED | OUTPATIENT
Start: 2023-09-19 | End: 2023-09-19 | Stop reason: SURG

## 2023-09-19 RX ORDER — EPHEDRINE SULFATE 50 MG/ML
INJECTION, SOLUTION INTRAVENOUS AS NEEDED
Status: DISCONTINUED | OUTPATIENT
Start: 2023-09-19 | End: 2023-09-19 | Stop reason: SURG

## 2023-09-19 RX ORDER — OXYCODONE HYDROCHLORIDE AND ACETAMINOPHEN 5; 325 MG/1; MG/1
1-2 TABLET ORAL EVERY 4 HOURS PRN
Qty: 42 TABLET | Refills: 0 | Status: SHIPPED | OUTPATIENT
Start: 2023-09-19

## 2023-09-19 RX ORDER — CEFAZOLIN SODIUM 2 G/100ML
2 INJECTION, SOLUTION INTRAVENOUS ONCE
Status: COMPLETED | OUTPATIENT
Start: 2023-09-19 | End: 2023-09-19

## 2023-09-19 RX ORDER — IPRATROPIUM BROMIDE AND ALBUTEROL SULFATE 2.5; .5 MG/3ML; MG/3ML
3 SOLUTION RESPIRATORY (INHALATION) ONCE AS NEEDED
Status: DISCONTINUED | OUTPATIENT
Start: 2023-09-19 | End: 2023-09-19 | Stop reason: HOSPADM

## 2023-09-19 RX ORDER — HYDROCODONE BITARTRATE AND ACETAMINOPHEN 7.5; 325 MG/1; MG/1
1 TABLET ORAL ONCE AS NEEDED
Status: DISCONTINUED | OUTPATIENT
Start: 2023-09-19 | End: 2023-09-19 | Stop reason: HOSPADM

## 2023-09-19 RX ADMIN — SODIUM CHLORIDE, POTASSIUM CHLORIDE, SODIUM LACTATE AND CALCIUM CHLORIDE 9 ML/HR: 600; 310; 30; 20 INJECTION, SOLUTION INTRAVENOUS at 08:32

## 2023-09-19 RX ADMIN — DEXAMETHASONE SODIUM PHOSPHATE 4 MG: 4 INJECTION, SOLUTION INTRAMUSCULAR; INTRAVENOUS at 09:36

## 2023-09-19 RX ADMIN — MIDAZOLAM 1 MG: 1 INJECTION INTRAMUSCULAR; INTRAVENOUS at 09:29

## 2023-09-19 RX ADMIN — FENTANYL CITRATE 50 MCG: 50 INJECTION, SOLUTION INTRAMUSCULAR; INTRAVENOUS at 13:16

## 2023-09-19 RX ADMIN — EPHEDRINE SULFATE 10 MG: 50 INJECTION INTRAVENOUS at 13:27

## 2023-09-19 RX ADMIN — EPHEDRINE SULFATE 5 MG: 50 INJECTION INTRAVENOUS at 14:07

## 2023-09-19 RX ADMIN — ROPIVACAINE HYDROCHLORIDE 20 ML: 5 INJECTION EPIDURAL; INFILTRATION; PERINEURAL at 09:36

## 2023-09-19 RX ADMIN — PHENAZOPYRIDINE 200 MG: 200 TABLET ORAL at 15:37

## 2023-09-19 RX ADMIN — FENTANYL CITRATE 50 MCG: 50 INJECTION, SOLUTION INTRAMUSCULAR; INTRAVENOUS at 09:24

## 2023-09-19 RX ADMIN — PROPOFOL 25 MCG/KG/MIN: 10 INJECTION, EMULSION INTRAVENOUS at 13:19

## 2023-09-19 RX ADMIN — EPHEDRINE SULFATE 5 MG: 50 INJECTION INTRAVENOUS at 13:32

## 2023-09-19 RX ADMIN — PROPOFOL 150 MG: 10 INJECTION, EMULSION INTRAVENOUS at 13:16

## 2023-09-19 RX ADMIN — MIDAZOLAM 1 MG: 1 INJECTION INTRAMUSCULAR; INTRAVENOUS at 09:24

## 2023-09-19 RX ADMIN — ROPIVACAINE HYDROCHLORIDE 10 ML: 5 INJECTION, SOLUTION EPIDURAL; INFILTRATION; PERINEURAL at 09:30

## 2023-09-19 RX ADMIN — KETOROLAC TROMETHAMINE 15 MG: 30 INJECTION, SOLUTION INTRAMUSCULAR; INTRAVENOUS at 14:01

## 2023-09-19 RX ADMIN — LIDOCAINE HYDROCHLORIDE 60 MG: 20 INJECTION, SOLUTION EPIDURAL; INFILTRATION; INTRACAUDAL; PERINEURAL at 13:16

## 2023-09-19 RX ADMIN — ONDANSETRON 4 MG: 2 INJECTION INTRAMUSCULAR; INTRAVENOUS at 14:06

## 2023-09-19 RX ADMIN — FENTANYL CITRATE 50 MCG: 50 INJECTION, SOLUTION INTRAMUSCULAR; INTRAVENOUS at 14:20

## 2023-09-19 RX ADMIN — DEXAMETHASONE SODIUM PHOSPHATE 8 MG: 4 INJECTION, SOLUTION INTRAMUSCULAR; INTRAVENOUS at 13:28

## 2023-09-19 RX ADMIN — CEFAZOLIN SODIUM 2 G: 2 INJECTION, SOLUTION INTRAVENOUS at 12:54

## 2023-09-19 NOTE — INTERVAL H&P NOTE
H&P updated. The patient was examined and is without change in complaints of mainly pain that isolates mostly to the dorsal medial more so than lateral aspect of the first MTP joint worse with shoewear but without pain with range of motion.  As previously discussed we will plan on right first MTP debridement with bone spur removal and synovectomy as needed but no plans for fusion or implants.  She and her  voiced clear understanding of the risk benefits potential outcomes and complications associated with this including worsening pain and need for subsequent fusion.  There was clear understanding of postoperative protocol and had no further questions.

## 2023-09-19 NOTE — ANESTHESIA PROCEDURE NOTES
Airway  Urgency: elective    Date/Time: 9/19/2023 1:18 PM  Airway not difficult    General Information and Staff    Patient location during procedure: OR  Anesthesiologist: Juan Pablo Soler MD  CRNA/CAA: Zelda Delgado CRNA    Indications and Patient Condition  Indications for airway management: airway protection    Preoxygenated: yes  Mask difficulty assessment: 1 - vent by mask    Final Airway Details  Final airway type: supraglottic airway      Successful airway: classic  Size 4     Number of attempts at approach: 1  Assessment: lips, teeth, and gum same as pre-op    Additional Comments  PreO2, IV induction, easy mask, LMA placed w/o difficulty, cuff air placed, green zone, secured in placed, EBBSH, +etCO2, atraumatic, teeth and lips as preop.

## 2023-09-19 NOTE — ANESTHESIA POSTPROCEDURE EVALUATION
Patient: Mahnaz Becerra    Procedure Summary       Date: 09/19/23 Room / Location:  [facility]  /  [facility]    Anesthesia Start: 1303 Anesthesia Stop: 1437    Procedure: right first metatarsophalangeal joint bone spur removal and synovectomy (Right: Foot) Diagnosis:       Arthritis of first metatarsophalangeal (MTP) joint of right foot      (Arthritis of first metatarsophalangeal (MTP) joint of right foot [M19.071])    Surgeons: Collin Alvarado MD Provider: Juan Pablo Soler MD    Anesthesia Type: general with block ASA Status: 3            Anesthesia Type: general with block    Vitals  Vitals Value Taken Time   /71 09/19/23 1530   Temp 36.5 °C (97.7 °F) 09/19/23 1435   Pulse 68 09/19/23 1535   Resp 13 09/19/23 1515   SpO2 98 % 09/19/23 1535   Vitals shown include unvalidated device data.        Post Anesthesia Care and Evaluation    Patient location during evaluation: PACU  Patient participation: complete - patient participated  Level of consciousness: awake  Pain management: adequate    Airway patency: patent  Anesthetic complications: No anesthetic complications  PONV Status: none  Cardiovascular status: stable  Respiratory status: acceptable  Hydration status: acceptable

## 2023-09-19 NOTE — ANESTHESIA PROCEDURE NOTES
Peripheral Block    Pre-sedation assessment completed: 9/19/2023 9:30 AM    Patient reassessed immediately prior to procedure    Patient location during procedure: pre-op  Start time: 9/19/2023 9:30 AM  Stop time: 9/19/2023 9:36 AM  Reason for block: at surgeon's request and post-op pain management  Performed by  Anesthesiologist: Juan Pablo Soler MD  Preanesthetic Checklist  Completed: patient identified, IV checked, site marked, risks and benefits discussed, surgical consent, monitors and equipment checked, pre-op evaluation and timeout performed  Prep:  Pt Position: supine  Sterile barriers:cap, gloves, mask and sterile barriers  Prep: ChloraPrep  Patient monitoring: blood pressure monitoring, continuous pulse oximetry and EKG  Procedure    Sedation: yes  Performed under: local infiltration  Guidance:ultrasound guided    ULTRASOUND INTERPRETATION.  Using ultrasound guidance a 22 G gauge needle was placed in close proximity to the sciatic nerve, at which point, under ultrasound guidance anesthetic was injected in the area of the nerve and spread of the anesthesia was seen on ultrasound in close proximity thereto.  There were no abnormalities seen on ultrasound; a digital image was taken; and the patient tolerated the procedure with no complications. Images:still images obtained, printed/placed on chart (U/S used to localize the nerve)    Laterality:right  Block Type:popliteal and sciatic  Injection Technique:single-shot  Needle Type:echogenic  Needle Gauge:22 G  Resistance on Injection: less than 15 psi    Medications Used: dexamethasone (DECADRON) injection - Injection   4 mg - 9/19/2023 9:36:00 AM  ropivacaine (NAROPIN) 0.5 % injection - Injection   20 mL - 9/19/2023 9:36:00 AM      Post Assessment  Injection Assessment: negative aspiration for heme, no paresthesia on injection and incremental injection  Patient Tolerance:comfortable throughout block  Complications:no  Additional Notes

## 2023-09-19 NOTE — ANESTHESIA PREPROCEDURE EVALUATION
Anesthesia Evaluation     Patient summary reviewed and Nursing notes reviewed   history of anesthetic complications:  PONV  NPO Solid Status: > 8 hours  NPO Liquid Status: > 2 hours           Airway   Mallampati: II  TM distance: >3 FB  Neck ROM: full  Dental      Pulmonary    (+) asthma,sleep apnea  Cardiovascular     ECG reviewed    (+) hypertension      Neuro/Psych  (+) headaches, psychiatric history    ROS Comment: Multiple Sclerosis  GI/Hepatic/Renal/Endo    (+) obesity, GERD, thyroid problem hypothyroidism    Musculoskeletal     Abdominal    Substance History      OB/GYN          Other                        Anesthesia Plan    ASA 3     general with block     (Patient has had a couple episodes of PONV. Given age and delayed emergence from anesthesia, will avoid scope patch. Plan for decadron, zofran and sub-hypnotic propofol infusion. )  intravenous induction     Anesthetic plan, risks, benefits, and alternatives have been provided, discussed and informed consent has been obtained with: patient.      CODE STATUS:

## 2023-09-19 NOTE — BRIEF OP NOTE
CHEILECTOMY  Progress Note    Mahnaz Becerra  9/19/2023    Pre-op Diagnosis:   Arthritis of first metatarsophalangeal (MTP) joint of right foot [M19.071]       Post-Op Diagnosis Codes:     * Arthritis of first metatarsophalangeal (MTP) joint of right foot [M19.071]    Procedure/CPT® Codes:        Procedure(s):  right first metatarsophalangeal joint bone spur removal and synovectomy as needed              Surgeon(s):  Collin Alvarado MD    Anesthesia: General with Block    Staff:   Circulator: Zina Machuca RN  Radiology Technologist: Yudi Perry  Scrub Person: Juarez Chen 24 min    Estimated Blood Loss: 5 mL    Urine Voided: * No values recorded between 9/19/2023  1:03 PM and 9/19/2023  2:26 PM *    Specimens:                None          Drains: * No LDAs found *    Findings: see dict        Complications: none          Collin Alvarado MD     Date: 9/19/2023  Time: 14:30 EDT

## 2023-09-19 NOTE — ANESTHESIA PROCEDURE NOTES
Peripheral Block    Pre-sedation assessment completed: 9/19/2023 9:25 AM    Patient reassessed immediately prior to procedure    Patient location during procedure: pre-op  Start time: 9/19/2023 9:25 AM  Stop time: 9/19/2023 9:30 AM  Reason for block: at surgeon's request and post-op pain management  Performed by  Anesthesiologist: Juan Pablo Soler MD  Preanesthetic Checklist  Completed: patient identified, IV checked, site marked, risks and benefits discussed, surgical consent, monitors and equipment checked, pre-op evaluation and timeout performed  Prep:  Pt Position: supine  Sterile barriers:cap, gloves, mask and sterile barriers  Prep: ChloraPrep  Patient monitoring: blood pressure monitoring, continuous pulse oximetry and EKG  Procedure    Sedation: yes  Performed under: local infiltration  Guidance:ultrasound guided    ULTRASOUND INTERPRETATION.  Using ultrasound guidance a 22 G gauge needle was placed in close proximity to the nerve, at which point, under ultrasound guidance anesthetic was injected in the area of the nerve and spread of the anesthesia was seen on ultrasound in close proximity thereto.  There were no abnormalities seen on ultrasound; a digital image was taken; and the patient tolerated the procedure with no complications. Images:still images obtained, printed/placed on chart (U/S to localize the nerve)    Laterality:right  Block Type:saphenous  Injection Technique:single-shot  Needle Type:echogenic  Needle Gauge:22 G  Resistance on Injection: less than 15 psi    Medications Used: ropivacaine (NAROPIN) 0.5 % injection - Injection   10 mL - 9/19/2023 9:30:00 AM      Post Assessment  Injection Assessment: negative aspiration for heme, no paresthesia on injection and incremental injection  Patient Tolerance:comfortable throughout block  Complications:no

## 2023-09-19 NOTE — OP NOTE
Operative Note      Facility: [facility]  Patient Name: Mahnaz Becerra  YOB: 1945  Date: 9/19/2023  Medical Record Number: [id number]      Pre-op Diagnosis: 1.  Right hallux valgus with first metatarsal phalangeal joint arthritis with exostosis      Post-op Diagnosis:1.  Right hallux valgus with first metatarsal phalangeal joint arthritis with exostosis        Procedure(s):  1.  Right first metatarsal phalangeal joint cheilectomy and synovectomy    Surgeon(s):  Collin Alvarado MD    Anesthesia: General with Block  Anesthesiologist: Juan Pablo Soler MD  CRNA: Zelda Delgado CRNA    Staff:   Circulator: Zina Machuca RN  Radiology Technologist: Yudi Perry  Scrub Person: Juarez Chen  Assistants : none      Tourniquet time: 24 minutes        Estimated Blood Loss:  5 mL  Drains: None  Specimens: * No orders in the log *  Findings: See Dictation    Complications: None      Indications for Procedure: Patient has a history of hallux valgus and first MTP arthritis but does not have significant pain with range of motion of the joint.  Her main complaint is a painful dorsal prominence that rubs in her shoes.  We discussed treatment options but decision made to proceed with cheilectomy rather than fusion at this time.  She and her  voiced clear understanding the operative procedure and potential outcomes with associated risk benefits potential outcomes and complications.          Description of Procedure: Preoperative informed consent and anesthesia evaluation were obtained.  IV antibiotics were administered and surgical site was marked.  Blocks administered by anesthesia.  Patient was brought to the operating room and placed in supine position.  Anesthesia was induced and LMA was positioned.    Well-padded tourniquet was placed on the right proximal thigh.  Right foot and leg were prepped and draped in a sterile fashion and surgical timeout was performed.    The first  MTP joint was localized clinically and radiographically with dorsomedial eminence that was bothering her.  Right foot and leg were exsanguinated and pneumatic tourniquet inflated to 250 mmHg.    I created a dorsal incision over the midline of the first MTP joint and carried this proximally and distally with care taken to maintain full-thickness flaps as her skin was somewhat thin.  Skin was protected and the EHL tendon was identified mobilized and retracted.  Dorsal capsulotomy was performed and full-thickness capsular flaps were elevated.  There was noted to be some hyperemic synovium that was debrided sharply with scalpel and rongeur.  Arthritic changes noted at the first MTP joint with eburnated hyperemic cartilage especially of the dorsal medial more so than dorsal lateral aspect of the first metatarsal phalangeal joint as well as dorsal osteophyte at the base of the first proximal phalanx with eburnated cartilage along the phalangeal base    The osteophyte was removed with a rongeur over the dorsum of the proximal phalanx.  I then also used a rongeur to remove a small amount of the medial eminence.  I then used a sagittal saw to resect the dorsal eminence which was more prominent medially than laterally such that this was flush with the dorsal cortex and was contoured with a rongeur.  There was still at least 70% of the joint left intact.  Wound was thoroughly irrigated.  I removed the retractors and placed the skin back over this and there was no further bony prominence.  Attempted going to range of motion there was no gross instability.    Retractors were again placed and the joint was again inspected and thoroughly irrigated with no further bony debris or hyperemic synovium noted and no instability.    The wound was again irrigated and the tourniquet was released.  Hemostasis was obtained there was good return of capillary refill to the toes.  The capsule was repaired with 2-0 Vicryl suture.  The EHL tendon  was intact.  The skin was then closed with 3-0 Vicryl and 3-0 nylon sutures.  Sterile dressing was applied she was placed in a forefoot and ankle bunion hammertoe spica type dressing    She was awakened transferred to stretcher and taken recovery in stable condition

## 2023-09-27 ENCOUNTER — TELEPHONE (OUTPATIENT)
Dept: ORTHOPEDIC SURGERY | Facility: CLINIC | Age: 78
End: 2023-09-27
Payer: MEDICARE

## 2023-09-27 NOTE — TELEPHONE ENCOUNTER
I returned patient's call.  She had surgery on 9/19/2023.  Discharge instructions admitted for nonweightbearing but had advised her she could do some partial weightbearing with her foot flat with a walker if needed.  She called a stating that she has been walking with just her postoperative shoe around the house without a cane or assistance for keeping her foot flat using assistance when she is out of the house and sleeping in a postoperative shoe.  Counseled her I prefer to at least use a cane and preferably a walker which she says she will do limiting weight on her right foot and not rocking forward on the toes.  I do not feel she needs to necessarily sleep in the postoperative shoe however.  She appreciated the call and voiced clear understanding of these instructions.

## 2023-09-27 NOTE — TELEPHONE ENCOUNTER
Provider: DR CHAU    Caller: PATIENT     Relationship to Patient: SELF     Phone Number: [phone]    Reason for Call: PATIENT HAD FOOT SURGERY (RIGHT) 9-19-23 AND HAS SOME QUESTIONS     When was the patient last seen: SX 9-19-23 AND POST OP 10-2-23      What therapies/medications have you tried: PATIENT STATES SHE WAS TOLD TO KEEP HER FOOT FLAT WHEN WALKING AFTER SURGERY. SHE IS IN A VELCRO SHOE AND WALKING WITHOUT ASSISTANCE.  SHE WANTS TO KNOW IF THIS IS OKAY. SECONDLY SHE WOULD LIKE TO KNOW IF SHE IS GOING OUT OF THE HOUSE SHOULD SHE BE USING A CANE OR SOME SORT OF ASSISTANCE TO WALK. SHE WOULD ALSO LIKE TO KNOW IF SHE HAS TO SLEEP IN THE SHOE. SHE HAS BEEN SINCE SURGERY BECAUSE SHE WAS NOT SURE. PLEASE CALL TO DISCUSS. OKAY TO LEAVE A VOICEMAIL

## 2023-10-02 ENCOUNTER — OFFICE VISIT (OUTPATIENT)
Dept: ORTHOPEDIC SURGERY | Facility: CLINIC | Age: 78
End: 2023-10-02
Payer: MEDICARE

## 2023-10-02 VITALS — WEIGHT: 196 LBS | HEIGHT: 65 IN | BODY MASS INDEX: 32.65 KG/M2 | TEMPERATURE: 97.1 F

## 2023-10-02 DIAGNOSIS — M19.071 ARTHRITIS OF FIRST METATARSOPHALANGEAL (MTP) JOINT OF RIGHT FOOT: ICD-10-CM

## 2023-10-02 DIAGNOSIS — R52 PAIN: Primary | ICD-10-CM

## 2023-10-02 RX ORDER — TRAMADOL HYDROCHLORIDE 50 MG/1
50 TABLET ORAL EVERY 6 HOURS PRN
COMMUNITY

## 2023-10-02 NOTE — PROGRESS NOTES
"Foot Follow Up      Patient: Mahnaz Becerra    YOB: 1945 78 y.o. female    Chief Complaints: Foot feels good    History of Present Illness: Patient follows up right first MTPCheilectomy and synovectomy on 9/19/2023.  She did not wish to have a fusion.  Please see notes from 7/31/2023 for details.  She had previous left third toe contusion with possible traumatic mallet toe and midfoot arthritis.    She has been partial weightbearing with her walker and does not have any complaints of significant pain in the right great toe.  She is back on her preoperative dose of tramadol that she was on for MS.  Pain is rated 0 out of 10  HPI    ROS: No foot pain  Past Medical History:   Diagnosis Date    Acid reflux     Anesthesia complication     ANESTHESIA HAS BROUGHT ON ASTHMA ATTACKS     Asthma     Bronchitis     Cat bite     RIGHT HAND, HEALING. CURRENTLY ON ANTIBIOTIC. INSTR TO LET DR CHAU'S OFFICE KNOW    Ede     FREQUENT    Diverticulitis     Gastritis     Heart murmur     History of Clostridioides difficile infection 2014    POST LEFT KNEE REPLACEMENT. MESS FOR IC    History of skin cancer     BACK    Hyperlipidemia     IBS (irritable bowel syndrome)     Migraines     MS (multiple sclerosis)     Osteoarthritis     PONV (postoperative nausea and vomiting)     Patient states she had post-op nausea and vomiting after hysterectomy in 1987.    Right foot pain     Sleep apnea     CANT TOLERATE CPAP    Tremor     RIGHT ARM    Unsteadiness on feet      Physical Exam:   Vitals:    10/02/23 1028   Temp: 97.1 °F (36.2 °C)   Weight: 88.9 kg (196 lb)   Height: 163.8 cm (64.5\")   PainSc: 0-No pain     Well developed with normal mood.  On exam her right great toe incision is healing without sign of infection.  The dorsal medial eminence that previously had been bothersome feels to be decompressed.  She did not have pain with limited motion of the first MTP joint.  There was good capillary refill to the " toe.  Calf was nontender sign of DVT.  Toe was grossly sensate to light touch.      Radiology: 3 views of the right foot ordered evaluate postoperative alignment reviewed and compared to previous x-rays.  There is no change in alignment to the first MTP joint as far as still some varus but remains mild the remains arthritic change of the first metatarsal phalangeal joint but appears to be decompression of the dorsal medial eminence compared to previous x-rays.  No evidence of instability.      Assessment/Plan: Status post right first MTP surgery as outlined above    1.  Right hallux valgus with first MTP arthritis and dorsal medial eminence with asymptomatic second hammertoe and minimally symptomatic third hammertoe  2.  Previous left third toe contusion with probable traumatic mallet toe  3.  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  Sutures removed and Steri-Strips were applied.  She was placed back into a forefoot and ankle bunion and hammertoe spica dressing.  She may do weightbearing with a postoperative shoe start transitioning to a cane.    We will see her back in 2 weeks with x-rays of her right foot.

## 2023-10-03 ENCOUNTER — TELEPHONE (OUTPATIENT)
Dept: ORTHOPEDIC SURGERY | Facility: CLINIC | Age: 78
End: 2023-10-03
Payer: MEDICARE

## 2023-10-03 NOTE — TELEPHONE ENCOUNTER
Patient was seen in the office yesterday. Patient had her foot wrapped but the wrap on her foot has been comprised due to the boot. Patient states after she took the boot off the wrap came off with the boot. Please review and advise patient [phone].

## 2023-10-03 NOTE — TELEPHONE ENCOUNTER
Call returned to the patient.  Apparently her dressing got stuck to the inside of the boot when she tried to remove the boot yesterday she pulled most of the dressing off.  The wound was not completely exposed.  Have asked her to go ahead and remove the dressing and clean the area with warm soapy water and apply Band-Aids to cover the incision.  She is to keep her foot elevated for today.  Will have her follow-up in the office tomorrow for wound check and dressing change per Dr. Alvarado

## 2023-10-04 ENCOUNTER — OFFICE VISIT (OUTPATIENT)
Dept: ORTHOPEDIC SURGERY | Facility: CLINIC | Age: 78
End: 2023-10-04
Payer: MEDICARE

## 2023-10-04 VITALS — WEIGHT: 196 LBS | HEIGHT: 65 IN | TEMPERATURE: 97.5 F | BODY MASS INDEX: 32.65 KG/M2

## 2023-10-04 DIAGNOSIS — M19.071 ARTHRITIS OF FIRST METATARSOPHALANGEAL (MTP) JOINT OF RIGHT FOOT: Primary | ICD-10-CM

## 2023-10-06 NOTE — PROGRESS NOTES
Foot Follow Up      Patient: Mahnaz Becerra    YOB: 1945 78 y.o. female    Chief Complaints: Foot doing okay    History of Present Illness:Patient follows up right first MTPCheilectomy and synovectomy on 9/19/2023.  She did not wish to have a fusion.  Please see notes from 7/31/2023 for details.  She had previous left third toe contusion with possible traumatic mallet toe and midfoot arthritis.     Patient was seen on 10/2/2023 and had been partial weightbearing with her walker and did not have any complaints of significant pain in the right great toe.  She was back on her preoperative dose of tramadol that she was on for MS.  Pain was rated 0 out of 10.    Sutures removed and Steri-Strips applied she is patient to a bunion for foot and ankle spica dressing and left partial weightbearing with cane and postoperative shoe    She called yesterday saying that the dressing and become stuck to the postoperative shoe and come off.  She kept a clean dressing on this was brought in today for further evaluation    She has no complaints regarding pain in the right great toe.  HPI    ROS: Foot pain  Past Medical History:   Diagnosis Date    Acid reflux     Anesthesia complication     ANESTHESIA HAS BROUGHT ON ASTHMA ATTACKS     Asthma     Bronchitis     Cat bite     RIGHT HAND, HEALING. CURRENTLY ON ANTIBIOTIC. INSTR TO LET DR CHAU'S OFFICE KNOW    Ede     FREQUENT    Diverticulitis     Gastritis     Heart murmur     History of Clostridioides difficile infection 2014    POST LEFT KNEE REPLACEMENT. MESS FOR IC    History of skin cancer     BACK    Hyperlipidemia     IBS (irritable bowel syndrome)     Migraines     MS (multiple sclerosis)     Osteoarthritis     PONV (postoperative nausea and vomiting)     Patient states she had post-op nausea and vomiting after hysterectomy in 1987.    Right foot pain     Sleep apnea     CANT TOLERATE CPAP    Tremor     RIGHT ARM    Unsteadiness on feet      Physical  "Exam:   Vitals:    10/04/23 1026   Temp: 97.5 °F (36.4 °C)   Weight: 88.9 kg (196 lb)   Height: 163.8 cm (64.5\")   PainSc: 0-No pain     Well developed with normal mood.  Right great toe incision is healing nicely there is no sign of infection and did not have pain with range of motion dorsal medial prominence was adequately decompressed.      Radiology: None performed      Assessment/Plan: Status post right first MTP surgery as outlined above    We discussed treatment and wound was again redressed with sterile dressing and forefoot and ankle bunion spica dressing she will continue partial weightbearing with her cane and postoperative shoe she is instructed on postoperative shoe and reviewed her if this dressing comes off just to keep the wound clean and may let it get wet in the shower and keep a clean bandage over this until follow-up but hopefully will remain intact    We will see her back as scheduled with x-rays of her right foot.  "

## 2023-10-16 ENCOUNTER — OFFICE VISIT (OUTPATIENT)
Dept: ORTHOPEDIC SURGERY | Facility: CLINIC | Age: 78
End: 2023-10-16
Payer: MEDICARE

## 2023-10-16 VITALS — BODY MASS INDEX: 33.46 KG/M2 | HEIGHT: 64 IN | TEMPERATURE: 97.5 F | WEIGHT: 196 LBS

## 2023-10-16 DIAGNOSIS — M20.5X2 ACQUIRED MALLET TOE, LEFT: ICD-10-CM

## 2023-10-16 DIAGNOSIS — R52 PAIN: Primary | ICD-10-CM

## 2023-10-16 DIAGNOSIS — M19.071 ARTHRITIS OF FIRST METATARSOPHALANGEAL (MTP) JOINT OF RIGHT FOOT: ICD-10-CM

## 2023-10-16 PROCEDURE — 73630 X-RAY EXAM OF FOOT: CPT | Performed by: ORTHOPAEDIC SURGERY

## 2023-10-16 PROCEDURE — 99024 POSTOP FOLLOW-UP VISIT: CPT | Performed by: ORTHOPAEDIC SURGERY

## 2023-10-16 NOTE — PROGRESS NOTES
Foot Follow Up      Patient: Mahnaz Becerra    YOB: 1945 78 y.o. female    Chief Complaints: Foot feeling okay but a little strange    History of Present Illness::Patient follows up right first MTPCheilectomy and synovectomy on 9/19/2023.  She did not wish to have a fusion.  Please see notes from 7/31/2023 for details.  She had previous left third toe contusion with possible traumatic mallet toe and midfoot arthritis.     Patient was seen on 10/2/2023 and had been partial weightbearing with her walker and did not have any complaints of significant pain in the right great toe.  She was back on her preoperative dose of tramadol that she was on for MS.  Pain was rated 0 out of 10.     Sutures were removed and Steri-Strips applied she is patient to a bunion for foot and ankle spica dressing and left partial weightbearing with cane and postoperative shoe     She called on 10/3/2023 saying that the dressing and become stuck to the postoperative shoe and come off.  She kept a clean dressing on this was brought in on 10/4/2023 for further evaluation     She had no complaints regarding pain in the right great toe.  Incision was healing without sign of infection she was placed back into a forefoot and ankle bunion spica dressing and allowed partial weightbearing with her cane and postoperative shoe    Patient is seen back today saying that she is feeling okay.  She said her toe does feel little bit strange but overall is continue to improve.  Pain is rated 0 out of 10  HPI    ROS: No foot pain  Past Medical History:   Diagnosis Date    Acid reflux     Anesthesia complication     ANESTHESIA HAS BROUGHT ON ASTHMA ATTACKS     Asthma     Bronchitis     Cat bite     RIGHT HAND, HEALING. CURRENTLY ON ANTIBIOTIC. INSTR TO LET DR CHAU'S OFFICE KNOW    Ede     FREQUENT    Diverticulitis     Gastritis     Heart murmur     History of Clostridioides difficile infection 2014    POST LEFT KNEE REPLACEMENT.  "MESS FOR IC    History of skin cancer     BACK    Hyperlipidemia     IBS (irritable bowel syndrome)     Migraines     MS (multiple sclerosis)     Osteoarthritis     PONV (postoperative nausea and vomiting)     Patient states she had post-op nausea and vomiting after hysterectomy in 1987.    Right foot pain     Sleep apnea     CANT TOLERATE CPAP    Tremor     RIGHT ARM    Unsteadiness on feet      Physical Exam:   Vitals:    10/16/23 1504   Temp: 97.5 °F (36.4 °C)   TempSrc: Temporal   Weight: 88.9 kg (196 lb)   Height: 163.8 cm (64.49\")   PainSc: 0-No pain   PainLoc: Foot     Well developed with normal mood.  On exam right first MTP incision is healing without sign of infection.  She had no pain with range of motion which was still somewhat limited but dorsal medial prominence was adequately decompressed.  Toe was grossly sensate to light touch.      Radiology: 3 views of the right foot ordered evaluate postoperative alignment reviewed.  Previous x-rays.  There is arthritic change of the first metatarsal phalangeal joint but with appropriate decompression without malalignment compared to previous x-rays.  The remainder arthritis of the midfoot especially the first and second tarsometatarsal joints and hammering of the lesser toes especially the second third and fourth.      Assessment/Plan:   Status post right first MTP surgery as outlined above    Overall she seems to be doing well I do not see any sign of infection or RSD.    We will leave her out of her dressing and she may keep a clean bandage over this and gradually increase activity and continue with postoperative shoe for another 2 weeks or so and then may start gradually transitioning into an athletic shoe and use her cane if needed    We discussed physical therapy and she is already doing this twice a week for her MS and will resume that    We will plan to see her back in 6 weeks with x-rays of her right foot if she is having any increased pain.    She told " me what kind and caring doctor she thought it was.

## 2023-10-30 ENCOUNTER — TELEPHONE (OUTPATIENT)
Dept: ORTHOPEDIC SURGERY | Facility: CLINIC | Age: 78
End: 2023-10-30
Payer: MEDICARE

## 2023-10-30 DIAGNOSIS — M79.674 PAIN OF RIGHT GREAT TOE: Primary | ICD-10-CM

## 2023-10-30 NOTE — TELEPHONE ENCOUNTER
Caller: DAVID LR    Relationship: SELF    Best call back number: [phone]    What orders are you requesting (i.e. lab or imaging): PHYSICAL THERAPY FOR RIGHT GREAT TOE    In what timeframe would the patient need to come in: ASAP    Where will you receive your lab/imaging services: [facility] IS PT'S THERAPIST    Additional notes: NEED TO HAVE IT SPECIFICALLY FOR HER TOE

## 2023-10-30 NOTE — TELEPHONE ENCOUNTER
PATIENT CONFIRMED PT ORDER TO BE SENT TO [address] [address]  [phone]- [facility] TAS[facility]ULP.

## 2023-11-06 ENCOUNTER — OFFICE VISIT (OUTPATIENT)
Dept: ORTHOPEDIC SURGERY | Facility: CLINIC | Age: 78
End: 2023-11-06
Payer: MEDICARE

## 2023-11-06 VITALS — HEIGHT: 63 IN | TEMPERATURE: 96.6 F | WEIGHT: 194 LBS | BODY MASS INDEX: 34.38 KG/M2

## 2023-11-06 DIAGNOSIS — Z96.652 S/P TKR (TOTAL KNEE REPLACEMENT), LEFT: Primary | ICD-10-CM

## 2023-11-06 DIAGNOSIS — S80.02XA CONTUSION OF LEFT KNEE, INITIAL ENCOUNTER: ICD-10-CM

## 2023-11-06 PROCEDURE — 99213 OFFICE O/P EST LOW 20 MIN: CPT | Performed by: ORTHOPAEDIC SURGERY

## 2023-11-06 PROCEDURE — 73562 X-RAY EXAM OF KNEE 3: CPT | Performed by: ORTHOPAEDIC SURGERY

## 2023-11-06 NOTE — PROGRESS NOTES
Patient Name: Mahnaz Becerra   YOB: 1945  Referring Primary Care Physician: Stephen Wilkes MD  BMI: Body mass index is 34.37 kg/m².    Chief Complaint:    Chief Complaint   Patient presents with    Left Knee - Pain        HPI:     Mahnaz Becerra is a 78 y.o. female who presents today for evaluation of   Chief Complaint   Patient presents with    Left Knee - Pain   .  Jocelynn is seen today complaining of some acute left knee pain.  She says she got a new cat and she was carrying a box from [employer] she tripped and fell on her back porch and hit her left knee on concrete.  She status post bilateral total knees but was sore in the left and 1 make sure everything was okay      Subjective   Medications:   Home Medications:  Current Outpatient Medications on File Prior to Visit   Medication Sig    ezetimibe (ZETIA) 10 MG tablet Take 1 tablet by mouth Daily.    hydrochlorothiazide (HYDRODIURIL) 25 MG tablet TAKE 1 TABLET BY MOUTH DAILY    multivitamin (THERAGRAN) tablet tablet Take 1 tablet by mouth Daily.    pantoprazole (PROTONIX) 40 MG EC tablet TAKE 1 TABLET BY MOUTH TWICE DAILY (Patient taking differently: Take 1 tablet by mouth Daily.)    traMADol (ULTRAM) 50 MG tablet Take 1 tablet by mouth Every 6 (Six) Hours As Needed for Moderate Pain.    VITAMIN E PO Take 1 tablet/day by mouth Daily. HOLDING FOR DOS    albuterol (VENTOLIN HFA) 108 (90 BASE) MCG/ACT inhaler Inhale 2 puffs Every 6 (Six) Hours As Needed for wheezing. (Patient not taking: Reported on 11/6/2023)    aspirin 325 MG EC tablet Take 1 tablet by mouth Daily. (Patient not taking: Reported on 11/6/2023)    cyclobenzaprine (FLEXERIL) 10 MG tablet Take 1 tablet by mouth 2 (Two) Times a Day As Needed for Muscle Spasms. (Patient not taking: Reported on 11/6/2023)    dicyclomine (BENTYL) 20 MG tablet Take 1 tablet by mouth As Needed (abd cramps). (Patient not taking: Reported on 11/6/2023)    famotidine (PEPCID) 40 MG tablet Take 1 tablet by mouth  At Night As Needed for Heartburn. (Patient not taking: Reported on 2023)    sucralfate (CARAFATE) 1 g tablet Take 1 tablet by mouth 3 (Three) Times a Day As Needed (heartburn). (Patient not taking: Reported on 2023)     No current facility-administered medications on file prior to visit.     Current Medications:  Scheduled Meds:  Continuous Infusions:No current facility-administered medications for this visit.    PRN Meds:.    I have reviewed the patient's medical history in detail and updated the computerized patient record.  Review and summarization of old records includes:    Past Medical History:   Diagnosis Date    Acid reflux     Anesthesia complication     ANESTHESIA HAS BROUGHT ON ASTHMA ATTACKS     Asthma     Bronchitis     Cat bite     RIGHT HAND, HEALING. CURRENTLY ON ANTIBIOTIC. INSTR TO LET DR CHAU'S OFFICE KNOW    Charleyhorsthomas     FREQUENT    Diverticulitis     Gastritis     Heart murmur     History of Clostridioides difficile infection     POST LEFT KNEE REPLACEMENT. MESS FOR IC    History of skin cancer     BACK    Hyperlipidemia     IBS (irritable bowel syndrome)     Migraines     MS (multiple sclerosis)     Osteoarthritis     PONV (postoperative nausea and vomiting)     Patient states she had post-op nausea and vomiting after hysterectomy in .    Right foot pain     Sleep apnea     CANT TOLERATE CPAP    Tremor     RIGHT ARM    Unsteadiness on feet         Past Surgical History:   Procedure Laterality Date    APPENDECTOMY      BREAST LUMPECTOMY Bilateral     BREAST SURGERY      REDUCTION     SECTION  1977    CHEILECTOMY Right 2023    Procedure: right first metatarsophalangeal joint bone spur removal and synovectomy;  Surgeon: Collin Chau MD;  Location: [facility];  Service: Orthopedics;  Laterality: Right;    CHOLECYSTECTOMY      COLONOSCOPY  2012    Dr Moe    COLONOSCOPY N/A 2020    Procedure: COLONOSCOPY TO CECUM AND TERM.  ILEUM WITH BIOPSIES;  Surgeon: Inder Pat MD;  Location: [facility] ENDOSCOPY;  Service: Gastroenterology;  Laterality: N/A;  PRE OP - CHANGE IN BOWEL HABITS, DIARRHEA  POST OP - DIVERTICULOSIS    COSMETIC SURGERY Bilateral     EYELIDS    DILATATION AND CURETTAGE      ENDOSCOPY N/A 2016    Procedure: ESOPHAGOGASTRODUODENOSCOPY WITH BX;  Surgeon: Nasim Moe MD;  [facility];  Service:     ENDOSCOPY N/A 2020    Procedure: ESOPHAGOGASTRODUODENOSCOPY WITH DUODENAL ASPIRATE, POLYPECTOMY AND BIOPSIES;  Surgeon: Inder Pat MD;  Location: [facility] ENDOSCOPY;  Service: Gastroenterology;  Laterality: N/A;  PRE OP - GERD  POST OP - GASTRIC POLYP, GASTRITIS    ENDOSCOPY W/ PEG REMOVAL  2012    Dr Moe    EYE SURGERY Bilateral     BLEPHAROPLASTY    FOOT SURGERY Right     bone spur removed - great toe    HYSTERECTOMY  1987    KNEE ARTHROSCOPY Right 2016    Procedure: KNEE ARTHROSCOPY, PARTIAL MEDIAL AND LATERAL MENISECTOMY, CHONDROPLASTY OF THE PATELLA, REMOVAL OF LOOSE BODIES;  Surgeon: Artur Pat MD;  Location: [facility] OR [facility] [place];  Service:     KNEE ARTHROSCOPY W/ MENISCAL REPAIR Left     OOPHORECTOMY Bilateral 1997    TONSILLECTOMY      TOTAL KNEE ARTHROPLASTY Left 2018    Procedure: LEFT TOTAL KNEE ARTHROPLASTY WITH MARGRET NAVIGATION;  Surgeon: Artur Pat MD;  Location: [facility] MAIN OR;  Service:     TOTAL KNEE ARTHROPLASTY Right 2022    Procedure: RIGHT TOTAL KNEE ARTHROPLASTY WITH MARGRET NAVIGATION;  Surgeon: Artur Pat MD;  Location: [facility] OR [facility];  Service: Orthopedics;  Laterality: Right;        Social History     Occupational History    Not on file   Tobacco Use    Smoking status: Former     Packs/day: .25     Types: Cigarettes     Quit date:      Years since quittin.8    Smokeless tobacco: Never    Tobacco comments:     Patient states she was a social smoker.   Vaping Use    Vaping Use: Never used   Substance and Sexual  Activity    Alcohol use: Yes     Comment: Occasional    Drug use: No    Sexual activity: Defer      Social History     Social History Narrative    Not on file        Family History   Problem Relation Age of Onset    Hypertension Mother     Stroke Mother     Alzheimer's disease Mother     Heart disease Father     Emphysema Father     Stroke Maternal Grandmother     Cancer Maternal Grandfather     No Known Problems Paternal Grandmother     Cerebral aneurysm Paternal Grandfather     Malig Hyperthermia Neg Hx        ROS: 14 point review of systems was performed and all other systems were reviewed and are negative except for documented findings in HPI and today's encounter.     Allergies:   Allergies   Allergen Reactions    Chlorhexidine Gluconate Hives    Flagyl [Metronidazole] Swelling     Lips and Tongue      Levofloxacin Swelling and Rash     RED RASH    Lansoprazole Itching and Other (See Comments)     AND TURN RED    Paxlovid [Nirmatrelvir-Ritonavir] Unknown - Low Severity     UNSURE OF REACTION BUT REMEMBERS MAKING HER VERY ILL    Cefdinir GI Intolerance     Abdominal pain, caused upset stomach    Trazodone And Nefazodone Myalgia     Constitutional:  Denies fever, shaking or chills   Eyes:  Denies change in visual acuity   HENT:  Denies nasal congestion or sore throat   Respiratory:  Denies cough or shortness of breath   Cardiovascular:  Denies chest pain or severe LE edema   GI:  Denies abdominal pain, nausea, vomiting, bloody stools or diarrhea   Musculoskeletal:  Numbness, tingling, pain, or loss of motor function only as noted above in history of present illness.  : Denies painful urination or hematuria  Integument:  Denies rash, lesion or ulceration   Neurologic:  Denies headache or focal weakness  Endocrine:  Denies lymphadenopathy  Psych:  Denies confusion or change in mental status   Hem:  Denies active bleeding    OBJECTIVE:  Physical Exam: 78 y.o. female  Wt Readings from Last 3 Encounters:   11/06/23  "88 kg (194 lb)   10/16/23 88.9 kg (196 lb)   10/04/23 88.9 kg (196 lb)     Ht Readings from Last 1 Encounters:   11/06/23 160 cm (63\")     Body mass index is 34.37 kg/m².  Vitals:    11/06/23 1058   Temp: 96.6 °F (35.9 °C)     Vital signs reviewed.     General Appearance:    Alert, cooperative, in no acute distress                  Eyes: conjunctiva clear  ENT: external ears and nose atraumatic  CV: no peripheral edema  Resp: normal respiratory effort  Skin: no rashes or wounds; normal turgor  Psych: mood and affect appropriate  Lymph: no nodes appreciated  Neuro: gross sensation intact  Vascular:  Palpable peripheral pulse in noted extremity  Musculoskeletal Extremities: Exam today shows she is able to walk fairly well she does have an history of MS she has bruising over the anterior aspect of her knee and she can do a straight leg raise her ligaments feel stable    Radiology:   AP lateral sunrise view left knee taken the office today for complaints of pain with comparison views from in the past about 5 years ago showed no obvious sign of fracture.        Assessment:     ICD-10-CM ICD-9-CM   1. S/P TKR (total knee replacement), left  Z96.652 V43.65   2. Contusion of left knee, initial encounter  S80.02XA 924.11        MDM/Plan:   The diagnosis(es), natural history, pathophysiology and treatment for diagnosis(es) were discussed. Opportunity given and questions answered.  Biomechanics of pertinent body areas discussed.  When appropriate, the use of ambulatory aids discussed.    Biomechanics of pertinent body areas discussed.  When appropriate, the use of ambulatory aids discussed.  Inflammation/pain control; with cold, heat, elevation and/or liniments discussed as appropriate  MEDICAL RECORDS reviewed from other provider(s) for past and current medical history pertinent to this complaint.  Advice given follow-up as necessary    11/6/2023    Dictated utilizing Dragon dictation    "

## 2023-11-27 ENCOUNTER — OFFICE VISIT (OUTPATIENT)
Dept: ORTHOPEDIC SURGERY | Facility: CLINIC | Age: 78
End: 2023-11-27
Payer: MEDICARE

## 2023-11-27 VITALS — BODY MASS INDEX: 34.38 KG/M2 | WEIGHT: 194 LBS | HEIGHT: 63 IN | TEMPERATURE: 96.6 F

## 2023-11-27 DIAGNOSIS — M20.5X2 ACQUIRED MALLET TOE, LEFT: ICD-10-CM

## 2023-11-27 DIAGNOSIS — M19.071 ARTHRITIS OF FIRST METATARSOPHALANGEAL (MTP) JOINT OF RIGHT FOOT: Primary | ICD-10-CM

## 2023-11-27 PROCEDURE — 99024 POSTOP FOLLOW-UP VISIT: CPT | Performed by: ORTHOPAEDIC SURGERY

## 2023-11-27 PROCEDURE — 73630 X-RAY EXAM OF FOOT: CPT | Performed by: ORTHOPAEDIC SURGERY

## 2023-11-27 NOTE — PROGRESS NOTES
Foot Follow Up      Patient: Mahnaz Becerra    YOB: 1945 78 y.o. female    Chief Complaints: Toe gets sore    History of Present Illness:Patient follows up right first MTPCheilectomy and synovectomy on 9/19/2023.  She did not wish to have a fusion.  Please see notes from 7/31/2023 for details.  She had previous left third toe contusion with possible traumatic mallet toe and midfoot arthritis.     Patient was seen on 10/2/2023 and had been partial weightbearing with her walker and did not have any complaints of significant pain in the right great toe.  She was back on her preoperative dose of tramadol that she was on for MS.  Pain was rated 0 out of 10.     Sutures were removed and Steri-Strips applied she is patient to a bunion for foot and ankle spica dressing and left partial weightbearing with cane and postoperative shoe     She called on 10/3/2023 saying that the dressing and become stuck to the postoperative shoe and come off.  She kept a clean dressing on this was brought in on 10/4/2023 for further evaluation     She had no complaints regarding pain in the right great toe.  Incision was healing without sign of infection she was placed back into a forefoot and ankle bunion spica dressing and allowed partial weightbearing with her cane and postoperative shoe     Patient was seen on 10/16/2023 reporting that she was feeling okay.  She reported that her toe did feel little bit strange but overall was continuing to improve.  Pain was rated 0 out of 10    I did not see any sign of infection or RSD.  She was left out of her dressing and instructed on keeping a clean bandage over her incision and to gradually increase activity.  Instructions are given to continue with postoperative shoe for another 2 weeks or so and then start gradually transitioning to into an athletic shoe and use a cane as needed.  We discussed therapy for her foot and she was already doing therapy twice a week for her MS and was  "allowed to resume as such.    Patient is seen back today stating that overall she is doing well but has intermittent days of swelling and some pain in her first toe \"like a hot poker\".  It remains somewhat sore and stiff.  She has been working on therapy doing some range of motion exercises and does only bother her much when she does that such as putting her foot back and pushing down on her toe but bothers her subsequent to that.  Advil does help.  HPI    ROS: Foot pain  Past Medical History:   Diagnosis Date    Acid reflux     Anesthesia complication     ANESTHESIA HAS BROUGHT ON ASTHMA ATTACKS     Asthma     Bronchitis     Cat bite     RIGHT HAND, HEALING. CURRENTLY ON ANTIBIOTIC. INSTR TO LET DR CHAU'S OFFICE KNOW    Ede     FREQUENT    Diverticulitis     Gastritis     Heart murmur     History of Clostridioides difficile infection 2014    POST LEFT KNEE REPLACEMENT. MESS FOR IC    History of skin cancer     BACK    Hyperlipidemia     IBS (irritable bowel syndrome)     Migraines     MS (multiple sclerosis)     Osteoarthritis     PONV (postoperative nausea and vomiting)     Patient states she had post-op nausea and vomiting after hysterectomy in 1987.    Right foot pain     Sleep apnea     CANT TOLERATE CPAP    Tremor     RIGHT ARM    Unsteadiness on feet      Physical Exam:   Vitals:    11/27/23 1425   Temp: 96.6 °F (35.9 °C)   Weight: 88 kg (194 lb)   Height: 160 cm (63\")   PainSc:   4     Well developed with normal mood.  On exam she has mild swelling to her right great toe there is no warmth erythema she was grossly sensate light touch but there was some diminished sensation but no mottling or hyperesthesia.  She had very limited motion with 20 degrees dorsiflexion and 20 degrees plantarflexion but with minimal discomfort.      Radiology: 3 views of the right foot ordered evaluate pain and alignment reviewed and compared to previous x-rays.  There remains arthritic change with hallux valgus of " the first metatarsal phalangeal joint with adequate decompression dorsally.      Assessment/Plan:  Status post right first MTP surgery as outlined above    We reviewed treatment going forward and obviously does still have arthritis to the right great toe but remains well decompressed dorsal medially.  She can continue working on range of motion exercises and we decided to hold off on any repeat injections of the first MTP joint.  She is getting continue with therapy and we will go ahead and prescribe some compounding cream.    I recommend heel cord stretching exercises to do at least 4 times a day.  Instruction sheet was provided and demonstrated for the patient. We discussed etiology of heel cord tightness to midfoot and forefoot overload.    We discussed a carbon fiber insert but decided to hold off on that but she will continue with stiff sturdy wide accommodative shoes.    I had a very pleasant conversation previously she gave me a card for some surgery that I was having which I greatly appreciated.

## 2024-01-08 ENCOUNTER — OFFICE VISIT (OUTPATIENT)
Dept: ORTHOPEDIC SURGERY | Facility: CLINIC | Age: 79
End: 2024-01-08
Payer: MEDICARE

## 2024-01-08 VITALS — TEMPERATURE: 98.4 F | BODY MASS INDEX: 34.73 KG/M2 | HEIGHT: 63 IN | WEIGHT: 196 LBS

## 2024-01-08 DIAGNOSIS — M19.072 ARTHRITIS OF FIRST METATARSOPHALANGEAL (MTP) JOINT OF LEFT FOOT: Primary | ICD-10-CM

## 2024-01-08 PROCEDURE — 99213 OFFICE O/P EST LOW 20 MIN: CPT | Performed by: ORTHOPAEDIC SURGERY

## 2024-01-08 NOTE — PROGRESS NOTES
Foot Follow Up      Patient: Mahnaz Becerra    YOB: 1945 78 y.o. female    Chief Complaints: Foot better than before surgery    History of Present Illness:Patient follows up right first MTPCheilectomy and synovectomy on 9/19/2023.  She did not wish to have a fusion.  Please see notes from 7/31/2023 for details.  She had previous left third toe contusion with possible traumatic mallet toe and midfoot arthritis.     Patient was seen on 10/2/2023 and had been partial weightbearing with her walker and did not have any complaints of significant pain in the right great toe.  She was back on her preoperative dose of tramadol that she was on for MS.  Pain was rated 0 out of 10.     Sutures were removed and Steri-Strips applied she is patient to a bunion for foot and ankle spica dressing and left partial weightbearing with cane and postoperative shoe     She called on 10/3/2023 saying that the dressing and become stuck to the postoperative shoe and come off.  She kept a clean dressing on this was brought in on 10/4/2023 for further evaluation     She had no complaints regarding pain in the right great toe.  Incision was healing without sign of infection she was placed back into a forefoot and ankle bunion spica dressing and allowed partial weightbearing with her cane and postoperative shoe     Patient was seen on 10/16/2023 reporting that she was feeling okay.  She reported that her toe did feel little bit strange but overall was continuing to improve.  Pain was rated 0 out of 10     I did not see any sign of infection or RSD.  She was left out of her dressing and instructed on keeping a clean bandage over her incision and to gradually increase activity.  Instructions are given to continue with postoperative shoe for another 2 weeks or so and then start gradually transitioning to into an athletic shoe and use a cane as needed.  We discussed therapy for her foot and she was already doing therapy twice a week  "for her MS and was allowed to resume as such.     Patient was seen on 11/27/2023 stating that overall she was doing well but had intermittent days of swelling and some pain in her first toe \"like a hot poker\".  Her toe remained somewhat sore and stiff.  She had been working on therapy doing some range of motion exercises and did not bother her much while she does that such as putting her foot back and pushing down on her toe but bothered her subsequent to that.  Advil did help.    Discussed with her that obviously she is to have arthritis but remains well decompressed dorsally and medially.  She is to continue work on range of motion exercises we held off any further injections.  She will continue with therapy and prescribed compounding cream.  She is also instructed on heel cord stretching exercises.  Decided to hold off on carbon fiber insert in shoes and continue with stiff wide accommodative shoes    Patient is seen back today send overall she is doing well she does get intermittent pain but does have discomfort beneath the first toe distal phalanx and some occasional dorsal pain to the first MTP joint.  Compounding cream has not really helped.  She has been doing some heel cord stretching exercises intermittently and overall katya better than she was before surgery.  HPI    ROS: Foot pain  Past Medical History:   Diagnosis Date    Acid reflux     Anesthesia complication     ANESTHESIA HAS BROUGHT ON ASTHMA ATTACKS     Asthma     Bronchitis     Cat bite     RIGHT HAND, HEALING. CURRENTLY ON ANTIBIOTIC. INSTR TO LET DR CHAU'S OFFICE KNOW    Ede     FREQUENT    Diverticulitis     Gastritis     Heart murmur     History of Clostridioides difficile infection 2014    POST LEFT KNEE REPLACEMENT. MESS FOR IC    History of skin cancer     BACK    Hyperlipidemia     IBS (irritable bowel syndrome)     Migraines     MS (multiple sclerosis)     Osteoarthritis     PONV (postoperative nausea and vomiting)     " "Patient states she had post-op nausea and vomiting after hysterectomy in 1987.    Right foot pain     Sleep apnea     CANT TOLERATE CPAP    Tremor     RIGHT ARM    Unsteadiness on feet      Physical Exam:   Vitals:    01/08/24 1115   Temp: 98.4 °F (36.9 °C)   Weight: 88.9 kg (196 lb)   Height: 160 cm (63\")   PainSc:   5     Well developed with normal mood.  Right great toe shows well-healed incision.  She had mild swelling.  She had 20 degrees dorsiflexion 20 of plantarflexion with mild crepitus and some discomfort.      Radiology: None performed      Assessment/Plan: Status post right first MTP surgery as outlined above    Overall she is better than she was before surgery and is been decompressed appropriately but obviously still has some arthritic pain.    We discussed treatment options and will hold off on carbon fiber insert as afraid this may aggravate the toe pulp pain that she is having and we discussed injection which she wanted to hold off on.  She does not wish any further surgical treatment and she will continue with stretching    I will see her back in 6 weeks with x-rays of her right foot.  "

## 2024-01-17 ENCOUNTER — LAB (OUTPATIENT)
Dept: LAB | Facility: HOSPITAL | Age: 79
End: 2024-01-17
Payer: MEDICARE

## 2024-01-17 ENCOUNTER — TRANSCRIBE ORDERS (OUTPATIENT)
Dept: ADMINISTRATIVE | Facility: HOSPITAL | Age: 79
End: 2024-01-17
Payer: MEDICARE

## 2024-01-17 DIAGNOSIS — E78.5 HYPERLIPIDEMIA, UNSPECIFIED HYPERLIPIDEMIA TYPE: ICD-10-CM

## 2024-01-17 DIAGNOSIS — Z00.00 ROUTINE GENERAL MEDICAL EXAMINATION AT A HEALTH CARE FACILITY: Primary | ICD-10-CM

## 2024-01-17 DIAGNOSIS — I10 ESSENTIAL HYPERTENSION, MALIGNANT: ICD-10-CM

## 2024-01-17 DIAGNOSIS — Z00.00 ROUTINE GENERAL MEDICAL EXAMINATION AT A HEALTH CARE FACILITY: ICD-10-CM

## 2024-01-17 DIAGNOSIS — R73.09 IMPAIRED GLUCOSE TOLERANCE TEST: ICD-10-CM

## 2024-01-17 LAB
ALBUMIN SERPL-MCNC: 4.5 G/DL (ref 3.5–5.2)
ALBUMIN/GLOB SERPL: 2.3 G/DL
ALP SERPL-CCNC: 78 U/L (ref 39–117)
ALT SERPL W P-5'-P-CCNC: 14 U/L (ref 1–33)
ANION GAP SERPL CALCULATED.3IONS-SCNC: 8 MMOL/L (ref 5–15)
AST SERPL-CCNC: 13 U/L (ref 1–32)
BACTERIA UR QL AUTO: ABNORMAL /HPF
BASOPHILS # BLD AUTO: 0.04 10*3/MM3 (ref 0–0.2)
BASOPHILS NFR BLD AUTO: 0.6 % (ref 0–1.5)
BILIRUB SERPL-MCNC: 0.4 MG/DL (ref 0–1.2)
BILIRUB UR QL STRIP: NEGATIVE
BUN SERPL-MCNC: 17 MG/DL (ref 8–23)
BUN/CREAT SERPL: 21.3 (ref 7–25)
CALCIUM SPEC-SCNC: 9.5 MG/DL (ref 8.6–10.5)
CHLORIDE SERPL-SCNC: 100 MMOL/L (ref 98–107)
CHOLEST SERPL-MCNC: 187 MG/DL (ref 0–200)
CLARITY UR: CLEAR
CO2 SERPL-SCNC: 31 MMOL/L (ref 22–29)
COLOR UR: YELLOW
CREAT SERPL-MCNC: 0.8 MG/DL (ref 0.57–1)
DEPRECATED RDW RBC AUTO: 41.8 FL (ref 37–54)
EGFRCR SERPLBLD CKD-EPI 2021: 76.5 ML/MIN/1.73
EOSINOPHIL # BLD AUTO: 0.24 10*3/MM3 (ref 0–0.4)
EOSINOPHIL NFR BLD AUTO: 3.6 % (ref 0.3–6.2)
ERYTHROCYTE [DISTWIDTH] IN BLOOD BY AUTOMATED COUNT: 12.3 % (ref 12.3–15.4)
GLOBULIN UR ELPH-MCNC: 2 GM/DL
GLUCOSE SERPL-MCNC: 102 MG/DL (ref 65–99)
GLUCOSE UR STRIP-MCNC: NEGATIVE MG/DL
HBA1C MFR BLD: 5.9 % (ref 4.8–5.6)
HCT VFR BLD AUTO: 40.2 % (ref 34–46.6)
HDLC SERPL-MCNC: 56 MG/DL (ref 40–60)
HGB BLD-MCNC: 13.7 G/DL (ref 12–15.9)
HGB UR QL STRIP.AUTO: NEGATIVE
HYALINE CASTS UR QL AUTO: ABNORMAL /LPF
IMM GRANULOCYTES # BLD AUTO: 0.02 10*3/MM3 (ref 0–0.05)
IMM GRANULOCYTES NFR BLD AUTO: 0.3 % (ref 0–0.5)
KETONES UR QL STRIP: NEGATIVE
LDLC SERPL CALC-MCNC: 111 MG/DL (ref 0–100)
LDLC/HDLC SERPL: 1.95 {RATIO}
LEUKOCYTE ESTERASE UR QL STRIP.AUTO: ABNORMAL
LYMPHOCYTES # BLD AUTO: 2.12 10*3/MM3 (ref 0.7–3.1)
LYMPHOCYTES NFR BLD AUTO: 31.9 % (ref 19.6–45.3)
MCH RBC QN AUTO: 31.5 PG (ref 26.6–33)
MCHC RBC AUTO-ENTMCNC: 34.1 G/DL (ref 31.5–35.7)
MCV RBC AUTO: 92.4 FL (ref 79–97)
MONOCYTES # BLD AUTO: 0.55 10*3/MM3 (ref 0.1–0.9)
MONOCYTES NFR BLD AUTO: 8.3 % (ref 5–12)
NEUTROPHILS NFR BLD AUTO: 3.67 10*3/MM3 (ref 1.7–7)
NEUTROPHILS NFR BLD AUTO: 55.3 % (ref 42.7–76)
NITRITE UR QL STRIP: NEGATIVE
NRBC BLD AUTO-RTO: 0 /100 WBC (ref 0–0.2)
PH UR STRIP.AUTO: 6 [PH] (ref 5–8)
PLATELET # BLD AUTO: 278 10*3/MM3 (ref 140–450)
PMV BLD AUTO: 9.8 FL (ref 6–12)
POTASSIUM SERPL-SCNC: 3.7 MMOL/L (ref 3.5–5.2)
PROT SERPL-MCNC: 6.5 G/DL (ref 6–8.5)
PROT UR QL STRIP: NEGATIVE
RBC # BLD AUTO: 4.35 10*6/MM3 (ref 3.77–5.28)
RBC # UR STRIP: ABNORMAL /HPF
REF LAB TEST METHOD: ABNORMAL
SODIUM SERPL-SCNC: 139 MMOL/L (ref 136–145)
SP GR UR STRIP: 1.02 (ref 1–1.03)
SQUAMOUS #/AREA URNS HPF: ABNORMAL /HPF
TRIGL SERPL-MCNC: 109 MG/DL (ref 0–150)
UROBILINOGEN UR QL STRIP: ABNORMAL
VLDLC SERPL-MCNC: 20 MG/DL (ref 5–40)
WBC # UR STRIP: ABNORMAL /HPF
WBC NRBC COR # BLD AUTO: 6.64 10*3/MM3 (ref 3.4–10.8)

## 2024-01-17 PROCEDURE — 80061 LIPID PANEL: CPT

## 2024-01-17 PROCEDURE — 80053 COMPREHEN METABOLIC PANEL: CPT

## 2024-01-17 PROCEDURE — 85025 COMPLETE CBC W/AUTO DIFF WBC: CPT

## 2024-01-17 PROCEDURE — 83036 HEMOGLOBIN GLYCOSYLATED A1C: CPT

## 2024-01-17 PROCEDURE — 36415 COLL VENOUS BLD VENIPUNCTURE: CPT

## 2024-01-17 PROCEDURE — 81001 URINALYSIS AUTO W/SCOPE: CPT

## 2024-02-20 NOTE — PROGRESS NOTES
"Foot Follow Up      Patient: Mahnaz Becerra    YOB: 1945 78 y.o. female    Chief Complaints: Foot \"feels just fine\"    History of Present Illness::Patient follows up right first MTPCheilectomy and synovectomy on 9/19/2023.  She did not wish to have a fusion.  Please see notes from 7/31/2023 for details.  She had previous left third toe contusion with possible traumatic mallet toe and midfoot arthritis.     Patient was seen on 10/2/2023 and had been partial weightbearing with her walker and did not have any complaints of significant pain in the right great toe.  She was back on her preoperative dose of tramadol that she was on for MS.  Pain was rated 0 out of 10.     Sutures were removed and Steri-Strips applied she is patient to a bunion for foot and ankle spica dressing and left partial weightbearing with cane and postoperative shoe     She called on 10/3/2023 saying that the dressing and become stuck to the postoperative shoe and come off.  She kept a clean dressing on this was brought in on 10/4/2023 for further evaluation     She had no complaints regarding pain in the right great toe.  Incision was healing without sign of infection she was placed back into a forefoot and ankle bunion spica dressing and allowed partial weightbearing with her cane and postoperative shoe     Patient was seen on 10/16/2023 reporting that she was feeling okay.  She reported that her toe did feel little bit strange but overall was continuing to improve.  Pain was rated 0 out of 10     I did not see any sign of infection or RSD.  She was left out of her dressing and instructed on keeping a clean bandage over her incision and to gradually increase activity.  Instructions are given to continue with postoperative shoe for another 2 weeks or so and then start gradually transitioning to into an athletic shoe and use a cane as needed.  We discussed therapy for her foot and she was already doing therapy twice a week for her " "MS and was allowed to resume as such.     Patient was seen on 11/27/2023 stating that overall she was doing well but had intermittent days of swelling and some pain in her first toe \"like a hot poker\".  Her toe remained somewhat sore and stiff.  She had been working on therapy doing some range of motion exercises and did not bother her much while she does that such as putting her foot back and pushing down on her toe but bothered her subsequent to that.  Advil did help.     Discussed with her that obviously she is to have arthritis but remains well decompressed dorsally and medially.  She is to continue work on range of motion exercises we held off any further injections.  She will continue with therapy and prescribed compounding cream.  She is also instructed on heel cord stretching exercises.  Decided to hold off on carbon fiber insert in shoes and continue with stiff wide accommodative shoes     Patient was seen on 1/8/2024 reporting that overall she was doing well.  She did get intermittent pain and did have discomfort beneath the first toe distal phalanx and some occasional dorsal pain to the first MTP joint.  Compounding cream had not really helped.  She had been doing some heel cord stretching exercises intermittently and overall remained better than she was before surgery.    Overall was encouraged she was better than she was before surgery and was decompressed appropriately but obviously still has some arthritic pain.  We discussed treatment options and decided to hold off on a carbon fiber insert as there was concerned that it could aggravate the toe pulp pain that she was having.  We discussed MTP injection which she wanted to hold off on did not wish any further surgical treatment.  She was instructed to continue with stretching.    Patient is seen back today stating that her foot \"feels just fine.  She does still get some tingling and nerve pain in the forefoot mainly in the first MTP joint but he " "thinks it is more related to MS did her surgery she gets some occasional discomfort over the medial aspect of her first MTP joint but overall remains much better than she was before surgery.  She intermittently had some occasional popping that was uncomfortable but now is no longer painful.  Her toe pulp is no longer painful either.  She still has some malleting of the third toe with some occasional discomfort but really not the second.  Pain is rated 0 out of 10  HPI    ROS: Foot pain  Past Medical History:   Diagnosis Date    Acid reflux     Anesthesia complication     ANESTHESIA HAS BROUGHT ON ASTHMA ATTACKS     Asthma     Bronchitis     Cat bite     RIGHT HAND, HEALING. CURRENTLY ON ANTIBIOTIC. INSTR TO LET DR CHAU'S OFFICE KNOW    Ede     FREQUENT    Diverticulitis     Gastritis     Heart murmur     History of Clostridioides difficile infection 2014    POST LEFT KNEE REPLACEMENT. MESS FOR IC    History of skin cancer     BACK    Hyperlipidemia     IBS (irritable bowel syndrome)     Migraines     MS (multiple sclerosis)     Osteoarthritis     PONV (postoperative nausea and vomiting)     Patient states she had post-op nausea and vomiting after hysterectomy in 1987.    Right foot pain     Sleep apnea     CANT TOLERATE CPAP    Tremor     RIGHT ARM    Unsteadiness on feet      Physical Exam:   Vitals:    02/21/24 1128   Temp: 97.3 °F (36.3 °C)   Weight: 88.9 kg (196 lb)   Height: 160 cm (63\")   PainSc: 0-No pain     Well developed with normal mood.  On exam she was nontender over the great toe.  She had 30 degrees dorsiflexion 30 Riese plantarflexion of the right great toe with mild discomfort palpation of the dorsum of the great toe.  There is mild malleting of the third toe but no ulceration.  She was in somewhat soft slip on shoes that appeared fairly constrictive      Radiology: 3 views of the right foot ordered evaluate alignment reviewed and compared to previous x-rays these show no change in " alignment to the first MTP joint which has persistent arthritic change and some hallux valgus but it appears to remain adequately decompressed.  There is some hammering of the second and third more so than fourth toe.  No obvious metatarsal fracture.  There is arthritis at the midfoot especially the first second and less so to the third and fourth tarsometatarsal joints.      Assessment/Plan: Status post right first MTP surgery as outlined above  2.  Right third mallet toe    I discussed treatment going forward overall she is doing very well and pleased with her outcome does not start any further surgical treatment.  If symptoms persisted and worsened we would need to fuse her first MTP joint possibly do something with her third toe    Overall she seems to be doing well and will continue with sturdy accommodative shoes    If he has persistent worsening symptoms to let me know otherwise obese agreement I will see her back as needed.  She gave me a very kind note today to tell me how much she had appreciated my care.

## 2024-02-21 ENCOUNTER — OFFICE VISIT (OUTPATIENT)
Dept: ORTHOPEDIC SURGERY | Facility: CLINIC | Age: 79
End: 2024-02-21
Payer: MEDICARE

## 2024-02-21 VITALS — WEIGHT: 196 LBS | TEMPERATURE: 97.3 F | BODY MASS INDEX: 34.73 KG/M2 | HEIGHT: 63 IN

## 2024-02-21 DIAGNOSIS — M20.5X2 ACQUIRED MALLET TOE, LEFT: ICD-10-CM

## 2024-02-21 DIAGNOSIS — M19.071 ARTHRITIS OF FIRST METATARSOPHALANGEAL (MTP) JOINT OF RIGHT FOOT: ICD-10-CM

## 2024-02-21 DIAGNOSIS — M19.072 ARTHRITIS OF FIRST METATARSOPHALANGEAL (MTP) JOINT OF LEFT FOOT: Primary | ICD-10-CM

## 2024-02-21 PROCEDURE — 99213 OFFICE O/P EST LOW 20 MIN: CPT | Performed by: ORTHOPAEDIC SURGERY

## 2024-03-01 ENCOUNTER — OFFICE (OUTPATIENT)
Dept: URBAN - METROPOLITAN AREA CLINIC 65 | Facility: CLINIC | Age: 79
End: 2024-03-01

## 2024-03-01 VITALS
SYSTOLIC BLOOD PRESSURE: 112 MMHG | WEIGHT: 191 LBS | DIASTOLIC BLOOD PRESSURE: 80 MMHG | HEIGHT: 64 IN | HEART RATE: 68 BPM

## 2024-03-01 DIAGNOSIS — R11.10 VOMITING, UNSPECIFIED: ICD-10-CM

## 2024-03-01 DIAGNOSIS — G43.D0 ABDOMINAL MIGRAINE, NOT INTRACTABLE: ICD-10-CM

## 2024-03-01 DIAGNOSIS — K21.9 GASTRO-ESOPHAGEAL REFLUX DISEASE WITHOUT ESOPHAGITIS: ICD-10-CM

## 2024-03-01 DIAGNOSIS — K58.9 IRRITABLE BOWEL SYNDROME WITHOUT DIARRHEA: ICD-10-CM

## 2024-03-01 PROCEDURE — 99213 OFFICE O/P EST LOW 20 MIN: CPT | Performed by: INTERNAL MEDICINE

## 2024-03-01 RX ORDER — AMITRIPTYLINE HYDROCHLORIDE 10 MG/1
10 TABLET, FILM COATED ORAL
Qty: 30 | Refills: 11 | Status: ACTIVE
Start: 2024-03-01

## 2024-03-22 NOTE — TELEPHONE ENCOUNTER
I called and LVM for patient. Her appointment time has been changed to 3:45. She need to arrive by 3:15.    no

## 2024-04-09 ENCOUNTER — HOSPITAL ENCOUNTER (OUTPATIENT)
Dept: GENERAL RADIOLOGY | Facility: HOSPITAL | Age: 79
Discharge: HOME | End: 2024-04-09
Payer: MEDICARE

## 2024-04-09 ENCOUNTER — LAB (OUTPATIENT)
Dept: LAB | Facility: HOSPITAL | Age: 79
End: 2024-04-09
Payer: MEDICARE

## 2024-04-09 ENCOUNTER — TRANSCRIBE ORDERS (OUTPATIENT)
Dept: LAB | Facility: HOSPITAL | Age: 79
End: 2024-04-09
Payer: MEDICARE

## 2024-04-09 DIAGNOSIS — R05.9 COUGH, UNSPECIFIED TYPE: ICD-10-CM

## 2024-04-09 DIAGNOSIS — R50.9 HYPERTHERMIA: ICD-10-CM

## 2024-04-09 DIAGNOSIS — J44.9 CHRONIC OBSTRUCTIVE PULMONARY DISEASE, UNSPECIFIED COPD TYPE: ICD-10-CM

## 2024-04-09 DIAGNOSIS — R50.9 HYPERTHERMIA: Primary | ICD-10-CM

## 2024-04-09 LAB
B PARAPERT DNA SPEC QL NAA+PROBE: NOT DETECTED
B PERT DNA SPEC QL NAA+PROBE: NOT DETECTED
BASOPHILS # BLD AUTO: 0.04 10*3/MM3 (ref 0–0.2)
BASOPHILS NFR BLD AUTO: 0.6 % (ref 0–1.5)
C PNEUM DNA NPH QL NAA+NON-PROBE: NOT DETECTED
DEPRECATED RDW RBC AUTO: 43.5 FL (ref 37–54)
EOSINOPHIL # BLD AUTO: 0.27 10*3/MM3 (ref 0–0.4)
EOSINOPHIL NFR BLD AUTO: 4.2 % (ref 0.3–6.2)
ERYTHROCYTE [DISTWIDTH] IN BLOOD BY AUTOMATED COUNT: 12.7 % (ref 12.3–15.4)
FLUAV SUBTYP SPEC NAA+PROBE: NOT DETECTED
FLUBV RNA ISLT QL NAA+PROBE: NOT DETECTED
HADV DNA SPEC NAA+PROBE: NOT DETECTED
HCOV 229E RNA SPEC QL NAA+PROBE: DETECTED
HCOV HKU1 RNA SPEC QL NAA+PROBE: NOT DETECTED
HCOV NL63 RNA SPEC QL NAA+PROBE: NOT DETECTED
HCOV OC43 RNA SPEC QL NAA+PROBE: NOT DETECTED
HCT VFR BLD AUTO: 40.4 % (ref 34–46.6)
HGB BLD-MCNC: 13.3 G/DL (ref 12–15.9)
HMPV RNA NPH QL NAA+NON-PROBE: NOT DETECTED
HPIV1 RNA ISLT QL NAA+PROBE: NOT DETECTED
HPIV2 RNA SPEC QL NAA+PROBE: NOT DETECTED
HPIV3 RNA NPH QL NAA+PROBE: NOT DETECTED
HPIV4 P GENE NPH QL NAA+PROBE: NOT DETECTED
IMM GRANULOCYTES # BLD AUTO: 0.02 10*3/MM3 (ref 0–0.05)
IMM GRANULOCYTES NFR BLD AUTO: 0.3 % (ref 0–0.5)
LYMPHOCYTES # BLD AUTO: 2.06 10*3/MM3 (ref 0.7–3.1)
LYMPHOCYTES NFR BLD AUTO: 31.9 % (ref 19.6–45.3)
M PNEUMO IGG SER IA-ACNC: NOT DETECTED
MCH RBC QN AUTO: 30.6 PG (ref 26.6–33)
MCHC RBC AUTO-ENTMCNC: 32.9 G/DL (ref 31.5–35.7)
MCV RBC AUTO: 92.9 FL (ref 79–97)
MONOCYTES # BLD AUTO: 0.42 10*3/MM3 (ref 0.1–0.9)
MONOCYTES NFR BLD AUTO: 6.5 % (ref 5–12)
NEUTROPHILS NFR BLD AUTO: 3.65 10*3/MM3 (ref 1.7–7)
NEUTROPHILS NFR BLD AUTO: 56.5 % (ref 42.7–76)
NRBC BLD AUTO-RTO: 0 /100 WBC (ref 0–0.2)
PLATELET # BLD AUTO: 355 10*3/MM3 (ref 140–450)
PMV BLD AUTO: 9.2 FL (ref 6–12)
RBC # BLD AUTO: 4.35 10*6/MM3 (ref 3.77–5.28)
RHINOVIRUS RNA SPEC NAA+PROBE: NOT DETECTED
RSV RNA NPH QL NAA+NON-PROBE: NOT DETECTED
SARS-COV-2 RNA NPH QL NAA+NON-PROBE: NOT DETECTED
WBC NRBC COR # BLD AUTO: 6.46 10*3/MM3 (ref 3.4–10.8)

## 2024-04-09 PROCEDURE — 36415 COLL VENOUS BLD VENIPUNCTURE: CPT

## 2024-04-09 PROCEDURE — 0202U NFCT DS 22 TRGT SARS-COV-2: CPT

## 2024-04-09 PROCEDURE — 85025 COMPLETE CBC W/AUTO DIFF WBC: CPT

## 2024-04-09 PROCEDURE — 71046 X-RAY EXAM CHEST 2 VIEWS: CPT

## 2024-05-02 ENCOUNTER — OFFICE VISIT (OUTPATIENT)
Dept: ORTHOPEDIC SURGERY | Facility: CLINIC | Age: 79
End: 2024-05-02
Payer: MEDICARE

## 2024-05-02 VITALS — TEMPERATURE: 97.1 F | HEIGHT: 64 IN | BODY MASS INDEX: 32.44 KG/M2 | WEIGHT: 190 LBS

## 2024-05-02 DIAGNOSIS — M19.072 ARTHRITIS OF FIRST METATARSOPHALANGEAL (MTP) JOINT OF LEFT FOOT: ICD-10-CM

## 2024-05-02 DIAGNOSIS — M20.42 HAMMER TOE OF LEFT FOOT: ICD-10-CM

## 2024-05-02 DIAGNOSIS — M77.41 METATARSALGIA OF RIGHT FOOT: ICD-10-CM

## 2024-05-02 DIAGNOSIS — M79.671 FOOT PAIN, RIGHT: Primary | ICD-10-CM

## 2024-05-02 DIAGNOSIS — S90.122A CONTUSION OF LESSER TOE OF LEFT FOOT WITHOUT DAMAGE TO NAIL, INITIAL ENCOUNTER: ICD-10-CM

## 2024-05-02 DIAGNOSIS — M19.071 ARTHRITIS OF FIRST METATARSOPHALANGEAL (MTP) JOINT OF RIGHT FOOT: ICD-10-CM

## 2024-05-02 NOTE — PROGRESS NOTES
Foot Follow Up      Patient: Mahnaz Becerra    YOB: 1945 78 y.o. female    Chief Complaints: Toe is sore      History of Present Illness:Patient follows up right first MTPCheilectomy and synovectomy on 9/19/2023.  She did not wish to have a fusion.  Please see notes from 7/31/2023 for details.  She had previous left third toe contusion with possible traumatic mallet toe and midfoot arthritis.     Patient was seen on 10/2/2023 and had been partial weightbearing with her walker and did not have any complaints of significant pain in the right great toe.  She was back on her preoperative dose of tramadol that she was on for MS.  Pain was rated 0 out of 10.     Sutures were removed and Steri-Strips applied she is patient to a bunion for foot and ankle spica dressing and left partial weightbearing with cane and postoperative shoe     She called on 10/3/2023 saying that the dressing and become stuck to the postoperative shoe and come off.  She kept a clean dressing on this was brought in on 10/4/2023 for further evaluation     She had no complaints regarding pain in the right great toe.  Incision was healing without sign of infection she was placed back into a forefoot and ankle bunion spica dressing and allowed partial weightbearing with her cane and postoperative shoe     Patient was seen on 10/16/2023 reporting that she was feeling okay.  She reported that her toe did feel little bit strange but overall was continuing to improve.  Pain was rated 0 out of 10     I did not see any sign of infection or RSD.  She was left out of her dressing and instructed on keeping a clean bandage over her incision and to gradually increase activity.  Instructions are given to continue with postoperative shoe for another 2 weeks or so and then start gradually transitioning to into an athletic shoe and use a cane as needed.  We discussed therapy for her foot and she was already doing therapy twice a week for her MS and was  "allowed to resume as such.     Patient was seen on 11/27/2023 stating that overall she was doing well but had intermittent days of swelling and some pain in her first toe \"like a hot poker\".  Her toe remained somewhat sore and stiff.  She had been working on therapy doing some range of motion exercises and did not bother her much while she does that such as putting her foot back and pushing down on her toe but bothered her subsequent to that.  Advil did help.     Discussed with her that obviously she is to have arthritis but remains well decompressed dorsally and medially.  She is to continue work on range of motion exercises we held off any further injections.  She will continue with therapy and prescribed compounding cream.  She is also instructed on heel cord stretching exercises.  Decided to hold off on carbon fiber insert in shoes and continue with stiff wide accommodative shoes     Patient was seen on 1/8/2024 reporting that overall she was doing well.  She did get intermittent pain and did have discomfort beneath the first toe distal phalanx and some occasional dorsal pain to the first MTP joint.  Compounding cream had not really helped.  She had been doing some heel cord stretching exercises intermittently and overall remained better than she was before surgery.     Overall was encouraged she was better than she was before surgery and was decompressed appropriately but obviously still has some arthritic pain.  We discussed treatment options and decided to hold off on a carbon fiber insert as there was concerned that it could aggravate the toe pulp pain that she was having.  We discussed MTP injection which she wanted to hold off on did not wish any further surgical treatment.  She was instructed to continue with stretching.     Patient was seen on 2/21/2024 stating that her right foot \"felt just fine \".  She did still get some tingling and nerve pain in the forefoot mainly in the first MTP joint but she felt " it was more related to MS and to her surgery.  She  some occasional discomfort over the medial aspect of her first MTP joint but overall remained much better than she was before surgery.  She had intermittently had some occasional popping that was uncomfortable but subsequently was no longer painful.  Her toe pulp was no longer painful either.  She still had some malleting of the third toe with some occasional discomfort but really not the second.  Pain was rated 0 out of 10.    Overall seem to be doing well and pleased with her outcome and did not wish to pursue any further surgical treatment.  Reviewed if symptoms were persistent we would need to fuse her first MTP joint and possibly do something with her third toe.  She was then continuously accommodative shoes and let me know if anything worsened otherwise we will see her back as needed.    Patient is seen back today stating that she had been sick for several weeks with pneumonia and bronchitis etc. and about 3 to 4 weeks ago had noted somewhat of a cut and some bruising over the distal aspect of her left second toe which is improved to some degree with some mild achiness there.  She also reports she has had intermittent forefoot pain on the right and pain under the arch and does not feel like she is walking as normally as she should.  She has minimal if any discomfort around the first MTP joint other than some occasional discomfort medially.  She does report that Advil helps with the forefoot pain she has on the right and has been on meloxicam in the past.  HPI    ROS: Foot pain  Past Medical History:   Diagnosis Date    Acid reflux     Anesthesia complication     ANESTHESIA HAS BROUGHT ON ASTHMA ATTACKS     Asthma     Bronchitis     Cat bite     RIGHT HAND, HEALING. CURRENTLY ON ANTIBIOTIC. INSTR TO LET DR CHAU'S OFFICE KNOW    Ede     FREQUENT    Diverticulitis     Gastritis     Heart murmur     History of Clostridioides difficile infection 2014  "   POST LEFT KNEE REPLACEMENT. MESS FOR IC    History of skin cancer     BACK    Hyperlipidemia     IBS (irritable bowel syndrome)     Migraines     MS (multiple sclerosis)     Osteoarthritis     PONV (postoperative nausea and vomiting)     Patient states she had post-op nausea and vomiting after hysterectomy in 1987.    Right foot pain     Sleep apnea     CANT TOLERATE CPAP    Tremor     RIGHT ARM    Unsteadiness on feet      Physical Exam:   Vitals:    05/02/24 1321   Temp: 97.1 °F (36.2 °C)   Weight: 86.2 kg (190 lb)   Height: 161.3 cm (63.5\")   PainSc:   3   PainLoc: Foot     Well developed with normal mood.  On exam there is a healed abrasion over the distal aspect of the second toe of the left foot there is minimal discomfort to palpation slight chronic malleting.  Right great toe showed minimal if any discomfort.  There was some vague discomfort in the forefoot but no focal tenderness plantarly or dorsally.      Radiology: 3 views left foot ordered evaluate pain reviewed and compared to prior x-rays from 4/20/2023.  These show persistent hallux rectus with first MTP arthritis.  There is some arthritic change of the IP joints of the lesser toes no clear fracture.  There is some mild hammering.  There is arthritis at the midfoot that is unchanged especially at the second third and fourth TMT joints.      3 views right foot ordered evaluate pain and alignment reviewed and compared to previous x-rays there katya arthritic change of the first metatarsal phalangeal joint.  I do not see any obvious fractures of the lesser toes or metatarsals.  There is no obvious change compared to previous x-rays.          Assessment/Plan: 1.  Left second toe contusion with possible mild mallet toe with no clear sign of fracture  2.  Bilateral first MTP arthritis  3.  Right forefoot metatarsalgia    We discussed treatment going forward and nothing I would do from a surgical standpoint nor does she desire it.    We discussed " anti-inflammatories and she will discuss with her PCP about oral anti-inflammatories so the kidney and liver functions can be monitored.    I did recommend heel cord stretching which she has been doing some and she can increase this in members had to do it I think this will help with her forefoot pain.    Also see about getting some power step orthotics to help support her arch.  Will use sturdy accommodative shoes.    she had neuritic compounding cream in the past that did not seem to help much.  Will try a nonneuritic formulary as it has more anti-inflammatory in it since she has had some relief with oral anti-inflammatories hopefully this will help 2.    Had a very pleasant visit and she told me that she has appreciated my care.  I will see her back as needed

## 2024-07-01 ENCOUNTER — TRANSCRIBE ORDERS (OUTPATIENT)
Dept: ADMINISTRATIVE | Facility: HOSPITAL | Age: 79
End: 2024-07-01
Payer: MEDICARE

## 2024-07-01 ENCOUNTER — LAB (OUTPATIENT)
Dept: LAB | Facility: HOSPITAL | Age: 79
End: 2024-07-01
Payer: MEDICARE

## 2024-07-01 DIAGNOSIS — E78.5 HYPERLIPIDEMIA, UNSPECIFIED HYPERLIPIDEMIA TYPE: Primary | ICD-10-CM

## 2024-07-01 DIAGNOSIS — E78.5 HYPERLIPIDEMIA, UNSPECIFIED HYPERLIPIDEMIA TYPE: ICD-10-CM

## 2024-07-01 LAB
ALBUMIN SERPL-MCNC: 4.1 G/DL (ref 3.5–5.2)
ALBUMIN/GLOB SERPL: 2 G/DL
ALP SERPL-CCNC: 67 U/L (ref 39–117)
ALT SERPL W P-5'-P-CCNC: 17 U/L (ref 1–33)
ANION GAP SERPL CALCULATED.3IONS-SCNC: 9.9 MMOL/L (ref 5–15)
AST SERPL-CCNC: 19 U/L (ref 1–32)
BILIRUB SERPL-MCNC: 0.5 MG/DL (ref 0–1.2)
BUN SERPL-MCNC: 13 MG/DL (ref 8–23)
BUN/CREAT SERPL: 17.6 (ref 7–25)
CALCIUM SPEC-SCNC: 9.5 MG/DL (ref 8.6–10.5)
CHLORIDE SERPL-SCNC: 102 MMOL/L (ref 98–107)
CHOLEST SERPL-MCNC: 181 MG/DL (ref 0–200)
CO2 SERPL-SCNC: 28.1 MMOL/L (ref 22–29)
CREAT SERPL-MCNC: 0.74 MG/DL (ref 0.57–1)
EGFRCR SERPLBLD CKD-EPI 2021: 84 ML/MIN/1.73
GLOBULIN UR ELPH-MCNC: 2.1 GM/DL
GLUCOSE SERPL-MCNC: 107 MG/DL (ref 65–99)
HDLC SERPL-MCNC: 55 MG/DL (ref 40–60)
LDLC SERPL CALC-MCNC: 112 MG/DL (ref 0–100)
LDLC/HDLC SERPL: 2.01 {RATIO}
POTASSIUM SERPL-SCNC: 4.2 MMOL/L (ref 3.5–5.2)
PROT SERPL-MCNC: 6.2 G/DL (ref 6–8.5)
SODIUM SERPL-SCNC: 140 MMOL/L (ref 136–145)
TRIGL SERPL-MCNC: 77 MG/DL (ref 0–150)
VLDLC SERPL-MCNC: 14 MG/DL (ref 5–40)

## 2024-07-01 PROCEDURE — 80053 COMPREHEN METABOLIC PANEL: CPT

## 2024-07-01 PROCEDURE — 80061 LIPID PANEL: CPT

## 2024-07-01 PROCEDURE — 36415 COLL VENOUS BLD VENIPUNCTURE: CPT

## 2024-08-01 ENCOUNTER — OFFICE VISIT (OUTPATIENT)
Dept: ORTHOPEDIC SURGERY | Facility: CLINIC | Age: 79
End: 2024-08-01
Payer: MEDICARE

## 2024-08-01 VITALS — BODY MASS INDEX: 33.84 KG/M2 | WEIGHT: 191 LBS | TEMPERATURE: 97.3 F | HEIGHT: 63 IN

## 2024-08-01 DIAGNOSIS — M17.0 PRIMARY OSTEOARTHRITIS OF BOTH KNEES: ICD-10-CM
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
## 2024-08-01 DIAGNOSIS — M47.816 OSTEOARTHRITIS OF LUMBAR SPINE, UNSPECIFIED SPINAL OSTEOARTHRITIS COMPLICATION STATUS: ICD-10-CM

## 2024-08-01 DIAGNOSIS — R52 PAIN: Primary | ICD-10-CM
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
## 2024-08-07 ENCOUNTER — OFFICE VISIT (OUTPATIENT)
Dept: ORTHOPEDIC SURGERY | Facility: CLINIC | Age: 79
End: 2024-08-07
Payer: MEDICARE

## 2024-08-07 VITALS — BODY MASS INDEX: 33.84 KG/M2 | WEIGHT: 191 LBS | TEMPERATURE: 96.9 F | HEIGHT: 63 IN

## 2024-08-07 DIAGNOSIS — M19.071 ARTHRITIS OF FIRST METATARSOPHALANGEAL (MTP) JOINT OF RIGHT FOOT: Primary | ICD-10-CM

## 2024-08-07 DIAGNOSIS — M19.079 ARTHRITIS OF FOOT: ICD-10-CM

## 2024-08-07 DIAGNOSIS — M20.41 HAMMER TOE OF RIGHT FOOT: ICD-10-CM

## 2024-08-07 DIAGNOSIS — M77.41 METATARSALGIA OF RIGHT FOOT: ICD-10-CM

## 2024-08-07 PROCEDURE — 99214 OFFICE O/P EST MOD 30 MIN: CPT | Performed by: ORTHOPAEDIC SURGERY

## 2024-08-07 NOTE — PROGRESS NOTES
Foot Follow Up      Patient: Mahnaz Becerra    YOB: 1945 79 y.o. female    Chief Complaints: Toes are sore    History of Present Illness::Patient follows up right first MTPCheilectomy and synovectomy on 9/19/2023.  She did not wish to have a fusion.  Please see notes from 7/31/2023 for details.  She had previous left third toe contusion with possible traumatic mallet toe and midfoot arthritis.     Patient was seen on 10/2/2023 and had been partial weightbearing with her walker and did not have any complaints of significant pain in the right great toe.  She was back on her preoperative dose of tramadol that she was on for MS.  Pain was rated 0 out of 10.     Sutures were removed and Steri-Strips applied she is patient to a bunion for foot and ankle spica dressing and left partial weightbearing with cane and postoperative shoe     She called on 10/3/2023 saying that the dressing and become stuck to the postoperative shoe and come off.  She kept a clean dressing on this was brought in on 10/4/2023 for further evaluation     She had no complaints regarding pain in the right great toe.  Incision was healing without sign of infection she was placed back into a forefoot and ankle bunion spica dressing and allowed partial weightbearing with her cane and postoperative shoe     Patient was seen on 10/16/2023 reporting that she was feeling okay.  She reported that her toe did feel little bit strange but overall was continuing to improve.  Pain was rated 0 out of 10     I did not see any sign of infection or RSD.  She was left out of her dressing and instructed on keeping a clean bandage over her incision and to gradually increase activity.  Instructions are given to continue with postoperative shoe for another 2 weeks or so and then start gradually transitioning to into an athletic shoe and use a cane as needed.  We discussed therapy for her foot and she was already doing therapy twice a week for her MS and was  "allowed to resume as such.     Patient was seen on 11/27/2023 stating that overall she was doing well but had intermittent days of swelling and some pain in her first toe \"like a hot poker\".  Her toe remained somewhat sore and stiff.  She had been working on therapy doing some range of motion exercises and did not bother her much while she does that such as putting her foot back and pushing down on her toe but bothered her subsequent to that.  Advil did help.     Discussed with her that obviously she is to have arthritis but remains well decompressed dorsally and medially.  She is to continue work on range of motion exercises we held off any further injections.  She will continue with therapy and prescribed compounding cream.  She is also instructed on heel cord stretching exercises.  Decided to hold off on carbon fiber insert in shoes and continue with stiff wide accommodative shoes     Patient was seen on 1/8/2024 reporting that overall she was doing well.  She did get intermittent pain and did have discomfort beneath the first toe distal phalanx and some occasional dorsal pain to the first MTP joint.  Compounding cream had not really helped.  She had been doing some heel cord stretching exercises intermittently and overall remained better than she was before surgery.     Overall was encouraged she was better than she was before surgery and was decompressed appropriately but obviously still has some arthritic pain.  We discussed treatment options and decided to hold off on a carbon fiber insert as there was concerned that it could aggravate the toe pulp pain that she was having.  We discussed MTP injection which she wanted to hold off on did not wish any further surgical treatment.  She was instructed to continue with stretching.     Patient was seen on 2/21/2024 stating that her right foot \"felt just fine \".  She did still get some tingling and nerve pain in the forefoot mainly in the first MTP joint but she felt " it was more related to MS and to her surgery.  She  some occasional discomfort over the medial aspect of her first MTP joint but overall remained much better than she was before surgery.  She had intermittently had some occasional popping that was uncomfortable but subsequently was no longer painful.  Her toe pulp was no longer painful either.  She still had some malleting of the third toe with some occasional discomfort but really not the second.  Pain was rated 0 out of 10.     Overall seem to be doing well and pleased with her outcome and did not wish to pursue any further surgical treatment.  Reviewed if symptoms were persistent we would need to fuse her first MTP joint and possibly do something with her third toe.  She was then continuously accommodative shoes and let me know if anything worsened otherwise we will see her back as needed.     Patient was seen on 5/2/2024 stating that she had been sick for several weeks with pneumonia and bronchitis etc. and about 3 to 4 weeks prior to appointment had noted somewhat of a cut and some bruising over the distal aspect of her left second toe which was improved to some degree with some mild achiness there.  She also forwarded she has had intermittent forefoot pain on the right and pain under the arch and did not feel like she was walking as normally as she should.  She had minimal if any discomfort around the first MTP joint other than some occasional discomfort medially.  She did report that Advil helps with the forefoot pain she had on the right and had been on meloxicam in the past.    We discussed treatment at that time and reviewed with her that she should check with her PCP about oral anti-inflammatories.  She was instructed on heel cord stretching exercises and she may would have to do this.  She is also to see by getting power steps to help with arch pain yesterday, and if shoes.  She had neuritic compounding cream in the past that did not seem to help we  "tried a nonrheumatic formulary.    The patient is seen back today for further evaluation of her toes.  She mainly has pain in the third and fourth toes of the right foot more so than the second and pain on the plantar aspect of these toes at the metatarsal heads.  Pain is mainly at the DIP joints and feels worse when she tries to straighten them.  She has been doing heel cord stretching  HPI    ROS: Foot pain  Past Medical History:   Diagnosis Date    Acid reflux     Anesthesia complication     ANESTHESIA HAS BROUGHT ON ASTHMA ATTACKS     Asthma     Bronchitis     Cat bite     RIGHT HAND, HEALING. CURRENTLY ON ANTIBIOTIC. INSTR TO LET DR CHAU'S OFFICE KNOW    [place]     FREQUENT    Diverticulitis     Gastritis     Heart murmur     History of Clostridioides difficile infection 2014    POST LEFT KNEE REPLACEMENT. MESS FOR IC    History of skin cancer     BACK    Hyperlipidemia     IBS (irritable bowel syndrome)     Migraines     MS (multiple sclerosis)     Osteoarthritis     PONV (postoperative nausea and vomiting)     Patient states she had post-op nausea and vomiting after hysterectomy in 1987.    Right foot pain     Sleep apnea     CANT TOLERATE CPAP    Tremor     RIGHT ARM    Unsteadiness on feet      Physical Exam:   Vitals:    08/07/24 1048   Temp: 96.9 °F (36.1 °C)   Weight: 86.6 kg (191 lb)   Height: 160 cm (63\")     Well developed with normal mood.  On exam she has neutral lamina to the first toe with limited motion with limited discomfort.  She had slight varus of the second toe with some flexion deformity at the second but more so to the third and fourth toes at the DIP and PIP joint with more discomfort over the DIP joints.  She had some discomfort under the metatarsal heads of the second third and fourth toes but without extension deformity and no callus formation.  Metatarsal heads were somewhat prominent.      Radiology: 3 views of the right foot ordered evaluate pain and alignment reviewed " and compared to previous x-rays.  There remains arthritis of the first metatarsal phalangeal joint without change in alignment compared with previous x-rays.  I do not see any obvious fracture of the metatarsals there is some hammering of the second third and fourth toes at the PIP joints as well as the DIP joints especially of the third and fourth.  There remains arthritis at the midfoot.  At the TMT joints.      Assessment/Plan: 1.  Right second third and fourth hammertoes with metatarsalgia  2.  Bilateral first MTP arthritis    We discussed treatment going forward and the nonrigid compounding cream was not of benefit to her.  Instructed that she continue with heel cord stretching exercises.  She was fitted with a crest pad to help offload the toes and a metatarsal pad to help offload the metatarsal heads.    We did discuss operative treatment and will see how she does with conservative measures first as she already went through surgery last year on her foot.    Will see her back in a month to assess progress and determine treatment course going forward.

## 2024-08-13 ENCOUNTER — TRANSCRIBE ORDERS (OUTPATIENT)
Dept: ADMINISTRATIVE | Facility: HOSPITAL | Age: 79
End: 2024-08-13
Payer: MEDICARE

## 2024-08-13 DIAGNOSIS — Z78.0 POSTMENOPAUSAL: Primary | ICD-10-CM

## 2024-08-22 NOTE — PROGRESS NOTES
New Shoulder      Patient: Mahnaz Becerra        YOB: 1945    Medical Record Number: [id number]        Chief Complaints: Right shoulder pain      History of Present Illness: This is a very nice 79-year-old female who is right-hand-dominant presents with right shoulder pain has been ongoing for several months.  She has had MS since 1994 she does physical therapy 2 times a week and has for 10 years it is helping to keep her MS at bay and overall from that standpoint she is doing well.  She has no history injury change in activity she does have night pain she has pain with activity past medical history as listed below      Allergies:   Allergies   Allergen Reactions    Chlorhexidine Gluconate Hives    Flagyl [Metronidazole] Swelling     Lips and Tongue      Levofloxacin Swelling and Rash     RED RASH    Lansoprazole Itching and Other (See Comments)     AND TURN RED    Paxlovid [Nirmatrelvir-Ritonavir] Unknown - Low Severity     UNSURE OF REACTION BUT REMEMBERS MAKING HER VERY ILL    Cefdinir GI Intolerance     Abdominal pain, caused upset stomach    Trazodone And Nefazodone Myalgia       Medications:   Home Medications:  Current Outpatient Medications on File Prior to Visit   Medication Sig    albuterol (VENTOLIN HFA) 108 (90 BASE) MCG/ACT inhaler Inhale 2 puffs Every 6 (Six) Hours As Needed for wheezing.    cyclobenzaprine (FLEXERIL) 10 MG tablet Take 1 tablet by mouth 2 (Two) Times a Day As Needed for Muscle Spasms.    Diclofenac Sodium 4 %, Topiramate 2 %, cloNIDine HCl 0.2 %, Lidocaine HCl 5 % Apply 1-2 g topically to the appropriate area as directed 3 (Three) to 4 (Four) times daily.    dicyclomine (BENTYL) 20 MG tablet Take 1 tablet by mouth As Needed (abd cramps).    ezetimibe (ZETIA) 10 MG tablet Take 1 tablet by mouth Daily.    famotidine (PEPCID) 40 MG tablet Take 1 tablet by mouth At Night As Needed for Heartburn.    hydrochlorothiazide (HYDRODIURIL) 25 MG tablet TAKE 1 TABLET BY MOUTH  DAILY    methylPREDNISolone (MEDROL) 4 MG dose pack Take as directed on package instructions.    multivitamin (THERAGRAN) tablet tablet Take 1 tablet by mouth Daily.    pantoprazole (PROTONIX) 40 MG EC tablet TAKE 1 TABLET BY MOUTH TWICE DAILY (Patient taking differently: Take 1 tablet by mouth Daily.)    sucralfate (CARAFATE) 1 g tablet Take 1 tablet by mouth 3 (Three) Times a Day As Needed (heartburn).    traMADol (ULTRAM) 50 MG tablet Take 1 tablet by mouth Every 6 (Six) Hours As Needed for Moderate Pain.    VITAMIN E PO Take 1 tablet/day by mouth Daily. HOLDING FOR DOS     No current facility-administered medications on file prior to visit.     Current Medications:  Scheduled Meds:  Continuous Infusions:No current facility-administered medications for this visit.    PRN Meds:.    Past Medical History:   Diagnosis Date    Acid reflux     Anesthesia complication     ANESTHESIA HAS BROUGHT ON ASTHMA ATTACKS     Asthma     Bronchitis     Cat bite     RIGHT HAND, HEALING. CURRENTLY ON ANTIBIOTIC. INSTR TO LET DR CHAU'S OFFICE KNOW    [place]     FREQUENT    Diverticulitis     Gastritis     Heart murmur     History of Clostridioides difficile infection     POST LEFT KNEE REPLACEMENT. MESS FOR IC    History of skin cancer     BACK    Hyperlipidemia     IBS (irritable bowel syndrome)     Migraines     MS (multiple sclerosis)     Osteoarthritis     PONV (postoperative nausea and vomiting)     Patient states she had post-op nausea and vomiting after hysterectomy in .    Right foot pain     Sleep apnea     CANT TOLERATE CPAP    Tremor     RIGHT ARM    Unsteadiness on feet         Past Surgical History:   Procedure Laterality Date    APPENDECTOMY      BREAST LUMPECTOMY Bilateral     BREAST SURGERY      REDUCTION     SECTION  1977    CHEILECTOMY Right 2023    Procedure: right first metatarsophalangeal joint bone spur removal and synovectomy;  Surgeon: Collin Chau MD;   [place] [facility] [place] [facility] [place]: [place]opedics;  Laterality: Right;    CHOLECYSTECTOMY      COLONOSCOPY  09/20/2012    Dr Moe    COLONOSCOPY N/A 09/29/2020    Procedure: COLONOSCOPY TO CECUM AND TERM. ILEUM WITH BIOPSIES;  Surgeon: Inder Pat MD;  Location: [facility] ENDOSCOPY;  Service: Gastroenterology;  Laterality: N/A;  PRE OP - CHANGE IN BOWEL HABITS, DIARRHEA  POST OP - DIVERTICULOSIS    COSMETIC SURGERY Bilateral     EYELIDS    DILATATION AND CURETTAGE  1979    ENDOSCOPY N/A 12/22/2016    Procedure: ESOPHAGOGASTRODUODENOSCOPY WITH BX;  Surgeon: Nasim Moe MD;  Location: [facility] ENDOSCOPY;  Service:     ENDOSCOPY N/A 09/29/2020    Procedure: ESOPHAGOGASTRODUODENOSCOPY WITH DUODENAL ASPIRATE, POLYPECTOMY AND BIOPSIES;  Surgeon: Inder Pat MD;  Location: [facility] ENDOSCOPY;  Service: Gastroenterology;  Laterality: N/A;  PRE OP - GERD  POST OP - GASTRIC POLYP, GASTRITIS    ENDOSCOPY W/ PEG REMOVAL  09/20/2012    Dr Moe    EYE SURGERY Bilateral     BLEPHAROPLASTY    FOOT SURGERY Right     bone spur removed - great toe    HYSTERECTOMY  1987    KNEE ARTHROSCOPY Right 08/12/2016    Procedure: KNEE ARTHROSCOPY, PARTIAL MEDIAL AND LATERAL MENISECTOMY, CHONDROPLASTY OF THE PATELLA, REMOVAL OF LOOSE BODIES;  Surgeon: Artur Pat MD;  [place] [facility] [place] [facility] [place] [facility] [place]  Service:     KNEE ARTHROSCOPY W/ MENISCAL REPAIR Left     OOPHORECTOMY Bilateral 1997    TONSILLECTOMY  1952    TOTAL KNEE ARTHROPLASTY Left 02/07/2018    Procedure: LEFT TOTAL KNEE ARTHROPLASTY WITH MARGRET NAVIGATION;  Surgeon: Artur Pat MD;  Location: [facility] MAIN OR;  Service:     TOTAL KNEE ARTHROPLASTY Right 11/08/2022    Procedure: RIGHT TOTAL KNEE ARTHROPLASTY WITH MARGRET NAVIGATION;  Surgeon: Artur Pat MD;  [place] [facility] [place] [facility] [place] [facility] [place]  Service: [place]opedics;  Laterality: Right;        Social History     Occupational History    Not on file   Tobacco Use    Smoking status: Former     Current packs/day: 0.00     Types:  "Cigarettes     Quit date:      Years since quittin.6     Passive exposure: Past    Smokeless tobacco: Never    Tobacco comments:     Patient states she was a social smoker.   Vaping Use    Vaping status: Never Used   Substance and Sexual Activity    Alcohol use: Not Currently     Comment: Occasional    Drug use: No    Sexual activity: Defer      Social History     Social History Narrative    Not on file        Family History   Problem Relation Age of Onset    Hypertension Mother     Stroke Mother     Alzheimer's disease Mother     Heart disease Father     Emphysema Father     Stroke Maternal Grandmother     Cancer Maternal Grandfather     No Known Problems Paternal Grandmother     Cerebral aneurysm Paternal Grandfather     Malig Hyperthermia Neg Hx              Review of Systems:     Review of Systems      Physical Exam: 79 y.o. female  General Appearance:    Alert, cooperative, in no acute distress                   Vitals:    24 1415   Temp: 97.7 °F (36.5 °C)   Weight: 90.8 kg (200 lb 3.2 oz)   Height: 162.6 cm (64\")   PainSc:   8      Patient is alert and read ×3 no acute distress appears her above-listed at height weight and age.  Affect is normal respiratory rate is normal unlabored. Heart rate regular rate rhythm, sclera, dentition and hearing are normal for the purpose of this exam.    Ortho Exam  Physical exam of the right shoulder reveals no overlying skin changes no lymphedema no lymphadenopathy.  Patient has active flexion 180 with mild symptoms abduction is similar external rotation is to 50 and internal rotation to the upper lumbar spine with mild symptoms.  Patient has good rotator cuff strength 4+ over 5 with isometric strength testing with pain.  Patient has a positive impingement and a positive Warren sign.  Patient has good cervical range of motion which is full and asymptomatic no radicular symptoms.  Patient has a normal elbow exam.  Good distal pulses are present  Patient has " pain with overhead activity and a positive Neer sign and a positive empty can sign , a positive drop arm and a definitive painful arc   Large Joint Arthrocentesis: R subacromial bursa  Date/Time: 8/26/2024 2:38 PM  Consent given by: patient  Site marked: site marked  Timeout: Immediately prior to procedure a time out was called to verify the correct patient, procedure, equipment, support staff and site/side marked as required   Supporting Documentation  Indications: pain   Procedure Details  Location: shoulder - R subacromial bursa  Preparation: Patient was prepped and draped in the usual sterile fashion  Needle gauge: 21G.  Approach: posterior  Medications administered: 80 mg methylPREDNISolone acetate 80 MG/ML; 2 mL lidocaine PF 1% 1 %  Patient tolerance: patient tolerated the procedure well with no immediate complications              Radiology:   AP, Scapular Y and Axillary Lateral of the right shoulder were ordered/reviewed to evauate shoulder pain.  I have no comparative films she has some acromioclavicular arthritis and some sclerosis at the insertion of the cuff otherwise no acute bony pathology  Imaging Results (Most Recent)       Procedure Component Value Units Date/Time    XR Shoulder 2+ View Right [[id number]] Resulted: 08/26/24 1329     Updated: 08/26/24 1329    Impression:      Ordering physician's impression is located in the Encounter Note dated 08/26/24. X-ray performed in the DR room.            Assessment/Plan: Right shoulder pain I really think this is impingement's rotator cuff in some fashion we talked about options I think a great option would be to proceed with a subacromial injection as a diagnostic and therapeutic tool she will continue to see physical therapy they will also address her shoulder if her symptoms persist we could pursue other means of testing  Cortisone Injection. See procedure note.  Cortisone Injection for DIAGNOSTIC and THERAPUTIC purposes.

## 2024-08-26 ENCOUNTER — OFFICE VISIT (OUTPATIENT)
Dept: ORTHOPEDIC SURGERY | Facility: CLINIC | Age: 79
End: 2024-08-26
Payer: MEDICARE

## 2024-08-26 VITALS — HEIGHT: 64 IN | BODY MASS INDEX: 34.18 KG/M2 | WEIGHT: 200.2 LBS | TEMPERATURE: 97.7 F

## 2024-08-26 DIAGNOSIS — M75.41 IMPINGEMENT SYNDROME OF RIGHT SHOULDER: ICD-10-CM

## 2024-08-26 DIAGNOSIS — M25.511 RIGHT SHOULDER PAIN, UNSPECIFIED CHRONICITY: Primary | ICD-10-CM

## 2024-08-26 RX ADMIN — LIDOCAINE HYDROCHLORIDE 2 ML: 10 INJECTION, SOLUTION EPIDURAL; INFILTRATION; INTRACAUDAL; PERINEURAL at 14:38

## 2024-08-26 RX ADMIN — METHYLPREDNISOLONE ACETATE 80 MG: 80 INJECTION, SUSPENSION INTRA-ARTICULAR; INTRALESIONAL; INTRAMUSCULAR; SOFT TISSUE at 14:38

## 2024-08-28 RX ORDER — LIDOCAINE HYDROCHLORIDE 10 MG/ML
2 INJECTION, SOLUTION EPIDURAL; INFILTRATION; INTRACAUDAL; PERINEURAL
Status: COMPLETED | OUTPATIENT
Start: 2024-08-26 | End: 2024-08-26

## 2024-08-28 RX ORDER — METHYLPREDNISOLONE ACETATE 80 MG/ML
80 INJECTION, SUSPENSION INTRA-ARTICULAR; INTRALESIONAL; INTRAMUSCULAR; SOFT TISSUE
Status: COMPLETED | OUTPATIENT
Start: 2024-08-26 | End: 2024-08-26

## 2024-09-11 ENCOUNTER — HOSPITAL ENCOUNTER (OUTPATIENT)
Facility: HOSPITAL | Age: 79
Discharge: HOME | End: 2024-09-11
Admitting: INTERNAL MEDICINE
Payer: MEDICARE

## 2024-09-11 DIAGNOSIS — Z78.0 POSTMENOPAUSAL: ICD-10-CM

## 2024-09-11 PROCEDURE — 77080 DXA BONE DENSITY AXIAL: CPT

## 2024-09-16 ENCOUNTER — OFFICE VISIT (OUTPATIENT)
Dept: ORTHOPEDIC SURGERY | Facility: CLINIC | Age: 79
End: 2024-09-16
Payer: MEDICARE

## 2024-09-16 VITALS — BODY MASS INDEX: 32.27 KG/M2 | WEIGHT: 189 LBS | HEIGHT: 64 IN | TEMPERATURE: 97.5 F

## 2024-09-16 DIAGNOSIS — M20.41 HAMMER TOE OF RIGHT FOOT: ICD-10-CM

## 2024-09-16 DIAGNOSIS — M77.41 METATARSALGIA OF RIGHT FOOT: ICD-10-CM

## 2024-09-16 DIAGNOSIS — M19.079 ARTHRITIS OF FOOT: ICD-10-CM

## 2024-09-16 DIAGNOSIS — M19.071 ARTHRITIS OF FIRST METATARSOPHALANGEAL (MTP) JOINT OF RIGHT FOOT: Primary | ICD-10-CM

## 2024-09-16 PROCEDURE — 99213 OFFICE O/P EST LOW 20 MIN: CPT | Performed by: ORTHOPAEDIC SURGERY

## 2024-10-04 ENCOUNTER — OFFICE (OUTPATIENT)
Age: 79
End: 2024-10-04

## 2024-10-04 ENCOUNTER — OFFICE (OUTPATIENT)
Dept: URBAN - METROPOLITAN AREA CLINIC 65 | Facility: CLINIC | Age: 79
End: 2024-10-04

## 2024-10-04 VITALS
DIASTOLIC BLOOD PRESSURE: 70 MMHG | SYSTOLIC BLOOD PRESSURE: 111 MMHG | HEART RATE: 71 BPM | HEIGHT: 64 IN | DIASTOLIC BLOOD PRESSURE: 70 MMHG | HEART RATE: 71 BPM | SYSTOLIC BLOOD PRESSURE: 111 MMHG | WEIGHT: 190 LBS | WEIGHT: 190 LBS | SYSTOLIC BLOOD PRESSURE: 111 MMHG | HEART RATE: 71 BPM | DIASTOLIC BLOOD PRESSURE: 70 MMHG | DIASTOLIC BLOOD PRESSURE: 70 MMHG | SYSTOLIC BLOOD PRESSURE: 111 MMHG | HEIGHT: 64 IN | HEIGHT: 64 IN | DIASTOLIC BLOOD PRESSURE: 70 MMHG | HEART RATE: 71 BPM | HEART RATE: 71 BPM | HEART RATE: 71 BPM | HEIGHT: 64 IN | HEIGHT: 64 IN | WEIGHT: 190 LBS | DIASTOLIC BLOOD PRESSURE: 70 MMHG | DIASTOLIC BLOOD PRESSURE: 70 MMHG | HEIGHT: 64 IN | HEIGHT: 64 IN | SYSTOLIC BLOOD PRESSURE: 111 MMHG | WEIGHT: 190 LBS | SYSTOLIC BLOOD PRESSURE: 111 MMHG | HEART RATE: 71 BPM | WEIGHT: 190 LBS | WEIGHT: 190 LBS | WEIGHT: 190 LBS | SYSTOLIC BLOOD PRESSURE: 111 MMHG

## 2024-10-04 DIAGNOSIS — G43.D0 ABDOMINAL MIGRAINE, NOT INTRACTABLE: ICD-10-CM

## 2024-10-04 DIAGNOSIS — M62.9 DISORDER OF MUSCLE, UNSPECIFIED: ICD-10-CM

## 2024-10-04 DIAGNOSIS — K21.9 GASTRO-ESOPHAGEAL REFLUX DISEASE WITHOUT ESOPHAGITIS: ICD-10-CM

## 2024-10-04 DIAGNOSIS — K58.9 IRRITABLE BOWEL SYNDROME, UNSPECIFIED: ICD-10-CM

## 2024-10-04 DIAGNOSIS — K57.90 DIVERTICULOSIS OF INTESTINE, PART UNSPECIFIED, WITHOUT PERFO: ICD-10-CM

## 2024-10-04 PROCEDURE — 99213 OFFICE O/P EST LOW 20 MIN: CPT | Performed by: INTERNAL MEDICINE

## 2024-11-11 ENCOUNTER — OFFICE VISIT (OUTPATIENT)
Dept: ORTHOPEDIC SURGERY | Facility: CLINIC | Age: 79
End: 2024-11-11
Payer: MEDICARE

## 2024-11-11 VITALS — BODY MASS INDEX: 34.02 KG/M2 | WEIGHT: 192 LBS | TEMPERATURE: 98 F | HEIGHT: 63 IN

## 2024-11-11 DIAGNOSIS — M75.101 TEAR OF RIGHT ROTATOR CUFF, UNSPECIFIED TEAR EXTENT, UNSPECIFIED WHETHER TRAUMATIC: Primary | ICD-10-CM

## 2024-11-11 PROCEDURE — 1160F RVW MEDS BY RX/DR IN RCRD: CPT | Performed by: ORTHOPAEDIC SURGERY

## 2024-11-11 PROCEDURE — 1159F MED LIST DOCD IN RCRD: CPT | Performed by: ORTHOPAEDIC SURGERY

## 2024-11-11 PROCEDURE — 99213 OFFICE O/P EST LOW 20 MIN: CPT | Performed by: ORTHOPAEDIC SURGERY

## 2024-11-11 NOTE — PROGRESS NOTES
Patient: Mahnaz Becerra  YOB: 1945  Date of Service: 11/11/2024    Chief Complaints: Right shoulder pain    Subjective:    History of Present Illness: Pt is seen in the office today with complaints of right shoulder pain I saw her last we thought it was rotator cuff we injected her she states she got very temporary relief her shoulder is bothering her with activities bothering her at rest        Allergies:   Allergies   Allergen Reactions    Chlorhexidine Gluconate Hives    Flagyl [Metronidazole] Swelling     Lips and Tongue      Levofloxacin Swelling and Rash     RED RASH    Lansoprazole Itching and Other (See Comments)     AND TURN RED    Paxlovid [Nirmatrelvir-Ritonavir] Unknown - Low Severity     UNSURE OF REACTION BUT REMEMBERS MAKING HER VERY ILL    Cefdinir GI Intolerance     Abdominal pain, caused upset stomach    Trazodone And Nefazodone Myalgia       Medications:   Home Medications:  Current Outpatient Medications on File Prior to Visit   Medication Sig    albuterol (VENTOLIN HFA) 108 (90 BASE) MCG/ACT inhaler Inhale 2 puffs Every 6 (Six) Hours As Needed for wheezing.    cyclobenzaprine (FLEXERIL) 10 MG tablet Take 1 tablet by mouth 2 (Two) Times a Day As Needed for Muscle Spasms.    dicyclomine (BENTYL) 20 MG tablet Take 1 tablet by mouth As Needed (abd cramps).    ezetimibe (ZETIA) 10 MG tablet Take 1 tablet by mouth Daily.    famotidine (PEPCID) 40 MG tablet Take 1 tablet by mouth At Night As Needed for Heartburn.    hydrochlorothiazide (HYDRODIURIL) 25 MG tablet TAKE 1 TABLET BY MOUTH DAILY    multivitamin (THERAGRAN) tablet tablet Take 1 tablet by mouth Daily.    pantoprazole (PROTONIX) 40 MG EC tablet TAKE 1 TABLET BY MOUTH TWICE DAILY (Patient taking differently: Take 1 tablet by mouth Daily.)    sucralfate (CARAFATE) 1 g tablet Take 1 tablet by mouth 3 (Three) Times a Day As Needed (heartburn).    traMADol (ULTRAM) 50 MG tablet Take 1 tablet by mouth Every 6 (Six) Hours As Needed  for Moderate Pain.    VITAMIN E PO Take 1 tablet/day by mouth Daily. HOLDING FOR DOS     No current facility-administered medications on file prior to visit.     Current Medications:  Scheduled Meds:  Continuous Infusions:No current facility-administered medications for this visit.    PRN Meds:.    I have reviewed the patient's medical history in detail and updated the computerized patient record.  Review and summarization of old records include:    Past Medical History:   Diagnosis Date    Acid reflux     Anesthesia complication     ANESTHESIA HAS BROUGHT ON ASTHMA ATTACKS     Asthma     Bronchitis     Cat bite     RIGHT HAND, HEALING. CURRENTLY ON ANTIBIOTIC. INSTR TO LET DR CHAU'S OFFICE KNOW    [place]     FREQUENT    Diverticulitis     Gastritis     Heart murmur     History of Clostridioides difficile infection     POST LEFT KNEE REPLACEMENT. MESS FOR IC    History of skin cancer     BACK    Hyperlipidemia     IBS (irritable bowel syndrome)     Migraines     MS (multiple sclerosis)     Osteoarthritis     PONV (postoperative nausea and vomiting)     Patient states she had post-op nausea and vomiting after hysterectomy in .    Right foot pain     Sleep apnea     CANT TOLERATE CPAP    Tremor     RIGHT ARM    Unsteadiness on feet         Past Surgical History:   Procedure Laterality Date    APPENDECTOMY      BREAST LUMPECTOMY Bilateral     BREAST SURGERY      REDUCTION     SECTION  1977    CHEILECTOMY Right 2023    Procedure: right first metatarsophalangeal joint bone spur removal and synovectomy;  Surgeon: Collin Chau MD;  [place] [facility] [place] [facility] [place]  Service: Orthopedics;  Laterality: Right;    CHOLECYSTECTOMY      COLONOSCOPY  2012    Dr Moe    COLONOSCOPY N/A 2020    Procedure: COLONOSCOPY TO CECUM AND TERM. ILEUM WITH BIOPSIES;  Surgeon: Inder Pat MD;  Location: [facility] ENDOSCOPY;  Service: Gastroenterology;  Laterality: N/A;  PRE OP - CHANGE  IN BOWEL HABITS, DIARRHEA  POST OP - DIVERTICULOSIS    COSMETIC SURGERY Bilateral     EYELIDS    DILATATION AND CURETTAGE      ENDOSCOPY N/A 2016    Procedure: ESOPHAGOGASTRODUODENOSCOPY WITH BX;  Surgeon: Nasim Moe MD;  Location: [facility] ENDOSCOPY;  Service:     ENDOSCOPY N/A 2020    Procedure: ESOPHAGOGASTRODUODENOSCOPY WITH DUODENAL ASPIRATE, POLYPECTOMY AND BIOPSIES;  Surgeon: Inder Pat MD;  Location: [facility] ENDOSCOPY;  Service: Gastroenterology;  Laterality: N/A;  PRE OP - GERD  POST OP - GASTRIC POLYP, GASTRITIS    ENDOSCOPY W/ PEG REMOVAL  2012    Dr Moe    EYE SURGERY Bilateral     BLEPHAROPLASTY    FOOT SURGERY Right     bone spur removed - great toe    HYSTERECTOMY  1987    KNEE ARTHROSCOPY Right 2016    Procedure: KNEE ARTHROSCOPY, PARTIAL MEDIAL AND LATERAL MENISECTOMY, CHONDROPLASTY OF THE PATELLA, REMOVAL OF LOOSE BODIES;  Surgeon: Artur Pat MD;  [place] [facility] [place] [facility] [place]  Service:     KNEE ARTHROSCOPY W/ MENISCAL REPAIR Left     OOPHORECTOMY Bilateral 1997    TONSILLECTOMY      TOTAL KNEE ARTHROPLASTY Left 2018    Procedure: LEFT TOTAL KNEE ARTHROPLASTY WITH MARGRET NAVIGATION;  Surgeon: Artur Pat MD;  Location: [facility] MAIN OR;  Service:     TOTAL KNEE ARTHROPLASTY Right 2022    Procedure: RIGHT TOTAL KNEE ARTHROPLASTY WITH MARGRET NAVIGATION;  Surgeon: Artur Pat MD;  Location: [facility];  Service: Orthopedics;  Laterality: Right;        Social History     Occupational History    Not on file   Tobacco Use    Smoking status: Former     Current packs/day: 0.00     Types: Cigarettes     Quit date:      Years since quittin.8     Passive exposure: Past    Smokeless tobacco: Never    Tobacco comments:     Patient states she was a social smoker.   Vaping Use    Vaping status: Never Used   Substance and Sexual Activity    Alcohol use: Not Currently     Comment: Occasional    Drug use: No    Sexual activity: Defer       Social History     Social History Narrative    Not on file        Family History   Problem Relation Age of Onset    Hypertension Mother     Stroke Mother     Alzheimer's disease Mother     Heart disease Father     Emphysema Father     Stroke Maternal Grandmother     Cancer Maternal Grandfather     No Known Problems Paternal Grandmother     Cerebral aneurysm Paternal Grandfather     Malig Hyperthermia Neg Hx        ROS: 14 point review of systems was performed and was negative except for documented findings in HPI and today's encounter.     Allergies:   Allergies   Allergen Reactions    Chlorhexidine Gluconate Hives    Flagyl [Metronidazole] Swelling     Lips and Tongue      Levofloxacin Swelling and Rash     RED RASH    Lansoprazole Itching and Other (See Comments)     AND TURN RED    Paxlovid [Nirmatrelvir-Ritonavir] Unknown - Low Severity     UNSURE OF REACTION BUT REMEMBERS MAKING HER VERY ILL    Cefdinir GI Intolerance     Abdominal pain, caused upset stomach    Trazodone And Nefazodone Myalgia     Constitutional:  Denies fever, shaking or chills   Eyes:  Denies change in visual acuity   HENT:  Denies nasal congestion or sore throat   Respiratory:  Denies cough or shortness of breath   Cardiovascular:  Denies chest pain or severe LE edema   GI:  Denies abdominal pain, nausea, vomiting, bloody stools or diarrhea   Musculoskeletal:  Numbness, tingling, or loss of motor function only as noted above in history of present illness.  : Denies painful urination or hematuria  Integument:  Denies rash, lesion or ulceration   Neurologic:  Denies headache or focal weakness  Endocrine:  Denies lymphadenopathy  Psych:  Denies confusion or change in mental status   Hem:  Denies active bleeding      Physical Exam: 79 y.o. female  Wt Readings from Last 3 Encounters:   09/16/24 85.7 kg (189 lb)   08/26/24 90.8 kg (200 lb 3.2 oz)   08/07/24 86.6 kg (191 lb)       There is no height or weight on file to calculate BMI.    There  were no vitals filed for this visit.  Vital signs reviewed.   General Appearance:    Alert, cooperative, in no acute distress                    Ortho exam    Physical exam of the right shoulder reveals no overlying skin changes no lymphedema no lymphadenopathy.  Patient has active flexion 180 with mild symptoms and some substitution abduction is similar external rotation is to 50 and internal rotation to the upper lumbar spine with mild symptoms.  Patient has good rotator cuff strength 4+ over 5 with isometric strength testing with pain.  Patient has a positive impingement and a positive Warren sign.  Patient has good cervical range of motion which is full and asymptomatic no radicular symptoms.  Patient has a normal elbow exam.  Good distal pulses are present  Patient has pain with overhead activity and a positive Neer sign and a positive empty can sign , a positive drop arm and a definitive painful arc              Assessment: Persistent right shoulder pain despite conservative measures plan is to proceed with an MRI.  We had a long discussion regarding the rotator cuff regarding his pathology and how we address it    Plan: As above  Follow up as indicated.  Ice, elevate, and rest as needed.  Discussed conservative measures of pain control including ice, bracing.    Sil Ahmadi M.D.

## 2024-12-17 ENCOUNTER — HOSPITAL ENCOUNTER (OUTPATIENT)
Facility: HOSPITAL | Age: 79
Discharge: HOME | End: 2024-12-17
Admitting: ORTHOPAEDIC SURGERY
Payer: MEDICARE

## 2024-12-17 DIAGNOSIS — M75.101 TEAR OF RIGHT ROTATOR CUFF, UNSPECIFIED TEAR EXTENT, UNSPECIFIED WHETHER TRAUMATIC: ICD-10-CM

## 2024-12-17 PROCEDURE — 73221 MRI JOINT UPR EXTREM W/O DYE: CPT

## 2024-12-23 ENCOUNTER — TELEPHONE (OUTPATIENT)
Dept: ORTHOPEDIC SURGERY | Facility: CLINIC | Age: 79
End: 2024-12-23
Payer: MEDICARE

## 2024-12-23 NOTE — TELEPHONE ENCOUNTER
----- Message from Sil Ahmadi sent at 12/18/2024  1:08 PM EST -----  Please tell her that she has a partial tear of the rotator cuff she does have some arthritis in the shoulder joint itself.  I do not look at this and think she has to have anything surgically repaired I would like her to make a follow-up so we can discuss next best steps

## 2024-12-23 NOTE — TELEPHONE ENCOUNTER
Spoke to patient and she voiced understanding. I have her scheduled for 1/21/25 which was first available

## 2024-12-26 NOTE — PROGRESS NOTES
Shoulder MRI Follow Up      Patient: Mahnaz Becerra        YOB: 1945            Chief Complaints: Shoulder pain right      History of Present Illness: The patient is here follow-up of an MRI of the shoulder on the right.  Her MRI demonstrates marked tendinopathy of the rotator cuff no obvious tear she does have some thickening of the joint suggestive perhaps of some capsulitis she is not interested in surgical at this time      Physical Exam: 79 y.o. female  General Appearance:    Alert, cooperative, in no acute distress                 There were no vitals filed for this visit.     Patient is alert and read ×3 no acute distress appears her above-listed at height weight and age.  Affect is normal respiratory rate is normal unlabored. Heart rate regular rate rhythm, sclera, dentition and hearing are normal for the purpose of this exam.      Ortho Exam  Physical exam of the right shoulder reveals no overlying skin changes no lymphedema no lymphadenopathy.  Patient has active flexion 180 with mild symptoms abduction is similar external rotation is to 50 and internal rotation to the low MRIs as above have reviewed and agree er lumbar spine with mild symptoms.  Patient has good rotator cuff strength 4+ over 5 with isometric strength testing with pain.  Patient has a positive impingement and a positive Warren sign.  Patient has good cervical range of motion which is full and asymptomatic no radicular symptoms.  Patient has a normal elbow exam.  Good distal pulses are present  Patient has pain with overhead activity and a positive Neer sign and a positive empty can sign , a positive drop arm and a definitive painful arc     MRI Results: MRIs as above I have reviewed and agree  Large Joint Arthrocentesis: R subacromial bursa  Date/Time: 1/21/2025 3:55 PM  Consent given by: patient  Site marked: site marked  Timeout: Immediately prior to procedure a time out was called to verify the correct patient,  procedure, equipment, support staff and site/side marked as required   Supporting Documentation  Indications: pain   Procedure Details  Location: shoulder - R subacromial bursa  Preparation: Patient was prepped and draped in the usual sterile fashion  Needle gauge: 21G.  Approach: posterior  Medications administered: 80 mg methylPREDNISolone acetate 80 MG/ML; 2 mL lidocaine PF 1% 1 %  Patient tolerance: patient tolerated the procedure well with no immediate complications        Assessment/Plan:    Right shoulder pain she does have some rotator cuff involvement there is also some capsulitis she did get relief from a previous subacromial injection we will do that again today I will have her go to make an appointment for 3 weeks for a possible glenohumeral injection if she is doing well she can cancel that appointment she understands her options going forward

## 2025-01-21 ENCOUNTER — OFFICE VISIT (OUTPATIENT)
Dept: ORTHOPEDIC SURGERY | Facility: CLINIC | Age: 80
End: 2025-01-21
Payer: MEDICARE

## 2025-01-21 VITALS — HEIGHT: 63 IN | WEIGHT: 190 LBS | TEMPERATURE: 98.2 F | BODY MASS INDEX: 33.66 KG/M2

## 2025-01-21 DIAGNOSIS — M75.41 IMPINGEMENT SYNDROME OF RIGHT SHOULDER: Primary | ICD-10-CM

## 2025-01-21 RX ADMIN — METHYLPREDNISOLONE ACETATE 80 MG: 80 INJECTION, SUSPENSION INTRA-ARTICULAR; INTRALESIONAL; INTRAMUSCULAR; SOFT TISSUE at 15:55

## 2025-01-21 RX ADMIN — LIDOCAINE HYDROCHLORIDE 2 ML: 10 INJECTION, SOLUTION EPIDURAL; INFILTRATION; INTRACAUDAL; PERINEURAL at 15:55

## 2025-01-22 RX ORDER — LIDOCAINE HYDROCHLORIDE 10 MG/ML
2 INJECTION, SOLUTION EPIDURAL; INFILTRATION; INTRACAUDAL; PERINEURAL
Status: COMPLETED | OUTPATIENT
Start: 2025-01-21 | End: 2025-01-21

## 2025-01-22 RX ORDER — METHYLPREDNISOLONE ACETATE 80 MG/ML
80 INJECTION, SUSPENSION INTRA-ARTICULAR; INTRALESIONAL; INTRAMUSCULAR; SOFT TISSUE
Status: COMPLETED | OUTPATIENT
Start: 2025-01-21 | End: 2025-01-21

## 2025-02-13 ENCOUNTER — LAB (OUTPATIENT)
Dept: LAB | Facility: HOSPITAL | Age: 80
End: 2025-02-13
Payer: MEDICARE

## 2025-02-13 ENCOUNTER — TRANSCRIBE ORDERS (OUTPATIENT)
Dept: ADMINISTRATIVE | Facility: HOSPITAL | Age: 80
End: 2025-02-13
Payer: MEDICARE

## 2025-02-13 DIAGNOSIS — E78.5 HYPERLIPIDEMIA, UNSPECIFIED HYPERLIPIDEMIA TYPE: ICD-10-CM

## 2025-02-13 DIAGNOSIS — Z00.00 ROUTINE GENERAL MEDICAL EXAMINATION AT A HEALTH CARE FACILITY: ICD-10-CM

## 2025-02-13 DIAGNOSIS — E03.9 ACQUIRED HYPOTHYROIDISM: ICD-10-CM

## 2025-02-13 DIAGNOSIS — Z00.00 ROUTINE GENERAL MEDICAL EXAMINATION AT A HEALTH CARE FACILITY: Primary | ICD-10-CM

## 2025-02-13 LAB
ALBUMIN SERPL-MCNC: 4 G/DL (ref 3.5–5.2)
ALBUMIN/GLOB SERPL: 1.7 G/DL
ALP SERPL-CCNC: 72 U/L (ref 39–117)
ALT SERPL W P-5'-P-CCNC: 17 U/L (ref 1–33)
ANION GAP SERPL CALCULATED.3IONS-SCNC: 9 MMOL/L (ref 5–15)
AST SERPL-CCNC: 17 U/L (ref 1–32)
BACTERIA UR QL AUTO: ABNORMAL /HPF
BASOPHILS # BLD AUTO: 0.04 10*3/MM3 (ref 0–0.2)
BASOPHILS NFR BLD AUTO: 0.6 % (ref 0–1.5)
BILIRUB SERPL-MCNC: 0.3 MG/DL (ref 0–1.2)
BILIRUB UR QL STRIP: NEGATIVE
BUN SERPL-MCNC: 14 MG/DL (ref 8–23)
BUN/CREAT SERPL: 19.4 (ref 7–25)
CALCIUM SPEC-SCNC: 9.4 MG/DL (ref 8.6–10.5)
CHLORIDE SERPL-SCNC: 102 MMOL/L (ref 98–107)
CHOLEST SERPL-MCNC: 190 MG/DL (ref 0–200)
CLARITY UR: CLEAR
CO2 SERPL-SCNC: 29 MMOL/L (ref 22–29)
COLOR UR: YELLOW
CREAT SERPL-MCNC: 0.72 MG/DL (ref 0.57–1)
DEPRECATED RDW RBC AUTO: 41.1 FL (ref 37–54)
EGFRCR SERPLBLD CKD-EPI 2021: 86.2 ML/MIN/1.73
EOSINOPHIL # BLD AUTO: 0.26 10*3/MM3 (ref 0–0.4)
EOSINOPHIL NFR BLD AUTO: 3.7 % (ref 0.3–6.2)
ERYTHROCYTE [DISTWIDTH] IN BLOOD BY AUTOMATED COUNT: 12.3 % (ref 12.3–15.4)
GLOBULIN UR ELPH-MCNC: 2.3 GM/DL
GLUCOSE SERPL-MCNC: 100 MG/DL (ref 65–99)
GLUCOSE UR STRIP-MCNC: NEGATIVE MG/DL
HCT VFR BLD AUTO: 40.6 % (ref 34–46.6)
HDLC SERPL-MCNC: 60 MG/DL (ref 40–60)
HGB BLD-MCNC: 13.8 G/DL (ref 12–15.9)
HGB UR QL STRIP.AUTO: NEGATIVE
HYALINE CASTS UR QL AUTO: ABNORMAL /LPF
IMM GRANULOCYTES # BLD AUTO: 0.01 10*3/MM3 (ref 0–0.05)
IMM GRANULOCYTES NFR BLD AUTO: 0.1 % (ref 0–0.5)
KETONES UR QL STRIP: NEGATIVE
LDLC SERPL CALC-MCNC: 113 MG/DL (ref 0–100)
LDLC/HDLC SERPL: 1.85 {RATIO}
LEUKOCYTE ESTERASE UR QL STRIP.AUTO: ABNORMAL
LYMPHOCYTES # BLD AUTO: 1.86 10*3/MM3 (ref 0.7–3.1)
LYMPHOCYTES NFR BLD AUTO: 26.8 % (ref 19.6–45.3)
MCH RBC QN AUTO: 31.2 PG (ref 26.6–33)
MCHC RBC AUTO-ENTMCNC: 34 G/DL (ref 31.5–35.7)
MCV RBC AUTO: 91.9 FL (ref 79–97)
MONOCYTES # BLD AUTO: 0.45 10*3/MM3 (ref 0.1–0.9)
MONOCYTES NFR BLD AUTO: 6.5 % (ref 5–12)
NEUTROPHILS NFR BLD AUTO: 4.32 10*3/MM3 (ref 1.7–7)
NEUTROPHILS NFR BLD AUTO: 62.3 % (ref 42.7–76)
NITRITE UR QL STRIP: NEGATIVE
NRBC BLD AUTO-RTO: 0 /100 WBC (ref 0–0.2)
PH UR STRIP.AUTO: 7 [PH] (ref 5–8)
PLATELET # BLD AUTO: 280 10*3/MM3 (ref 140–450)
PMV BLD AUTO: 9.8 FL (ref 6–12)
POTASSIUM SERPL-SCNC: 4.2 MMOL/L (ref 3.5–5.2)
PROT SERPL-MCNC: 6.3 G/DL (ref 6–8.5)
PROT UR QL STRIP: NEGATIVE
RBC # BLD AUTO: 4.42 10*6/MM3 (ref 3.77–5.28)
RBC # UR STRIP: ABNORMAL /HPF
REF LAB TEST METHOD: ABNORMAL
SODIUM SERPL-SCNC: 140 MMOL/L (ref 136–145)
SP GR UR STRIP: 1.02 (ref 1–1.03)
SQUAMOUS #/AREA URNS HPF: ABNORMAL /HPF
TRIGL SERPL-MCNC: 94 MG/DL (ref 0–150)
TSH SERPL DL<=0.05 MIU/L-ACNC: 2.56 UIU/ML (ref 0.27–4.2)
UROBILINOGEN UR QL STRIP: ABNORMAL
VLDLC SERPL-MCNC: 17 MG/DL (ref 5–40)
WBC # UR STRIP: ABNORMAL /HPF
WBC NRBC COR # BLD AUTO: 6.94 10*3/MM3 (ref 3.4–10.8)

## 2025-02-13 PROCEDURE — 81001 URINALYSIS AUTO W/SCOPE: CPT

## 2025-02-13 PROCEDURE — 80061 LIPID PANEL: CPT

## 2025-02-13 PROCEDURE — 36415 COLL VENOUS BLD VENIPUNCTURE: CPT

## 2025-02-13 PROCEDURE — 85025 COMPLETE CBC W/AUTO DIFF WBC: CPT

## 2025-02-13 PROCEDURE — 84443 ASSAY THYROID STIM HORMONE: CPT

## 2025-02-13 PROCEDURE — 80053 COMPREHEN METABOLIC PANEL: CPT

## 2025-02-28 NOTE — PROGRESS NOTES
Patient: Mahnaz Becerra  YOB: 1945  Date of Service: 2/28/2025    Chief Complaints: Right shoulder pain    Subjective:    History of Present Illness: Pt is seen in the office today with complaints of right shoulder pain I last saw her she had an MRI which showed some tendinopathy of the cuff she had some thickening of the capsule to suggest some capsulitis we did a glenohumeral injection she got about 65% relief in her symptoms still having some she is not interested in surgical we discussed previously trying a glenohumeral injection which I think is reasonable.  She is seeing physical therapy        Allergies:   Allergies   Allergen Reactions    Chlorhexidine Gluconate Hives    Flagyl [Metronidazole] Swelling     Lips and Tongue      Levofloxacin Swelling and Rash     RED RASH    Lansoprazole Itching and Other (See Comments)     AND TURN RED    Paxlovid [Nirmatrelvir-Ritonavir] Unknown - Low Severity     UNSURE OF REACTION BUT REMEMBERS MAKING HER VERY ILL    Cefdinir GI Intolerance     Abdominal pain, caused upset stomach    Trazodone And Nefazodone Myalgia       Medications:   Home Medications:  Current Outpatient Medications on File Prior to Visit   Medication Sig    albuterol (VENTOLIN HFA) 108 (90 BASE) MCG/ACT inhaler Inhale 2 puffs Every 6 (Six) Hours As Needed for wheezing.    cyclobenzaprine (FLEXERIL) 10 MG tablet Take 1 tablet by mouth 2 (Two) Times a Day As Needed for Muscle Spasms.    dicyclomine (BENTYL) 20 MG tablet Take 1 tablet by mouth As Needed (abd cramps).    ezetimibe (ZETIA) 10 MG tablet Take 1 tablet by mouth Daily.    famotidine (PEPCID) 40 MG tablet Take 1 tablet by mouth At Night As Needed for Heartburn.    hydrochlorothiazide (HYDRODIURIL) 25 MG tablet TAKE 1 TABLET BY MOUTH DAILY    multivitamin (THERAGRAN) tablet tablet Take 1 tablet by mouth Daily.    pantoprazole (PROTONIX) 40 MG EC tablet TAKE 1 TABLET BY MOUTH TWICE DAILY (Patient taking differently: Take 1 tablet  by mouth Daily.)    sucralfate (CARAFATE) 1 g tablet Take 1 tablet by mouth 3 (Three) Times a Day As Needed (heartburn).    traMADol (ULTRAM) 50 MG tablet Take 1 tablet by mouth Every 6 (Six) Hours As Needed for Moderate Pain.    VITAMIN E PO Take 1 tablet/day by mouth Daily. HOLDING FOR DOS     No current facility-administered medications on file prior to visit.     Current Medications:  Scheduled Meds:  Continuous Infusions:No current facility-administered medications for this visit.    PRN Meds:.    I have reviewed the patient's medical history in detail and updated the computerized patient record.  Review and summarization of old records include:    Past Medical History:   Diagnosis Date    Acid reflux     Anesthesia complication     ANESTHESIA HAS BROUGHT ON ASTHMA ATTACKS     Asthma     Bronchitis     Cat bite     RIGHT HAND, HEALING. CURRENTLY ON ANTIBIOTIC. INSTR TO LET DR CHAU'S OFFICE KNOW    Ede     FREQUENT    Diverticulitis     Gastritis     Heart murmur     History of Clostridioides difficile infection     POST LEFT KNEE REPLACEMENT. MESS FOR IC    History of skin cancer     BACK    Hyperlipidemia     IBS (irritable bowel syndrome)     Migraines     MS (multiple sclerosis)     Osteoarthritis     PONV (postoperative nausea and vomiting)     Patient states she had post-op nausea and vomiting after hysterectomy in .    Right foot pain     Sleep apnea     CANT TOLERATE CPAP    Tremor     RIGHT ARM    Unsteadiness on feet         Past Surgical History:   Procedure Laterality Date    APPENDECTOMY      BREAST LUMPECTOMY Bilateral     BREAST SURGERY      REDUCTION     SECTION  1977    CHEILECTOMY Right 2023    Procedure: right first metatarsophalangeal joint bone spur removal and synovectomy;  Surgeon: Collin Chau MD;  [place] [facility] [place] [facility] [place]: [place]opedics;  Laterality: Right;    CHOLECYSTECTOMY      COLONOSCOPY  2012    Dr Moe     COLONOSCOPY N/A 2020    Procedure: COLONOSCOPY TO CECUM AND TERM. ILEUM WITH BIOPSIES;  Surgeon: Inder Pat MD;  Location: [facility] ENDOSCOPY;  Service: Gastroenterology;  Laterality: N/A;  PRE OP - CHANGE IN BOWEL HABITS, DIARRHEA  POST OP - DIVERTICULOSIS    COSMETIC SURGERY Bilateral     EYELIDS    DILATATION AND CURETTAGE  1979    ENDOSCOPY N/A 2016    Procedure: ESOPHAGOGASTRODUODENOSCOPY WITH BX;  Surgeon: Nasim Moe MD;  [facility] ENDOSCOPY;  Service:     ENDOSCOPY N/A 2020    Procedure: ESOPHAGOGASTRODUODENOSCOPY WITH DUODENAL ASPIRATE, POLYPECTOMY AND BIOPSIES;  Surgeon: Inder Pat MD;  Location: [facility] ENDOSCOPY;  Service: Gastroenterology;  Laterality: N/A;  PRE OP - GERD  POST OP - GASTRIC POLYP, GASTRITIS    ENDOSCOPY W/ PEG REMOVAL  2012    Dr Moe    EYE SURGERY Bilateral     BLEPHAROPLASTY    FOOT SURGERY Right     bone spur removed - great toe    HYSTERECTOMY      KNEE ARTHROSCOPY Right 2016    Procedure: KNEE ARTHROSCOPY, PARTIAL MEDIAL AND LATERAL MENISECTOMY, CHONDROPLASTY OF THE PATELLA, REMOVAL OF LOOSE BODIES;  Surgeon: Artur Pat MD;  [place] [facility] [place] [facility] [place] [facility] [place]:     KNEE ARTHROSCOPY W/ MENISCAL REPAIR Left     OOPHORECTOMY Bilateral 1997    TONSILLECTOMY      TOTAL KNEE ARTHROPLASTY Left 2018    Procedure: LEFT TOTAL KNEE ARTHROPLASTY WITH MARGRET NAVIGATION;  Surgeon: Artur Pat MD;  Location: [facility] MAIN OR;  Service:     TOTAL KNEE ARTHROPLASTY Right 2022    Procedure: RIGHT TOTAL KNEE ARTHROPLASTY WITH MARGRET NAVIGATION;  Surgeon: Artur Pat MD;  [place] [facility] [place] [facility] [place] [facility] [place]  Service: [place]opedics;  Laterality: Right;        Social History     Occupational History    Not on file   Tobacco Use    Smoking status: Former     Current packs/day: 0.00     Types: Cigarettes     Quit date:      Years since quittin.1     Passive exposure: Past    Smokeless tobacco: Never    Tobacco comments:      Patient states she was a social smoker.   Vaping Use    Vaping status: Never Used   Substance and Sexual Activity    Alcohol use: Not Currently     Comment: Occasional    Drug use: No    Sexual activity: Defer      Social History     Social History Narrative    Not on file        Family History   Problem Relation Age of Onset    Hypertension Mother     Stroke Mother     Alzheimer's disease Mother     Heart disease Father     Emphysema Father     Stroke Maternal Grandmother     Cancer Maternal Grandfather     No Known Problems Paternal Grandmother     Cerebral aneurysm Paternal Grandfather     Malig Hyperthermia Neg Hx        ROS: 14 point review of systems was performed and was negative except for documented findings in HPI and today's encounter.     Allergies:   Allergies   Allergen Reactions    Chlorhexidine Gluconate Hives    Flagyl [Metronidazole] Swelling     Lips and Tongue      Levofloxacin Swelling and Rash     RED RASH    Lansoprazole Itching and Other (See Comments)     AND TURN RED    Paxlovid [Nirmatrelvir-Ritonavir] Unknown - Low Severity     UNSURE OF REACTION BUT REMEMBERS MAKING HER VERY ILL    Cefdinir GI Intolerance     Abdominal pain, caused upset stomach    Trazodone And Nefazodone Myalgia     Constitutional:  Denies fever, shaking or chills   Eyes:  Denies change in visual acuity   HENT:  Denies nasal congestion or sore throat   Respiratory:  Denies cough or shortness of breath   Cardiovascular:  Denies chest pain or severe LE edema   GI:  Denies abdominal pain, nausea, vomiting, bloody stools or diarrhea   Musculoskeletal:  Numbness, tingling, or loss of motor function only as noted above in history of present illness.  : Denies painful urination or hematuria  Integument:  Denies rash, lesion or ulceration   Neurologic:  Denies headache or focal weakness  Endocrine:  Denies lymphadenopathy  Psych:  Denies confusion or change in mental status   Hem:  Denies active bleeding      Physical Exam:  79 y.o. female  Wt Readings from Last 3 Encounters:   01/21/25 86.2 kg (190 lb)   11/11/24 87.1 kg (192 lb)   09/16/24 85.7 kg (189 lb)       There is no height or weight on file to calculate BMI.    There were no vitals filed for this visit.  Vital signs reviewed.   General Appearance:    Alert, cooperative, in no acute distress                    Ortho exam      Physical exam of the right shoulder reveals no overlying skin changes no lymphedema no lymphadenopathy.  Patient has active flexion 180 with mild symptoms abduction is similar external rotation is to 50 and internal rotation to the lower lumbar spine with mild symptoms.  Patient has good rotator cuff strength 4+ over 5 with isometric strength testing with pain.  Patient has a positive impingement and a positive Warren sign.  Patient has good cervical range of motion which is full and asymptomatic no radicular symptoms.  Patient has a normal elbow exam.  Good distal pulses are present  Patient has pain with overhead activity and a positive Neer sign and a positive empty can sign , a positive drop arm and a definitive painful arc            Assessment: right shoulder pain I last saw her she had an MRI which showed some tendinopathy of the cuff she had some thickening of the capsule to suggest some capsulitis we did a glenohumeral injection she got about 65% relief in her symptoms still having some she is not interested in surgical we discussed previously trying a glenohumeral injection which I think is reasonable.  She is seeing physical therapy    Plan: As above  Follow up as indicated.  Ice, elevate, and rest as needed.  Discussed conservative measures of pain control including ice, bracing.  Large Joint Arthrocentesis: R glenohumeral  Date/Time: 3/4/2025 4:22 PM  Consent given by: patient  Site marked: site marked  Timeout: Immediately prior to procedure a time out was called to verify the correct patient, procedure, equipment, support staff and  site/side marked as required   Supporting Documentation  Indications: pain   Procedure Details  Location: shoulder - R glenohumeral  Preparation: Patient was prepped and draped in the usual sterile fashion  Needle gauge: 21G.  Approach: posterior  Medications administered: 1 mL methylPREDNISolone acetate 80 MG/ML; 2 mL lidocaine PF 1% 1 %  Patient tolerance: patient tolerated the procedure well with no immediate complications      Sil Ahmadi M.D.

## 2025-03-04 ENCOUNTER — OFFICE VISIT (OUTPATIENT)
Dept: ORTHOPEDIC SURGERY | Facility: CLINIC | Age: 80
End: 2025-03-04
Payer: MEDICARE

## 2025-03-04 VITALS — HEIGHT: 62 IN | BODY MASS INDEX: 35.33 KG/M2 | WEIGHT: 192 LBS | TEMPERATURE: 98 F

## 2025-03-04 DIAGNOSIS — M75.01 ADHESIVE CAPSULITIS OF RIGHT SHOULDER: ICD-10-CM

## 2025-03-04 DIAGNOSIS — M75.41 IMPINGEMENT SYNDROME OF RIGHT SHOULDER: Primary | ICD-10-CM

## 2025-03-04 RX ADMIN — LIDOCAINE HYDROCHLORIDE 2 ML: 10 INJECTION, SOLUTION EPIDURAL; INFILTRATION; INTRACAUDAL; PERINEURAL at 16:22

## 2025-03-04 RX ADMIN — METHYLPREDNISOLONE ACETATE 1 ML: 80 INJECTION, SUSPENSION INTRA-ARTICULAR; INTRALESIONAL; INTRAMUSCULAR; SOFT TISSUE at 16:22

## 2025-03-05 RX ORDER — LIDOCAINE HYDROCHLORIDE 10 MG/ML
2 INJECTION, SOLUTION EPIDURAL; INFILTRATION; INTRACAUDAL; PERINEURAL
Status: COMPLETED | OUTPATIENT
Start: 2025-03-04 | End: 2025-03-04

## 2025-03-05 RX ORDER — METHYLPREDNISOLONE ACETATE 80 MG/ML
1 INJECTION, SUSPENSION INTRA-ARTICULAR; INTRALESIONAL; INTRAMUSCULAR; SOFT TISSUE
Status: COMPLETED | OUTPATIENT
Start: 2025-03-04 | End: 2025-03-04

## 2025-04-25 ENCOUNTER — TRANSCRIBE ORDERS (OUTPATIENT)
Dept: LAB | Facility: HOSPITAL | Age: 80
End: 2025-04-25
Payer: MEDICARE

## 2025-04-25 ENCOUNTER — OFFICE (OUTPATIENT)
Dept: URBAN - METROPOLITAN AREA CLINIC 65 | Facility: CLINIC | Age: 80
End: 2025-04-25
Payer: MEDICARE

## 2025-04-25 VITALS
WEIGHT: 190 LBS | HEART RATE: 61 BPM | DIASTOLIC BLOOD PRESSURE: 82 MMHG | HEIGHT: 64 IN | SYSTOLIC BLOOD PRESSURE: 121 MMHG

## 2025-04-25 DIAGNOSIS — K21.9 CHRONIC GASTROESOPHAGEAL REFLUX DISEASE: Primary | ICD-10-CM

## 2025-04-25 DIAGNOSIS — G43.D0 ABDOMINAL MIGRAINE, NOT INTRACTABLE: ICD-10-CM

## 2025-04-25 DIAGNOSIS — K21.9 GASTRO-ESOPHAGEAL REFLUX DISEASE WITHOUT ESOPHAGITIS: ICD-10-CM

## 2025-04-25 DIAGNOSIS — K58.9 IRRITABLE BOWEL SYNDROME, UNSPECIFIED: ICD-10-CM

## 2025-04-25 DIAGNOSIS — K58.9 IRRITABLE BOWEL SYNDROME, UNSPECIFIED TYPE: ICD-10-CM

## 2025-04-25 PROCEDURE — 99214 OFFICE O/P EST MOD 30 MIN: CPT | Performed by: INTERNAL MEDICINE

## 2025-04-25 RX ORDER — UBIDECARENONE 200 MG
400 CAPSULE ORAL
Qty: 60 | Refills: 12 | Status: ACTIVE
Start: 2025-04-25

## 2025-08-20 ENCOUNTER — TRANSCRIBE ORDERS (OUTPATIENT)
Dept: ADMINISTRATIVE | Facility: HOSPITAL | Age: 80
End: 2025-08-20
Payer: MEDICARE

## 2025-08-20 ENCOUNTER — LAB (OUTPATIENT)
Dept: LAB | Facility: HOSPITAL | Age: 80
End: 2025-08-20
Payer: MEDICARE

## 2025-08-20 DIAGNOSIS — R73.09 IMPAIRED GLUCOSE TOLERANCE TEST: ICD-10-CM

## 2025-08-20 DIAGNOSIS — I10 HYPERTENSION, UNSPECIFIED TYPE: ICD-10-CM

## 2025-08-20 DIAGNOSIS — E78.5 HYPERLIPIDEMIA, UNSPECIFIED HYPERLIPIDEMIA TYPE: Primary | ICD-10-CM

## 2025-08-20 DIAGNOSIS — E78.5 HYPERLIPIDEMIA, UNSPECIFIED HYPERLIPIDEMIA TYPE: ICD-10-CM

## 2025-08-20 LAB
ALBUMIN SERPL-MCNC: 4.1 G/DL (ref 3.5–5.2)
ALBUMIN/GLOB SERPL: 1.5 G/DL
ALP SERPL-CCNC: 68 U/L (ref 39–117)
ALT SERPL W P-5'-P-CCNC: 14 U/L (ref 1–33)
ANION GAP SERPL CALCULATED.3IONS-SCNC: 13.6 MMOL/L (ref 5–15)
AST SERPL-CCNC: 20 U/L (ref 1–32)
BILIRUB SERPL-MCNC: 0.5 MG/DL (ref 0–1.2)
BUN SERPL-MCNC: 13 MG/DL (ref 8–23)
BUN/CREAT SERPL: 16.9 (ref 7–25)
CALCIUM SPEC-SCNC: 9.8 MG/DL (ref 8.6–10.5)
CHLORIDE SERPL-SCNC: 103 MMOL/L (ref 98–107)
CHOLEST SERPL-MCNC: 173 MG/DL (ref 0–200)
CO2 SERPL-SCNC: 26.4 MMOL/L (ref 22–29)
CREAT SERPL-MCNC: 0.77 MG/DL (ref 0.57–1)
EGFRCR SERPLBLD CKD-EPI 2021: 79.1 ML/MIN/1.73
GLOBULIN UR ELPH-MCNC: 2.7 GM/DL
GLUCOSE SERPL-MCNC: 125 MG/DL (ref 65–99)
HBA1C MFR BLD: 6.2 % (ref 4.8–5.6)
HDLC SERPL-MCNC: 47 MG/DL (ref 40–60)
LDLC SERPL CALC-MCNC: 103 MG/DL (ref 0–100)
LDLC/HDLC SERPL: 2.12 {RATIO}
POTASSIUM SERPL-SCNC: 3.9 MMOL/L (ref 3.5–5.2)
PROT SERPL-MCNC: 6.8 G/DL (ref 6–8.5)
SODIUM SERPL-SCNC: 143 MMOL/L (ref 136–145)
TRIGL SERPL-MCNC: 131 MG/DL (ref 0–150)
VLDLC SERPL-MCNC: 23 MG/DL (ref 5–40)

## 2025-08-20 PROCEDURE — 83036 HEMOGLOBIN GLYCOSYLATED A1C: CPT

## 2025-08-20 PROCEDURE — 36415 COLL VENOUS BLD VENIPUNCTURE: CPT

## 2025-08-20 PROCEDURE — 80061 LIPID PANEL: CPT

## 2025-08-20 PROCEDURE — 80053 COMPREHEN METABOLIC PANEL: CPT

## (undated) DEVICE — INTENDED FOR TISSUE SEPARATION, AND OTHER PROCEDURES THAT REQUIRE A SHARP SURGICAL BLADE TO PUNCTURE OR CUT.: Brand: BARD-PARKER ® CARBON RIB-BACK BLADES

## (undated) DEVICE — GLV SURG SENSICARE GREEN W/ALOE PF LF 9 STRL

## (undated) DEVICE — GLV SURG SENSICARE MICRO PF LF 8 STRL

## (undated) DEVICE — PK KN TOTL 40

## (undated) DEVICE — GLV SURG SIGNATURE ESSENTIAL PF LTX SZ8

## (undated) DEVICE — NEEDLE, QUINCKE, 18GX3.5": Brand: MEDLINE

## (undated) DEVICE — BITEBLOCK OMNI BLOC

## (undated) DEVICE — UNDERCAST PADDING: Brand: DEROYAL

## (undated) DEVICE — CATH ASP DUODENAL 2.5MM 180CM

## (undated) DEVICE — DRAPE,U/ SHT,SPLIT,PLAS,STERIL: Brand: MEDLINE

## (undated) DEVICE — APPL CHLORAPREP W/TINT 26ML ORNG

## (undated) DEVICE — FRCP BX RADJAW4 NDL 2.8 240CM LG OG BX40

## (undated) DEVICE — INSTRUMENT BATTERY

## (undated) DEVICE — PK ORTHO MINOR TOWER 40

## (undated) DEVICE — STPLR SKIN VISISTAT WD 35CT

## (undated) DEVICE — TBG PENCL TELESCP MEGADYNE SMOKE EVAC 10FT

## (undated) DEVICE — WEBRIL* CAST PADDING: Brand: DEROYAL

## (undated) DEVICE — GLV SURG PREMIERPRO ORTHO LTX PF SZ8.5 BRN

## (undated) DEVICE — P.F.C. DRILL BIT AND STEINMAN PIN PACKET (1 UNIT) .125IN DIA 5IN LGTH: Brand: P.F.C.

## (undated) DEVICE — TRAP FLD MINIVAC MEGADYNE 100ML

## (undated) DEVICE — BNDG ELAS CO-FLEX SLF ADHR 2IN 5YD LF STRL

## (undated) DEVICE — SUT VICRYL 1 CT1 27IN  JJ40G

## (undated) DEVICE — GLV SURG TRIUMPH CLASSIC PF LTX 8 STRL

## (undated) DEVICE — SUT VIC 3/0 SH 27IN J416H

## (undated) DEVICE — SOL IRR NACL 0.9PCT ARTHROMATIC 3000ML

## (undated) DEVICE — ADAPT CLN BIOGUARD AIR/H2O DISP

## (undated) DEVICE — KT ORCA ORCAPOD DISP STRL

## (undated) DEVICE — PREP SOL POVIDONE/IODINE BT 4OZ

## (undated) DEVICE — BNDG ELAS ELITE V/CLOSE 6IN 5YD LF STRL

## (undated) DEVICE — GOWN,NON-REINFORCED,SIRUS,SET IN SLV,XXL: Brand: MEDLINE

## (undated) DEVICE — PICO 7 10CM X 20CM: Brand: PICO™ 7

## (undated) DEVICE — STERILE PATIENT PROTECTIVE PAD FOR IMP® KNEE POSITIONERS & COHESIVE WRAP (10 / CASE): Brand: DE MAYO KNEE POSITIONER®

## (undated) DEVICE — DECANTER BAG 9": Brand: MEDLINE INDUSTRIES, INC.

## (undated) DEVICE — SYS CLS SKIN PREMIERPRO EXOFINFUSION 22CM

## (undated) DEVICE — LN SMPL CO2 SHTRM SD STREAM W/M LUER

## (undated) DEVICE — SKIN PREP TRAY W/CHG: Brand: MEDLINE INDUSTRIES, INC.

## (undated) DEVICE — PREMIUM WET SKIN PREP TRAY: Brand: MEDLINE INDUSTRIES, INC.

## (undated) DEVICE — APPL CHLORAPREP HI/LITE 26ML ORNG

## (undated) DEVICE — GLV SURG SENSICARE W/ALOE PF LF 9 STRL

## (undated) DEVICE — SENSR O2 OXIMAX FNGR A/ 18IN NONSTR

## (undated) DEVICE — GLV SURG SENSICARE POLYISPRN W/ALOE PF LF 9 GRN STRL

## (undated) DEVICE — PAD,ABDOMINAL,8"X10",ST,LF: Brand: MEDLINE

## (undated) DEVICE — GLV SURG BIOGEL LTX PF 8

## (undated) DEVICE — ENCORE® LATEX ORTHO SIZE 8, STERILE LATEX POWDER-FREE SURGICAL GLOVE: Brand: ENCORE

## (undated) DEVICE — SYR LUERLOK 30CC

## (undated) DEVICE — OPTIFOAM GENTLE SA, POSTOP, 4X12: Brand: MEDLINE

## (undated) DEVICE — PATIENT RETURN ELECTRODE, SINGLE-USE, CONTACT QUALITY MONITORING, ADULT, WITH 9FT CORD, FOR PATIENTS WEIGING OVER 33LBS. (15KG): Brand: MEGADYNE

## (undated) DEVICE — OCCLUSIVE GAUZE STRIP,3% BISMUTH TRIBROMOPHENATE IN PETROLATUM BLEND: Brand: XEROFORM

## (undated) DEVICE — TRAP,MUCUS SPECIMEN, 80CC: Brand: MEDLINE

## (undated) DEVICE — TUBING, SUCTION, 1/4" X 10', STRAIGHT: Brand: MEDLINE

## (undated) DEVICE — COVER,MAYO STAND,STERILE: Brand: MEDLINE

## (undated) DEVICE — DRSNG GZ PETROLTM XEROFORM CURAD 1X8IN STRL

## (undated) DEVICE — NDL SPINE 18G 31/2IN PNK

## (undated) DEVICE — DRSNG GZ CURAD XEROFORM NONADHS 5X9IN STRL

## (undated) DEVICE — GLV SURG TRIUMPH CLASSIC PF LTX 9 STRL

## (undated) DEVICE — SOL NACL 0.9PCT 100ML SGL

## (undated) DEVICE — P.F.C. DRILL BIT AND STEINMAN PIN PACKET (1 UNIT) .125IN DIA 5IN LGTH
Type: IMPLANTABLE DEVICE | Site: KNEE | Status: NON-FUNCTIONAL
Brand: P.F.C.
Removed: 2022-11-08

## (undated) DEVICE — ENCORE® LATEX ORTHO SIZE 8.5, STERILE LATEX POWDER-FREE SURGICAL GLOVE: Brand: ENCORE

## (undated) DEVICE — MAT FLR ABSORBENT LG 4FT 10 2.5FT

## (undated) DEVICE — GAUZE,SPONGE,FLUFF,6"X6.75",STRL,10/TRAY: Brand: MEDLINE

## (undated) DEVICE — DUAL CUT SAGITTAL BLADE

## (undated) DEVICE — TOWEL,OR,DSP,ST,BLUE,STD,4/PK,20PK/CS: Brand: MEDLINE

## (undated) DEVICE — THE TORRENT IRRIGATION SCOPE CONNECTOR IS USED WITH THE TORRENT IRRIGATION TUBING TO PROVIDE IRRIGATION FLUIDS SUCH AS STERILE WATER DURING GASTROINTESTINAL ENDOSCOPIC PROCEDURES WHEN USED IN CONJUNCTION WITH AN IRRIGATION PUMP (OR ELECTROSURGICAL UNIT).: Brand: TORRENT

## (undated) DEVICE — BNDG GZ SOF-FORM CONFRM 2X75IN LF STRL

## (undated) DEVICE — ELECTRD NDL EZ CLN MOD 2.75IN

## (undated) DEVICE — MSK PROC CURAPLEX O2 2/ADAPT 7FT

## (undated) DEVICE — 2108 SERIES SAGITTAL BLADE, NO OFFSET  (12.4 X 1.19 X 82.1MM)